# Patient Record
Sex: FEMALE | Race: WHITE | NOT HISPANIC OR LATINO | Employment: OTHER | URBAN - METROPOLITAN AREA
[De-identification: names, ages, dates, MRNs, and addresses within clinical notes are randomized per-mention and may not be internally consistent; named-entity substitution may affect disease eponyms.]

---

## 2017-03-02 ENCOUNTER — HOSPITAL ENCOUNTER (OUTPATIENT)
Dept: MAMMOGRAPHY | Facility: CLINIC | Age: 72
Discharge: HOME | End: 2017-03-02
Attending: EMERGENCY MEDICINE | Admitting: EMERGENCY MEDICINE
Payer: MEDICARE

## 2017-03-02 DIAGNOSIS — Z12.31 VISIT FOR SCREENING MAMMOGRAM: ICD-10-CM

## 2017-03-02 PROCEDURE — 77063 BREAST TOMOSYNTHESIS BI: CPT

## 2017-03-06 ENCOUNTER — TRANSFERRED RECORDS (OUTPATIENT)
Dept: HEALTH INFORMATION MANAGEMENT | Facility: CLINIC | Age: 72
End: 2017-03-06

## 2017-03-20 DIAGNOSIS — I10 ESSENTIAL HYPERTENSION WITH GOAL BLOOD PRESSURE LESS THAN 130/80: ICD-10-CM

## 2017-03-20 NOTE — TELEPHONE ENCOUNTER
lisinopril 20mg      Last Written Prescription Date: 05/12/16  Last Fill Quantity: 90, # refills: 2  Last Office Visit with G, P or [facility] prescribing provider: 08/05/16       Potassium   Date Value Ref Range Status   08/05/2016 4.3 3.4 - 5.3 mmol/L Final     Creatinine   Date Value Ref Range Status   08/05/2016 0.76 0.52 - 1.04 mg/dL Final     BP Readings from Last 3 Encounters:   08/05/16 112/74   07/27/16 135/81   05/12/16 120/80

## 2017-03-21 RX ORDER — LISINOPRIL 20 MG/1
20 TABLET ORAL DAILY
Qty: 90 TABLET | Refills: 1 | Status: SHIPPED | OUTPATIENT
Start: 2017-03-21 | End: 2017-09-22

## 2017-04-20 ENCOUNTER — HOSPITAL ENCOUNTER (OUTPATIENT)
Dept: CT IMAGING | Facility: CLINIC | Age: 72
Discharge: HOME | End: 2017-04-20
Attending: INTERNAL MEDICINE | Admitting: INTERNAL MEDICINE
Payer: MEDICARE

## 2017-04-20 DIAGNOSIS — R91.8 PULMONARY NODULES: ICD-10-CM

## 2017-04-20 LAB
CREAT BLD-MCNC: 0.8 MG/DL (ref 0.52–1.04)
GFR SERPL CREATININE-BSD FRML MDRD: 70 ML/MIN/1.7M2

## 2017-04-20 PROCEDURE — 71260 CT THORAX DX C+: CPT

## 2017-04-20 PROCEDURE — 82565 ASSAY OF CREATININE: CPT

## 2017-04-20 PROCEDURE — 25500064 ZZH RX 255 OP 636: Performed by: INTERNAL MEDICINE

## 2017-04-20 PROCEDURE — 25000125 ZZHC RX 250: Performed by: INTERNAL MEDICINE

## 2017-04-20 RX ORDER — IOPAMIDOL 755 MG/ML
75 INJECTION, SOLUTION INTRAVASCULAR ONCE
Status: COMPLETED | OUTPATIENT
Start: 2017-04-20 | End: 2017-04-20

## 2017-04-20 RX ADMIN — IOPAMIDOL 75 ML: 755 INJECTION, SOLUTION INTRAVENOUS at 10:43

## 2017-04-20 RX ADMIN — SODIUM CHLORIDE 70 ML: 9 INJECTION, SOLUTION INTRAVENOUS at 10:44

## 2017-04-21 NOTE — PROGRESS NOTES
[facility]  [address] [address]  Tel. [phone]  Fax [phone]      Dear Alondra,   Your recent CT scan lungs results were reviewed and are within acceptable limits. Please continue with treatment, and follow up plans as discussed and do not hesitate to contact me with any questions or concerns, or if your symptoms do not improve or resolve.  Stay healthy!    Regards,  Dr. Kramer  [facility]

## 2017-07-10 ENCOUNTER — OFFICE VISIT (OUTPATIENT)
Dept: INTERNAL MEDICINE | Facility: CLINIC | Age: 72
End: 2017-07-10
Payer: COMMERCIAL

## 2017-07-10 VITALS
HEIGHT: 64 IN | HEART RATE: 69 BPM | OXYGEN SATURATION: 95 % | BODY MASS INDEX: 28.03 KG/M2 | SYSTOLIC BLOOD PRESSURE: 110 MMHG | TEMPERATURE: 98.6 F | WEIGHT: 164.2 LBS | DIASTOLIC BLOOD PRESSURE: 84 MMHG

## 2017-07-10 DIAGNOSIS — E53.8 VITAMIN B12 DEFICIENCY WITHOUT ANEMIA: ICD-10-CM

## 2017-07-10 DIAGNOSIS — I10 ESSENTIAL HYPERTENSION: ICD-10-CM

## 2017-07-10 DIAGNOSIS — E53.8 VITAMIN B12 DEFICIENCY (NON ANEMIC): ICD-10-CM

## 2017-07-10 DIAGNOSIS — E78.5 HYPERLIPIDEMIA LDL GOAL <130: ICD-10-CM

## 2017-07-10 DIAGNOSIS — H26.9 CATARACT OF BOTH EYES, UNSPECIFIED CATARACT TYPE: ICD-10-CM

## 2017-07-10 DIAGNOSIS — Z01.818 PREOP GENERAL PHYSICAL EXAM: Primary | ICD-10-CM

## 2017-07-10 PROCEDURE — 80048 BASIC METABOLIC PNL TOTAL CA: CPT | Performed by: PHYSICIAN ASSISTANT

## 2017-07-10 PROCEDURE — 99214 OFFICE O/P EST MOD 30 MIN: CPT | Performed by: PHYSICIAN ASSISTANT

## 2017-07-10 PROCEDURE — 36415 COLL VENOUS BLD VENIPUNCTURE: CPT | Performed by: PHYSICIAN ASSISTANT

## 2017-07-10 NOTE — NURSING NOTE
"Chief Complaint   Patient presents with     Pre-Op Exam     catract surgery 07/17       Initial /84 (BP Location: Left arm, Patient Position: Chair, Cuff Size: Adult Regular)  Pulse 69  Temp 98.6  F (37  C) (Oral)  Ht 5' 4\" (1.626 m)  Wt 164 lb 3.2 oz (74.5 kg)  SpO2 95%  BMI 28.18 kg/m2 Estimated body mass index is 28.18 kg/(m^2) as calculated from the following:    Height as of this encounter: 5' 4\" (1.626 m).    Weight as of this encounter: 164 lb 3.2 oz (74.5 kg).  Medication Reconciliation: complete    "

## 2017-07-10 NOTE — MR AVS SNAPSHOT
After Visit Summary   7/10/2017    Alondra Morgan    MRN: [id number]           Patient Information     Date Of Birth          1945        Visit Information        Provider Department      7/10/2017 10:40 AM Gerda Camarillo PA-C [facility]        Today's Diagnoses     Preop general physical exam    -  1    Cataract of both eyes, unspecified cataract type        Essential hypertension        Vitamin B12 deficiency (non anemic)        Hyperlipidemia LDL goal <130        Vitamin B12 deficiency without anemia          Care Instructions      Before Your Surgery      Call your surgeon if there is any change in your health. This includes signs of a cold or flu (such as a sore throat, runny nose, cough, rash or fever).    Do not smoke, drink alcohol or take over the counter medicine (unless your surgeon or primary care doctor tells you to) for the 24 hours before and after surgery.    If you take prescribed drugs: Follow your doctor s orders about which medicines to take and which to stop until after surgery.    Eating and drinking prior to surgery: follow the instructions from your surgeon    Take a shower or bath the night before surgery. Use the soap your surgeon gave you to gently clean your skin. If you do not have soap from your surgeon, use your regular soap. Do not shave or scrub the surgery site.  Wear clean pajamas and have clean sheets on your bed.           Follow-ups after your visit        Who to contact     If you have questions or need follow up information about today's clinic visit or your schedule please contact [facility] directly at [phone].  Normal or non-critical lab and imaging results will be communicated to you by MyChart, letter or phone within 4 business days after the clinic has received the results. If you do not hear from us within 7 days, please contact the clinic through MyChart or phone. If you have a critical  "or abnormal lab result, we will notify you by phone as soon as possible.  Submit refill requests through [employer] or call your pharmacy and they will forward the refill request to us. Please allow 3 business days for your refill to be completed.          Additional Information About Your Visit        Medialetshart Information     [employer] gives you secure access to your electronic health record. If you see a primary care provider, you can also send messages to your care team and make appointments. If you have questions, please call your primary care clinic.  If you do not have a primary care provider, please call [phone] and they will assist you.        Care EveryWhere ID     This is your Care EveryWhere ID. This could be used by other organizations to access your [facility] medical records  [id number]        Your Vitals Were     Pulse Temperature Height Pulse Oximetry BMI (Body Mass Index)       69 98.6  F (37  C) (Oral) 5' 4\" (1.626 m) 95% 28.18 kg/m2        Blood Pressure from Last 3 Encounters:   07/10/17 110/84   08/05/16 112/74   07/27/16 135/81    Weight from Last 3 Encounters:   07/10/17 164 lb 3.2 oz (74.5 kg)   08/05/16 159 lb 9.6 oz (72.4 kg)   05/12/16 161 lb 3.2 oz (73.1 kg)              We Performed the Following     Basic metabolic panel        Primary Care Provider Office Phone # Fax #    Roxana Kramer [phone]       [facility] [address] [facility] [address]        Equal Access to Services     EMANUEL GARCÍA : Hadii bruna bernard hadasho Sobrandoali, waaxda luqadaha, qaybta kaalmada adeegyaadrian, andrew orellana. So [facility] [phone].    ATENCIÓN: Si habla español, tiene a dill disposición servicios gratuitos de asistencia lingüística. Llame al [phone].    We comply with applicable federal civil rights laws and [place] laws. We do not discriminate on the basis of race, color, national origin, age, disability sex, sexual orientation or gender " identity.            Thank you!     Thank you for choosing [facility]  for your care. Our goal is always to provide you with excellent care. Hearing back from our patients is one way we can continue to improve our services. Please take a few minutes to complete the written survey that you may receive in the mail after your visit with us. Thank you!             Your Updated Medication List - Protect others around you: Learn how to safely use, store and throw away your medicines at www.disposemymeds.org.          This list is accurate as of: 7/10/17  4:07 PM.  Always use your most recent med list.                   Brand Name Dispense Instructions for use Diagnosis    aspirin 81 MG tablet     100 tablet    Take 1 tablet (81 mg) by mouth daily INDICATION: FOR HEART AND CARDIOVASCULAR HEALTH    Hypertension       calcium-vitamin D 600-400 MG-UNIT per tablet    calcium 600 + D    100 tablet    Take 1 tablet by mouth 2 times daily FOR BONE HEALTH AND FOR VITAMIN D DEFICIENCY (LOW VITAMIN D)    Osteopenia       cholecalciferol 2000 UNITS tablet     100 tablet    Take 2 tablets by mouth daily INDICATION: TO TREAT VITAMIN D DEFICIENCY (LOW VITAMIN D)    Vitamin D deficiency       CLARITIN 10 MG tablet   Generic drug:  loratadine     3 MONTHS    ONE DAILY FOR ALLERGIES    Allergies       cyabnocobalamin 2500 MCG sublingual tablet    VITAMIN B-12    250 tablet    Take 2,500 mcg by mouth daily INDICATION: FOR VITAMIN B12 SUPPLEMENTATION - TO IMPROVE MEMORY, BALANCE, SLEEP AND MOOD, DIRECTIONS: PLEASE PLACE UNDER THE TONGUE TO DISSOLVE    Vitamin B12 deficiency (non anemic)       fish oil-omega-3 fatty acids 1000 MG capsule     100    THREE TIMES DAILY    Routine physical examination       fluticasone 50 MCG/ACT spray    FLONASE    16 g    Spray 2 sprays in nostril At Bedtime INDICATION: TO CONTROL NASAL ALLERGY SYMPTOMS, DO NOT BLOW NOSE FOR 30 MINUTES AFTER USING    Nasal congestion       lisinopril 20  MG tablet    PRINIVIL/ZESTRIL    90 tablet    Take 1 tablet (20 mg) by mouth daily INDICATION:TO LOWER BLOOD PRESSURE AND TO PRESERVE KIDNEY FUNCTION    Essential hypertension with goal blood pressure less than 130/80       methylcellulose (laxative) Powd     1418 g    Take 1 g by mouth 2 times daily (before meals) INDICATION: FIBER SUPPLEMENT/TO LOWER CHOLESTEROL    Hyperlipidemia LDL goal <130       MULTIVITAMIN PO      1 daily        Pantothenic Acid 250 MG Caps      Take 1 capsule by mouth daily        zoledronic Acid 5 MG/100ML Soln infusion    RECLAST    100 mL    Inject 100 mLs (5 mg) into the vein once for 1 dose TO TREAT OSTEOPENIA    Osteopenia

## 2017-07-10 NOTE — PROGRESS NOTES
[address] [address]  [phone]  Dept: [phone]    PRE-OP EVALUATION:  Today's date: 7/10/2017    Alondra Morgan (: 1945) presents for pre-operative evaluation assessment as requested by Dr. Gr.  She requires evaluation and anesthesia risk assessment prior to undergoing surgery/procedure for treatment of Cataract  .  Proposed procedure: bilateral cataract repair     Date of Surgery/ Procedure:  and 2017  Time of Surgery/ Procedure: [address] [place] [address]/Surgical Facility: [facility]   [phone]  Primary Physician: Roxana Kramer  Type of Anesthesia Anticipated: Local with MAC    Patient has a Health Care Directive or Living Will:  YES     1. NO - Do you have a history of heart attack, stroke, stent, bypass or surgery on an artery in the head, neck, heart or legs?  2. NO - Do you ever have any pain or discomfort in your chest?  3. NO - Do you have a history of  Heart Failure?  4. NO - Are you troubled by shortness of breath when: walking on the level, up a slight hill or at night?  5. NO - Do you currently have a cold, bronchitis or other respiratory infection?  6. NO - Do you have a cough, shortness of breath or wheezing?  7. NO - Do you sometimes get pains in the calves of your legs when you walk?  8. NO - Do you or anyone in your family have previous history of blood clots?  9. NO - Do you or does anyone in your family have a serious bleeding problem such as prolonged bleeding following surgeries or cuts?  10. NO - Have you ever had problems with anemia or been told to take iron pills?  11. NO - Have you had any abnormal blood loss such as black, tarry or bloody stools, or abnormal vaginal bleeding?  12. NO - Have you ever had a blood transfusion?  13. NO - Have you or any of your relatives ever had problems with anesthesia?  14. NO - Do you have sleep apnea, excessive snoring or daytime drowsiness?  15. NO - Do you have  any prosthetic heart valves?  16. NO - Do you have prosthetic joints?  17. NO - Is there any chance that you may be pregnant?      HPI:                                                      Brief HPI related to upcoming procedure: cataract      See problem list for active medical problems.  Problems all longstanding and stable, except as noted/documented.  See ROS for pertinent symptoms related to these conditions.                                                                                                  .  HYPERTENSION - Patient has longstanding history of mod-severe HTN , currently denies any symptoms referable to elevated blood pressure. Specifically denies chest pain, palpitations, dyspnea, orthopnea, PND or peripheral edema. Blood pressure readings have been in normal range. Current medication regimen is as listed below. Patient denies any side effects of medication.                                                                                                                                                                                          .  HYPERLIPIDEMIA - Patient has a long history of significant Hyperlipidemia requiring medication for treatment with recent good control. Patient reports no problems or side effects with the medication.                                                                                                                                                       .    MEDICAL HISTORY:                                                      Patient Active Problem List    Diagnosis Date Noted     Nasal congestion 05/12/2016     Priority: Medium     Vitamin B12 deficiency (non anemic) 05/12/2016     Priority: Medium     Nonspecific abnormal results of thyroid function study 05/12/2016     Priority: Medium     Postmenopausal status 12/04/2015     Priority: Medium     Essential hypertension 05/21/2015     Hypertension 07/30/2014     Alcohol consuption of more than two drinks per day  01/28/2013     Abnormal LFTs 01/28/2013     MODERATE ALCOHOL INTAKE       Tubular adenoma of colon 01/28/2013     Heme positive stool 01/28/2013     FH: colon cancer 01/28/2013     Hyperlipidemia LDL goal <130 01/28/2013     Vitamin B12 deficiency without anemia 10/03/2011     Diagnosis updated by automated process. Provider to review and confirm.       Family history of colon cancer 10/03/2011     Asymptomatic postmenopausal status 10/03/2011     Problem list name updated by automated process. Provider to review       FHx: osteoporosis 10/03/2011     Melanocytic nevus 10/03/2011     (Problem list name updated by automated process. Provider to review and confirm.)       Advanced directives, counseling/discussion 09/28/2011     Advance Directive Problem List Overview:   Name Relationship Phone    Primary Health Care Agent            Alternative Health Care Agent          Discussed advance care planning with patient; information given to patient to review. 9/28/2011          Vitamin D deficiency 07/06/2011     Osteopenia      Arthritis       Past Medical History:   Diagnosis Date     Arthritis      Atypical hyperplasia of breasts      Osteopenia      Polio      Polyp, colon     Adenomatous     Past Surgical History:   Procedure Laterality Date     C VAG HYST,RMV TUBE/OVARY       HC EXCISION BREAST LESION, OPEN >=1      ATYPICAL HYPERPLASIA X 2     TONSILLECTOMY       Current Outpatient Prescriptions   Medication Sig Dispense Refill     lisinopril (PRINIVIL/ZESTRIL) 20 MG tablet Take 1 tablet (20 mg) by mouth daily INDICATION:TO LOWER BLOOD PRESSURE AND TO PRESERVE KIDNEY FUNCTION 90 tablet 1     calcium-vitamin D (CALCIUM 600 + D) 600-400 MG-UNIT per tablet Take 1 tablet by mouth 2 times daily FOR BONE HEALTH AND FOR VITAMIN D DEFICIENCY (LOW VITAMIN D) 100 tablet PRN     cholecalciferol 2000 UNITS tablet Take 2 tablets by mouth daily INDICATION: TO TREAT VITAMIN D DEFICIENCY (LOW VITAMIN D) 100 tablet prn      Cyanocobalamin (VITAMIN  B-12) 2500 MCG tablet Take 2,500 mcg by mouth daily INDICATION: FOR VITAMIN B12 SUPPLEMENTATION - TO IMPROVE MEMORY, BALANCE, SLEEP AND MOOD, DIRECTIONS: PLEASE PLACE UNDER THE TONGUE TO DISSOLVE 250 tablet 3     methylcellulose, laxative, POWD Take 1 g by mouth 2 times daily (before meals) INDICATION: FIBER SUPPLEMENT/TO LOWER CHOLESTEROL 1418 g prn     Pantothenic Acid 250 MG CAPS Take 1 capsule by mouth daily       aspirin 81 MG tablet Take 1 tablet (81 mg) by mouth daily INDICATION: FOR HEART AND CARDIOVASCULAR HEALTH 100 tablet 3     CLARITIN 10 MG OR TABS ONE DAILY FOR ALLERGIES 3 MONTHS 1 YEAR     FISH OIL 1000 MG OR CAPS THREE TIMES DAILY 100 3     MULTIVITAMIN OR 1 daily       fluticasone (FLONASE) 50 MCG/ACT nasal spray Spray 2 sprays in nostril At Bedtime INDICATION: TO CONTROL NASAL ALLERGY SYMPTOMS, DO NOT BLOW NOSE FOR 30 MINUTES AFTER USING (Patient not taking: Reported on 7/10/2017) 16 g PRN     zoledronic Acid (RECLAST) 5 MG/100ML SOLN Inject 100 mLs (5 mg) into the vein once for 1 dose TO TREAT OSTEOPENIA 100 mL 0     OTC products: None, except as noted above and no recent use of OTC ASA, NSAIDS or Steroids    Allergies   Allergen Reactions     Nkda [No Known Drug Allergies]       Latex Allergy: NO    Social History   Substance Use Topics     Smoking status: Former Smoker     Packs/day: 1.00     Years: 40.00     Types: Cigarettes     Quit date: 10/7/1983     Smokeless tobacco: Never Used     Alcohol use Yes      Comment: 4 drinks per week     History   Drug Use No       REVIEW OF SYSTEMS:                                                    C: NEGATIVE for fever, chills, change in weight  INTEGUMENTARY/SKIN: NEGATIVE for worrisome rashes, moles or lesions  EYES: NEGATIVE for vision changes or irritation  E/M: NEGATIVE for ear, mouth and throat problems  R: NEGATIVE for significant cough or SOB  CV: NEGATIVE for chest pain, palpitations or peripheral edema  GI: NEGATIVE for  "nausea, abdominal pain, heartburn, or change in bowel habits  MUSCULOSKELETAL: NEGATIVE for significant arthralgias or myalgia  ENDOCRINE: NEGATIVE for temperature intolerance, skin/hair changes  HEME/ALLERGY/IMMUNE: NEGATIVE for bleeding problems  PSYCHIATRIC: NEGATIVE for changes in mood or affect  ROS otherwise negative    EXAM:                                                    /84 (BP Location: Left arm, Patient Position: Chair, Cuff Size: Adult Regular)  Pulse 69  Temp 98.6  F (37  C) (Oral)  Ht 5' 4\" (1.626 m)  Wt 164 lb 3.2 oz (74.5 kg)  SpO2 95%  BMI 28.18 kg/m2  GENERAL APPEARANCE: healthy, alert and no distress  EYES: Eyes grossly normal to inspection, PERRL and conjunctivae and sclerae normal  HENT: ear canals and TM's normal and nose and mouth without ulcers or lesions  RESP: lungs clear to auscultation - no rales, rhonchi or wheezes  CV: regular rate and rhythm, normal S1 S2, no S3 or S4 and no murmur, click or rub   ABDOMEN: soft, nontender, no HSM or masses and bowel sounds normal  MS: extremities normal- no gross deformities noted  SKIN: no suspicious lesions or rashes  NEURO: Normal strength and tone, sensory exam grossly normal, mentation intact and speech normal  PSYCH: mentation appears normal and affect normal/bright    DIAGNOSTICS:                                                      EKG: Not indicated due to non-vascular surgery and low risk of event (age <65 and without cardiac risk factors)  Labs Drawn and in Process:   Unresulted Labs Ordered in the Past 30 Days of this Admission     Date and Time Order Name Status Description    7/10/2017 1039 BASIC METABOLIC PANEL In process           Recent Labs   Lab Test  08/05/16   1515  07/27/16 2002 05/05/16   0845   04/13/14   1212   HGB   --   12.3  13.2   < >  12.7   PLT   --   299  259   < >  260   INR   --    --    --    --   0.95   NA  135  130*  133   < >  137   POTASSIUM  4.3  3.8  4.3   < >  4.8   CR  0.76  0.69  0.80   < >  " 0.73    < > = values in this interval not displayed.        IMPRESSION:                                                    Reason for surgery/procedure: cataract  Diagnosis/reason for consult: HTN, hyperlipidemia,     The proposed surgical procedure is considered LOW risk.    REVISED CARDIAC RISK INDEX  The patient has the following serious cardiovascular risks for perioperative complications such as (MI, PE, VFib and 3  AV Block):  No serious cardiac risks  INTERPRETATION: 0 risks: Class I (very low risk - 0.4% complication rate)    The patient has the following additional risks for perioperative complications:  No identified additional risks      ICD-10-CM    1. Preop general physical exam Z01.818 Basic metabolic panel   2. Cataract of both eyes, unspecified cataract type H26.9 Basic metabolic panel   3. Essential hypertension I10 Basic metabolic panel   4. Vitamin B12 deficiency (non anemic) E53.8    5. Hyperlipidemia LDL goal <130 E78.5    6. Vitamin B12 deficiency without anemia E53.8        RECOMMENDATIONS:                                                          --Patient is to take all scheduled medications on the day of surgery EXCEPT for modifications listed below.    Anticoagulant or Antiplatelet Medication Use  Bleeding risk is low for this procedure (e.g. dental, skin, cataract)        ACE Inhibitor or Angiotensin Receptor Blocker (ARB) Use  Ace inhibitor or Angiotensin Receptor Blocker (ARB) and will continue this medication     APPROVAL GIVEN to proceed with proposed procedure, without further diagnostic evaluation       Signed Electronically by: Gerda Camarillo PA-C    Copy of this evaluation report is provided to requesting physician.    [facility] Preop Guidelines

## 2017-07-11 LAB
ANION GAP SERPL CALCULATED.3IONS-SCNC: 12 MMOL/L (ref 3–14)
BUN SERPL-MCNC: 15 MG/DL (ref 7–30)
CALCIUM SERPL-MCNC: 9.9 MG/DL (ref 8.5–10.1)
CHLORIDE SERPL-SCNC: 100 MMOL/L (ref 94–109)
CO2 SERPL-SCNC: 25 MMOL/L (ref 20–32)
CREAT SERPL-MCNC: 0.7 MG/DL (ref 0.52–1.04)
GFR SERPL CREATININE-BSD FRML MDRD: 81 ML/MIN/1.7M2
GLUCOSE SERPL-MCNC: 77 MG/DL (ref 70–99)
POTASSIUM SERPL-SCNC: 4.6 MMOL/L (ref 3.4–5.3)
SODIUM SERPL-SCNC: 137 MMOL/L (ref 133–144)

## 2017-08-10 ENCOUNTER — TELEPHONE (OUTPATIENT)
Dept: INTERNAL MEDICINE | Facility: CLINIC | Age: 72
End: 2017-08-10

## 2017-08-10 DIAGNOSIS — I10 ESSENTIAL HYPERTENSION: ICD-10-CM

## 2017-08-10 DIAGNOSIS — R79.89 ABNORMAL LFTS: ICD-10-CM

## 2017-08-10 DIAGNOSIS — E55.9 VITAMIN D DEFICIENCY: Primary | ICD-10-CM

## 2017-08-10 DIAGNOSIS — E78.5 HYPERLIPIDEMIA LDL GOAL <130: ICD-10-CM

## 2017-08-10 NOTE — TELEPHONE ENCOUNTER
Reason for Call: Request for an order or referral    Order or referral being requested labs before October 24th    Date needed: as soon as possible    Has the patient been seen by the PCP for this problem? YES    Additional comments pt is having physical on October 24th needs labs before    Phone number Patient can be reached at  Home number on file [phone] (home)    Best Time anytime    Can we leave a detailed message on this number?  YES    Call taken on 8/10/2017 at 9:46 AM by Zaria Trinh

## 2017-09-22 DIAGNOSIS — I10 ESSENTIAL HYPERTENSION WITH GOAL BLOOD PRESSURE LESS THAN 130/80: ICD-10-CM

## 2017-09-25 RX ORDER — LISINOPRIL 20 MG/1
TABLET ORAL
Qty: 90 TABLET | Refills: 2 | Status: SHIPPED | OUTPATIENT
Start: 2017-09-25 | End: 2017-10-24

## 2017-09-25 NOTE — TELEPHONE ENCOUNTER
Lisinopril       Last Written Prescription Date: 3/21/17  Last Fill Quantity: 90, # refills: 1  Last Office Visit with [facility], [facility] or [facility] prescribing provider: 7/10/17  Next 5 appointments (look out 90 days)     Oct 24, 2017  2:30 PM CDT   PHYSICAL with Roxana Kramer MD   [facility]    [address] [place] [address] [phone]                   Potassium   Date Value Ref Range Status   07/10/2017 4.6 3.4 - 5.3 mmol/L Final     Creatinine   Date Value Ref Range Status   07/10/2017 0.70 0.52 - 1.04 mg/dL Final     BP Readings from Last 3 Encounters:   07/10/17 110/84   08/05/16 112/74   07/27/16 135/81

## 2017-10-16 ENCOUNTER — TRANSFERRED RECORDS (OUTPATIENT)
Dept: HEALTH INFORMATION MANAGEMENT | Facility: CLINIC | Age: 72
End: 2017-10-16

## 2017-10-24 ENCOUNTER — OFFICE VISIT (OUTPATIENT)
Dept: INTERNAL MEDICINE | Facility: CLINIC | Age: 72
End: 2017-10-24
Payer: COMMERCIAL

## 2017-10-24 VITALS
DIASTOLIC BLOOD PRESSURE: 70 MMHG | RESPIRATION RATE: 18 BRPM | HEART RATE: 87 BPM | SYSTOLIC BLOOD PRESSURE: 110 MMHG | OXYGEN SATURATION: 95 % | TEMPERATURE: 97.3 F | WEIGHT: 167 LBS | BODY MASS INDEX: 27.82 KG/M2 | HEIGHT: 65 IN

## 2017-10-24 DIAGNOSIS — E78.5 HYPERLIPIDEMIA LDL GOAL <130: ICD-10-CM

## 2017-10-24 DIAGNOSIS — E53.8 VITAMIN B12 DEFICIENCY (NON ANEMIC): ICD-10-CM

## 2017-10-24 DIAGNOSIS — Z85.828 HISTORY OF BASAL CELL CARCINOMA: ICD-10-CM

## 2017-10-24 DIAGNOSIS — M85.80 OSTEOPENIA, UNSPECIFIED LOCATION: ICD-10-CM

## 2017-10-24 DIAGNOSIS — E55.9 VITAMIN D DEFICIENCY: ICD-10-CM

## 2017-10-24 DIAGNOSIS — J30.2 SEASONAL ALLERGIC RHINITIS, UNSPECIFIED CHRONICITY, UNSPECIFIED TRIGGER: ICD-10-CM

## 2017-10-24 DIAGNOSIS — R09.81 NASAL CONGESTION: ICD-10-CM

## 2017-10-24 DIAGNOSIS — I10 ESSENTIAL HYPERTENSION WITH GOAL BLOOD PRESSURE LESS THAN 130/80: ICD-10-CM

## 2017-10-24 DIAGNOSIS — R94.6 NONSPECIFIC ABNORMAL RESULTS OF THYROID FUNCTION STUDY: Primary | ICD-10-CM

## 2017-10-24 DIAGNOSIS — Z78.0 ASYMPTOMATIC POSTMENOPAUSAL STATUS: ICD-10-CM

## 2017-10-24 DIAGNOSIS — L98.9 SKIN LESION: ICD-10-CM

## 2017-10-24 PROCEDURE — 99214 OFFICE O/P EST MOD 30 MIN: CPT | Performed by: INTERNAL MEDICINE

## 2017-10-24 RX ORDER — LISINOPRIL 20 MG/1
20 TABLET ORAL DAILY
Qty: 90 TABLET | Refills: 2 | Status: SHIPPED | OUTPATIENT
Start: 2017-10-24 | End: 2018-10-26

## 2017-10-24 RX ORDER — LORATADINE 10 MG/1
TABLET ORAL
Qty: 90 TABLET | Status: SHIPPED | OUTPATIENT
Start: 2017-10-24 | End: 2020-11-09

## 2017-10-24 RX ORDER — CYANOCOBALAMIN (VITAMIN B-12) 2500 MCG
2500 TABLET, SUBLINGUAL SUBLINGUAL DAILY
Qty: 250 TABLET | Refills: 3 | Status: SHIPPED | OUTPATIENT
Start: 2017-10-24 | End: 2018-10-26

## 2017-10-24 RX ORDER — FLUTICASONE PROPIONATE 50 MCG
2 SPRAY, SUSPENSION (ML) NASAL AT BEDTIME
Qty: 16 G | Status: SHIPPED | OUTPATIENT
Start: 2017-10-24 | End: 2017-11-17

## 2017-10-24 RX ORDER — ZOLEDRONIC ACID 5 MG/100ML
5 INJECTION, SOLUTION INTRAVENOUS ONCE
Qty: 100 ML | Refills: 0 | Status: CANCELLED | OUTPATIENT
Start: 2017-10-24 | End: 2017-10-24

## 2017-10-24 NOTE — PROGRESS NOTES
SUBJECTIVE:   Alondra Morgan is a 72 year old female who presents for Preventive Visit.    [facility] for HPI/ROS submitted by the patient on 10/24/2017   Annual Exam:  Getting at least 3 servings of Calcium per day:: Yes  Bi-annual eye exam:: Yes  Dental care twice a year:: Yes  Sleep apnea or symptoms of sleep apnea:: None  Frequency of exercise:: 2-3 days/week  Taking medications regularly:: Yes  Medication side effects:: None  Additional concerns today:: No  PHQ-2 Score: 0  Duration of exercise:: 30-45 minut    COGNITIVE SCREEN  1) Repeat 3 items (Banana, Sunrise, Chair)    2) Clock draw:   3) 3 item recall: Recalls 3 objects  Results: NORMAL clock, 1-2 items recalled: COGNITIVE IMPAIRMENT LESS LIKELY    Mini-CogTM Copyright ANDREW Jarvis. Licensed by the author for use in [facility]; reprinted with permission (anabela@[email or web address]). All rights reserved.            Hypertension Follow-up      Outpatient blood pressures are not being checked.    Low Salt Diet: no added salt      Alondra reports feeling really good. She reports feeling better since she started taking vitamin supplements and has noted significant improvement with regards to fatigue and muscle aches and pains.  Reviewed and updated as needed this visit by clinical staffTobacco  Allergies  Meds  Med Hx  Surg Hx  Fam Hx  Soc Hx        Reviewed and updated as needed this visit by Provider        Social History   Substance Use Topics     Smoking status: Former Smoker     Packs/day: 1.00     Years: 40.00     Types: Cigarettes     Quit date: 10/7/1983     Smokeless tobacco: Never Used     Alcohol use Yes      Comment: 4 drinks per week       The patient does not drink >3 drinks per day nor >7 drinks per week.    Today's PHQ-2 Score:   PHQ-2 ( 1999 [employer]) 10/24/2017 10/24/2017   Q1: Little interest or pleasure in doing things 0 0   Q2: Feeling down, depressed or hopeless 0 0   PHQ-2 Score 0 0   Q1: Little interest  "or pleasure in doing things Not at all -   Q2: Feeling down, depressed or hopeless Not at all -   PHQ-2 Score 0 -         Do you feel safe in your environment - Yes    Do you have a Health Care Directive?: Yes: Patient states has Advance Directive and will bring in a copy to clinic.    Current providers sharing in care for this patient include:   Patient Care Team:  Roxana Kramer MD as PCP - General      Hearing impairment: No    Ability to successfully perform activities of daily living: Yes, no assistance needed     Fall risk:  Fallen 2 or more times in the past year?: No  Any fall with injury in the past year?: No      Home safety:  none identified  click delete button to remove this line now    The following health maintenance items are reviewed in Epic and correct as of today:  Health Maintenance   Topic Date Due     DEXA Q2 YR  02/26/2017     CMP Q1 YR  05/05/2017     LIPID MONITORING Q1 YEAR  05/05/2017     FALL RISK ASSESSMENT  05/12/2017     TSH Q1 YEAR  08/05/2017     INFLUENZA VACCINE (SYSTEM ASSIGNED)  09/01/2017     CBC Q1 YR  10/24/2018     MAMMO SCREEN Q2 YR (SYSTEM ASSIGNED)  03/02/2019     ADVANCE DIRECTIVE PLANNING Q5 YRS  05/12/2021     COLON CANCER SCREEN (SYSTEM ASSIGNED)  10/24/2021     TETANUS IMMUNIZATION (SYSTEM ASSIGNED)  12/11/2022     PNEUMOCOCCAL  Completed     Labs reviewed in EPIC      ROS:  14 point ROS reviewed in detail and negative     OBJECTIVE:   /70  Pulse 87  Temp 97.3  F (36.3  C) (Oral)  Resp 18  Ht 5' 5\" (1.651 m)  Wt 167 lb (75.8 kg)  SpO2 95%  BMI 27.79 kg/m2 Estimated body mass index is 27.79 kg/(m^2) as calculated from the following:    Height as of this encounter: 5' 5\" (1.651 m).    Weight as of this encounter: 167 lb (75.8 kg).  EXAM:   GENERAL APPEARANCE: healthy, alert and no distress  EYES: Eyes grossly normal to inspection, PERRL and conjunctivae and sclerae normal  HENT: ear canals and TM's normal, nose and mouth without ulcers or lesions, " "oropharynx clear and oral mucous membranes moist  NECK: no adenopathy, no asymmetry, masses, or scars and thyroid normal to palpation  RESP: lungs clear to auscultation - no rales, rhonchi or wheezes  BREAST: normal without masses, tenderness or nipple discharge and no palpable axillary masses or adenopathy  CV: regular rate and rhythm, normal S1 S2, no S3 or S4, no murmur, click or rub, no peripheral edema and peripheral pulses strong  ABDOMEN: soft, nontender, no hepatosplenomegaly, no masses and bowel sounds normal  MS: no musculoskeletal defects are noted and gait is age appropriate without ataxia  SKIN: no suspicious lesions or rashes  NEURO: Normal strength and tone, sensory exam grossly normal, mentation intact and speech normal  PSYCH: mentation appears normal and affect normal/bright    ASSESSMENT / PLAN:       End of Life Planning:  Patient currently has an advanced directive: Yes.  Practitioner is supportive of decision.    COUNSELING:  Reviewed preventive health counseling, as reflected in patient instructions       Regular exercise       Healthy diet/nutrition       Immunizations    UTD              Estimated body mass index is 27.79 kg/(m^2) as calculated from the following:    Height as of this encounter: 5' 5\" (1.651 m).    Weight as of this encounter: 167 lb (75.8 kg).     reports that she quit smoking about 34 years ago. Her smoking use included Cigarettes. She has a 40.00 pack-year smoking history. She has never used smokeless tobacco.        Appropriate preventive services were discussed with this patient, including applicable screening as appropriate for cardiovascular disease, diabetes, osteopenia/osteoporosis, and glaucoma.  As appropriate for age/gender, discussed screening for colorectal cancer, prostate cancer, breast cancer, and cervical cancer. Checklist reviewing preventive services available has been given to the patient.    Reviewed patients plan of care and provided an AVS. The Basic " Care Plan (routine screening as documented in Health Maintenance) for Alondra meets the Care Plan requirement. This Care Plan has been established and reviewed with the Patient.    Counseling Resources:  ATP IV Guidelines  Pooled Cohorts Equation Calculator  Breast Cancer Risk Calculator  FRAX Risk Assessment  ICSI Preventive Guidelines  Dietary Guidelines for Americans, 2010  USDA's MyPlate  ASA Prophylaxis  Lung CA Screening    Roxana Kramer MD  [facility] for HPI/ROS submitted by the patient on 10/24/2017   Annual Exam:  Getting at least 3 servings of Calcium per day:: Yes  Bi-annual eye exam:: Yes  Dental care twice a year:: Yes  Sleep apnea or symptoms of sleep apnea:: None  Frequency of exercise:: 2-3 days/week  Taking medications regularly:: Yes  Medication side effects:: None  Additional concerns today:: No  PHQ-2 Score: 0  Duration of exercise:: 30-45 minutes

## 2017-10-24 NOTE — NURSING NOTE
"Chief Complaint   Patient presents with     Medicare Visit       Initial /70  Pulse 87  Temp 97.3  F (36.3  C) (Oral)  Resp 18  Ht 5' 4\" (1.626 m)  Wt 167 lb (75.8 kg)  SpO2 95%  BMI 28.67 kg/m2 Estimated body mass index is 28.67 kg/(m^2) as calculated from the following:    Height as of this encounter: 5' 4\" (1.626 m).    Weight as of this encounter: 167 lb (75.8 kg).  Blood pressure completed using cuff size: regular    "

## 2017-10-24 NOTE — MR AVS SNAPSHOT
After Visit Summary   10/24/2017    Alondra Morgan    MRN: [id number]           Patient Information     Date Of Birth          1945        Visit Information        Provider Department      10/24/2017 2:30 PM Roxana Kramer MD [facility]        Today's Diagnoses     Nonspecific abnormal results of thyroid function study    -  1    Essential hypertension with goal blood pressure less than 130/80        Osteopenia, unspecified location        Vitamin D deficiency        Nasal congestion        Vitamin B12 deficiency (non anemic)        Hyperlipidemia LDL goal <130        Seasonal allergic rhinitis, unspecified chronicity, unspecified trigger        Asymptomatic postmenopausal status        Skin lesion        History of basal cell carcinoma          Care Instructions    ** FOLLOW UP PLAN**:    PLEASE SCHEDULE FASTING LABS WITH OFFICE VISIT 1 MONTH FROM TODAY TO ESTABLISH CARE AND TO REVIEW TEST RESULTS      BE SURE TO SCHEDULE YOUR LAB DRAW APPOINTMENT FOR AT LEAST 5 DAYS BEFORE YOUR NEXT VISIT      YOU HAVE BEEN REFERRED FOR BONE DENSITY, YEARLY SKIN CHECK   PLEASE  MAKE SURE TO SCHEDULE BONE DENSITY APPOINTMENT(S) AT THE   OR BY CALLING THE NUMBER BELOW AND  YEARLY SKIN CHECK APPOINTMENT(S) BY TELEPHONE      YOU MAY CONTACT THE CLINIC IF ANY QUESTIONS OR CONCERNS -[phone] OR VIA [employer]           Preventive Health Recommendations    Female Ages 65 +    Yearly exam:     See your health care provider every year in order to  o Review health changes.   o Discuss preventive care.    o Review your medicines if your doctor has prescribed any.      You no longer need a yearly Pap test unless you've had an abnormal Pap test in the past 10 years. If you have vaginal symptoms, such as bleeding or discharge, be sure to talk with your provider about a Pap test.      Every 1 to 2 years, have a mammogram.  If you are over 69, talk with your health care provider about  whether or not you want to continue having screening mammograms.      Every 10 years, have a colonoscopy. Or, have a yearly FIT test (stool test). These exams will check for colon cancer.       Have a cholesterol test every 5 years, or more often if your doctor advises it.       Have a diabetes test (fasting glucose) every three years. If you are at risk for diabetes, you should have this test more often.       At age 65, have a bone density scan (DEXA) to check for osteoporosis (brittle bone disease).    Shots:    Get a flu shot each year.    Get a tetanus shot every 10 years.    Talk to your doctor about your pneumonia vaccines. There are now two you should receive - Pneumovax (PPSV 23) and Prevnar (PCV 13).    Talk to your doctor about the shingles vaccine.    Talk to your doctor about the hepatitis B vaccine.    Nutrition:     Eat at least 5 servings of fruits and vegetables each day.      Eat whole-grain bread, whole-wheat pasta and brown rice instead of white grains and rice.      Talk to your provider about Calcium and Vitamin D.     Lifestyle    Exercise at least 150 minutes a week (30 minutes a day, 5 days a week). This will help you control your weight and prevent disease.      Limit alcohol to one drink per day.      No smoking.       Wear sunscreen to prevent skin cancer.       See your dentist twice a year for an exam and cleaning.      See your eye doctor every 1 to 2 years to screen for conditions such as glaucoma, macular degeneration and cataracts.          Follow-ups after your visit        Additional Services     DERMATOLOGY REFERRAL       Your provider has referred you to: [facility] [phone]   [email or web address]    Please be aware that coverage of these services is subject to the terms and limitations of your health insurance plan.  Call member services at your health plan with any benefit or coverage questions.       Please bring the following with you to your appointment:    (1) Any X-Rays, CTs or MRIs which have been performed.  Contact the facility where they were done to arrange for  prior to your scheduled appointment.  Any new CT, MRI or other procedures ordered by your specialist must be performed at a [place] facility or coordinated by your clinic's referral office.  (2) List of current medications  (3) This referral request   (4) Any documents/labs given to you for this referral                  Future tests that were ordered for you today     Open Future Orders        Priority Expected Expires Ordered    DX Hip/Pelvis/Spine Routine 10/24/2017 10/23/2018 10/24/2017    T4 free Routine 10/24/2017 4/23/2018 10/24/2017    TSH Routine 10/24/2017 4/23/2018 10/24/2017            Who to contact     If you have questions or need follow up information about today's clinic visit or your schedule please contact [facility] directly at [phone].  Normal or non-critical lab and imaging results will be communicated to you by [employer], letter or phone within 4 business days after the clinic has received the results. If you do not hear from us within 7 days, please contact the clinic through [email or web address] or phone. If you have a critical or abnormal lab result, we will notify you by phone as soon as possible.  Submit refill requests through [email or web address] or call your pharmacy and they will forward the refill request to us. Please allow 3 business days for your refill to be completed.          Additional Information About Your Visit        [email or web address] Information     [email or web address] gives you secure access to your electronic health record. If you see a primary care provider, you can also send messages to your care team and make appointments. If you have questions, please call your primary care clinic.  If you do not have a primary care provider, please call [phone] and they will assist you.        Care EveryWhere ID   "   This is your Care EveryWhere ID. This could be used by other organizations to access your [place] medical records  [id number]        Your Vitals Were     Pulse Temperature Respirations Height Pulse Oximetry BMI (Body Mass Index)    87 97.3  F (36.3  C) (Oral) 18 5' 5\" (1.651 m) 95% 27.79 kg/m2       Blood Pressure from Last 3 Encounters:   10/24/17 110/70   07/10/17 110/84   08/05/16 112/74    Weight from Last 3 Encounters:   10/24/17 167 lb (75.8 kg)   07/10/17 164 lb 3.2 oz (74.5 kg)   08/05/16 159 lb 9.6 oz (72.4 kg)              We Performed the Following     DERMATOLOGY REFERRAL          Today's Medication Changes          These changes are accurate as of: 10/24/17  4:03 PM.  If you have any questions, ask your nurse or doctor.               These medicines have changed or have updated prescriptions.        Dose/Directions    lisinopril 20 MG tablet   Commonly known as:  PRINIVIL/ZESTRIL   This may have changed:  See the new instructions.   Used for:  Essential hypertension with goal blood pressure less than 130/80   Changed by:  Roxana Kramer MD        Dose:  20 mg   Take 1 tablet (20 mg) by mouth daily   Quantity:  90 tablet   Refills:  2       loratadine 10 MG tablet   Commonly known as:  CLARITIN   This may have changed:  See the new instructions.   Used for:  Seasonal allergic rhinitis, unspecified chronicity, unspecified trigger   Changed by:  Roxana Kramer MD        ONE DAILY FOR ALLERGIES   Quantity:  90 tablet   Refills:  prn            Where to get your medicines      These medications were sent to [id number] - [facility] [address]  [id number] [place] [address]     Phone:  [phone]     calcium-vitamin D 600-400 MG-UNIT per tablet    cholecalciferol 2000 UNITS tablet    fluticasone 50 MCG/ACT spray    lisinopril 20 MG tablet    loratadine 10 MG tablet    methylcellulose (laxative) Powd         Some of these will need a paper " prescription and others can be bought over the counter.  Ask your nurse if you have questions.     Bring a paper prescription for each of these medications     cyabnocobalamin 2500 MCG sublingual tablet                Primary Care Provider Office Phone # Fax #    Roxana Kramer [phone]       [address] [facility] [address]        Equal Access to Services     [facility] HospitalJUNE : Hadii aad ku hadasho Soomaali, waaxda luqadaha, qaybta kaalmada adeegyada, waxay idiin hayaan adeeg khronaldsh laTienaan ah. So [facility] [phone].    ATENCIÓN: Si habla español, tiene a dill disposición servicios gratuitos de asistencia lingüística. Llame al [phone].    We comply with applicable federal civil rights laws and [place] laws. We do not discriminate on the basis of race, color, national origin, age, disability, sex, sexual orientation, or gender identity.            Thank you!     Thank you for choosing [facility]  for your care. Our goal is always to provide you with excellent care. Hearing back from our patients is one way we can continue to improve our services. Please take a few minutes to complete the written survey that you may receive in the mail after your visit with us. Thank you!             Your Updated Medication List - Protect others around you: Learn how to safely use, store and throw away your medicines at www.disposemymeds.org.          This list is accurate as of: 10/24/17  4:03 PM.  Always use your most recent med list.                   Brand Name Dispense Instructions for use Diagnosis    aspirin 81 MG tablet     100 tablet    Take 1 tablet (81 mg) by mouth daily INDICATION: FOR HEART AND CARDIOVASCULAR HEALTH    Hypertension       calcium-vitamin D 600-400 MG-UNIT per tablet    calcium 600 + D    100 tablet    Take 1 tablet by mouth 2 times daily FOR BONE HEALTH AND FOR VITAMIN D DEFICIENCY (LOW VITAMIN D)    Osteopenia, unspecified location       cholecalciferol  2000 UNITS tablet     100 tablet    Take 2 tablets by mouth daily INDICATION: TO TREAT VITAMIN D DEFICIENCY (LOW VITAMIN D)    Vitamin D deficiency       cyabnocobalamin 2500 MCG sublingual tablet    VITAMIN B-12    250 tablet    Take 2,500 mcg by mouth daily INDICATION: FOR VITAMIN B12 SUPPLEMENTATION - TO IMPROVE MEMORY, BALANCE, SLEEP AND MOOD, DIRECTIONS: PLEASE PLACE UNDER THE TONGUE TO DISSOLVE    Vitamin B12 deficiency (non anemic)       fish oil-omega-3 fatty acids 1000 MG capsule     100    THREE TIMES DAILY    Routine physical examination       fluticasone 50 MCG/ACT spray    FLONASE    16 g    Spray 2 sprays in nostril At Bedtime INDICATION: TO CONTROL NASAL ALLERGY SYMPTOMS, DO NOT BLOW NOSE FOR 30 MINUTES AFTER USING    Nasal congestion       lisinopril 20 MG tablet    PRINIVIL/ZESTRIL    90 tablet    Take 1 tablet (20 mg) by mouth daily    Essential hypertension with goal blood pressure less than 130/80       loratadine 10 MG tablet    CLARITIN    90 tablet    ONE DAILY FOR ALLERGIES    Seasonal allergic rhinitis, unspecified chronicity, unspecified trigger       methylcellulose (laxative) Powd     1418 g    Take 1 g by mouth 2 times daily (before meals) INDICATION: FIBER SUPPLEMENT/TO LOWER CHOLESTEROL    Hyperlipidemia LDL goal <130       MULTIVITAMIN PO      1 daily        Pantothenic Acid 250 MG Caps      Take 1 capsule by mouth daily        zoledronic Acid 5 MG/100ML Soln infusion    RECLAST    100 mL    Inject 100 mLs (5 mg) into the vein once for 1 dose TO TREAT OSTEOPENIA    Osteopenia

## 2017-10-24 NOTE — PATIENT INSTRUCTIONS
** FOLLOW UP PLAN**:    PLEASE SCHEDULE FASTING LABS WITH OFFICE VISIT 1 MONTH FROM TODAY TO ESTABLISH CARE AND TO REVIEW TEST RESULTS      BE SURE TO SCHEDULE YOUR LAB DRAW APPOINTMENT FOR AT LEAST 5 DAYS BEFORE YOUR NEXT VISIT      YOU HAVE BEEN REFERRED FOR BONE DENSITY, YEARLY SKIN CHECK   PLEASE  MAKE SURE TO SCHEDULE BONE DENSITY APPOINTMENT(S) AT THE   OR BY CALLING THE NUMBER BELOW AND  YEARLY SKIN CHECK APPOINTMENT(S) BY TELEPHONE      YOU MAY CONTACT THE CLINIC IF ANY QUESTIONS OR CONCERNS -[phone] OR VIA [employer]           Preventive Health Recommendations    Female Ages 65 +    Yearly exam:     See your health care provider every year in order to  o Review health changes.   o Discuss preventive care.    o Review your medicines if your doctor has prescribed any.      You no longer need a yearly Pap test unless you've had an abnormal Pap test in the past 10 years. If you have vaginal symptoms, such as bleeding or discharge, be sure to talk with your provider about a Pap test.      Every 1 to 2 years, have a mammogram.  If you are over 69, talk with your health care provider about whether or not you want to continue having screening mammograms.      Every 10 years, have a colonoscopy. Or, have a yearly FIT test (stool test). These exams will check for colon cancer.       Have a cholesterol test every 5 years, or more often if your doctor advises it.       Have a diabetes test (fasting glucose) every three years. If you are at risk for diabetes, you should have this test more often.       At age 65, have a bone density scan (DEXA) to check for osteoporosis (brittle bone disease).    Shots:    Get a flu shot each year.    Get a tetanus shot every 10 years.    Talk to your doctor about your pneumonia vaccines. There are now two you should receive - Pneumovax (PPSV 23) and Prevnar (PCV 13).    Talk to your doctor about the shingles vaccine.    Talk to your doctor about the hepatitis B  vaccine.    Nutrition:     Eat at least 5 servings of fruits and vegetables each day.      Eat whole-grain bread, whole-wheat pasta and brown rice instead of white grains and rice.      Talk to your provider about Calcium and Vitamin D.     Lifestyle    Exercise at least 150 minutes a week (30 minutes a day, 5 days a week). This will help you control your weight and prevent disease.      Limit alcohol to one drink per day.      No smoking.       Wear sunscreen to prevent skin cancer.       See your dentist twice a year for an exam and cleaning.      See your eye doctor every 1 to 2 years to screen for conditions such as glaucoma, macular degeneration and cataracts.

## 2017-10-26 ENCOUNTER — RADIANT APPOINTMENT (OUTPATIENT)
Dept: BONE DENSITY | Facility: CLINIC | Age: 72
End: 2017-10-26
Attending: INTERNAL MEDICINE
Payer: COMMERCIAL

## 2017-10-26 DIAGNOSIS — E78.5 HYPERLIPIDEMIA LDL GOAL <130: ICD-10-CM

## 2017-10-26 DIAGNOSIS — R79.89 ABNORMAL LFTS: ICD-10-CM

## 2017-10-26 DIAGNOSIS — R94.6 NONSPECIFIC ABNORMAL RESULTS OF THYROID FUNCTION STUDY: ICD-10-CM

## 2017-10-26 DIAGNOSIS — M85.80 OSTEOPENIA, UNSPECIFIED LOCATION: ICD-10-CM

## 2017-10-26 DIAGNOSIS — I10 ESSENTIAL HYPERTENSION: ICD-10-CM

## 2017-10-26 DIAGNOSIS — E55.9 VITAMIN D DEFICIENCY: ICD-10-CM

## 2017-10-26 DIAGNOSIS — Z78.0 ASYMPTOMATIC POSTMENOPAUSAL STATUS: ICD-10-CM

## 2017-10-26 LAB
ALBUMIN SERPL-MCNC: 3.5 G/DL (ref 3.4–5)
ALP SERPL-CCNC: 95 U/L (ref 40–150)
ALT SERPL W P-5'-P-CCNC: 26 U/L (ref 0–50)
ANION GAP SERPL CALCULATED.3IONS-SCNC: 7 MMOL/L (ref 3–14)
AST SERPL W P-5'-P-CCNC: 16 U/L (ref 0–45)
BILIRUB SERPL-MCNC: 0.3 MG/DL (ref 0.2–1.3)
BUN SERPL-MCNC: 14 MG/DL (ref 7–30)
CALCIUM SERPL-MCNC: 8.9 MG/DL (ref 8.5–10.1)
CHLORIDE SERPL-SCNC: 104 MMOL/L (ref 94–109)
CHOLEST SERPL-MCNC: 206 MG/DL
CO2 SERPL-SCNC: 27 MMOL/L (ref 20–32)
CREAT SERPL-MCNC: 0.7 MG/DL (ref 0.52–1.04)
DEPRECATED CALCIDIOL+CALCIFEROL SERPL-MC: 75 UG/L (ref 20–75)
GFR SERPL CREATININE-BSD FRML MDRD: 82 ML/MIN/1.7M2
GLUCOSE SERPL-MCNC: 90 MG/DL (ref 70–99)
HDLC SERPL-MCNC: 78 MG/DL
LDLC SERPL CALC-MCNC: 111 MG/DL
NONHDLC SERPL-MCNC: 128 MG/DL
POTASSIUM SERPL-SCNC: 4.3 MMOL/L (ref 3.4–5.3)
PROT SERPL-MCNC: 7.1 G/DL (ref 6.8–8.8)
SODIUM SERPL-SCNC: 138 MMOL/L (ref 133–144)
T4 FREE SERPL-MCNC: 0.86 NG/DL (ref 0.76–1.46)
TRIGL SERPL-MCNC: 84 MG/DL
TSH SERPL DL<=0.005 MIU/L-ACNC: 1.88 MU/L (ref 0.4–4)

## 2017-10-26 PROCEDURE — 77085 DXA BONE DENSITY AXL VRT FX: CPT | Performed by: INTERNAL MEDICINE

## 2017-10-26 PROCEDURE — 84439 ASSAY OF FREE THYROXINE: CPT | Performed by: INTERNAL MEDICINE

## 2017-10-26 PROCEDURE — 36415 COLL VENOUS BLD VENIPUNCTURE: CPT | Performed by: INTERNAL MEDICINE

## 2017-10-26 PROCEDURE — 84443 ASSAY THYROID STIM HORMONE: CPT | Performed by: INTERNAL MEDICINE

## 2017-10-26 PROCEDURE — 80053 COMPREHEN METABOLIC PANEL: CPT | Performed by: INTERNAL MEDICINE

## 2017-10-26 PROCEDURE — 82306 VITAMIN D 25 HYDROXY: CPT | Performed by: INTERNAL MEDICINE

## 2017-10-26 PROCEDURE — 80061 LIPID PANEL: CPT | Performed by: INTERNAL MEDICINE

## 2017-11-17 ENCOUNTER — OFFICE VISIT (OUTPATIENT)
Dept: FAMILY MEDICINE | Facility: CLINIC | Age: 72
End: 2017-11-17
Payer: COMMERCIAL

## 2017-11-17 DIAGNOSIS — L81.4 SOLAR LENTIGO: ICD-10-CM

## 2017-11-17 DIAGNOSIS — L82.1 SEBORRHEIC KERATOSES: ICD-10-CM

## 2017-11-17 DIAGNOSIS — I78.1 TELANGIECTASIA OF FACE: ICD-10-CM

## 2017-11-17 DIAGNOSIS — D22.9 MULTIPLE BENIGN MELANOCYTIC NEVI: Primary | ICD-10-CM

## 2017-11-17 DIAGNOSIS — Z80.8 FAMILY HISTORY OF NONMELANOMA SKIN CANCER: ICD-10-CM

## 2017-11-17 DIAGNOSIS — Z85.828 HISTORY OF BASAL CELL CARCINOMA: ICD-10-CM

## 2017-11-17 DIAGNOSIS — D18.01 CAPILLARY HEMANGIOMA OF SKIN: ICD-10-CM

## 2017-11-17 PROCEDURE — 99213 OFFICE O/P EST LOW 20 MIN: CPT | Performed by: FAMILY MEDICINE

## 2017-11-17 NOTE — LETTER
11/17/2017         RE: Alondra Morgan  [address] [address]        Dear Colleague,    Thank you for referring your patient, Alondra Morgan, to the [facility] - PRIMARY CARE SKIN. Please see a copy of my visit note below.    [facility] - PRIMARY CARE SKIN    CC : skin cancer screening (full-body)  SUBJECTIVE:                                                    Alondra Morgan is a 72 year old female who presents to clinic today for a full-body skin exam because of her history of skin cancer. No bothersome lesions noticed by the patient or other skin concerns.    She requests treatment of bumps on the face.    Personal history of skin cancer : YES - basal cell carcinoma on right shoulder and right ala groove (s/p Mohs with ?dermatology specialists).  Family history of skin cancer : YES - non-melanoma skin cancer in mother, sister, brother.    Sun Exposure History  Previous history of significant sun exposure: Travels to [facility] every January.  Blistering sunburns : YES - 5 times  Tanning beds : NO.  Sunscreen Use : YES, SPF : 30.  UV-protective clothing use : NO  Wide-brimmed hats : YES  UV-protective sunglasses : YES  Avoids mid-day sun : YES - sits in shade when in [place], [place] locations    Occupation : retired school counselor (indoor).    Refer to electronic medical record (EMR) for past medical history and medications.    INTEGUMENTARY/SKIN: NEGATIVE for worrisome rashes, moles or lesions  ROS : 14 point review of systems was negative except the symptoms listed above in the HPI.    This document serves as a record of the services and decisions personally performed and made by Drea Mcadams MD. It was created on her behalf by Blaise Tapia, a trained medical scribe.  The creation of this document is based on the scribe's personal observations and the provider's statements to the medical scribe.  Blaise Tapia, November 17, 2017 8:08 AM      OBJECTIVE:                  "                                   GENERAL: healthy, alert and no distress  SKIN: [place] Skin Type - I.  This patient was examined from the top of the head to the bottom of the feet  including scalp, face, neck, back, chest, breasts, buttocks, both arms, both legs, both hands, both feet, all 10 fingers and all 10 toes. The dermatoscope was used to help evaluate pigmented lesions.  Skin Pertinent Findings:  Face : Multiple telangiectasias on both cheeks. Slightly raised, red lesion(s) consistent with capillary hemangioma on right upper cheek.    Legs : Scattered infrequent brown macules most consistent with benign nevi (melanocytic nevi). brown, macule(s) most consistent with benign solar lentigo.    Back : Scattered \"stuck on\" appearing papules, raised, brown, coarse-textured, round lesion(s) most consistent with seborrheic keratoses.    Right  shoulder : well-healed scar.    Diagnostic Test Results:  none           ASSESSMENT:                                                      Encounter Diagnoses   Name Primary?     Family history of nonmelanoma skin cancer      History of basal cell carcinoma      Multiple benign melanocytic nevi Yes     Seborrheic keratoses      Capillary hemangioma of skin      Solar lentigo      Telangiectasia of face          PLAN:                                                    Patient Instructions   FUTURE APPOINTMENTS    Follow up in 1 year(s) for a full-body skin cancer screening.    Consider following with Dr. Chelo Luna for laser treatment of lesions on the face.    Please have your previous dermatology records forwarded to the clinic.    SUN PROTECTION INSTRUCTIONS  Sun damage can lead to skin cancer and premature aging of the skin.      The best way to protect from sun damage to your skin is to avoid the sun during peak hours (10 am - 2 pm) even on overcast days.      Use UPF sun-protective clothing, which while more expensive initially provides longer lasting coverage " "without having to worry about remembering to re-apply.  1. Wear a wide-brimmed hat and sunglasses.   2. Wear sun-protective clothing.  [employer] and other companies make sun protective clothing that are stylish, comfortable and cool. [employer] and other companies make UV arm sleeves suitable for golfing, gardening and other activities.      Sunscreen instructions:  1. Use sunscreens with Zinc Oxide, Titanium Dioxide or Avobenzone to protect from UVA rays.  2. Use SPF 30-50+ to protect from UVB rays.  3. Re-apply every 2 hours even if water resistant.  4. Apply on your face every day even when cloudy and even in the winter. UVA \"aging rays\" penetrate window glass and are just as strong in the winter as in the summer.    Product Recommendations:    Good examples include: Blue Lizard, EltaMD, Solbar    Good daily moisturizers with SPF: Vanicream, CeraVe.    For sensitive skin, consider : SkinMedica Essential Defense Mineral Shield Broad Spectrum SPF 35      Never use tanning beds. Tanning beds are associated with much higher risks of skin cancer.    All tanning damages the skin. Aim for ivory skin year round and you will have less trouble with your skin in years to come. There is no merit in getting \"a base tan\" before a warm weather vacation, as any tanning indicates your body's response to sun damage.    Stop smoking. Smokers have higher rates of skin cancer and also have premature skin wrinkling.    FYI  You should use about 3 tablespoons of sunscreen to protect your whole body. Thus a typical eight ounce bottle of sunscreen should last 4 applications. Remember, that the SPF rating is compromised if you don t apply enough. Most people only apply 1/2 - 1/3 of the amount they need. Also don t forget areas such as your ears, feet, upper back and harder to reach places. Keep in mind that these amounts should be increased for larger body sizes.    Sunscreens with titanium dioxide and/or zinc oxide in the " active ingredients are physical blockers as opposed to chemical blockers. Chemical-free sunscreens should not irritate the skin.    Spray-on sunscreens may be used for touch-up application only, not as a base layer. Also, use with caution around small children due to inhalation risk.    Avoid retinyl palmitate products.    Avoid combination products that include both sunscreen and insect repellant, as sunscreen should be applied every 2 hours, but insect repellant should not be applied as frequently.    SPF means sun protection factor, which is just the degree to which the sunscreen can protect against UVB rays. There is no rating system for UVA rays. SPF is calculated as the time skin will burn when sunscreen is applied vs. skin without sunscreen.    Water resistant sunscreens should be re-applied every 1-2 hours.    For more information:  http://www.skincancer.org/prevention/sun-protection/sunscreen/sunscreens-safe-and-effective    SKIN CANCER SELF-EXAM INSTRUCTIONS  Check every month in the mirror or with a household member. Be aware of any changes, especially bleeding or tenderness. Also, make sure to check your nails for color changes after removal of nail polish.    For melanoma, check for:  A - Asymmetry. One half unlike the other half.  B - Border. Irregular, scalloped, ragged, notched, blurred or poorly defined borders.  C - Color. Color variations from one area to another, with shades of tan, brown and/or black present. Sometimes white, red or blue.  D - Diameter. Greater than 6 mm (about the size of a pencil eraser). Any new growth of a mole should be concerning and be evaluated.  E - Evolving. A mole or skin lesion that looks different from the rest or is changing in size, shape or color.    For basal cell carcinoma and squamous cell carcinoma, check for:    Sores, shiny bumps, nodules, scaly lesions, or wart-like growths that are itchy, tender, crusting, scabbing, eroding, oozing or bleeding.    Open  sores/wounds or reddish/irritated areas that do not heal within 2-3 weeks.    Scar-like areas that are white, yellow or waxy in color.    The patient was counseled about sunscreens and sun avoidance. The patient was counseled to check the skin regularly and report any lesion that is new, changing, itching, scabbing, bleeding or otherwise bothersome. The patient was discharged ambulatory and in stable condition.      PROCEDURES:                                                    None.    TT : 25 minutes.  CT : 15 minutes.      The information in this document, created by the medical scribe for me, accurately reflects the services I personally performed and the decisions made by me. I have reviewed and approved this document for accuracy prior to leaving the patient care area.  Drea Mcadams MD November 17, 2017 8:08 AM  [facility] - PRIMARY CARE SKIN    Again, thank you for allowing me to participate in the care of your patient.        Sincerely,        Susy Mcadams MD

## 2017-11-17 NOTE — MR AVS SNAPSHOT
"              After Visit Summary   11/17/2017    Alondra Morgan    MRN: [id number]           Patient Information     Date Of Birth          1945        Visit Information        Provider Department      11/17/2017 8:20 AM Susy Mcadams MD [facility] - Primary Care Skin        Today's Diagnoses     Multiple benign melanocytic nevi    -  1    Family history of nonmelanoma skin cancer        History of basal cell carcinoma        Seborrheic keratoses        Capillary hemangioma of skin        Solar lentigo        Telangiectasia of face          Care Instructions    FUTURE APPOINTMENTS    Follow up in 1 year(s) for a full-body skin cancer screening.    Consider following with Dr. Chelo Luna for laser treatment of lesions on the face.    Please have your previous dermatology records forwarded to the clinic.    SUN PROTECTION INSTRUCTIONS  Sun damage can lead to skin cancer and premature aging of the skin.      The best way to protect from sun damage to your skin is to avoid the sun during peak hours (10 am - 2 pm) even on overcast days.      Use UPF sun-protective clothing, which while more expensive initially provides longer lasting coverage without having to worry about remembering to re-apply.  1. Wear a wide-brimmed hat and sunglasses.   2. Wear sun-protective clothing.  [employer] and other [employer] make sun protective clothing that are stylish, comfortable and cool. [employer] and other [employer] make UV arm sleeves suitable for golfing, gardening and other activities.      Sunscreen instructions:  1. Use sunscreens with Zinc Oxide, Titanium Dioxide or Avobenzone to protect from UVA rays.  2. Use SPF 30-50+ to protect from UVB rays.  3. Re-apply every 2 hours even if water resistant.  4. Apply on your face every day even when cloudy and even in the winter. UVA \"aging rays\" penetrate window glass and are just as strong in the winter as in the summer.    Product " "Recommendations:    Good examples include: Blue Lizard, EltaMD, Solbar    Good daily moisturizers with SPF: Vanicream, CeraVe.    For sensitive skin, consider : SkinMedica Essential Defense Mineral Shield Broad Spectrum SPF 35      Never use tanning beds. Tanning beds are associated with much higher risks of skin cancer.    All tanning damages the skin. Aim for ivory skin year round and you will have less trouble with your skin in years to come. There is no merit in getting \"a base tan\" before a warm weather vacation, as any tanning indicates your body's response to sun damage.    Stop smoking. Smokers have higher rates of skin cancer and also have premature skin wrinkling.    FYI  You should use about 3 tablespoons of sunscreen to protect your whole body. Thus a typical eight ounce bottle of sunscreen should last 4 applications. Remember, that the SPF rating is compromised if you don t apply enough. Most people only apply 1/2 - 1/3 of the amount they need. Also don t forget areas such as your ears, feet, upper back and harder to reach places. Keep in mind that these amounts should be increased for larger body sizes.    Sunscreens with titanium dioxide and/or zinc oxide in the active ingredients are physical blockers as opposed to chemical blockers. Chemical-free sunscreens should not irritate the skin.    Spray-on sunscreens may be used for touch-up application only, not as a base layer. Also, use with caution around small children due to inhalation risk.    Avoid retinyl palmitate products.    Avoid combination products that include both sunscreen and insect repellant, as sunscreen should be applied every 2 hours, but insect repellant should not be applied as frequently.    SPF means sun protection factor, which is just the degree to which the sunscreen can protect against UVB rays. There is no rating system for UVA rays. SPF is calculated as the time skin will burn when sunscreen is applied vs. skin without " sunscreen.    Water resistant sunscreens should be re-applied every 1-2 hours.    For more information:  http://www.skincancer.org/prevention/sun-protection/sunscreen/sunscreens-safe-and-effective    SKIN CANCER SELF-EXAM INSTRUCTIONS  Check every month in the mirror or with a household member. Be aware of any changes, especially bleeding or tenderness. Also, make sure to check your nails for color changes after removal of nail polish.    For melanoma, check for:  A - Asymmetry. One half unlike the other half.  B - Border. Irregular, scalloped, ragged, notched, blurred or poorly defined borders.  C - Color. Color variations from one area to another, with shades of tan, brown and/or black present. Sometimes white, red or blue.  D - Diameter. Greater than 6 mm (about the size of a pencil eraser). Any new growth of a mole should be concerning and be evaluated.  E - Evolving. A mole or skin lesion that looks different from the rest or is changing in size, shape or color.    For basal cell carcinoma and squamous cell carcinoma, check for:    Sores, shiny bumps, nodules, scaly lesions, or wart-like growths that are itchy, tender, crusting, scabbing, eroding, oozing or bleeding.    Open sores/wounds or reddish/irritated areas that do not heal within 2-3 weeks.    Scar-like areas that are white, yellow or waxy in color.          Follow-ups after your visit        Your next 10 appointments already scheduled     Nov 21, 2017 10:00 AM CST   [facility]    [address] [address] [phone]           Bring a current list of meds and any records pertaining to this visit. For Physicals, please bring immunization records and any forms needing to be filled out. Please arrive 10 minutes early to complete paperwork.              Who to contact     If you have questions or need follow up information  about today's clinic visit or your schedule please contact [facility] - PRIMARY CARE SKIN directly at [phone].  Normal or non-critical lab and imaging results will be communicated to you by MyChart, letter or phone within 4 business days after the clinic has received the results. If you do not hear from us within 7 days, please contact the clinic through LikeWherehart or phone. If you have a critical or abnormal lab result, we will notify you by phone as soon as possible.  Submit refill requests through ReferBright or call your pharmacy and they will forward the refill request to us. Please allow 3 business days for your refill to be completed.          Additional Information About Your Visit        LikeWhereharWorkfolio Information     ReferBright gives you secure access to your electronic health record. If you see a primary care provider, you can also send messages to your care team and make appointments. If you have questions, please call your primary care clinic.  If you do not have a primary care provider, please call [phone] and they will assist you.        Care EveryWhere ID     This is your Care EveryWhere ID. This could be used by other organizations to access your [facility] medical records  [id number]         Blood Pressure from Last 3 Encounters:   10/24/17 110/70   07/10/17 110/84   08/05/16 112/74    Weight from Last 3 Encounters:   10/24/17 167 lb (75.8 kg)   07/10/17 164 lb 3.2 oz (74.5 kg)   08/05/16 159 lb 9.6 oz (72.4 kg)              Today, you had the following     No orders found for display       Primary Care Provider Office Phone # Fax #    Ivan Mcpherson [phone]       [address] [place] [id number]        Equal Access to Services     [facility] Center[facility]E : Hadii bruna barr Soharsha, waaxda luqadaha, qaybta kaalmada rosemary, andrew orellana. So [facility] [phone].    ATENCIÓN: Si habla español, tiene a dill disposición servicios gratuitos de  jericho lingüísticjacobMarcus Sylvester al [phone].    We comply with applicable federal civil rights laws and [place] laws. We do not discriminate on the basis of race, color, national origin, age, disability, sex, sexual orientation, or gender identity.            Thank you!     Thank you for choosing [facility] - PRIMARY CARE [facility]  for your care. Our goal is always to provide you with excellent care. Hearing back from our patients is one way we can continue to improve our services. Please take a few minutes to complete the written survey that you may receive in the mail after your visit with us. Thank you!             Your Updated Medication List - Protect others around you: Learn how to safely use, store and throw away your medicines at www.disposemymeds.org.          This list is accurate as of: 11/17/17  8:25 AM.  Always use your most recent med list.                   Brand Name Dispense Instructions for use Diagnosis    aspirin 81 MG tablet     100 tablet    Take 1 tablet (81 mg) by mouth daily INDICATION: FOR HEART AND CARDIOVASCULAR HEALTH    Hypertension       calcium-vitamin D 600-400 MG-UNIT per tablet    calcium 600 + D    100 tablet    Take 1 tablet by mouth 2 times daily FOR BONE HEALTH AND FOR VITAMIN D DEFICIENCY (LOW VITAMIN D)    Osteopenia, unspecified location       cholecalciferol 2000 UNITS tablet     100 tablet    Take 2 tablets by mouth daily INDICATION: TO TREAT VITAMIN D DEFICIENCY (LOW VITAMIN D)    Vitamin D deficiency       cyabnocobalamin 2500 MCG sublingual tablet    VITAMIN B-12    250 tablet    Take 2,500 mcg by mouth daily INDICATION: FOR VITAMIN B12 SUPPLEMENTATION - TO IMPROVE MEMORY, BALANCE, SLEEP AND MOOD, DIRECTIONS: PLEASE PLACE UNDER THE TONGUE TO DISSOLVE    Vitamin B12 deficiency (non anemic)       fish oil-omega-3 fatty acids 1000 MG capsule     100    THREE TIMES DAILY    Routine physical examination       lisinopril 20 MG tablet    PRINIVIL/ZESTRIL    90 tablet     Take 1 tablet (20 mg) by mouth daily    Essential hypertension with goal blood pressure less than 130/80       loratadine 10 MG tablet    CLARITIN    90 tablet    ONE DAILY FOR ALLERGIES    Seasonal allergic rhinitis, unspecified chronicity, unspecified trigger       methylcellulose (laxative) Powd     1418 g    Take 1 g by mouth 2 times daily (before meals) INDICATION: FIBER SUPPLEMENT/TO LOWER CHOLESTEROL    Hyperlipidemia LDL goal <130       MULTIVITAMIN PO      1 daily        Pantothenic Acid 250 MG Caps      Take 1 capsule by mouth daily        zoledronic Acid 5 MG/100ML Soln infusion    RECLAST    100 mL    Inject 100 mLs (5 mg) into the vein once for 1 dose TO TREAT OSTEOPENIA    Osteopenia

## 2017-11-17 NOTE — PROGRESS NOTES
[facility] - PRIMARY CARE SKIN    CC : skin cancer screening (full-body)  SUBJECTIVE:                                                    Alondra Morgan is a 72 year old female who presents to clinic today for a full-body skin exam because of her history of skin cancer. No bothersome lesions noticed by the patient or other skin concerns.    She requests treatment of bumps on the face.    Personal history of skin cancer : YES - basal cell carcinoma on right shoulder and right ala groove (s/p Mohs with ?dermatology specialists).  Family history of skin cancer : YES - non-melanoma skin cancer in mother, sister, brother.    Sun Exposure History  Previous history of significant sun exposure: Travels to [facility] every January.  Blistering sunburns : YES - 5 times  Tanning beds : NO.  Sunscreen Use : YES, SPF : 30.  UV-protective clothing use : NO  Wide-brimmed hats : YES  UV-protective sunglasses : YES  Avoids mid-day sun : YES - sits in shade when in [place] locations    Occupation : retired school counselor (indoor).    Refer to electronic medical record (EMR) for past medical history and medications.    INTEGUMENTARY/SKIN: NEGATIVE for worrisome rashes, moles or lesions  ROS : 14 point review of systems was negative except the symptoms listed above in the HPI.    This document serves as a record of the services and decisions personally performed and made by Drea Mcadams MD. It was created on her behalf by Blaise Tapia, a trained medical scribe.  The creation of this document is based on the scribe's personal observations and the provider's statements to the medical scribe.  Blaise Tapia, November 17, 2017 8:08 AM      OBJECTIVE:                                                    GENERAL: healthy, alert and no distress  SKIN: [place] Skin Type - I.  This patient was examined from the top of the head to the bottom of the feet  including scalp, face, neck, back, chest, breasts, buttocks, both arms,  "both legs, both hands, both feet, all 10 fingers and all 10 toes. The dermatoscope was used to help evaluate pigmented lesions.  Skin Pertinent Findings:  Face : Multiple telangiectasias on both cheeks. Slightly raised, red lesion(s) consistent with capillary hemangioma on right upper cheek.    Legs : Scattered infrequent brown macules most consistent with benign nevi (melanocytic nevi). brown, macule(s) most consistent with benign solar lentigo.    Back : Scattered \"stuck on\" appearing papules, raised, brown, coarse-textured, round lesion(s) most consistent with seborrheic keratoses.    Right  shoulder : well-healed scar.    Diagnostic Test Results:  none           ASSESSMENT:                                                      Encounter Diagnoses   Name Primary?     Family history of nonmelanoma skin cancer      History of basal cell carcinoma      Multiple benign melanocytic nevi Yes     Seborrheic keratoses      Capillary hemangioma of skin      Solar lentigo      Telangiectasia of face          PLAN:                                                    Patient Instructions   FUTURE APPOINTMENTS    Follow up in 1 year(s) for a full-body skin cancer screening.    Consider following with Dr. Chelo Luna for laser treatment of lesions on the face.    Please have your previous dermatology records forwarded to the clinic.    SUN PROTECTION INSTRUCTIONS  Sun damage can lead to skin cancer and premature aging of the skin.      The best way to protect from sun damage to your skin is to avoid the sun during peak hours (10 am - 2 pm) even on overcast days.      Use UPF sun-protective clothing, which while more expensive initially provides longer lasting coverage without having to worry about remembering to re-apply.  1. Wear a wide-brimmed hat and sunglasses.   2. Wear sun-protective clothing.  [employer] and other companies make sun protective clothing that are stylish, comfortable and cool. [employer] and " "other companies make UV arm sleeves suitable for golfing, gardening and other activities.      Sunscreen instructions:  1. Use sunscreens with Zinc Oxide, Titanium Dioxide or Avobenzone to protect from UVA rays.  2. Use SPF 30-50+ to protect from UVB rays.  3. Re-apply every 2 hours even if water resistant.  4. Apply on your face every day even when cloudy and even in the winter. UVA \"aging rays\" penetrate window glass and are just as strong in the winter as in the summer.    Product Recommendations:    Good examples include: Blue Lizard, EltaMD, Solbar    Good daily moisturizers with SPF: Vanicream, CeraVe.    For sensitive skin, consider : SkinMedica Essential Defense Mineral Shield Broad Spectrum SPF 35      Never use tanning beds. Tanning beds are associated with much higher risks of skin cancer.    All tanning damages the skin. Aim for ivory skin year round and you will have less trouble with your skin in years to come. There is no merit in getting \"a base tan\" before a warm weather vacation, as any tanning indicates your body's response to sun damage.    Stop smoking. Smokers have higher rates of skin cancer and also have premature skin wrinkling.    FYI  You should use about 3 tablespoons of sunscreen to protect your whole body. Thus a typical eight ounce bottle of sunscreen should last 4 applications. Remember, that the SPF rating is compromised if you don t apply enough. Most people only apply 1/2 - 1/3 of the amount they need. Also don t forget areas such as your ears, feet, upper back and harder to reach places. Keep in mind that these amounts should be increased for larger body sizes.    Sunscreens with titanium dioxide and/or zinc oxide in the active ingredients are physical blockers as opposed to chemical blockers. Chemical-free sunscreens should not irritate the skin.    Spray-on sunscreens may be used for touch-up application only, not as a base layer. Also, use with caution around small children due " to inhalation risk.    Avoid retinyl palmitate products.    Avoid combination products that include both sunscreen and insect repellant, as sunscreen should be applied every 2 hours, but insect repellant should not be applied as frequently.    SPF means sun protection factor, which is just the degree to which the sunscreen can protect against UVB rays. There is no rating system for UVA rays. SPF is calculated as the time skin will burn when sunscreen is applied vs. skin without sunscreen.    Water resistant sunscreens should be re-applied every 1-2 hours.    For more information:  http://www.skincancer.org/prevention/sun-protection/sunscreen/sunscreens-safe-and-effective    SKIN CANCER SELF-EXAM INSTRUCTIONS  Check every month in the mirror or with a household member. Be aware of any changes, especially bleeding or tenderness. Also, make sure to check your nails for color changes after removal of nail polish.    For melanoma, check for:  A - Asymmetry. One half unlike the other half.  B - Border. Irregular, scalloped, ragged, notched, blurred or poorly defined borders.  C - Color. Color variations from one area to another, with shades of tan, brown and/or black present. Sometimes white, red or blue.  D - Diameter. Greater than 6 mm (about the size of a pencil eraser). Any new growth of a mole should be concerning and be evaluated.  E - Evolving. A mole or skin lesion that looks different from the rest or is changing in size, shape or color.    For basal cell carcinoma and squamous cell carcinoma, check for:    Sores, shiny bumps, nodules, scaly lesions, or wart-like growths that are itchy, tender, crusting, scabbing, eroding, oozing or bleeding.    Open sores/wounds or reddish/irritated areas that do not heal within 2-3 weeks.    Scar-like areas that are white, yellow or waxy in color.    The patient was counseled about sunscreens and sun avoidance. The patient was counseled to check the skin regularly and report  any lesion that is new, changing, itching, scabbing, bleeding or otherwise bothersome. The patient was discharged ambulatory and in stable condition.      PROCEDURES:                                                    None.    TT : 25 minutes.  CT : 15 minutes.      The information in this document, created by the medical scribe for me, accurately reflects the services I personally performed and the decisions made by me. I have reviewed and approved this document for accuracy prior to leaving the patient care area.  Drea Mcadams MD November 17, 2017 8:08 AM  [facility] - PRIMARY CARE SKIN

## 2017-11-17 NOTE — PATIENT INSTRUCTIONS
"FUTURE APPOINTMENTS    Follow up in 1 year(s) for a full-body skin cancer screening.    Consider following with Dr. Chelo Luna for laser treatment of lesions on the face.    Please have your previous dermatology records forwarded to the clinic.    SUN PROTECTION INSTRUCTIONS  Sun damage can lead to skin cancer and premature aging of the skin.      The best way to protect from sun damage to your skin is to avoid the sun during peak hours (10 am - 2 pm) even on overcast days.      Use UPF sun-protective clothing, which while more expensive initially provides longer lasting coverage without having to worry about remembering to re-apply.  1. Wear a wide-brimmed hat and sunglasses.   2. Wear sun-protective clothing.  [employer] and other [employer] make sun protective clothing that are stylish, comfortable and cool. [employer] and other [employer] make UV arm sleeves suitable for golfing, gardening and other activities.      Sunscreen instructions:  1. Use sunscreens with Zinc Oxide, Titanium Dioxide or Avobenzone to protect from UVA rays.  2. Use SPF 30-50+ to protect from UVB rays.  3. Re-apply every 2 hours even if water resistant.  4. Apply on your face every day even when cloudy and even in the winter. UVA \"aging rays\" penetrate window glass and are just as strong in the winter as in the summer.    Product Recommendations:    Good examples include: Blue Tyrell, EltaMD, Solbar    Good daily moisturizers with SPF: Vanicream, CeraVe.    For sensitive skin, consider : SkinMedica Essential Defense Mineral Shield Broad Spectrum SPF 35      Never use tanning beds. Tanning beds are associated with much higher risks of skin cancer.    All tanning damages the skin. Aim for ivory skin year round and you will have less trouble with your skin in years to come. There is no merit in getting \"a base tan\" before a warm weather vacation, as any tanning indicates your body's response to sun damage.    Stop smoking. Smokers " have higher rates of skin cancer and also have premature skin wrinkling.    FYI  You should use about 3 tablespoons of sunscreen to protect your whole body. Thus a typical eight ounce bottle of sunscreen should last 4 applications. Remember, that the SPF rating is compromised if you don t apply enough. Most people only apply 1/2 - 1/3 of the amount they need. Also don t forget areas such as your ears, feet, upper back and harder to reach places. Keep in mind that these amounts should be increased for larger body sizes.    Sunscreens with titanium dioxide and/or zinc oxide in the active ingredients are physical blockers as opposed to chemical blockers. Chemical-free sunscreens should not irritate the skin.    Spray-on sunscreens may be used for touch-up application only, not as a base layer. Also, use with caution around small children due to inhalation risk.    Avoid retinyl palmitate products.    Avoid combination products that include both sunscreen and insect repellant, as sunscreen should be applied every 2 hours, but insect repellant should not be applied as frequently.    SPF means sun protection factor, which is just the degree to which the sunscreen can protect against UVB rays. There is no rating system for UVA rays. SPF is calculated as the time skin will burn when sunscreen is applied vs. skin without sunscreen.    Water resistant sunscreens should be re-applied every 1-2 hours.    For more information:  http://www.skincancer.org/prevention/sun-protection/sunscreen/sunscreens-safe-and-effective    SKIN CANCER SELF-EXAM INSTRUCTIONS  Check every month in the mirror or with a household member. Be aware of any changes, especially bleeding or tenderness. Also, make sure to check your nails for color changes after removal of nail polish.    For melanoma, check for:  A - Asymmetry. One half unlike the other half.  B - Border. Irregular, scalloped, ragged, notched, blurred or poorly defined borders.  C - Color.  Color variations from one area to another, with shades of tan, brown and/or black present. Sometimes white, red or blue.  D - Diameter. Greater than 6 mm (about the size of a pencil eraser). Any new growth of a mole should be concerning and be evaluated.  E - Evolving. A mole or skin lesion that looks different from the rest or is changing in size, shape or color.    For basal cell carcinoma and squamous cell carcinoma, check for:    Sores, shiny bumps, nodules, scaly lesions, or wart-like growths that are itchy, tender, crusting, scabbing, eroding, oozing or bleeding.    Open sores/wounds or reddish/irritated areas that do not heal within 2-3 weeks.    Scar-like areas that are white, yellow or waxy in color.

## 2017-11-20 ENCOUNTER — TRANSFERRED RECORDS (OUTPATIENT)
Dept: FAMILY MEDICINE | Facility: CLINIC | Age: 72
End: 2017-11-20

## 2017-11-20 DIAGNOSIS — Z85.828 HISTORY OF BASAL CELL CARCINOMA: Primary | ICD-10-CM

## 2017-11-20 NOTE — PROGRESS NOTES
TRANSFERRED RECORDS                                                    Records from : [facility].    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

## 2017-11-21 ENCOUNTER — OFFICE VISIT (OUTPATIENT)
Dept: INTERNAL MEDICINE | Facility: CLINIC | Age: 72
End: 2017-11-21
Payer: COMMERCIAL

## 2017-11-21 VITALS
DIASTOLIC BLOOD PRESSURE: 82 MMHG | BODY MASS INDEX: 27.96 KG/M2 | TEMPERATURE: 97.6 F | WEIGHT: 168 LBS | OXYGEN SATURATION: 98 % | HEART RATE: 78 BPM | SYSTOLIC BLOOD PRESSURE: 132 MMHG

## 2017-11-21 DIAGNOSIS — Z12.11 SCREENING FOR COLON CANCER: ICD-10-CM

## 2017-11-21 DIAGNOSIS — M85.80 OSTEOPENIA, UNSPECIFIED LOCATION: ICD-10-CM

## 2017-11-21 DIAGNOSIS — I10 ESSENTIAL HYPERTENSION: Primary | ICD-10-CM

## 2017-11-21 PROCEDURE — 99214 OFFICE O/P EST MOD 30 MIN: CPT | Performed by: INTERNAL MEDICINE

## 2017-11-21 NOTE — NURSING NOTE
"Chief Complaint   Patient presents with     Hypertension     review recent labs     Establish Care       Initial /78  Pulse 78  Temp 97.6  F (36.4  C) (Oral)  Wt 168 lb (76.2 kg)  SpO2 98%  BMI 27.96 kg/m2 Estimated body mass index is 27.96 kg/(m^2) as calculated from the following:    Height as of 10/24/17: 5' 5\" (1.651 m).    Weight as of this encounter: 168 lb (76.2 kg).  Medication Reconciliation: complete   Kira Cardoso CMA      "

## 2017-11-21 NOTE — MR AVS SNAPSHOT
"              After Visit Summary   11/21/2017    Alondra Morgan    MRN: [id number]           Patient Information     Date Of Birth          1945        Visit Information        Provider Department      11/21/2017 10:00 AM Ivan Mcpherson MD [facility]        Today's Diagnoses     Essential hypertension    -  1    Osteopenia, unspecified location          Care Instructions    *  Continue all medications at the same doses.  Contact your usual pharmacy if you need refills.     *  Reclast infusion at least one more time.  I will set this up at the outpatient infusion center.     *  Return to see me in approximately Spet/October 2018, sooner if needed.  Please get nonfasting labs done in the [facility] lab 1-2 days before this appointment.  Call [phone] to schedule both appointments.               5 GOALS TO PREVENT VASCULAR DISEASE:     1.  Aggressive blood pressure control, under 130/80 ideally.  Using medications if needed.    Your blood pressure is under good control    BP Readings from Last 4 Encounters:   11/21/17 132/82   10/24/17 110/70   07/10/17 110/84   08/05/16 112/74       2.  Aggressive LDL cholesterol (\"bad cholesterol\") lowering as indicated.    Your goal is an LDL under 130 for sure, preferably under 100.  (If you have diabetes or previous vascular disease, the the LDL goals would be under 100 for sure, preferably under 70.)    New guidelines identify four high-risk groups who could benefit from statins:   *people with pre-existing heart disease, such as those who have had a heart attack;   *people ages 40 to 75 who have diabetes of any type  *patients ages 40 to 75 with at least a 7.5% risk of developing cardiovascular disease over the next decade, according to a formula described in the guidelines  *patients with the sort of super-high cholesterol that sometimes runs in families, as evidenced by an LDL of 190 milligrams per deciliter or higher    Your " "cholesterol levels are well controlled.    Recent Labs   Lab Test  10/26/17   0853  05/05/16   0845  02/24/15   0838  01/09/14   0854   CHOL  206*  190  190  224*   HDL  78  76  78  73   LDL  111*  98  97  136*   TRIG  84  80  74  78   CHOLHDLRATIO   --    --   2.4  3.1       3.  Aggressive diabetic prevention, screening and/or management.      You do not have diabetes as of the most recent blood tests.     4.  No smoking    5.  Consider taking low dose aspirin (81 mg) tablet once per day over the age of 50, every day unless there is a specific reason that you cannot take aspirin (such as side effect, allergy, or you are on another \"blood thinner\").        --Based on your current cardiac risk factors, you should take Aspirin 81 mg once per day if you are over 50 years of age.             Preventive Health Recommendations  Female Ages 50 - 64    Yearly exam: See your health care provider every year in order to  o Review health changes.   o Discuss preventive care.    o Review your medicines if your doctor has prescribed any.      Get a Pap test every three years (unless you have an abnormal result and your provider advises testing more often).    If you get Pap tests with HPV test, you only need to test every 5 years, unless you have an abnormal result.     You do not need a Pap test if your uterus was removed (hysterectomy) and you have not had cancer.    You should be tested each year for STDs (sexually transmitted diseases) if you're at risk.     Have a mammogram every 1 to 2 years.    Have a colonoscopy at age 50, or have a yearly FIT test (stool test). These exams screen for colon cancer.      Have a cholesterol test every 5 years, or more often if advised.    Have a diabetes test (fasting glucose) every three years. If you are at risk for diabetes, you should have this test more often.     If you are at risk for osteoporosis (brittle bone disease), think about having a bone density scan (DEXA).    Shots: Get a " "flu shot each year. Get a tetanus shot every 10 years.    Nutrition:     Eat at least 5 servings of fruits and vegetables each day.    Eat whole-grain bread, whole-wheat pasta and brown rice instead of white grains and rice.    Talk to your provider about Calcium and Vitamin D.        --Good Grains:  Oats, brown rice, Quinoa (these do not raise the blood sugar as much)     --Bad grains:  Anything made from wheat or white rice     (because these raise the blood sugars significantly, and the possible gluten issue from wheat for some people).      --Proteins:  Aim for \"lean proteins\" including chicken, fish, seafood, pork, turkey, and eggs (in moderation); Eat red meat only occasionally        Jassi Schuler:                  Lifestyle    Exercise at least 150 minutes a week (30 minutes a day, 5 days a week). This will help you control your weight and prevent disease.    Limit alcohol to one drink per day.    No smoking.     Wear sunscreen to prevent skin cancer.     See your dentist every six months for an exam and cleaning.    See your eye doctor every 1 to 2 years.              Follow-ups after your visit        Future tests that were ordered for you today     Open Future Orders        Priority Expected Expires Ordered    CBC with platelets differential ASAP 9/1/2018 11/21/2018 11/21/2017    Basic metabolic panel Routine 9/1/2018 11/21/2018 11/21/2017            Who to contact     If you have questions or need follow up information about today's clinic visit or your schedule please contact [facility] directly at [phone].  Normal or non-critical lab and imaging results will be communicated to you by MyChart, letter or phone within 4 business days after the clinic has received the results. If you do not hear from us within 7 days, please contact the clinic through MyChart or phone. If you have a critical or abnormal lab result, we will notify you by phone as soon as possible.  Submit " refill requests through [employer] or call your pharmacy and they will forward the refill request to us. Please allow 3 business days for your refill to be completed.          Additional Information About Your Visit        [employer] Information     [employer] gives you secure access to your electronic health record. If you see a primary care provider, you can also send messages to your care team and make appointments. If you have questions, please call your primary care clinic.  If you do not have a primary care provider, please call [phone] and they will assist you.        Care EveryWhere ID     This is your Care EveryWhere ID. This could be used by other organizations to access your [place] medical records  [id number]        Your Vitals Were     Pulse Temperature Pulse Oximetry BMI (Body Mass Index)          78 97.6  F (36.4  C) (Oral) 98% 27.96 kg/m2         Blood Pressure from Last 3 Encounters:   11/21/17 132/82   10/24/17 110/70   07/10/17 110/84    Weight from Last 3 Encounters:   11/21/17 168 lb (76.2 kg)   10/24/17 167 lb (75.8 kg)   07/10/17 164 lb 3.2 oz (74.5 kg)               Primary Care Provider Office Phone # Fax #    Ivan Mcpherson [phone]       [address] [place] [address]        Equal Access to Services     EMANUEL GARCÍA AH: Hadii aad ku hadasho Soomaali, waaxda luqadaha, qaybta kaalmada adeegyada, waxay gonzalez haymaryan orellana. So [facility] [phone].    ATENCIÓN: Si habla español, tiene a dill disposición servicios gratuitos de asistencia lingüística. Llame al [phone].    We comply with applicable federal civil rights laws and [place] laws. We do not discriminate on the basis of race, color, national origin, age, disability, sex, sexual orientation, or gender identity.            Thank you!     Thank you for choosing [facility]  for your care. Our goal is always to provide you with excellent care. Hearing back from our  patients is one way we can continue to improve our services. Please take a few minutes to complete the written survey that you may receive in the mail after your visit with us. Thank you!             Your Updated Medication List - Protect others around you: Learn how to safely use, store and throw away your medicines at www.disposemymeds.org.          This list is accurate as of: 11/21/17 10:52 AM.  Always use your most recent med list.                   Brand Name Dispense Instructions for use Diagnosis    aspirin 81 MG tablet     100 tablet    Take 1 tablet (81 mg) by mouth daily INDICATION: FOR HEART AND CARDIOVASCULAR HEALTH    Hypertension       calcium-vitamin D 600-400 MG-UNIT per tablet    calcium 600 + D    100 tablet    Take 1 tablet by mouth 2 times daily FOR BONE HEALTH AND FOR VITAMIN D DEFICIENCY (LOW VITAMIN D)    Osteopenia, unspecified location       cholecalciferol 2000 UNITS tablet     100 tablet    Take 2 tablets by mouth daily INDICATION: TO TREAT VITAMIN D DEFICIENCY (LOW VITAMIN D)    Vitamin D deficiency       cyabnocobalamin 2500 MCG sublingual tablet    VITAMIN B-12    250 tablet    Take 2,500 mcg by mouth daily INDICATION: FOR VITAMIN B12 SUPPLEMENTATION - TO IMPROVE MEMORY, BALANCE, SLEEP AND MOOD, DIRECTIONS: PLEASE PLACE UNDER THE TONGUE TO DISSOLVE    Vitamin B12 deficiency (non anemic)       fish oil-omega-3 fatty acids 1000 MG capsule     100    THREE TIMES DAILY    Routine physical examination       lisinopril 20 MG tablet    PRINIVIL/ZESTRIL    90 tablet    Take 1 tablet (20 mg) by mouth daily    Essential hypertension with goal blood pressure less than 130/80       loratadine 10 MG tablet    CLARITIN    90 tablet    ONE DAILY FOR ALLERGIES    Seasonal allergic rhinitis, unspecified chronicity, unspecified trigger       methylcellulose (laxative) Powd     1418 g    Take 1 g by mouth 2 times daily (before meals) INDICATION: FIBER SUPPLEMENT/TO LOWER CHOLESTEROL    Hyperlipidemia LDL  goal <130       MULTIVITAMIN PO      1 daily        Pantothenic Acid 250 MG Caps      Take 1 capsule by mouth daily        zoledronic Acid 5 MG/100ML Soln infusion    RECLAST    100 mL    Inject 100 mLs (5 mg) into the vein once for 1 dose TO TREAT OSTEOPENIA    Osteopenia

## 2017-11-21 NOTE — PATIENT INSTRUCTIONS
"*  Continue all medications at the same doses.  Contact your usual pharmacy if you need refills.     *  Reclast infusion at least one more time.  I will set this up at the outpatient infusion center.     *  Return to see me in approximately Spet/October 2018, sooner if needed.  [facility] 1-2 days before this appointment.  Call [phone] to schedule both appointments.               5 GOALS TO PREVENT VASCULAR DISEASE:     1.  Aggressive blood pressure control, under 130/80 ideally.  Using medications if needed.    Your blood pressure is under good control    BP Readings from Last 4 Encounters:   11/21/17 132/82   10/24/17 110/70   07/10/17 110/84   08/05/16 112/74       2.  Aggressive LDL cholesterol (\"bad cholesterol\") lowering as indicated.    Your goal is an LDL under 130 for sure, preferably under 100.  (If you have diabetes or previous vascular disease, the the LDL goals would be under 100 for sure, preferably under 70.)    New guidelines identify four high-risk groups who could benefit from statins:   *people with pre-existing heart disease, such as those who have had a heart attack;   *people ages 40 to 75 who have diabetes of any type  *patients ages 40 to 75 with at least a 7.5% risk of developing cardiovascular disease over the next decade, according to a formula described in the guidelines  *patients with the sort of super-high cholesterol that sometimes runs in families, as evidenced by an LDL of 190 milligrams per deciliter or higher    Your cholesterol levels are well controlled.    Recent Labs   Lab Test  10/26/17   0853  05/05/16   0845  02/24/15   0838  01/09/14   0854   CHOL  206*  190  190  224*   HDL  78  76  78  73   LDL  111*  98  97  136*   TRIG  84  80  74  78   CHOLHDLRATIO   --    --   2.4  3.1       3.  Aggressive diabetic prevention, screening and/or management.      You do not have diabetes as of the most recent blood tests.     4.  No " "smoking    5.  Consider taking low dose aspirin (81 mg) tablet once per day over the age of 50, every day unless there is a specific reason that you cannot take aspirin (such as side effect, allergy, or you are on another \"blood thinner\").        --Based on your current cardiac risk factors, you should take Aspirin 81 mg once per day if you are over 50 years of age.             Preventive Health Recommendations  Female Ages 50 - 64    Yearly exam: See your health care provider every year in order to  o Review health changes.   o Discuss preventive care.    o Review your medicines if your doctor has prescribed any.      Get a Pap test every three years (unless you have an abnormal result and your provider advises testing more often).    If you get Pap tests with HPV test, you only need to test every 5 years, unless you have an abnormal result.     You do not need a Pap test if your uterus was removed (hysterectomy) and you have not had cancer.    You should be tested each year for STDs (sexually transmitted diseases) if you're at risk.     Have a mammogram every 1 to 2 years.    Have a colonoscopy at age 50, or have a yearly FIT test (stool test). These exams screen for colon cancer.      Have a cholesterol test every 5 years, or more often if advised.    Have a diabetes test (fasting glucose) every three years. If you are at risk for diabetes, you should have this test more often.     If you are at risk for osteoporosis (brittle bone disease), think about having a bone density scan (DEXA).    Shots: Get a flu shot each year. Get a tetanus shot every 10 years.    Nutrition:     Eat at least 5 servings of fruits and vegetables each day.    Eat whole-grain bread, whole-wheat pasta and brown rice instead of white grains and rice.    Talk to your provider about Calcium and Vitamin D.        --Good Grains:  Oats, brown rice, Quinoa (these do not raise the blood sugar as much)     --Bad grains:  Anything made from wheat or " "white rice     (because these raise the blood sugars significantly, and the possible gluten issue from wheat for some people).      --Proteins:  Aim for \"lean proteins\" including chicken, fish, seafood, pork, turkey, and eggs (in moderation); Eat red meat only occasionally        Jassi Schuler:                  Lifestyle    Exercise at least 150 minutes a week (30 minutes a day, 5 days a week). This will help you control your weight and prevent disease.    Limit alcohol to one drink per day.    No smoking.     Wear sunscreen to prevent skin cancer.     See your dentist every six months for an exam and cleaning.    See your eye doctor every 1 to 2 years.      "

## 2017-11-21 NOTE — PROGRESS NOTES
SUBJECTIVE:   Alondra Morgan is a 72 year old female who presents to clinic today for the following health issues:    Review recent labs - review bone density TBD if she is to continue on Reclast    New Patient/Establish Care    Hypertension Follow-up      Outpatient blood pressures are not being checked.    Low Salt Diet: no added salt      Amount of exercise or physical activity: 2-3 days/week for an average of 45-60 minutes    Problems taking medications regularly: No    Medication side effects: none    Diet: low salt    ITCHING - Concerned about possible shingles on lower back, R side. Mild itching in area, unable to see area, but no pain X 1 week    Problem list and histories reviewed & adjusted, as indicated.  Additional history: as documented        Reviewed and updated as needed this visit by clinical staffTobacco  Allergies       Reviewed and updated as needed this visit by Provider           Past Medical History:  ---------------------------  Past Medical History:   Diagnosis Date     Arthritis      Atypical hyperplasia of breasts      Basal cell carcinoma of ala nasi      Basal cell carcinoma of shoulder      History of poliomyelitis 1947    acute polio infection age 2, resolved without any sequelae     Osteopenia      Polyp, colon     Adenomatous       Past Surgical History:  ---------------------------  Past Surgical History:   Procedure Laterality Date     C VAG HYST,RMV TUBE/OVARY       EYE SURGERY  07/2017    cataract bilat eyes     HC EXCISION BREAST LESION, OPEN >=1      ATYPICAL HYPERPLASIA X 2     HYSTERECTOMY, PAP NO LONGER INDICATED  1994     TONSILLECTOMY         Current Medications:  ---------------------------  Current Outpatient Prescriptions   Medication Sig Dispense Refill     lisinopril (PRINIVIL/ZESTRIL) 20 MG tablet Take 1 tablet (20 mg) by mouth daily 90 tablet 2     calcium-vitamin D (CALCIUM 600 + D) 600-400 MG-UNIT per tablet Take 1 tablet by mouth 2 times daily FOR BONE  HEALTH AND FOR VITAMIN D DEFICIENCY (LOW VITAMIN D) 100 tablet PRN     cholecalciferol 2000 UNITS tablet Take 2 tablets by mouth daily INDICATION: TO TREAT VITAMIN D DEFICIENCY (LOW VITAMIN D) 100 tablet prn     cyabnocobalamin (VITAMIN B-12) 2500 MCG sublingual tablet Take 2,500 mcg by mouth daily INDICATION: FOR VITAMIN B12 SUPPLEMENTATION - TO IMPROVE MEMORY, BALANCE, SLEEP AND MOOD, DIRECTIONS: PLEASE PLACE UNDER THE TONGUE TO DISSOLVE 250 tablet 3     loratadine (CLARITIN) 10 MG tablet ONE DAILY FOR ALLERGIES 90 tablet prn     methylcellulose, laxative, POWD Take 1 g by mouth 2 times daily (before meals) INDICATION: FIBER SUPPLEMENT/TO LOWER CHOLESTEROL 1418 g prn     Pantothenic Acid 250 MG CAPS Take 1 capsule by mouth daily       aspirin 81 MG tablet Take 1 tablet (81 mg) by mouth daily INDICATION: FOR HEART AND CARDIOVASCULAR HEALTH 100 tablet 3     FISH OIL 1000 MG OR CAPS THREE TIMES DAILY 100 3     MULTIVITAMIN OR 1 daily       zoledronic Acid (RECLAST) 5 MG/100ML SOLN Inject 100 mLs (5 mg) into the vein once for 1 dose TO TREAT OSTEOPENIA 100 mL 0       Allergies:  -------------  Allergies   Allergen Reactions     Nkda [No Known Drug Allergies]        Social History:  -------------------  Social History     Social History     Marital status:      Spouse name: N/A     Number of children: N/A     Years of education: N/A     Occupational History     Not on file.     Social History Main Topics     Smoking status: Former Smoker     Packs/day: 1.00     Years: 40.00     Types: Cigarettes     Quit date: 10/7/1983     Smokeless tobacco: Never Used     Alcohol use Yes      Comment: 4 drinks per week     Drug use: No     Sexual activity: Not Currently     Partners: Male     Other Topics Concern      Service No     Blood Transfusions No     Caffeine Concern No     Occupational Exposure No     Hobby Hazards No     Sleep Concern Yes     Stress Concern No     Weight Concern Yes     Special Diet No      Back Care No     Exercise Yes     3 X WEEK     Seat Belt Yes     Self-Exams No     ENCOURAGED     Social History Narrative       Family Medical History:  ------------------------------  Family History   Problem Relation Age of Onset     CANCER Mother      lung     OSTEOPOROSIS Mother      Eye Disorder Mother      glaucoma     Arthritis Mother      osteo     HEART DISEASE Mother      CHF     Hypertension Mother      CEREBROVASCULAR DISEASE Father      TIAs     OSTEOPOROSIS Father      Hypertension Father      CANCER Father      BONE     CEREBROVASCULAR DISEASE Brother      Cancer - colorectal Brother      CANCER Brother      PROSTATE     Arthritis Sister      PARTIAL KNEE REPLACEMENT IN 2009     Breast Cancer Maternal Grandmother      CEREBROVASCULAR DISEASE Maternal Grandfather      Breast Cancer Maternal Aunt      Breast Cancer Maternal Aunt      Breast Cancer Other      2 YOUNGER MATERNAL COUSINS     Asthma Daughter          ROS:  REVIEW OF SYSTEMS:    RESP: negative for cough, dyspnea, wheezing, hemoptysis  CV: negative for chest pain, palpitations, PND, GAMEZ, orthopnea; reports no changes in their ability to perform physical activity (from cardiovascular standpoint)  GI: negative for dysphagia, N/V, pain, melena, diarrhea and constipation  NEURO: negative for numbness/tingling, paralysis, incoordination, or focal weakness     OBJECTIVE:                                                    /82  Pulse 78  Temp 97.6  F (36.4  C) (Oral)  Wt 168 lb (76.2 kg)  SpO2 98%  BMI 27.96 kg/m2     GENERAL alert and no distress  EYES:  Normal sclera,conjunctiva, EOMI  HENT: oral and posterior pharynx without lesions or erythema, facies symmetric  NECK: Neck supple. No LAD, without thyroidmegaly or JVD., Carotids without bruits.  RESP: Clear to ausculation bilaterally without wheezes or crackles. Normal BS in all fields.  CV: RRR normal S1S2 without murmurs, rubs or gallops. PMI normal  LYMPH: no cervical lymph  adenopathy appreciated  MS: extremities- no gross deformities of the visible extremities noted, no edema  PSYCH: Alert and oriented times 3; speech- coherent  SKIN:  No obvious significant skin lesions on visible portions of face          ASSESSMENT/PLAN:                                                      (I10) Essential hypertension  (primary encounter diagnosis)  Comment: This condition is currently controlled on the current medical regimen.  Continue current therapy.   Discussed hypertension in detail including JNC VIII guidelines for blood pressure goals.  Discussed indication for treatment and treatment options.  Discussed the importance for aggressive management of HTN to prevent vascular complications later.  Recommended lower fat, lower carbohydrate, and lower sodium (<2000 mg)diet.  Discussed required intervals for follow up on HTN, lab studies, and the need to aggresive management of other cardiac disease risk factors.  Recommened pt. follow their blood pressures outside the clinic to ensure that BPs are remaining within guidelines, and to contact me if the readings are not within guidelines so we can adjust treatment as needed.   Plan: CBC with platelets differential, Basic         metabolic panel            (M85.80) Osteopenia, unspecified location  Comment: should have at least one more infusion of Reclast.   Plan:     (Z12.11) Screening for colon cancer  Comment:   Plan: GASTROENTEROLOGY ADULT REF PROCEDURE ONLY            *  Continue all medications at the same doses.  Contact your usual pharmacy if you need refills.     *  Reclast infusion at least one more time.  I will set this up at the outpatient infusion center.     *  Return to see me in approximately Spet/October 2018, sooner if needed.  Please get nonfasting labs done in the [facility] lab 1-2 days before this appointment.  Call [phone] to schedule both appointments.           See Patient Instructions    CHRISTIANO HOPKINS M.D.,  MD  [facility]

## 2017-12-02 RX ORDER — ZOLEDRONIC ACID 5 MG/100ML
5 INJECTION, SOLUTION INTRAVENOUS ONCE
Status: CANCELLED
Start: 2017-12-04 | End: 2017-12-04

## 2017-12-08 ENCOUNTER — MYC MEDICAL ADVICE (OUTPATIENT)
Dept: INTERNAL MEDICINE | Facility: CLINIC | Age: 72
End: 2017-12-08

## 2017-12-08 NOTE — TELEPHONE ENCOUNTER
Lety from [facility] oncology/infusion center calling.  Verified they have infusion order.  Lety stated she will call the patient.

## 2017-12-08 NOTE — TELEPHONE ENCOUNTER
Order and referral placed by PCP on 12/2/17.  Left message for infusion clinic at [facility] to call back to verify they received order.

## 2017-12-19 ENCOUNTER — HOSPITAL ENCOUNTER (OUTPATIENT)
Facility: CLINIC | Age: 72
Setting detail: SPECIMEN
Discharge: HOME | End: 2017-12-19
Attending: INTERNAL MEDICINE | Admitting: INTERNAL MEDICINE
Payer: MEDICARE

## 2017-12-19 ENCOUNTER — INFUSION THERAPY VISIT (OUTPATIENT)
Dept: INFUSION THERAPY | Facility: CLINIC | Age: 72
End: 2017-12-19
Attending: INTERNAL MEDICINE
Payer: MEDICARE

## 2017-12-19 VITALS — TEMPERATURE: 97.4 F | SYSTOLIC BLOOD PRESSURE: 134 MMHG | DIASTOLIC BLOOD PRESSURE: 77 MMHG | HEART RATE: 76 BPM

## 2017-12-19 DIAGNOSIS — M85.80 OSTEOPENIA, UNSPECIFIED LOCATION: ICD-10-CM

## 2017-12-19 DIAGNOSIS — Z78.0 ASYMPTOMATIC POSTMENOPAUSAL STATUS: ICD-10-CM

## 2017-12-19 DIAGNOSIS — Z78.0 POSTMENOPAUSAL STATUS: Primary | ICD-10-CM

## 2017-12-19 LAB
CALCIUM SERPL-MCNC: 9.1 MG/DL (ref 8.5–10.1)
CREAT SERPL-MCNC: 0.88 MG/DL (ref 0.52–1.04)
GFR SERPL CREATININE-BSD FRML MDRD: 63 ML/MIN/1.7M2

## 2017-12-19 PROCEDURE — 96365 THER/PROPH/DIAG IV INF INIT: CPT

## 2017-12-19 PROCEDURE — 82565 ASSAY OF CREATININE: CPT | Performed by: INTERNAL MEDICINE

## 2017-12-19 PROCEDURE — 25000128 H RX IP 250 OP 636: Performed by: INTERNAL MEDICINE

## 2017-12-19 PROCEDURE — 82310 ASSAY OF CALCIUM: CPT | Performed by: INTERNAL MEDICINE

## 2017-12-19 RX ORDER — ZOLEDRONIC ACID 5 MG/100ML
5 INJECTION, SOLUTION INTRAVENOUS ONCE
Status: CANCELLED
Start: 2017-12-19 | End: 2017-12-19

## 2017-12-19 RX ORDER — ZOLEDRONIC ACID 5 MG/100ML
5 INJECTION, SOLUTION INTRAVENOUS ONCE
Status: COMPLETED | OUTPATIENT
Start: 2017-12-19 | End: 2017-12-19

## 2017-12-19 RX ADMIN — ZOLEDRONIC ACID 5 MG: 5 INJECTION, SOLUTION INTRAVENOUS at 16:12

## 2017-12-19 ASSESSMENT — PAIN SCALES - GENERAL: PAINLEVEL: NO PAIN (0)

## 2017-12-19 NOTE — MR AVS SNAPSHOT
After Visit Summary   12/19/2017    Alondra Morgan    MRN: [id number]           Patient Information     Date Of Birth          1945        Visit Information        Provider Department      12/19/2017 3:00 PM  INFUSION CHAIR [facility] [place] and [facility]        Today's Diagnoses     Postmenopausal status    -  1    Asymptomatic postmenopausal status        Osteopenia, unspecified location           Follow-ups after your visit        Who to contact     If you have questions or need follow up information about today's clinic visit or your schedule please contact [facility] directly at [phone].  Normal or non-critical lab and imaging results will be communicated to you by [employer], letter or phone within 4 business days after the clinic has received the results. If you do not hear from us within 7 days, please contact the clinic through [employer] or phone. If you have a critical or abnormal lab result, we will notify you by phone as soon as possible.  Submit refill requests through [employer] or call your pharmacy and they will forward the refill request to us. Please allow 3 business days for your refill to be completed.          Additional Information About Your Visit        MyChart Information     [employer] gives you secure access to your electronic health record. If you see a primary care provider, you can also send messages to your care team and make appointments. If you have questions, please call your primary care clinic.  If you do not have a primary care provider, please call [phone] and they will assist you.        Care EveryWhere ID     This is your Care EveryWhere ID. This could be used by other organizations to access your [place] medical records  [id number]        Your Vitals Were     Pulse Temperature                76 97.4  F (36.3  C) (Oral)           Blood Pressure from Last 3 Encounters:   12/19/17 134/77   11/21/17 132/82    10/24/17 110/70    Weight from Last 3 Encounters:   11/21/17 76.2 kg (168 lb)   10/24/17 75.8 kg (167 lb)   07/10/17 74.5 kg (164 lb 3.2 oz)              We Performed the Following     Calcium     Creatinine        Primary Care Provider Office Phone # Fax #    Ivan Mcpherson [phone]       [address] [facility] [address]        Equal Access to Services     EMANUEL GARCÍA : Hadii aad ku hadasho Soomaali, waaxda luqadaha, qaybta kaalmada adeegyada, waxay idiin hayaan adeeg eddiearaalba la'maryan . So [facility] [phone].    ATENCIÓN: Si habla español, tiene a dill disposición servicios gratuitos de asistencia lingüística. [facility] [phone].    We comply with applicable federal civil rights laws and [place] laws. We do not discriminate on the basis of race, color, national origin, age, disability, sex, sexual orientation, or gender identity.            Thank you!     Thank you for choosing [facility]  for your care. Our goal is always to provide you with excellent care. Hearing back from our patients is one way we can continue to improve our services. Please take a few minutes to complete the written survey that you may receive in the mail after your visit with us. Thank you!             Your Updated Medication List - Protect others around you: Learn how to safely use, store and throw away your medicines at www.disposemymeds.org.          This list is accurate as of: 12/19/17  4:37 PM.  Always use your most recent med list.                   Brand Name Dispense Instructions for use Diagnosis    aspirin 81 MG tablet     100 tablet    Take 1 tablet (81 mg) by mouth daily INDICATION: FOR HEART AND CARDIOVASCULAR HEALTH    Hypertension       calcium-vitamin D 600-400 MG-UNIT per tablet    calcium 600 + D    100 tablet    Take 1 tablet by mouth 2 times daily FOR BONE HEALTH AND FOR VITAMIN D DEFICIENCY (LOW VITAMIN D)    Osteopenia, unspecified location        cholecalciferol 2000 UNITS tablet     100 tablet    Take 2 tablets by mouth daily INDICATION: TO TREAT VITAMIN D DEFICIENCY (LOW VITAMIN D)    Vitamin D deficiency       cyabnocobalamin 2500 MCG sublingual tablet    VITAMIN B-12    250 tablet    Take 2,500 mcg by mouth daily INDICATION: FOR VITAMIN B12 SUPPLEMENTATION - TO IMPROVE MEMORY, BALANCE, SLEEP AND MOOD, DIRECTIONS: PLEASE PLACE UNDER THE TONGUE TO DISSOLVE    Vitamin B12 deficiency (non anemic)       fish oil-omega-3 fatty acids 1000 MG capsule     100    THREE TIMES DAILY    Routine physical examination       lisinopril 20 MG tablet    PRINIVIL/ZESTRIL    90 tablet    Take 1 tablet (20 mg) by mouth daily    Essential hypertension with goal blood pressure less than 130/80       loratadine 10 MG tablet    CLARITIN    90 tablet    ONE DAILY FOR ALLERGIES    Seasonal allergic rhinitis, unspecified chronicity, unspecified trigger       methylcellulose (laxative) Powd     1418 g    Take 1 g by mouth 2 times daily (before meals) INDICATION: FIBER SUPPLEMENT/TO LOWER CHOLESTEROL    Hyperlipidemia LDL goal <130       MULTIVITAMIN PO      1 daily        Pantothenic Acid 250 MG Caps      Take 1 capsule by mouth daily        zoledronic Acid 5 MG/100ML Soln infusion    RECLAST    100 mL    Inject 100 mLs (5 mg) into the vein once for 1 dose TO TREAT OSTEOPENIA    Osteopenia

## 2017-12-19 NOTE — PROGRESS NOTES
Infusion Nursing Note:  Alondra Morgan presents today for labs and reclast.    Patient seen by provider today: No   present during visit today: Not Applicable.    Note: N/A.    Intravenous Access:  Labs drawn without difficulty.  Peripheral IV placed.    Treatment Conditions:  Lab Results   Component Value Date     10/26/2017                   Lab Results   Component Value Date    POTASSIUM 4.3 10/26/2017           Lab Results   Component Value Date    MAG 2.1 08/05/2016            Lab Results   Component Value Date    CR 0.88 12/19/2017                   Lab Results   Component Value Date    YANICK 9.1 12/19/2017                Lab Results   Component Value Date    BILITOTAL 0.3 10/26/2017           Lab Results   Component Value Date    ALBUMIN 3.5 10/26/2017                    Lab Results   Component Value Date    ALT 26 10/26/2017           Lab Results   Component Value Date    AST 16 10/26/2017     Results reviewed, labs MET treatment parameters, ok to proceed with treatment.        Post Infusion Assessment:  Patient tolerated infusion without incident.  Site patent and intact, free from redness, edema or discomfort.  No evidence of extravasations.  Access discontinued per protocol.    Discharge Plan:   AVS to patient via MYCHART.  Patient will return as scheduled for next appointment.   Patient discharged in stable condition accompanied by: self.  Departure Mode: Ambulatory.    Colin Damon RN

## 2018-04-13 ENCOUNTER — HOSPITAL ENCOUNTER (OUTPATIENT)
Dept: MAMMOGRAPHY | Facility: CLINIC | Age: 73
Discharge: HOME | End: 2018-04-13
Attending: INTERNAL MEDICINE | Admitting: INTERNAL MEDICINE
Payer: MEDICARE

## 2018-04-13 DIAGNOSIS — Z12.31 VISIT FOR SCREENING MAMMOGRAM: ICD-10-CM

## 2018-04-13 PROCEDURE — 77067 SCR MAMMO BI INCL CAD: CPT

## 2018-04-18 ENCOUNTER — TRANSFERRED RECORDS (OUTPATIENT)
Dept: HEALTH INFORMATION MANAGEMENT | Facility: CLINIC | Age: 73
End: 2018-04-18

## 2018-06-11 ENCOUNTER — OFFICE VISIT (OUTPATIENT)
Dept: URGENT CARE | Facility: URGENT CARE | Age: 73
End: 2018-06-11
Payer: COMMERCIAL

## 2018-06-11 VITALS
WEIGHT: 161 LBS | BODY MASS INDEX: 26.79 KG/M2 | DIASTOLIC BLOOD PRESSURE: 70 MMHG | RESPIRATION RATE: 20 BRPM | HEART RATE: 81 BPM | TEMPERATURE: 97.9 F | OXYGEN SATURATION: 100 % | SYSTOLIC BLOOD PRESSURE: 120 MMHG

## 2018-06-11 DIAGNOSIS — H10.31 ACUTE BACTERIAL CONJUNCTIVITIS OF RIGHT EYE: ICD-10-CM

## 2018-06-11 DIAGNOSIS — J06.9 UPPER RESPIRATORY TRACT INFECTION, UNSPECIFIED TYPE: Primary | ICD-10-CM

## 2018-06-11 PROCEDURE — 99213 OFFICE O/P EST LOW 20 MIN: CPT | Performed by: PHYSICIAN ASSISTANT

## 2018-06-11 RX ORDER — OFLOXACIN 3 MG/ML
SOLUTION/ DROPS OPHTHALMIC
Qty: 1 BOTTLE | Refills: 0 | Status: SHIPPED | OUTPATIENT
Start: 2018-06-11 | End: 2018-10-26

## 2018-06-11 NOTE — MR AVS SNAPSHOT
After Visit Summary   6/11/2018    Alondra Morgan    MRN: [id number]           Patient Information     Date Of Birth          1945        Visit Information        Provider Department      6/11/2018 12:05 PM Mychal Ferrara PA-C [facility]        Today's Diagnoses     Upper respiratory tract infection, unspecified type    -  1    Acute bacterial conjunctivitis of right eye           Follow-ups after your visit        Who to contact     If you have questions or need follow up information about today's clinic visit or your schedule please contact [facility] directly at [phone].  Normal or non-critical lab and imaging results will be communicated to you by i[employer]hart, letter or phone within 4 business days after the clinic has received the results. If you do not hear from us within 7 days, please contact the clinic through [employer] or phone. If you have a critical or abnormal lab result, we will notify you by phone as soon as possible.  Submit refill requests through [employer] or call your pharmacy and they will forward the refill request to us. Please allow 3 business days for your refill to be completed.          Additional Information About Your Visit        MyChart Information     [employer] gives you secure access to your electronic health record. If you see a primary care provider, you can also send messages to your care team and make appointments. If you have questions, please call your primary care clinic.  If you do not have a primary care provider, please call [phone] and they will assist you.        Care EveryWhere ID     This is your Care EveryWhere ID. This could be used by other organizations to access your [place] medical records  [id number]        Your Vitals Were     Pulse Temperature Respirations Pulse Oximetry BMI (Body Mass Index)       81 97.9  F (36.6  C) 20 100% 26.79 kg/m2        Blood Pressure from Last 3  Encounters:   06/11/18 120/70   12/19/17 134/77   11/21/17 132/82    Weight from Last 3 Encounters:   06/11/18 161 lb (73 kg)   11/21/17 168 lb (76.2 kg)   10/24/17 167 lb (75.8 kg)              Today, you had the following     No orders found for display         Today's Medication Changes          These changes are accurate as of 6/11/18  1:19 PM.  If you have any questions, ask your nurse or doctor.               Start taking these medicines.        Dose/Directions    ofloxacin 0.3 % ophthalmic solution   Commonly known as:  OCUFLOX   Used for:  Acute bacterial conjunctivitis of right eye   Started by:  Mychal Ferrara PA-C        Instill 1-2 drops in the affected eye(s) every 2 hours while awake for 2 days then 1-2 drops every 4 hours while awake for the next 5 days.   Quantity:  1 Bottle   Refills:  0            Where to get your medicines      These medications were sent to [id number] [place], [place] [id number] [facility] [address]     Phone:  [phone]     ofloxacin 0.3 % ophthalmic solution                Primary Care Provider Office Phone # Fax #    Ivan Mcpherson [phone]       [address] [address]        Equal Access to Services     EMANUEL GARCÍA AH: Hadparvez mcfarland ku hadasho Soomaali, waaxda luqadaha, qaybta kaalmada adeegyada, andrew roth haymaryan rudolph . So [facility] [phone].    ATENCIÓN: Si habla español, tiene a dill disposición servicios gratuitos de asistencia lingüística. Llame al [phone].    We comply with applicable federal civil rights laws and [place] laws. We do not discriminate on the basis of race, color, national origin, age, disability, sex, sexual orientation, or gender identity.            Thank you!     Thank you for choosing [facility]  for your care. Our goal is always to provide you with excellent care. Hearing back from our patients is one way we  can continue to improve our services. Please take a few minutes to complete the written survey that you may receive in the mail after your visit with us. Thank you!             Your Updated Medication List - Protect others around you: Learn how to safely use, store and throw away your medicines at www.disposemymeds.org.          This list is accurate as of 6/11/18  1:19 PM.  Always use your most recent med list.                   Brand Name Dispense Instructions for use Diagnosis    aspirin 81 MG tablet     100 tablet    Take 1 tablet (81 mg) by mouth daily INDICATION: FOR HEART AND CARDIOVASCULAR HEALTH    Hypertension       calcium-vitamin D 600-400 MG-UNIT per tablet    calcium 600 + D    100 tablet    Take 1 tablet by mouth 2 times daily FOR BONE HEALTH AND FOR VITAMIN D DEFICIENCY (LOW VITAMIN D)    Osteopenia, unspecified location       cholecalciferol 2000 units tablet     100 tablet    Take 2 tablets by mouth daily INDICATION: TO TREAT VITAMIN D DEFICIENCY (LOW VITAMIN D)    Vitamin D deficiency       cyanocobalamin 2500 MCG sublingual tablet    VITAMIN B-12    250 tablet    Take 2,500 mcg by mouth daily INDICATION: FOR VITAMIN B12 SUPPLEMENTATION - TO IMPROVE MEMORY, BALANCE, SLEEP AND MOOD, DIRECTIONS: PLEASE PLACE UNDER THE TONGUE TO DISSOLVE    Vitamin B12 deficiency (non anemic)       fish oil-omega-3 fatty acids 1000 MG capsule     100    THREE TIMES DAILY    Routine physical examination       lisinopril 20 MG tablet    PRINIVIL/ZESTRIL    90 tablet    Take 1 tablet (20 mg) by mouth daily    Essential hypertension with goal blood pressure less than 130/80       loratadine 10 MG tablet    CLARITIN    90 tablet    ONE DAILY FOR ALLERGIES    Seasonal allergic rhinitis, unspecified chronicity, unspecified trigger       MULTIVITAMIN PO      1 daily        ofloxacin 0.3 % ophthalmic solution    OCUFLOX    1 Bottle    Instill 1-2 drops in the affected eye(s) every 2 hours while awake for 2 days then 1-2 drops  every 4 hours while awake for the next 5 days.    Acute bacterial conjunctivitis of right eye       Pantothenic Acid 250 MG Caps      Take 1 capsule by mouth daily        zoledronic Acid 5 MG/100ML Soln infusion    RECLAST    100 mL    Inject 100 mLs (5 mg) into the vein once for 1 dose TO TREAT OSTEOPENIA    Osteopenia

## 2018-06-11 NOTE — PROGRESS NOTES
SUBJECTIVE:   Alondra Morgan is a 72 year old female presenting with a chief complaint of nasal congestion and red eye, mattering.  Onset of symptoms was over 1 week ago .  Course of illness is ongoing.    Severity mild  Current and Associated symptoms: runny nose  Treatment measures tried include warm moist compresses.  Predisposing factors include recent illness.    Past Medical History:   Diagnosis Date     Arthritis      Atypical hyperplasia of breasts      Basal cell carcinoma of ala nasi      Basal cell carcinoma of shoulder      History of poliomyelitis 1947    acute polio infection age 2, resolved without any sequelae     Osteopenia      Polyp, colon     Adenomatous        Allergies   Allergen Reactions     Nkda [No Known Drug Allergies]      Pollen Extract          Social History   Substance Use Topics     Smoking status: Former Smoker     Packs/day: 1.00     Years: 40.00     Types: Cigarettes     Quit date: 10/7/1983     Smokeless tobacco: Never Used     Alcohol use Yes      Comment: 4 drinks per week       ROS:  CONSTITUTIONAL:NEGATIVE for fever, chills, change in weight  INTEGUMENTARY/SKIN: NEGATIVE for worrisome rashes, moles or lesions  EYES: POSITIVE for right eye redness, mattering  ENT/MOUTH: Positive for runny nose  RESP:NEGATIVE for significant cough or SOB  GI: NEGATIVE for nausea, abdominal pain, heartburn, or change in bowel habits  MUSCULOSKELETAL: NEGATIVE for significant arthralgias or myalgia  NEURO: NEGATIVE for weakness, dizziness or paresthesias    OBJECTIVE  :/70  Pulse 81  Temp 97.9  F (36.6  C)  Resp 20  Wt 161 lb (73 kg)  SpO2 100%  BMI 26.79 kg/m2  GENERAL APPEARANCE: healthy, alert and no distress  EYES: conjunctiva/corneas- conjunctival injection OD and yellow colored discharge present right  HENT: ear canals and TM's normal.  Nose and mouth without ulcers, erythema or lesions  NECK: supple, nontender, no lymphadenopathy  RESP: lungs clear to auscultation - no rales,  rhonchi or wheezes  CV: regular rates and rhythm, normal S1 S2, no murmur noted  NEURO: Normal strength and tone, sensory exam grossly normal,  normal speech and mentation  SKIN: no suspicious lesions or rashes    ASSESSMENT/PLAN:    ICD-10-CM    1. Upper respiratory tract infection, unspecified type J06.9    2. Acute bacterial conjunctivitis of right eye H10.31 ofloxacin (OCUFLOX) 0.3 % ophthalmic solution     Orders Placed This Encounter     ofloxacin (OCUFLOX) 0.3 % ophthalmic solution       Wash hands  Follow up as needed  See orders in Epic

## 2018-06-14 ENCOUNTER — TRANSFERRED RECORDS (OUTPATIENT)
Dept: HEALTH INFORMATION MANAGEMENT | Facility: CLINIC | Age: 73
End: 2018-06-14

## 2018-10-10 ENCOUNTER — DOCUMENTATION ONLY (OUTPATIENT)
Dept: LAB | Facility: CLINIC | Age: 73
End: 2018-10-10

## 2018-10-10 DIAGNOSIS — Z13.29 SCREENING FOR HYPOTHYROIDISM: Primary | ICD-10-CM

## 2018-10-10 DIAGNOSIS — E78.5 HYPERLIPIDEMIA LDL GOAL <130: ICD-10-CM

## 2018-10-10 DIAGNOSIS — E53.8 VITAMIN B12 DEFICIENCY (NON ANEMIC): ICD-10-CM

## 2018-10-10 DIAGNOSIS — E55.9 VITAMIN D DEFICIENCY: ICD-10-CM

## 2018-10-10 DIAGNOSIS — M85.80 OSTEOPENIA, UNSPECIFIED LOCATION: ICD-10-CM

## 2018-10-10 DIAGNOSIS — I10 ESSENTIAL HYPERTENSION WITH GOAL BLOOD PRESSURE LESS THAN 130/80: ICD-10-CM

## 2018-10-10 DIAGNOSIS — R79.89 ABNORMAL LFTS: ICD-10-CM

## 2018-10-23 DIAGNOSIS — I10 ESSENTIAL HYPERTENSION: ICD-10-CM

## 2018-10-23 DIAGNOSIS — I10 ESSENTIAL HYPERTENSION WITH GOAL BLOOD PRESSURE LESS THAN 130/80: ICD-10-CM

## 2018-10-23 DIAGNOSIS — M85.80 OSTEOPENIA, UNSPECIFIED LOCATION: ICD-10-CM

## 2018-10-23 DIAGNOSIS — E78.5 HYPERLIPIDEMIA LDL GOAL <130: ICD-10-CM

## 2018-10-23 DIAGNOSIS — R79.89 ABNORMAL LFTS: ICD-10-CM

## 2018-10-23 DIAGNOSIS — E55.9 VITAMIN D DEFICIENCY: ICD-10-CM

## 2018-10-23 DIAGNOSIS — Z13.29 SCREENING FOR HYPOTHYROIDISM: ICD-10-CM

## 2018-10-23 LAB
ALBUMIN SERPL-MCNC: 3.5 G/DL (ref 3.4–5)
ALP SERPL-CCNC: 92 U/L (ref 40–150)
ALT SERPL W P-5'-P-CCNC: 21 U/L (ref 0–50)
ANION GAP SERPL CALCULATED.3IONS-SCNC: 5 MMOL/L (ref 3–14)
AST SERPL W P-5'-P-CCNC: 18 U/L (ref 0–45)
BASOPHILS # BLD AUTO: 0 10E9/L (ref 0–0.2)
BASOPHILS NFR BLD AUTO: 0.7 %
BILIRUB SERPL-MCNC: 0.4 MG/DL (ref 0.2–1.3)
BUN SERPL-MCNC: 17 MG/DL (ref 7–30)
CALCIUM SERPL-MCNC: 9.2 MG/DL (ref 8.5–10.1)
CHLORIDE SERPL-SCNC: 98 MMOL/L (ref 94–109)
CHOLEST SERPL-MCNC: 199 MG/DL
CO2 SERPL-SCNC: 28 MMOL/L (ref 20–32)
CREAT SERPL-MCNC: 0.71 MG/DL (ref 0.52–1.04)
DEPRECATED CALCIDIOL+CALCIFEROL SERPL-MC: 63 UG/L (ref 20–75)
DIFFERENTIAL METHOD BLD: NORMAL
EOSINOPHIL # BLD AUTO: 0.2 10E9/L (ref 0–0.7)
EOSINOPHIL NFR BLD AUTO: 4.1 %
ERYTHROCYTE [DISTWIDTH] IN BLOOD BY AUTOMATED COUNT: 13.7 % (ref 10–15)
GFR SERPL CREATININE-BSD FRML MDRD: 80 ML/MIN/1.7M2
GLUCOSE SERPL-MCNC: 90 MG/DL (ref 70–99)
HCT VFR BLD AUTO: 37.8 % (ref 35–47)
HDLC SERPL-MCNC: 71 MG/DL
HGB BLD-MCNC: 12.3 G/DL (ref 11.7–15.7)
LDLC SERPL CALC-MCNC: 112 MG/DL
LYMPHOCYTES # BLD AUTO: 1.5 10E9/L (ref 0.8–5.3)
LYMPHOCYTES NFR BLD AUTO: 33.2 %
MCH RBC QN AUTO: 29.6 PG (ref 26.5–33)
MCHC RBC AUTO-ENTMCNC: 32.5 G/DL (ref 31.5–36.5)
MCV RBC AUTO: 91 FL (ref 78–100)
MONOCYTES # BLD AUTO: 0.5 10E9/L (ref 0–1.3)
MONOCYTES NFR BLD AUTO: 12.4 %
NEUTROPHILS # BLD AUTO: 2.2 10E9/L (ref 1.6–8.3)
NEUTROPHILS NFR BLD AUTO: 49.6 %
NONHDLC SERPL-MCNC: 128 MG/DL
PLATELET # BLD AUTO: 298 10E9/L (ref 150–450)
POTASSIUM SERPL-SCNC: 4.4 MMOL/L (ref 3.4–5.3)
PROT SERPL-MCNC: 7.4 G/DL (ref 6.8–8.8)
RBC # BLD AUTO: 4.16 10E12/L (ref 3.8–5.2)
SODIUM SERPL-SCNC: 131 MMOL/L (ref 133–144)
TRIGL SERPL-MCNC: 81 MG/DL
TSH SERPL DL<=0.005 MIU/L-ACNC: 1.57 MU/L (ref 0.4–4)
WBC # BLD AUTO: 4.4 10E9/L (ref 4–11)

## 2018-10-23 PROCEDURE — 80053 COMPREHEN METABOLIC PANEL: CPT | Performed by: INTERNAL MEDICINE

## 2018-10-23 PROCEDURE — 36415 COLL VENOUS BLD VENIPUNCTURE: CPT | Performed by: INTERNAL MEDICINE

## 2018-10-23 PROCEDURE — 84443 ASSAY THYROID STIM HORMONE: CPT | Performed by: INTERNAL MEDICINE

## 2018-10-23 PROCEDURE — 80061 LIPID PANEL: CPT | Performed by: INTERNAL MEDICINE

## 2018-10-23 PROCEDURE — 82306 VITAMIN D 25 HYDROXY: CPT | Performed by: INTERNAL MEDICINE

## 2018-10-23 PROCEDURE — 85025 COMPLETE CBC W/AUTO DIFF WBC: CPT | Performed by: INTERNAL MEDICINE

## 2018-10-24 ASSESSMENT — ACTIVITIES OF DAILY LIVING (ADL)
CURRENT_FUNCTION: NO ASSISTANCE NEEDED
I_NEED_ASSISTANCE_FOR_THE_FOLLOWING_DAILY_ACTIVITIES:: NO ASSISTANCE IS NEEDED

## 2018-10-26 ENCOUNTER — OFFICE VISIT (OUTPATIENT)
Dept: INTERNAL MEDICINE | Facility: CLINIC | Age: 73
End: 2018-10-26
Payer: COMMERCIAL

## 2018-10-26 VITALS
OXYGEN SATURATION: 98 % | RESPIRATION RATE: 10 BRPM | HEIGHT: 65 IN | BODY MASS INDEX: 27.16 KG/M2 | DIASTOLIC BLOOD PRESSURE: 80 MMHG | WEIGHT: 163 LBS | TEMPERATURE: 98.4 F | SYSTOLIC BLOOD PRESSURE: 124 MMHG | HEART RATE: 81 BPM

## 2018-10-26 DIAGNOSIS — M85.80 OSTEOPENIA, UNSPECIFIED LOCATION: ICD-10-CM

## 2018-10-26 DIAGNOSIS — I10 ESSENTIAL HYPERTENSION WITH GOAL BLOOD PRESSURE LESS THAN 130/80: ICD-10-CM

## 2018-10-26 DIAGNOSIS — D12.6 TUBULAR ADENOMA OF COLON: ICD-10-CM

## 2018-10-26 DIAGNOSIS — Z00.00 MEDICARE ANNUAL WELLNESS VISIT, SUBSEQUENT: Primary | ICD-10-CM

## 2018-10-26 PROCEDURE — 99397 PER PM REEVAL EST PAT 65+ YR: CPT | Performed by: INTERNAL MEDICINE

## 2018-10-26 RX ORDER — LISINOPRIL 20 MG/1
20 TABLET ORAL DAILY
Qty: 90 TABLET | Refills: 3 | Status: SHIPPED | OUTPATIENT
Start: 2018-10-26 | End: 2019-11-04

## 2018-10-26 ASSESSMENT — ACTIVITIES OF DAILY LIVING (ADL): CURRENT_FUNCTION: NO ASSISTANCE NEEDED

## 2018-10-26 NOTE — PROGRESS NOTES
SUBJECTIVE:   Alondra Morgan is a 73 year old female who presents for Preventive Visit.      Are you in the first 12 months of your Medicare coverage?  No    Physical   Annual:     Getting at least 3 servings of Calcium per day:  Yes    Bi-annual eye exam:  Yes    Dental care twice a year:  Yes    Sleep apnea or symptoms of sleep apnea:  None    Diet:  Regular (no restrictions)    Frequency of exercise:  2-3 days/week    Duration of exercise:  30-45 minutes    Taking medications regularly:  Yes    Medication side effects:  Not applicable    Additional concerns today:  YES    Ability to successfully perform activities of daily living: no assistance needed    Home Safety:  No safety concerns identified    Hearing Impairment: no hearing concerns    1.    Blood presure remains well controlled at home  Readings outside clinic are within normal limits.  Reviewed last 6 BP readings in chart:  BP Readings from Last 6 Encounters:   10/26/18 124/80   06/11/18 120/70   12/19/17 134/77   11/21/17 132/82   10/24/17 110/70   07/10/17 110/84     He has not experienced any significant side effects from medicaiotns for hypertension.    NO active cardiac complaints or symptoms with exercise.     2.  History of osteopenia.  Did not tolerate alendronate/Fosamax when tried years ago.  She does use intermittent doses of Reclast.  Reviewed last bone density with her.  She is taking calcium and vitamin D.  Vitamin D levels are within normal limits.    Fall risk:       COGNITIVE SCREEN  1) Repeat 3 items (Leader, Season, Table)    2) Clock draw: NORMAL  3) 3 item recall: Recalls 3 objects  Results: 3 items recalled: COGNITIVE IMPAIRMENT LESS LIKELY    Mini-CogTM Copyright ANDREW Jarvis. Licensed by the author for use in [facility]; reprinted with permission ([email or web address]@.[email or web address]). All rights reserved.        Reviewed and updated as needed this visit by clinical staff  Tobacco  Allergies  Meds         Reviewed and updated as needed this  visit by Provider        Social History   Substance Use Topics     Smoking status: Former Smoker     Packs/day: 1.00     Years: 40.00     Types: Cigarettes     Quit date: 10/7/1983     Smokeless tobacco: Never Used     Alcohol use Yes      Comment: 4 drinks per week       Alcohol Use 10/24/2018   If you drink alcohol do you typically have greater than 3 drinks per day OR greater than 7 drinks per week? No   No flowsheet data found.            Today's PHQ-2 Score:   PHQ-2 ( 1999 Pf[employer]) 10/24/2018   Q1: Little interest or pleasure in doing things 0   Q2: Feeling down, depressed or hopeless 0   PHQ-2 Score 0   Q1: Little interest or pleasure in doing things Not at all   Q2: Feeling down, depressed or hopeless Not at all   PHQ-2 Score 0       Do you feel safe in your environment - Yes    Do you have a Health Care Directive?: Yes: Advance Directive has been received and scanned.    Current providers sharing in care for this patient include:   Patient Care Team:  Ivan Mcpherson MD as PCP - General (Internal Medicine)    The following health maintenance items are reviewed in Epic and correct as of today:  Health Maintenance   Topic Date Due     INFLUENZA VACCINE (1) 09/01/2018     FALL RISK ASSESSMENT  10/24/2018     TSH Q1 YEAR  10/23/2019     CMP Q1 YR  10/23/2019     LIPID MONITORING Q1 YEAR  10/23/2019     CBC Q1 YR  10/23/2019     DEXA Q2 YR  10/26/2019     PHQ-2 Q1 YR  10/26/2019     ADVANCE DIRECTIVE PLANNING Q5 YRS  05/12/2021     TETANUS IMMUNIZATION (SYSTEM ASSIGNED)  12/11/2022     COLON CANCER SCREEN (SYSTEM ASSIGNED)  06/14/2028     PNEUMOCOCCAL  Completed       Past Medical History:  ---------------------------  Past Medical History:   Diagnosis Date     Arthritis      Atypical hyperplasia of breasts      Basal cell carcinoma of ala nasi      Basal cell carcinoma of shoulder      History of poliomyelitis 1947    acute polio infection age 2, resolved without any sequelae     Osteopenia      Polyp,  colon     Adenomatous       Past Surgical History:  ---------------------------  Past Surgical History:   Procedure Laterality Date     C VAG HYST,RMV TUBE/OVARY       EYE SURGERY  07/2017    cataract bilat eyes     HC EXCISION BREAST LESION, OPEN >=1      ATYPICAL HYPERPLASIA X 2     HYSTERECTOMY, PAP NO LONGER INDICATED  1994     TONSILLECTOMY         Current Medications:  ---------------------------  Current Outpatient Prescriptions   Medication Sig Dispense Refill     aspirin 81 MG tablet Take 1 tablet (81 mg) by mouth daily INDICATION: FOR HEART AND CARDIOVASCULAR HEALTH 100 tablet 3     calcium-vitamin D (CALCIUM 600 + D) 600-400 MG-UNIT per tablet Take 1 tablet by mouth 2 times daily FOR BONE HEALTH AND FOR VITAMIN D DEFICIENCY (LOW VITAMIN D) 100 tablet PRN     cholecalciferol 2000 UNITS tablet Take 2 tablets by mouth daily INDICATION: TO TREAT VITAMIN D DEFICIENCY (LOW VITAMIN D) 100 tablet prn     FISH OIL 1000 MG OR CAPS THREE TIMES DAILY 100 3     lisinopril (PRINIVIL/ZESTRIL) 20 MG tablet Take 1 tablet (20 mg) by mouth daily 90 tablet 3     loratadine (CLARITIN) 10 MG tablet ONE DAILY FOR ALLERGIES 90 tablet prn     MULTIVITAMIN OR 1 daily       Pantothenic Acid 250 MG CAPS Take 1 capsule by mouth daily       zoledronic Acid (RECLAST) 5 MG/100ML SOLN Inject 100 mLs (5 mg) into the vein once for 1 dose TO TREAT OSTEOPENIA 100 mL 0     [DISCONTINUED] lisinopril (PRINIVIL/ZESTRIL) 20 MG tablet Take 1 tablet (20 mg) by mouth daily 90 tablet 2       Allergies:  -------------  Allergies   Allergen Reactions     Alendronic Acid GI Disturbance     Severe GERD     Pollen Extract        Social History:  -------------------  Social History     Social History     Marital status:      Spouse name: N/A     Number of children: N/A     Years of education: N/A     Occupational History     Not on file.     Social History Main Topics     Smoking status: Former Smoker     Packs/day: 1.00     Years: 40.00     Types:  "Cigarettes     Quit date: 10/7/1983     Smokeless tobacco: Never Used     Alcohol use Yes      Comment: 4 drinks per week     Drug use: No     Sexual activity: Not Currently     Partners: Male     Other Topics Concern      Service No     Blood Transfusions No     Caffeine Concern No     Occupational Exposure No     Hobby Hazards No     Sleep Concern Yes     Stress Concern No     Weight Concern Yes     Special Diet No     Back Care No     Exercise Yes     3 X WEEK     Seat Belt Yes     Self-Exams No     ENCOURAGED     Social History Narrative       Family Medical History:  ------------------------------  Family History   Problem Relation Age of Onset     Cancer Mother      lung     Osteoporosis Mother      Eye Disorder Mother      glaucoma     Arthritis Mother      osteo     HEART DISEASE Mother      CHF     Hypertension Mother      Cerebrovascular Disease Father      TIAs     Osteoporosis Father      Hypertension Father      Cancer Father      BONE     No Known Problems Brother      Cerebrovascular Disease Brother      Cancer - colorectal Brother      Cancer Brother      PROSTATE     Arthritis Sister      PARTIAL KNEE REPLACEMENT IN 2009     Osteoporosis Sister      Breast Cancer Maternal Grandmother      Cerebrovascular Disease Maternal Grandfather      Breast Cancer Maternal Aunt      Breast Cancer Maternal Aunt      Breast Cancer Other      2 YOUNGER MATERNAL COUSINS     Asthma Daughter             Review of Systems  Constitutional, HEENT, cardiovascular, pulmonary, gi and gu systems are negative, except as otherwise noted.    OBJECTIVE:   /80  Pulse 81  Temp 98.4  F (36.9  C) (Oral)  Resp 10  Ht 5' 5\" (1.651 m)  Wt 163 lb (73.9 kg)  SpO2 98%  BMI 27.12 kg/m2 Estimated body mass index is 27.12 kg/(m^2) as calculated from the following:    Height as of this encounter: 5' 5\" (1.651 m).    Weight as of this encounter: 163 lb (73.9 kg).  Physical Exam  GENERAL alert and no distress  EYES:  Normal " sclera,conjunctiva, EOMI  HENT: oral and posterior pharynx without lesions or erythema, facies symmetric  NECK: Neck supple. No LAD, without thyroidmegaly or JVD., Carotids without bruits.  RESP: Clear to ausculation bilaterally without wheezes or crackles. Normal BS in all fields.  CV: RRR normal S1S2 without murmurs, rubs or gallops. PMI normal  LYMPH: no cervical lymph adenopathy appreciated  MS: extremities- no gross deformities of the visible extremities noted, no edema  PSYCH: Alert and oriented times 3; speech- coherent  SKIN:  No obvious significant skin lesions on visible portions of face         ASSESSMENT / PLAN:     (Z00.00) Medicare annual wellness visit, subsequent  (primary encounter diagnosis)  Comment: Discussed cardiac disease risk factors and cardiac disease risk factor modification, including diabetes screening, blood pressure screening (and management if indicated), and cholesterol screening.   Reviewed immunzation guidelines, including pneumococcal vaccines, annual influenza, and shingles vaccines.   Discussed routine cancer screenings, including skin cancer, colon cancer screening for everyone until age 80, prostate cancer screening in men until age 75, mammogram and PAP/pelvic for women until age 75.   Recommended regular dentist visits to care for remaining teeth.   Recommended regular screening for vision and glaucoma.   Recommended safe driving and accident avoidance.   Plan:     (I10) Essential hypertension with goal blood pressure less than 130/80  Comment: This condition is currently controlled on the current medical regimen.  Continue current therapy.  Discussed current hypertension treatment guidelines, including indications for treatment and treatment options.  Discussed the importance for aggressive management of HTN to prevent vascular complications later.  Recommended lower fat, lower carbohydrate, and lower sodium (<2000 mg)diet.  Discussed required intervals for follow up on  "HTN, lab studies.  Recommened pt. follow their blood pressures outside the clinic to ensure that BPs are remaining within guidelines, and to contact me if the readings are not within guidelines on a regular basis so we can adjust treatment as needed.   Plan: lisinopril (PRINIVIL/ZESTRIL) 20 MG tablet            (M85.80) Osteopenia, unspecified location  Comment: Recommend conservative treatments for osteopenia/osteoporosis including regular exercise and stretching, calcium and Vit D replacement (aiming for at least 3001-0080 mg Ca, 400 IU Vit D each day), and not smoking.  Reviewed medical treatments for osteopenia/osteoporosis including bisphosphanates (Fosamax/Actonel), SERMs (Evista), and HRT.  Discussed the risks and benefits of each and discussed the WHI study results and controversies over HRT use.  Check DEXA not more often than every 2-3 years, depending on insurance coverage.   Plan:     (D12.6) Tubular adenoma of colon  Comment: repeat colonoscopy every 5 years.   Plan:        End of Life Planning:  Patient currently has an advanced directive: Yes.  Practitioner is supportive of decision.    COUNSELING:  Reviewed preventive health counseling, as reflected in patient instructions       Regular exercise       Healthy diet/nutrition       Vision screening       Hearing screening       Dental care       Aspirin Prophylaxsis       Colon cancer screening       Hepatitis C screening    BP Readings from Last 1 Encounters:   10/26/18 124/80     Estimated body mass index is 27.12 kg/(m^2) as calculated from the following:    Height as of this encounter: 5' 5\" (1.651 m).    Weight as of this encounter: 163 lb (73.9 kg).           reports that she quit smoking about 35 years ago. Her smoking use included Cigarettes. She has a 40.00 pack-year smoking history. She has never used smokeless tobacco.      Appropriate preventive services were discussed with this patient, including applicable screening as appropriate for " cardiovascular disease, diabetes, osteopenia/osteoporosis, and glaucoma.  As appropriate for age/gender, discussed screening for colorectal cancer, prostate cancer, breast cancer, and cervical cancer. Checklist reviewing preventive services available has been given to the patient.    Reviewed patients plan of care and provided an AVS. The Basic Care Plan (routine screening as documented in Health Maintenance) for Alondra meets the Care Plan requirement. This Care Plan has been established and reviewed with the Patient.    Counseling Resources:  ATP IV Guidelines  Pooled Cohorts Equation Calculator  Breast Cancer Risk Calculator  FRAX Risk Assessment  ICSI Preventive Guidelines  Dietary Guidelines for Americans, 2010  USDA's MyPlate  ASA Prophylaxis  Lung CA Screening    Ivan Mcpherson MD  [facility]  Answers for HPI/ROS submitted by the patient on 10/24/2018   PHQ-2 Score: 0

## 2018-10-26 NOTE — PATIENT INSTRUCTIONS
"  *  Continue all medications at the same doses.  Contact your usual pharmacy if you need refills.     SHINGLES VACCINE:     I would recommend that you consider getting a \"shingles vaccine\".  The shingles vaccine does not stop you from getting shingles, but it decreases the intensity of the event, the duration of the event, and decreases the painful nerve condition that results     There are two options available:     --Shingrix (available starting early 2018), 2 shots, 2-6 months apart  **recommended**   OR   --Zostavax, one shot    --Based on the available data, the Shingrix vaccine has superior benefit and should be considered even if you have had the Zostavax vaccine before.      --Contact your insurance provider and ask them if either shingles vaccine is covered and is so, how much it will cost you.  Usually this will be cheaper to get in a pharmacy given by the pharmacist.    --Regardless of the coverage, I would recommend that you consider the vaccine since shingles is very painful, just ask anyone who has ever had it.               5 GOALS TO PREVENT VASCULAR DISEASE:     1.  Aggressive blood pressure control, under 130/80 ideally.  Using medications if needed.    Your blood pressure is under good control    BP Readings from Last 4 Encounters:   10/26/18 124/80   06/11/18 120/70   12/19/17 134/77   11/21/17 132/82       2.  Aggressive LDL cholesterol (\"bad cholesterol\") lowering as indicated.    Your goal is an LDL under 130 for sure, preferably under 100.  (If you have diabetes or previous vascular disease, the the LDL goals would be under 100 for sure, preferably under 70.)    New guidelines identify four high-risk groups who could benefit from statins:   *people with pre-existing heart disease, such as those who have had a heart attack;   *people ages 40 to 75 who have diabetes of any type  *patients ages 40 to 75 with at least a 7.5% risk of developing cardiovascular disease over the next decade, " "according to a formula described in the guidelines  *patients with the sort of super-high cholesterol that sometimes runs in families, as evidenced by an LDL of 190 milligrams per deciliter or higher    Your cholesterol levels are well controlled.    Recent Labs   Lab Test  10/23/18   1009  10/26/17   0853   02/24/15   0838  01/09/14   0854   CHOL  199  206*   < >  190  224*   HDL  71  78   < >  78  73   LDL  112*  111*   < >  97  136*   TRIG  81  84   < >  74  78   CHOLHDLRATIO   --    --    --   2.4  3.1    < > = values in this interval not displayed.       3.  Aggressive diabetic prevention, screening and/or management.      You do not have diabetes as of the most recent blood tests.     4.  No smoking    5.  Consider taking low dose aspirin (81 mg) tablet once per day over the age of 50, every day unless there is a specific reason that you cannot take aspirin (such as side effect, allergy, or you are on another \"blood thinner\").        --Based on your current cardiac risk factors, you should take Aspirin 81 mg once per day if you are over 50 years of age.           Preventive Health Recommendations    Female Ages 65 +    Yearly exam:     See your health care provider every year in order to  o Review health changes.   o Discuss preventive care.    o Review your medicines if your doctor has prescribed any.      You no longer need a yearly Pap test unless you've had an abnormal Pap test in the past 10 years. If you have vaginal symptoms, such as bleeding or discharge, be sure to talk with your provider about a Pap test.      Every 1 to 2 years, have a mammogram.  If you are over 69, talk with your health care provider about whether or not you want to continue having screening mammograms.      Every 10 years, have a colonoscopy. Or, have a yearly FIT test (stool test). These exams will check for colon cancer.       Have a cholesterol test every 5 years, or more often if your doctor advises it.       Have a diabetes " "test (fasting glucose) every three years. If you are at risk for diabetes, you should have this test more often.       At age 65, have a bone density scan (DEXA) to check for osteoporosis (brittle bone disease).    Shots:    Get a flu shot each year.    Get a tetanus shot every 10 years.    Talk to your doctor about your pneumonia vaccines. There are now two you should receive - Pneumovax (PPSV 23) and Prevnar (PCV 13).    Talk to your pharmacist about the shingles vaccine.    Talk to your doctor about the hepatitis B vaccine.    Nutrition:     Eat at least 5 servings of fruits and vegetables each day.      Eat whole-grain bread, whole-wheat pasta and brown rice instead of white grains and rice.      Get adequate Calcium and Vitamin D.        --Good Grains:  Oats, brown rice, Quinoa (these do not raise the blood sugar as much)     --Bad grains:  Anything made from wheat or white rice     (because these raise the blood sugars significantly, and the possible gluten issue from wheat for some people).      --Proteins:  Aim for \"lean proteins\" including chicken, fish, seafood, pork, turkey, and eggs (in moderation); Eat red meat only occasionally      Lifestyle    Exercise at least 150 minutes a week (30 minutes a day, 5 days a week). This will help you control your weight and prevent disease.      Limit alcohol to one drink per day.      No smoking.       Wear sunscreen to prevent skin cancer.       See your dentist twice a year for an exam and cleaning.      See your eye doctor every 1 to 2 years to screen for conditions such as glaucoma, macular degeneration and cataracts.  "

## 2018-10-26 NOTE — MR AVS SNAPSHOT
"              After Visit Summary   10/26/2018    Alondra Morgan    MRN: [id number]           Patient Information     Date Of Birth          1945        Visit Information        Provider Department      10/26/2018 10:00 AM Ivan Mcpherson MD [facility]        Today's Diagnoses     Medicare annual wellness visit, subsequent    -  1    Essential hypertension with goal blood pressure less than 130/80        Osteopenia, unspecified location        Tubular adenoma of colon          Care Instructions      *  Continue all medications at the same doses.  Contact your usual pharmacy if you need refills.     SHINGLES VACCINE:     I would recommend that you consider getting a \"shingles vaccine\".  The shingles vaccine does not stop you from getting shingles, but it decreases the intensity of the event, the duration of the event, and decreases the painful nerve condition that results     There are two options available:     --Shingrix (available starting early 2018), 2 shots, 2-6 months apart  **recommended**   OR   --Zostavax, one shot    --Based on the available data, the Shingrix vaccine has superior benefit and should be considered even if you have had the Zostavax vaccine before.      --Contact your insurance provider and ask them if either shingles vaccine is covered and is so, how much it will cost you.  Usually this will be cheaper to get in a pharmacy given by the pharmacist.    --Regardless of the coverage, I would recommend that you consider the vaccine since shingles is very painful, just ask anyone who has ever had it.               5 GOALS TO PREVENT VASCULAR DISEASE:     1.  Aggressive blood pressure control, under 130/80 ideally.  Using medications if needed.    Your blood pressure is under good control    BP Readings from Last 4 Encounters:   10/26/18 124/80   06/11/18 120/70   12/19/17 134/77   11/21/17 132/82       2.  Aggressive LDL cholesterol (\"bad cholesterol\") " "lowering as indicated.    Your goal is an LDL under 130 for sure, preferably under 100.  (If you have diabetes or previous vascular disease, the the LDL goals would be under 100 for sure, preferably under 70.)    New guidelines identify four high-risk groups who could benefit from statins:   *people with pre-existing heart disease, such as those who have had a heart attack;   *people ages 40 to 75 who have diabetes of any type  *patients ages 40 to 75 with at least a 7.5% risk of developing cardiovascular disease over the next decade, according to a formula described in the guidelines  *patients with the sort of super-high cholesterol that sometimes runs in families, as evidenced by an LDL of 190 milligrams per deciliter or higher    Your cholesterol levels are well controlled.    Recent Labs   Lab Test  10/23/18   1009  10/26/17   0853   02/24/15   0838  01/09/14   0854   CHOL  199  206*   < >  190  224*   HDL  71  78   < >  78  73   LDL  112*  111*   < >  97  136*   TRIG  81  84   < >  74  78   CHOLHDLRATIO   --    --    --   2.4  3.1    < > = values in this interval not displayed.       3.  Aggressive diabetic prevention, screening and/or management.      You do not have diabetes as of the most recent blood tests.     4.  No smoking    5.  Consider taking low dose aspirin (81 mg) tablet once per day over the age of 50, every day unless there is a specific reason that you cannot take aspirin (such as side effect, allergy, or you are on another \"blood thinner\").        --Based on your current cardiac risk factors, you should take Aspirin 81 mg once per day if you are over 50 years of age.           Preventive Health Recommendations    Female Ages 65 +    Yearly exam:     See your health care provider every year in order to  o Review health changes.   o Discuss preventive care.    o Review your medicines if your doctor has prescribed any.      You no longer need a yearly Pap test unless you've had an abnormal Pap " "test in the past 10 years. If you have vaginal symptoms, such as bleeding or discharge, be sure to talk with your provider about a Pap test.      Every 1 to 2 years, have a mammogram.  If you are over 69, talk with your health care provider about whether or not you want to continue having screening mammograms.      Every 10 years, have a colonoscopy. Or, have a yearly FIT test (stool test). These exams will check for colon cancer.       Have a cholesterol test every 5 years, or more often if your doctor advises it.       Have a diabetes test (fasting glucose) every three years. If you are at risk for diabetes, you should have this test more often.       At age 65, have a bone density scan (DEXA) to check for osteoporosis (brittle bone disease).    Shots:    Get a flu shot each year.    Get a tetanus shot every 10 years.    Talk to your doctor about your pneumonia vaccines. There are now two you should receive - Pneumovax (PPSV 23) and Prevnar (PCV 13).    Talk to your pharmacist about the shingles vaccine.    Talk to your doctor about the hepatitis B vaccine.    Nutrition:     Eat at least 5 servings of fruits and vegetables each day.      Eat whole-grain bread, whole-wheat pasta and brown rice instead of white grains and rice.      Get adequate Calcium and Vitamin D.        --Good Grains:  Oats, brown rice, Quinoa (these do not raise the blood sugar as much)     --Bad grains:  Anything made from wheat or white rice     (because these raise the blood sugars significantly, and the possible gluten issue from wheat for some people).      --Proteins:  Aim for \"lean proteins\" including chicken, fish, seafood, pork, turkey, and eggs (in moderation); Eat red meat only occasionally      Lifestyle    Exercise at least 150 minutes a week (30 minutes a day, 5 days a week). This will help you control your weight and prevent disease.      Limit alcohol to one drink per day.      No smoking.       Wear sunscreen to prevent skin " cancer.       See your dentist twice a year for an exam and cleaning.      See your eye doctor every 1 to 2 years to screen for conditions such as glaucoma, macular degeneration and cataracts.          Follow-ups after your visit        Follow-up notes from your care team     Return in about 1 year (around 10/26/2019) for Blood pressure, Medicare Annual Wellness Exam.      Your next 10 appointments already scheduled     Nov 19, 2018 11:00 AM CST   Return Visit with Susy Mcadams MD   [facility] [place] [id number] [phone]              Who to contact     If you have questions or need follow up information about today's clinic visit or your schedule please contact [facility] directly at [phone].  Normal or non-critical lab and imaging results will be communicated to you by MyChart, letter or phone within 4 business days after the clinic has received the results. If you do not hear from us within 7 days, please contact the clinic through MyChart or phone. If you have a critical or abnormal lab result, we will notify you by phone as soon as possible.  Submit refill requests through [employer] or call your pharmacy and they will forward the refill request to us. Please allow 3 business days for your refill to be completed.          Additional Information About Your Visit        MyChart Information     [employer] gives you secure access to your electronic health record. If you see a primary care provider, you can also send messages to your care team and make appointments. If you have questions, please call your primary care clinic.  If you do not have a primary care provider, please call [phone] and they will assist you.        Care EveryWhere ID     This is your Care EveryWhere ID. This could be used by other organizations to access your [facility] medical records  [id number]        Your  "Vitals Were     Pulse Temperature Respirations Height Pulse Oximetry BMI (Body Mass Index)    81 98.4  F (36.9  C) (Oral) 10 5' 5\" (1.651 m) 98% 27.12 kg/m2       Blood Pressure from Last 3 Encounters:   10/26/18 124/80   06/11/18 120/70   12/19/17 134/77    Weight from Last 3 Encounters:   10/26/18 163 lb (73.9 kg)   06/11/18 161 lb (73 kg)   11/21/17 168 lb (76.2 kg)              Today, you had the following     No orders found for display         Where to get your medicines      These medications were sent to [id number] - [facility] [address] [facility]  [address] [facility] [address]     Phone:  [phone]     lisinopril 20 MG tablet          Primary Care Provider Office Phone # Fax #    Ivan Mcpherson [phone]       [address] [address]        Equal Access to Services     EL GARCÍA : Hadii bruna ku hadasho Soomaali, waaxda luqadaha, qaybta kaalmada adeegyada, andrew roth haymaryan rudolph . So [facility] [phone].    ATENCIÓN: Si habla español, tiene a dill disposición servicios gratuitos de asistencia lingüística. Llame al [phone].    We comply with applicable federal civil rights laws and [place] laws. We do not discriminate on the basis of race, color, national origin, age, disability, sex, sexual orientation, or gender identity.            Thank you!     Thank you for choosing [facility]  for your care. Our goal is always to provide you with excellent care. Hearing back from our patients is one way we can continue to improve our services. Please take a few minutes to complete the written survey that you may receive in the mail after your visit with us. Thank you!             Your Updated Medication List - Protect others around you: Learn how to safely use, store and throw away your medicines at www.disposemymeds.org.          This list is accurate as of 10/26/18 10:50 AM.  Always use your " most recent med list.                   Brand Name Dispense Instructions for use Diagnosis    aspirin 81 MG tablet     100 tablet    Take 1 tablet (81 mg) by mouth daily INDICATION: FOR HEART AND CARDIOVASCULAR HEALTH    Hypertension       calcium carbonate 600 mg-vitamin D 400 units 600-400 MG-UNIT per tablet    calcium 600 + D    100 tablet    Take 1 tablet by mouth 2 times daily FOR BONE HEALTH AND FOR VITAMIN D DEFICIENCY (LOW VITAMIN D)    Osteopenia, unspecified location       cholecalciferol 2000 units tablet     100 tablet    Take 2 tablets by mouth daily INDICATION: TO TREAT VITAMIN D DEFICIENCY (LOW VITAMIN D)    Vitamin D deficiency       fish oil-omega-3 fatty acids 1000 MG capsule     100    THREE TIMES DAILY    Routine physical examination       lisinopril 20 MG tablet    PRINIVIL/ZESTRIL    90 tablet    Take 1 tablet (20 mg) by mouth daily    Essential hypertension with goal blood pressure less than 130/80       loratadine 10 MG tablet    CLARITIN    90 tablet    ONE DAILY FOR ALLERGIES    Seasonal allergic rhinitis, unspecified chronicity, unspecified trigger       MULTIVITAMIN PO      1 daily        Pantothenic Acid 250 MG Caps      Take 1 capsule by mouth daily        zoledronic Acid 5 MG/100ML Soln infusion    RECLAST    100 mL    Inject 100 mLs (5 mg) into the vein once for 1 dose TO TREAT OSTEOPENIA    Osteopenia

## 2018-11-19 ENCOUNTER — OFFICE VISIT (OUTPATIENT)
Dept: FAMILY MEDICINE | Facility: CLINIC | Age: 73
End: 2018-11-19
Payer: COMMERCIAL

## 2018-11-19 VITALS
HEIGHT: 65 IN | BODY MASS INDEX: 27.12 KG/M2 | DIASTOLIC BLOOD PRESSURE: 83 MMHG | SYSTOLIC BLOOD PRESSURE: 129 MMHG | HEART RATE: 76 BPM | OXYGEN SATURATION: 100 %

## 2018-11-19 DIAGNOSIS — D18.01 CAPILLARY HEMANGIOMA OF SKIN: ICD-10-CM

## 2018-11-19 DIAGNOSIS — Z85.828 HISTORY OF BASAL CELL CARCINOMA: Primary | ICD-10-CM

## 2018-11-19 DIAGNOSIS — L82.1 SEBORRHEIC KERATOSES: ICD-10-CM

## 2018-11-19 DIAGNOSIS — L81.4 SOLAR LENTIGO: ICD-10-CM

## 2018-11-19 DIAGNOSIS — D22.9 MULTIPLE BENIGN MELANOCYTIC NEVI: ICD-10-CM

## 2018-11-19 PROCEDURE — 99213 OFFICE O/P EST LOW 20 MIN: CPT | Performed by: FAMILY MEDICINE

## 2018-11-19 NOTE — MR AVS SNAPSHOT
"              After Visit Summary   11/19/2018    Alondra Morgan    MRN: [id number]           Patient Information     Date Of Birth          1945        Visit Information        Provider Department      11/19/2018 11:00 AM Susy Mcadams MD [facility]        Today's Diagnoses     History of basal cell carcinoma    -  1    Capillary hemangioma of skin        Multiple benign melanocytic nevi        Solar lentigo        Seborrheic keratoses          Care Instructions    FUTURE APPOINTMENTS  Follow up in 1 year(s) for a full-body skin cancer screening.    SUN PROTECTION INSTRUCTIONS  Sun damage can lead to skin cancer and premature aging of the skin.      The best way to protect from sun damage to your skin is to avoid the sun during peak hours (10 am - 2 pm) even on overcast days.      Use UPF sun-protective clothing, which while more expensive initially provides longer lasting coverage without having to worry about remembering to re-apply.  1. Wear a wide-brimmed hat and sunglasses.   2. Wear sun-protective clothing.  [employer] and other [employer] make sun protective clothing that are stylish, comfortable and cool. [employer] and other [employer] make UV arm sleeves suitable for golfing, gardening and other activities.      Sunscreen instructions:  1. Use sunscreens with Zinc Oxide, Titanium Dioxide or Avobenzone to protect from UVA rays.  2. Use SPF 30-50+ to protect from UVB rays.  3. Re-apply every 2 hours even if water resistant.  4. Apply on your face every day even when cloudy and even in the winter. UVA \"aging rays\" penetrate window glass and are just as strong in the winter as in the summer.    Product Recommendations:    Consider use of sunscreen sticks with Zinc Oxide and Titanium Dioxide active ingredients such as Neutrogena Pure&Free Baby Sunscreen Stick.    Good examples include: Blue Lizard, EltaMD, Solbar    Good daily moisturizers with SPF: Vanicream, " "CeraVe.    For sensitive skin, consider : SkinMedica Essential Defense Mineral Shield Broad Spectrum SPF 35    Men: consider use of Neutrogena Triple Protect Facial Lotion      Never use tanning beds. Tanning beds are associated with much higher risks of skin cancer.    All tanning damages the skin. Aim for ivory skin year round and you will have less trouble with your skin in years to come. There is no merit in getting \"a base tan\" before a warm weather vacation, as any tanning indicates your body's response to sun damage.    Stop smoking. Smokers have higher rates of skin cancer and also have premature skin wrinkling.    FYI  You should use about 3 tablespoons of sunscreen to protect your whole body. Thus a typical eight ounce bottle of sunscreen should last 4 applications. Remember, that the SPF rating is compromised if you don t apply enough. Most people only apply 1/2 - 1/3 of the amount they need. Also don t forget areas such as your ears, feet, upper back and harder to reach places. Keep in mind that these amounts should be increased for larger body sizes.    Sunscreens with titanium dioxide and/or zinc oxide in the active ingredients are physical blockers as opposed to chemical blockers. Chemical-free sunscreens should not irritate the skin.    Spray-on sunscreens may be used for touch-up application only, not as a base layer. Also, use with caution around small children due to inhalation risk.    Avoid retinyl palmitate products.    Avoid combination products that include both sunscreen and insect repellant, as sunscreen should be applied every 2 hours, but insect repellant should not be applied as frequently.    SPF means sun protection factor, which is just the degree to which the sunscreen can protect against UVB rays. There is no rating system for UVA rays. SPF is calculated as the time skin will burn when sunscreen is applied vs. skin without sunscreen.    Water resistant sunscreens should be " re-applied every 1-2 hours.    For more information:  http://www.skincancer.org/prevention/sun-protection/sunscreen/sunscreens-safe-and-effective    SKIN CANCER SELF-EXAM INSTRUCTIONS  Check every month in the mirror or with a household member. Be aware of any changes, especially bleeding or tenderness. Also, make sure to check your nails for color changes after removal of nail polish.    For melanoma, check for:  A - Asymmetry. One half unlike the other half.  B - Border. Irregular, scalloped, ragged, notched, blurred or poorly defined borders.  C - Color. Color variations from one area to another, with shades of tan, brown and/or black present. Sometimes white, red or blue.  D - Diameter. Greater than 6 mm (about the size of a pencil eraser). Any new growth of a mole should be concerning and be evaluated.  E - Evolving. A mole or skin lesion that looks different from the rest or is changing in size, shape or color.    For basal cell carcinoma and squamous cell carcinoma, check for:    Sores, shiny bumps, nodules, scaly lesions, or wart-like growths that are itchy, tender, crusting, scabbing, eroding, oozing or bleeding.    Open sores/wounds or reddish/irritated areas that do not heal within 2-3 weeks.    Scar-like areas that are white, yellow or waxy in color.          Follow-ups after your visit        Your next 10 appointments already scheduled     Nov 19, 2018 11:00 AM CST   Return Visit with Susy Mcadams MD   [facility] ([address] [address]   [phone]              Who to contact     If you have questions or need follow up information about today's clinic visit or your schedule please contact [facility] directly at [phone].  Normal or non-critical lab and imaging results will be communicated to you by MyChart, letter or phone within 4 business days after the clinic has received the  "results. If you do not hear from us within 7 days, please contact the clinic through [employer] or phone. If you have a critical or abnormal lab result, we will notify you by phone as soon as possible.  Submit refill requests through [employer] or call your pharmacy and they will forward the refill request to us. Please allow 3 business days for your refill to be completed.          Additional Information About Your Visit        Vir2usharAdmazely Information     [employer] gives you secure access to your electronic health record. If you see a primary care provider, you can also send messages to your care team and make appointments. If you have questions, please call your primary care clinic.  If you do not have a primary care provider, please call [phone] and they will assist you.        Care EveryWhere ID     This is your Care EveryWhere ID. This could be used by other organizations to access your [facility] medical records  [id number]        Your Vitals Were     Pulse Height Pulse Oximetry Breastfeeding? BMI (Body Mass Index)       76 5' 5\" (1.651 m) 100% No 27.12 kg/m2        Blood Pressure from Last 3 Encounters:   11/19/18 129/83   10/26/18 124/80   06/11/18 120/70    Weight from Last 3 Encounters:   10/26/18 163 lb (73.9 kg)   06/11/18 161 lb (73 kg)   11/21/17 168 lb (76.2 kg)              Today, you had the following     No orders found for display       Primary Care Provider Office Phone # Fax #    Ivan Mcpherson [phone]       [address] [facility] [address]        Equal Access to Services     EMANUEL GARCÍA : Hadii aad ku hadasho Soomaali, waaxda luqadaha, qaybta kaalmada adeegyada, andrew orellana. So [facility] [phone].    ATENCIÓN: Si habla español, tiene a dill disposición servicios gratuitos de asistencia lingüística. Llame al [phone].    We comply with applicable federal civil rights laws and [place] laws. We do not discriminate on the basis of race, " color, national origin, age, disability, sex, sexual orientation, or gender identity.            Thank you!     Thank you for choosing [facility]  for your care. Our goal is always to provide you with excellent care. Hearing back from our patients is one way we can continue to improve our services. Please take a few minutes to complete the written survey that you may receive in the mail after your visit with us. Thank you!             Your Updated Medication List - Protect others around you: Learn how to safely use, store and throw away your medicines at www.disposemymeds.org.          This list is accurate as of 11/19/18 10:58 AM.  Always use your most recent med list.                   Brand Name Dispense Instructions for use Diagnosis    aspirin 81 MG tablet     100 tablet    Take 1 tablet (81 mg) by mouth daily INDICATION: FOR HEART AND CARDIOVASCULAR HEALTH    Hypertension       calcium carbonate 600 mg-vitamin D 400 units 600-400 MG-UNIT per tablet    calcium 600 + D    100 tablet    Take 1 tablet by mouth 2 times daily FOR BONE HEALTH AND FOR VITAMIN D DEFICIENCY (LOW VITAMIN D)    Osteopenia, unspecified location       cholecalciferol 2000 units tablet     100 tablet    Take 2 tablets by mouth daily INDICATION: TO TREAT VITAMIN D DEFICIENCY (LOW VITAMIN D)    Vitamin D deficiency       fish oil-omega-3 fatty acids 1000 MG capsule     100    THREE TIMES DAILY    Routine physical examination       lisinopril 20 MG tablet    PRINIVIL/ZESTRIL    90 tablet    Take 1 tablet (20 mg) by mouth daily    Essential hypertension with goal blood pressure less than 130/80       loratadine 10 MG tablet    CLARITIN    90 tablet    ONE DAILY FOR ALLERGIES    Seasonal allergic rhinitis, unspecified chronicity, unspecified trigger       MULTIVITAMIN PO      1 daily        Pantothenic Acid 250 MG Caps      Take 1 capsule by mouth daily        zoledronic Acid 5 MG/100ML Soln infusion    RECLAST    100 mL     Inject 100 mLs (5 mg) into the vein once for 1 dose TO TREAT OSTEOPENIA    Osteopenia

## 2018-11-19 NOTE — PATIENT INSTRUCTIONS
"FUTURE APPOINTMENTS  Follow up in 1 year(s) for a full-body skin cancer screening.    SUN PROTECTION INSTRUCTIONS  Sun damage can lead to skin cancer and premature aging of the skin.      The best way to protect from sun damage to your skin is to avoid the sun during peak hours (10 am - 2 pm) even on overcast days.      Use UPF sun-protective clothing, which while more expensive initially provides longer lasting coverage without having to worry about remembering to re-apply.  1. Wear a wide-brimmed hat and sunglasses.   2. Wear sun-protective clothing.  [employer] and other [employer] make sun protective clothing that are stylish, comfortable and cool. [employer] and other [employer] make UV arm sleeves suitable for golfing, gardening and other activities.      Sunscreen instructions:  1. Use sunscreens with Zinc Oxide, Titanium Dioxide or Avobenzone to protect from UVA rays.  2. Use SPF 30-50+ to protect from UVB rays.  3. Re-apply every 2 hours even if water resistant.  4. Apply on your face every day even when cloudy and even in the winter. UVA \"aging rays\" penetrate window glass and are just as strong in the winter as in the summer.    Product Recommendations:    Consider use of sunscreen sticks with Zinc Oxide and Titanium Dioxide active ingredients such as Neutrogena Pure&Free Baby Sunscreen Stick.    Good examples include: Blue Lizard, EltaMD, Solbar    Good daily moisturizers with SPF: Vanicream, CeraVe.    For sensitive skin, consider : SkinMedica Essential Defense Mineral Shield Broad Spectrum SPF 35    Men: consider use of Neutrogena Triple Protect Facial Lotion      Never use tanning beds. Tanning beds are associated with much higher risks of skin cancer.    All tanning damages the skin. Aim for ivory skin year round and you will have less trouble with your skin in years to come. There is no merit in getting \"a base tan\" before a warm weather vacation, as any tanning indicates your body's " response to sun damage.    Stop smoking. Smokers have higher rates of skin cancer and also have premature skin wrinkling.    FYI  You should use about 3 tablespoons of sunscreen to protect your whole body. Thus a typical eight ounce bottle of sunscreen should last 4 applications. Remember, that the SPF rating is compromised if you don t apply enough. Most people only apply 1/2 - 1/3 of the amount they need. Also don t forget areas such as your ears, feet, upper back and harder to reach places. Keep in mind that these amounts should be increased for larger body sizes.    Sunscreens with titanium dioxide and/or zinc oxide in the active ingredients are physical blockers as opposed to chemical blockers. Chemical-free sunscreens should not irritate the skin.    Spray-on sunscreens may be used for touch-up application only, not as a base layer. Also, use with caution around small children due to inhalation risk.    Avoid retinyl palmitate products.    Avoid combination products that include both sunscreen and insect repellant, as sunscreen should be applied every 2 hours, but insect repellant should not be applied as frequently.    SPF means sun protection factor, which is just the degree to which the sunscreen can protect against UVB rays. There is no rating system for UVA rays. SPF is calculated as the time skin will burn when sunscreen is applied vs. skin without sunscreen.    Water resistant sunscreens should be re-applied every 1-2 hours.    For more information:  http://www.skincancer.org/prevention/sun-protection/sunscreen/sunscreens-safe-and-effective    SKIN CANCER SELF-EXAM INSTRUCTIONS  Check every month in the mirror or with a household member. Be aware of any changes, especially bleeding or tenderness. Also, make sure to check your nails for color changes after removal of nail polish.    For melanoma, check for:  A - Asymmetry. One half unlike the other half.  B - Border. Irregular, scalloped, ragged,  notched, blurred or poorly defined borders.  C - Color. Color variations from one area to another, with shades of tan, brown and/or black present. Sometimes white, red or blue.  D - Diameter. Greater than 6 mm (about the size of a pencil eraser). Any new growth of a mole should be concerning and be evaluated.  E - Evolving. A mole or skin lesion that looks different from the rest or is changing in size, shape or color.    For basal cell carcinoma and squamous cell carcinoma, check for:    Sores, shiny bumps, nodules, scaly lesions, or wart-like growths that are itchy, tender, crusting, scabbing, eroding, oozing or bleeding.    Open sores/wounds or reddish/irritated areas that do not heal within 2-3 weeks.    Scar-like areas that are white, yellow or waxy in color.

## 2018-11-19 NOTE — LETTER
11/19/2018         RE: Alondra Morgan  [address] [facility] [address] [place] [id number]        Dear Colleague,    Thank you for referring your patient, Alondra Morgan, to the [facility]. Please see a copy of my visit note below.    [facility] - PRIMARY CARE SKIN    CC : skin cancer screening (full-body)  SUBJECTIVE:                                                    Alondra Morgan is a 73 year old female who presents to clinic today for a full-body skin exam because of her history of skin cancer. No bothersome lesions noticed by the patient or other skin concerns.    Personal history of skin cancer : YES - basal cell carcinoma on right shoulder and right nasolabial fold (s/p MMS).  Family history of skin cancer : YES - non-melanoma skin cancer in mother, sister, brother.    Personal Medical History  Eczema Psoriasis Autoimmune   NO NO NO     Family Medical History  Eczema Psoriasis Autoimmune   NO YES - in father and a brother NO     Sun Exposure History  Previous history of significant sun exposure: Travels to [facility] every January.  Blistering sunburns : YES - 5 times  Tanning beds : NO.  Sunscreen Use : YES, SPF : 30.  UV-protective clothing use : NO  Wide-brimmed hats : YES  UV-protective sunglasses : YES  Avoids mid-day sun : YES - sits in shade when in [place], [place] locations    Occupation : retired school counselor (indoor).    Refer to electronic medical record (EMR) for past medical history and medications.    INTEGUMENTARY/SKIN: NEGATIVE for worrisome rashes, moles or lesions   ROS : 14 point review of systems was negative except the symptoms listed above in the HPI.    This document serves as a record of the services and decisions personally performed and made by Drea Mcadams MD. It was created on her behalf by Blaise Tapia, a trained medical scribe.  The creation of this document is based on the scribe's personal observations and the provider's statements to the  "medical scribe.  Blaise Tapia, November 19, 2018 10:48 AM      OBJECTIVE:                                                    GENERAL: healthy, alert and no distress  SKIN: Starks Skin Type - I.  This patient was examined from the top of the head to the bottom of the feet  including scalp, face, neck, back, chest, breasts, buttocks, both arms, both legs, both hands, both feet, all 10 fingers and all 10 toes. The dermatoscope was used to help evaluate pigmented lesions.  Skin Pertinent Findings:  Face : Multiple telangiectasias on both cheeks.     Arms : scattered slightly raised, red lesion(s) consistent with capillary hemangioma. brown, macule(s) most consistent with benign solar lentigo.    Legs : Brown, macule(s) most consistent with benign solar lentigo.    Back : brown, macule(s) most consistent with benign solar lentigo. Scattered \"stuck on\" appearing papules, raised, brown, coarse-textured, round lesion(s) most consistent with seborrheic keratoses.    Right shoulder : well-healed scar.          ASSESSMENT:                                                      Encounter Diagnoses   Name Primary?     History of basal cell carcinoma Yes     Capillary hemangioma of skin      Multiple benign melanocytic nevi      Solar lentigo      Seborrheic keratoses          PLAN:                                                    Patient Instructions   FUTURE APPOINTMENTS  Follow up in 1 year(s) for a full-body skin cancer screening.    SUN PROTECTION INSTRUCTIONS  Sun damage can lead to skin cancer and premature aging of the skin.      The best way to protect from sun damage to your skin is to avoid the sun during peak hours (10 am - 2 pm) even on overcast days.      Use UPF sun-protective clothing, which while more expensive initially provides longer lasting coverage without having to worry about remembering to re-apply.  1. Wear a wide-brimmed hat and sunglasses.   2. Wear sun-protective clothing.  [employer] and " "other companies make sun protective clothing that are stylish, comfortable and cool. [employer] and other companies make UV arm sleeves suitable for golfing, gardening and other activities.      Sunscreen instructions:  1. Use sunscreens with Zinc Oxide, Titanium Dioxide or Avobenzone to protect from UVA rays.  2. Use SPF 30-50+ to protect from UVB rays.  3. Re-apply every 2 hours even if water resistant.  4. Apply on your face every day even when cloudy and even in the winter. UVA \"aging rays\" penetrate window glass and are just as strong in the winter as in the summer.    Product Recommendations:    Consider use of sunscreen sticks with Zinc Oxide and Titanium Dioxide active ingredients such as Neutrogena Pure&Free Baby Sunscreen Stick.    Good examples include: Blue Lizard, EltaMD, Solbar    Good daily moisturizers with SPF: Vanicream, CeraVe.    For sensitive skin, consider : SkinMedica Essential Defense Mineral Shield Broad Spectrum SPF 35    Men: consider use of Neutrogena Triple Protect Facial Lotion      Never use tanning beds. Tanning beds are associated with much higher risks of skin cancer.    All tanning damages the skin. Aim for ivory skin year round and you will have less trouble with your skin in years to come. There is no merit in getting \"a base tan\" before a warm weather vacation, as any tanning indicates your body's response to sun damage.    Stop smoking. Smokers have higher rates of skin cancer and also have premature skin wrinkling.    FYI  You should use about 3 tablespoons of sunscreen to protect your whole body. Thus a typical eight ounce bottle of sunscreen should last 4 applications. Remember, that the SPF rating is compromised if you don t apply enough. Most people only apply 1/2 - 1/3 of the amount they need. Also don t forget areas such as your ears, feet, upper back and harder to reach places. Keep in mind that these amounts should be increased for larger body sizes.    Sunscreens " with titanium dioxide and/or zinc oxide in the active ingredients are physical blockers as opposed to chemical blockers. Chemical-free sunscreens should not irritate the skin.    Spray-on sunscreens may be used for touch-up application only, not as a base layer. Also, use with caution around small children due to inhalation risk.    Avoid retinyl palmitate products.    Avoid combination products that include both sunscreen and insect repellant, as sunscreen should be applied every 2 hours, but insect repellant should not be applied as frequently.    SPF means sun protection factor, which is just the degree to which the sunscreen can protect against UVB rays. There is no rating system for UVA rays. SPF is calculated as the time skin will burn when sunscreen is applied vs. skin without sunscreen.    Water resistant sunscreens should be re-applied every 1-2 hours.    For more information:  http://www.skincancer.org/prevention/sun-protection/sunscreen/sunscreens-safe-and-effective    SKIN CANCER SELF-EXAM INSTRUCTIONS  Check every month in the mirror or with a household member. Be aware of any changes, especially bleeding or tenderness. Also, make sure to check your nails for color changes after removal of nail polish.    For melanoma, check for:  A - Asymmetry. One half unlike the other half.  B - Border. Irregular, scalloped, ragged, notched, blurred or poorly defined borders.  C - Color. Color variations from one area to another, with shades of tan, brown and/or black present. Sometimes white, red or blue.  D - Diameter. Greater than 6 mm (about the size of a pencil eraser). Any new growth of a mole should be concerning and be evaluated.  E - Evolving. A mole or skin lesion that looks different from the rest or is changing in size, shape or color.    For basal cell carcinoma and squamous cell carcinoma, check for:    Sores, shiny bumps, nodules, scaly lesions, or wart-like growths that are itchy, tender, crusting,  scabbing, eroding, oozing or bleeding.    Open sores/wounds or reddish/irritated areas that do not heal within 2-3 weeks.    Scar-like areas that are white, yellow or waxy in color.    The patient was counseled about sunscreens and sun avoidance. The patient was counseled to check the skin regularly and report any lesion that is new, changing, itching, scabbing, bleeding or otherwise bothersome. The patient was discharged ambulatory and in stable condition.      PROCEDURES:                                                    None.    TT : 25 minutes.  CT : 15 minutes.      The information in this document, created by the medical scribe for me, accurately reflects the services I personally performed and the decisions made by me. I have reviewed and approved this document for accuracy prior to leaving the patient care area.  November 19, 2018 10:48 AM  Susy Mcadams MD  [facility] - PRIMARY CARE SKIN    Again, thank you for allowing me to participate in the care of your patient.        Sincerely,        Susy Mcadams MD

## 2018-11-19 NOTE — PROGRESS NOTES
[facility] - PRIMARY CARE SKIN    CC : skin cancer screening (full-body)  SUBJECTIVE:                                                    Alondra Morgan is a 73 year old female who presents to clinic today for a full-body skin exam because of her history of skin cancer. No bothersome lesions noticed by the patient or other skin concerns.    Personal history of skin cancer : YES - basal cell carcinoma on right shoulder and right nasolabial fold (s/p MMS).  Family history of skin cancer : YES - non-melanoma skin cancer in mother, sister, brother.    Personal Medical History  Eczema Psoriasis Autoimmune   NO NO NO     Family Medical History  Eczema Psoriasis Autoimmune   NO YES - in father and a brother NO     Sun Exposure History  Previous history of significant sun exposure: Travels to [facility] every January.  Blistering sunburns : YES - 5 times  Tanning beds : NO.  Sunscreen Use : YES, SPF : 30.  UV-protective clothing use : NO  Wide-brimmed hats : YES  UV-protective sunglasses : YES  Avoids mid-day sun : YES - sits in shade when in [place], [place] locations    Occupation : retired school counselor (indoor).    Refer to electronic medical record (EMR) for past medical history and medications.    INTEGUMENTARY/SKIN: NEGATIVE for worrisome rashes, moles or lesions   ROS : 14 point review of systems was negative except the symptoms listed above in the HPI.    This document serves as a record of the services and decisions personally performed and made by Drea Mcadams MD. It was created on her behalf by Blaise Tapia, a trained medical scribe.  The creation of this document is based on the scribe's personal observations and the provider's statements to the medical scribe.  Blaise Tapia, November 19, 2018 10:48 AM      OBJECTIVE:                                                    GENERAL: healthy, alert and no distress  SKIN: Starks Skin Type - I.  This patient was examined from the top of the head to the  "bottom of the feet  including scalp, face, neck, back, chest, breasts, buttocks, both arms, both legs, both hands, both feet, all 10 fingers and all 10 toes. The dermatoscope was used to help evaluate pigmented lesions.  Skin Pertinent Findings:  Face : Multiple telangiectasias on both cheeks.     Arms : scattered slightly raised, red lesion(s) consistent with capillary hemangioma. brown, macule(s) most consistent with benign solar lentigo.    Legs : Brown, macule(s) most consistent with benign solar lentigo.    Back : brown, macule(s) most consistent with benign solar lentigo. Scattered \"stuck on\" appearing papules, raised, brown, coarse-textured, round lesion(s) most consistent with seborrheic keratoses.    Right shoulder : well-healed scar.          ASSESSMENT:                                                      Encounter Diagnoses   Name Primary?     History of basal cell carcinoma Yes     Capillary hemangioma of skin      Multiple benign melanocytic nevi      Solar lentigo      Seborrheic keratoses          PLAN:                                                    Patient Instructions   FUTURE APPOINTMENTS  Follow up in 1 year(s) for a full-body skin cancer screening.    SUN PROTECTION INSTRUCTIONS  Sun damage can lead to skin cancer and premature aging of the skin.      The best way to protect from sun damage to your skin is to avoid the sun during peak hours (10 am - 2 pm) even on overcast days.      Use UPF sun-protective clothing, which while more expensive initially provides longer lasting coverage without having to worry about remembering to re-apply.  1. Wear a wide-brimmed hat and sunglasses.   2. Wear sun-protective clothing.  [employer] and other [employer] make sun protective clothing that are stylish, comfortable and cool. [employer] and other [employer] make UV arm sleeves suitable for golfing, gardening and other activities.      Sunscreen instructions:  1. Use sunscreens with Zinc " "Oxide, Titanium Dioxide or Avobenzone to protect from UVA rays.  2. Use SPF 30-50+ to protect from UVB rays.  3. Re-apply every 2 hours even if water resistant.  4. Apply on your face every day even when cloudy and even in the winter. UVA \"aging rays\" penetrate window glass and are just as strong in the winter as in the summer.    Product Recommendations:    Consider use of sunscreen sticks with Zinc Oxide and Titanium Dioxide active ingredients such as Neutrogena Pure&Free Baby Sunscreen Stick.    Good examples include: Blue Lizard, EltaMD, Solbar    Good daily moisturizers with SPF: Vanicream, CeraVe.    For sensitive skin, consider : SkinMedica Essential Defense Mineral Shield Broad Spectrum SPF 35    Men: consider use of Neutrogena Triple Protect Facial Lotion      Never use tanning beds. Tanning beds are associated with much higher risks of skin cancer.    All tanning damages the skin. Aim for ivory skin year round and you will have less trouble with your skin in years to come. There is no merit in getting \"a base tan\" before a warm weather vacation, as any tanning indicates your body's response to sun damage.    Stop smoking. Smokers have higher rates of skin cancer and also have premature skin wrinkling.    FYI  You should use about 3 tablespoons of sunscreen to protect your whole body. Thus a typical eight ounce bottle of sunscreen should last 4 applications. Remember, that the SPF rating is compromised if you don t apply enough. Most people only apply 1/2 - 1/3 of the amount they need. Also don t forget areas such as your ears, feet, upper back and harder to reach places. Keep in mind that these amounts should be increased for larger body sizes.    Sunscreens with titanium dioxide and/or zinc oxide in the active ingredients are physical blockers as opposed to chemical blockers. Chemical-free sunscreens should not irritate the skin.    Spray-on sunscreens may be used for touch-up application only, not as a " base layer. Also, use with caution around small children due to inhalation risk.    Avoid retinyl palmitate products.    Avoid combination products that include both sunscreen and insect repellant, as sunscreen should be applied every 2 hours, but insect repellant should not be applied as frequently.    SPF means sun protection factor, which is just the degree to which the sunscreen can protect against UVB rays. There is no rating system for UVA rays. SPF is calculated as the time skin will burn when sunscreen is applied vs. skin without sunscreen.    Water resistant sunscreens should be re-applied every 1-2 hours.    For more information:  http://www.skincancer.org/prevention/sun-protection/sunscreen/sunscreens-safe-and-effective    SKIN CANCER SELF-EXAM INSTRUCTIONS  Check every month in the mirror or with a household member. Be aware of any changes, especially bleeding or tenderness. Also, make sure to check your nails for color changes after removal of nail polish.    For melanoma, check for:  A - Asymmetry. One half unlike the other half.  B - Border. Irregular, scalloped, ragged, notched, blurred or poorly defined borders.  C - Color. Color variations from one area to another, with shades of tan, brown and/or black present. Sometimes white, red or blue.  D - Diameter. Greater than 6 mm (about the size of a pencil eraser). Any new growth of a mole should be concerning and be evaluated.  E - Evolving. A mole or skin lesion that looks different from the rest or is changing in size, shape or color.    For basal cell carcinoma and squamous cell carcinoma, check for:    Sores, shiny bumps, nodules, scaly lesions, or wart-like growths that are itchy, tender, crusting, scabbing, eroding, oozing or bleeding.    Open sores/wounds or reddish/irritated areas that do not heal within 2-3 weeks.    Scar-like areas that are white, yellow or waxy in color.    The patient was counseled about sunscreens and sun avoidance. The  patient was counseled to check the skin regularly and report any lesion that is new, changing, itching, scabbing, bleeding or otherwise bothersome. The patient was discharged ambulatory and in stable condition.      PROCEDURES:                                                    None.    TT : 25 minutes.  CT : 15 minutes.      The information in this document, created by the medical scribe for me, accurately reflects the services I personally performed and the decisions made by me. I have reviewed and approved this document for accuracy prior to leaving the patient care area.  November 19, 2018 10:48 AM  Susy Mcadams MD  [facility] - PRIMARY CARE SKIN

## 2018-11-20 ENCOUNTER — OFFICE VISIT (OUTPATIENT)
Dept: INTERNAL MEDICINE | Facility: CLINIC | Age: 73
End: 2018-11-20
Payer: COMMERCIAL

## 2018-11-20 ENCOUNTER — TELEPHONE (OUTPATIENT)
Dept: INTERNAL MEDICINE | Facility: CLINIC | Age: 73
End: 2018-11-20

## 2018-11-20 VITALS
OXYGEN SATURATION: 97 % | BODY MASS INDEX: 26.96 KG/M2 | TEMPERATURE: 97.3 F | RESPIRATION RATE: 14 BRPM | DIASTOLIC BLOOD PRESSURE: 88 MMHG | SYSTOLIC BLOOD PRESSURE: 122 MMHG | HEART RATE: 73 BPM | WEIGHT: 162 LBS

## 2018-11-20 DIAGNOSIS — M62.838 NECK MUSCLE SPASM: ICD-10-CM

## 2018-11-20 DIAGNOSIS — M62.830 BACK MUSCLE SPASM: Primary | ICD-10-CM

## 2018-11-20 DIAGNOSIS — R19.5 LOOSE STOOLS: ICD-10-CM

## 2018-11-20 DIAGNOSIS — R11.0 NAUSEA: ICD-10-CM

## 2018-11-20 PROCEDURE — 99214 OFFICE O/P EST MOD 30 MIN: CPT | Performed by: PHYSICIAN ASSISTANT

## 2018-11-20 NOTE — PROGRESS NOTES
SUBJECTIVE:   Alondra Morgan is a 73 year old female who presents to clinic today for the following health issues:      Joint Pain    Onset: 4-5 days worsened last night    Description:   Location: Left side of chest and left arm and shoulder  Character: Sharp    Intensity: moderate    Progression of Symptoms: worse, intermittent    Accompanying Signs & Symptoms:  Other symptoms: tingling in left fingers, diarrhea, low appetite    History:   Previous similar pain: no       Precipitating factors:   Trauma or overuse: YES- swims- may have overstretched with swimming the day before this happened.    Alleviating factors:  Improved by: nothing    Therapies Tried and outcome: None    Has not noted any rash     -------------------------------------    Problem list and histories reviewed & adjusted, as indicated.  Additional history: as documented    Labs reviewed in EPIC    Reviewed and updated as needed this visit by clinical staff       Reviewed and updated as needed this visit by Provider  Allergies  Meds         ROS:  Constitutional, HEENT, cardiovascular, pulmonary, gi and gu systems are negative, except as otherwise noted.    OBJECTIVE:     /88 (BP Location: Left arm, Patient Position: Chair, Cuff Size: Adult Regular)  Pulse 73  Temp 97.3  F (36.3  C) (Oral)  Resp 14  Wt 162 lb (73.5 kg)  SpO2 97%  BMI 26.96 kg/m2  Body mass index is 26.96 kg/(m^2).  GENERAL: healthy, alert and no distress  NECK: no adenopathy, no asymmetry, masses, or scars and thyroid normal to palpation  RESP: lungs clear to auscultation - no rales, rhonchi or wheezes  CV: regular rates and rhythm and normal S1 S2, no S3 or S4  MS: tenderness parascapular muscle noted.   Some tightness trapezius and left neck muscles   SKIN: no suspicious lesions or rashes    Diagnostic Test Results:  none     ASSESSMENT/PLAN:             1. Back muscle spasm      2. Neck muscle spasm      3. Nausea      4. Loose stools      Reviewed symptoms,  seems Musculoskeletal in nature.  REviewed stretches to do, heat tylenol/ibuprofen.  Monitor  If rash be checked again - reviewed s/sx of shingles.     Otherwise mild nause and loose stools to monitor - possible mild viral process.           25 minute spent with patient > 50% of time on counseling and plan of care      Gerda Camarillo PA-C  [facility]

## 2018-11-20 NOTE — TELEPHONE ENCOUNTER
Alondra Morgan is a 73 year old female who calls with left side rib pain, upper left side back pain.    NURSING ASSESSMENT:  Description:  Pt states that she has a pain on her left side, rib cage area/upper back that she notices in certain positions, can be sharp at times, comes and goes   Onset/duration:  yesterday  Precip. factors:  none  Associated symptoms:  Does c/o upset stomach, some diarrhea.  Denies any chest pain, no chest pressures, states has some SOB but this is ongoing for her.  Denies any jaw pain, no n/v   Improves/worsens symptoms:  none  Pain scale (0-10)   5/10  LMP/preg/breast feeding:  NA  Last exam/Treatment:  10/26/18  Allergies:   Allergies   Allergen Reactions     Alendronic Acid GI Disturbance     Severe GERD     Pollen Extract        MEDICATIONS:   Taking medication(s) as prescribed? Yes  Taking over the counter medication(s?) No  Any medication side effects? Not Applicable    Any barriers to taking medication(s) as prescribed?  No  Medication(s) improving/managing symptoms?  N/A  Medication reconciliation completed: Yes      NURSING PLAN: Nursing advice to patient pt is asking to be seen today. Appointment scheduled     RECOMMENDED DISPOSITION:  See in 24 hours - appointment with Gerda Camarillo today   Will comply with recommendation: Yes  If further questions/concerns or if symptoms do not improve, worsen or new symptoms develop, call your PCP or [facility] as soon as possible.      Guideline used:  Telephone Triage Protocols for Nurses, Fifth Edition, Kristen Sparks RN

## 2018-11-20 NOTE — MR AVS SNAPSHOT
After Visit Summary   11/20/2018    Alondra Morgan    MRN: [id number]           Patient Information     Date Of Birth          1945        Visit Information        Provider Department      11/20/2018 3:00 PM Gerda Camarillo PA-C [facility]        Today's Diagnoses     Back muscle spasm    -  1    Neck muscle spasm        Nausea        Loose stools           Follow-ups after your visit        Follow-up notes from your care team     Return in about 2 weeks (around 12/4/2018), or if symptoms worsen or fail to improve.      Who to contact     If you have questions or need follow up information about today's clinic visit or your schedule please contact [facility] directly at [phone].  Normal or non-critical lab and imaging results will be communicated to you by [employer], letter or phone within 4 business days after the clinic has received the results. If you do not hear from us within 7 days, please contact the clinic through Viameric[employer] or phone. If you have a critical or abnormal lab result, we will notify you by phone as soon as possible.  Submit refill requests through [employer] or call your pharmacy and they will forward the refill request to us. Please allow 3 business days for your refill to be completed.          Additional Information About Your Visit        MyChart Information     [employer] gives you secure access to your electronic health record. If you see a primary care provider, you can also send messages to your care team and make appointments. If you have questions, please call your primary care clinic.  If you do not have a primary care provider, please call [phone] and they will assist you.        Care EveryWhere ID     This is your Care EveryWhere ID. This could be used by other organizations to access your [facility] medical records  [id number]        Your Vitals Were     Pulse Temperature Respirations Pulse Oximetry BMI  (Body Mass Index)       73 97.3  F (36.3  C) (Oral) 14 97% 26.96 kg/m2        Blood Pressure from Last 3 Encounters:   11/20/18 122/88   11/19/18 129/83   10/26/18 124/80    Weight from Last 3 Encounters:   11/20/18 162 lb (73.5 kg)   10/26/18 163 lb (73.9 kg)   06/11/18 161 lb (73 kg)              Today, you had the following     No orders found for display       Primary Care Provider Office Phone # Fax #    Ivan Mcpherson [phone]       [address] [facility] [address]        Equal Access to Services     EMANUEL GARCÍA : Hadii bruna barr Soharsha, waaxda marcella, qaybta kaalmada adeserenada, andrew orellana. So [facility] [phone].    ATENCIÓN: Si habla español, tiene a dill disposición servicios gratuitos de asistencia lingüística. Llame al [phone].    We comply with applicable federal civil rights laws and [place] laws. We do not discriminate on the basis of race, color, national origin, age, disability, sex, sexual orientation, or gender identity.            Thank you!     Thank you for choosing [facility]  for your care. Our goal is always to provide you with excellent care. Hearing back from our patients is one way we can continue to improve our services. Please take a few minutes to complete the written survey that you may receive in the mail after your visit with us. Thank you!             Your Updated Medication List - Protect others around you: Learn how to safely use, store and throw away your medicines at www.disposemymeds.org.          This list is accurate as of 11/20/18  3:24 PM.  Always use your most recent med list.                   Brand Name Dispense Instructions for use Diagnosis    aspirin 81 MG tablet     100 tablet    Take 1 tablet (81 mg) by mouth daily INDICATION: FOR HEART AND CARDIOVASCULAR HEALTH    Hypertension       calcium carbonate 600 mg-vitamin D 400 units 600-400 MG-UNIT per tablet    calcium  600 + D    100 tablet    Take 1 tablet by mouth 2 times daily FOR BONE HEALTH AND FOR VITAMIN D DEFICIENCY (LOW VITAMIN D)    Osteopenia, unspecified location       cholecalciferol 2000 units tablet     100 tablet    Take 2 tablets by mouth daily INDICATION: TO TREAT VITAMIN D DEFICIENCY (LOW VITAMIN D)    Vitamin D deficiency       fish oil-omega-3 fatty acids 1000 MG capsule     100    THREE TIMES DAILY    Routine physical examination       lisinopril 20 MG tablet    PRINIVIL/ZESTRIL    90 tablet    Take 1 tablet (20 mg) by mouth daily    Essential hypertension with goal blood pressure less than 130/80       loratadine 10 MG tablet    CLARITIN    90 tablet    ONE DAILY FOR ALLERGIES    Seasonal allergic rhinitis, unspecified chronicity, unspecified trigger       MULTIVITAMIN PO      1 daily        Pantothenic Acid 250 MG Caps      Take 1 capsule by mouth daily        zoledronic Acid 5 MG/100ML Soln infusion    RECLAST    100 mL    Inject 100 mLs (5 mg) into the vein once for 1 dose TO TREAT OSTEOPENIA    Osteopenia

## 2019-02-07 ENCOUNTER — OFFICE VISIT (OUTPATIENT)
Dept: INTERNAL MEDICINE | Facility: CLINIC | Age: 74
End: 2019-02-07
Payer: COMMERCIAL

## 2019-02-07 VITALS
WEIGHT: 166.5 LBS | RESPIRATION RATE: 16 BRPM | BODY MASS INDEX: 27.71 KG/M2 | HEART RATE: 81 BPM | TEMPERATURE: 98.6 F | DIASTOLIC BLOOD PRESSURE: 68 MMHG | SYSTOLIC BLOOD PRESSURE: 120 MMHG | OXYGEN SATURATION: 97 %

## 2019-02-07 DIAGNOSIS — M25.511 ACUTE PAIN OF RIGHT SHOULDER: Primary | ICD-10-CM

## 2019-02-07 PROCEDURE — 99213 OFFICE O/P EST LOW 20 MIN: CPT | Performed by: PHYSICIAN ASSISTANT

## 2019-02-07 NOTE — PROGRESS NOTES
SUBJECTIVE:   Alondra Morgan is a 73 year old female who presents to clinic today for the following health issues:    Musculoskeletal problem/pain    Duration: 1 week    Description  Location: Right shoulder  Decreased range of motion and hard to push things down   Denies numbness or tingling  Noted weakness     Intensity:  mild    Accompanying signs and symptoms: none    History  Previous similar problem: no   Previous evaluation:  none    Precipitating or alleviating factors:  Trauma or overuse: YES- Pulled something when she caught herself about to fall down the stairs.   Aggravating factors include: none    Therapies tried and outcome: heat and ice      Problem list and histories reviewed & adjusted, as indicated.  Additional history: as documented    Reviewed and updated as needed this visit by clinical staff  Tobacco  Allergies  Meds  Problems       Reviewed and updated as needed this visit by Provider  Meds  Problems         OBJECTIVE:     /68   Pulse 81   Temp 98.6  F (37  C) (Oral)   Resp 16   Wt 75.5 kg (166 lb 8 oz)   SpO2 97%   BMI 27.71 kg/m    Body mass index is 27.71 kg/m .  GENERAL: healthy, alert and no distress  SHOULDER Exam-Right   Inspection: no swelling, no bruising, no discoloration, no obvious deformity, no asymmetry, no glenohumeral joint anterior bulge, no distal clavicle elevation, no muscle atrophy, no scapular winging   Tenderness of: SC joint- no , clavicle(prox-mid)- no , clavicle-(mid-distal)- no , AC joint- YES, acromion- no , anterior capsule- YES, prox bicep tendon- YES, greater tuberosity- no , prox humerus- no , supraspinatous- no , infraspinatous- no , superior trapezious- no , rhomboids- no    Range of Motion: Active- forward flexion- 90 degrees, abduction- 90 degrees, external rotation- normal, internal rotation- normal   Strength: forward flexion- 5/5, 4+/5, abduction- 5/5, 4+/5, internal rotation- 5/5 and external rotation- 5/5   Special tests: Neers-  POSITIVE and Munroe(supraspinatous)- POSITIVE       ASSESSMENT/PLAN:       1. Acute pain of right shoulder  - suspect rotator cuff injury, plan for trial of PT, if no improvement over the next 2-4 weeks, plan for MRI   - [facility], AND CHIROPRACTIC REFERRAL; Future  - PHYSICAL THERAPY REFERRAL; Future    Nicol Herman PA-C  [facility]

## 2019-02-11 ENCOUNTER — THERAPY VISIT (OUTPATIENT)
Dept: PHYSICAL THERAPY | Facility: CLINIC | Age: 74
End: 2019-02-11
Attending: PHYSICIAN ASSISTANT
Payer: COMMERCIAL

## 2019-02-11 DIAGNOSIS — M25.511 ACUTE PAIN OF RIGHT SHOULDER: ICD-10-CM

## 2019-02-11 PROCEDURE — 97110 THERAPEUTIC EXERCISES: CPT | Mod: GP | Performed by: PHYSICAL THERAPIST

## 2019-02-11 PROCEDURE — 97161 PT EVAL LOW COMPLEX 20 MIN: CPT | Mod: GP | Performed by: PHYSICAL THERAPIST

## 2019-02-11 NOTE — PROGRESS NOTES
[facility] Initial Evaluation  Subjective:  The history is provided by the patient.   Alondra Morgan is a 73 year old female with a right shoulder condition.  Condition occurred with:  A fall.  Condition occurred: other (on vacation).  This is a new condition  DOI February 1st  Order: 2/11/2019  .    Patient reports pain:  Anterior and posterior.  Radiates to:  Upper arm and shoulder.  Pain is described as aching and shooting and is constant and reported as 4/10.  Associated symptoms:  Loss of motion/stiffness and loss of strength (clicking ). Pain is worse in the P.M..  Symptoms are exacerbated by carrying, lying on extremity, lifting, using arm overhead, using arm at shoulder level and using arm behind back and relieved by heat.  Since onset symptoms are gradually improving.  Special testing: On hold       General health as reported by patient is excellent.  Pertinent medical history includes:  High blood pressure (osteopenia).  Medical allergies: no.  Surgical history: Broken arm, hysterectomy, atypical hyperplasia.  Current medications:  High blood pressure medication (bone density).  Current occupation is Retired  .    Employment tasks: regular household.    Barriers include:  None as reported by the patient.    Red flags:  None as reported by the patient.                        Objective:  Standing Alignment:    Cervical/Thoracic:  Forward head  Shoulder/UE:  Rounded shoulders                                  Cervical/Thoracic Evaluation  Arom wnl cervical: Patient denies any numbness, tingling, radicular sympmtoms or neck pain.                                   Shoulder Evaluation:  ROM:  AROM:    Flexion:  Left:  Wnl    Right:  Mod loss, pain  Extension: Left: wnlRight: min loss, pain  Abduction:  Left: wnl   Right:  Mod loss, pain    Internal Rotation:  Left:  T5    Right:  T8  External Rotation:  Left:  75 degrees    Right:  65-70 degrees, pain                PROM:    Flexion:  Left:   Wnl    Right: min loss due to pain      Abduction:  Left:  Wnl    Right:  Slight loss due to pain      External Rotation:  Left:  85 degrees    Right:  80 degrees, slight pain                Pain: Patient notes pain at end range with active flexion, abduction, ER and slight pain with IR and extension.  Endfeel: tighter end feel with overpressure of abduction and flexion possibly due to pain inhibition  Strength:    Flexion: Left:5/5   Pain:    Right: 4+/5      Pain:  +    Abduction:  Left: 5/5  Pain:    Right: 4+/5      Pain:++  Adduction:  Left: 5/5    Pain:    Right: 5/5      Pain:-/+  Internal Rotation:  Left:5/5     Pain:    Right: 5/5     Pain:  External Rotation:   Left:5/5     Pain:   Right:5/5      Pain:+      Horizontal Adduction:  Right:/5     Pain:-/+      Stability Testing:      Left shoulder stability negative testing:  Load and shift anterior and Load and shift posterior    Right shoulder stability negative testing:  Load and shift anterior and Load and shift posterior  Special Tests:  Special tests assessed shoulder: positive empty can, O'Briens (pain with both directions).  Pain with transition from concentric to eccentric shoulder abduction.    Left shoulder negative for the following special tests:  Impingement and Acromioclavicular    Right shoulder negative for the following special tests:Impingement and Acrimioclavicular  Palpation:      Right shoulder tenderness present at: Supraspinatus and Upper Trap                                     General     ROS    Assessment/Plan:    Patient is a 73 year old female with right side shoulder complaints.    Patient has the following significant findings with corresponding treatment plan.                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    Comorbidity factors that impact the plan of care are:      High blood pressure, Osteoarthritis and osteopenia.     Medications impacting care: Bone density and High blood pressure.  2) Examination of Body Systems comprised of:   Body structures and functions that impact the plan of care:      Shoulder.   Activity limitations that impact the plan of care are:      Dressing, Lifting and reaching.  3) Clinical presentation characteristics are:   Stable/Uncomplicated.  4) Decision-Making    Low complexity using standardized patient assessment instrument and/or measureable assessment of functional outcome.  Cumulative Therapy Evaluation is: Low complexity.    Previous and current functional limitations:  (See Goal Flow Sheet for this information)    Short term and Long term goals: (See Goal Flow Sheet for this information)     Communication ability:  Patient appears to be able to clearly communicate and understand verbal and written communication and follow directions correctly.  Treatment Explanation - The following has been discussed with the patient:   RX ordered/plan of care  Anticipated outcomes  Possible risks and side effects  This patient would benefit from PT intervention to resume normal activities.   Rehab potential is good.    Frequency:  1 X week, once daily  Duration:  for 4 weeks  Discharge Plan:  Achieve all LTG.  Independent in home treatment program.  Reach maximal therapeutic benefit.    Please refer to the daily flowsheet for treatment today, total treatment time and time spent performing 1:1 timed codes.

## 2019-02-18 ENCOUNTER — THERAPY VISIT (OUTPATIENT)
Dept: PHYSICAL THERAPY | Facility: CLINIC | Age: 74
End: 2019-02-18
Payer: COMMERCIAL

## 2019-02-18 DIAGNOSIS — M25.511 ACUTE PAIN OF RIGHT SHOULDER: ICD-10-CM

## 2019-02-18 PROCEDURE — 97110 THERAPEUTIC EXERCISES: CPT | Mod: GP | Performed by: PHYSICAL THERAPIST

## 2019-02-18 PROCEDURE — 97112 NEUROMUSCULAR REEDUCATION: CPT | Mod: GP | Performed by: PHYSICAL THERAPIST

## 2019-02-18 PROCEDURE — 97140 MANUAL THERAPY 1/> REGIONS: CPT | Mod: GP | Performed by: PHYSICAL THERAPIST

## 2019-02-19 NOTE — PROGRESS NOTES
Subjective:  HPI                    Objective:  System    Physical Exam    General     ROS    Assessment/Plan:    SUBJECTIVE  Subjective: Patient returns to therapy after a week on her own and feels that she is improving and able to do more activities with less pain. Reaching out and grabbing a gallon of milk is still bothersome.  Icing after HEP has helped reduced her symptoms.    Current Pain level: 3/10   Changes in function:  Yes (See Goal flowsheet attached for changes in current functional level)     Adverse reaction to treatment or activity:  None    OBJECTIVE  Changes in objective findings:  Yes, see objective  Objective: Flexion: 125 degrees, pain level 1/10, abduction 133 degrees, 2/10     ASSESSMENT  Alondra continues to require intervention to meet STG and LTG's: PT  Patient is progressing as expected.  Response to therapy has shown an improvement in  pain level and function  Progress made towards STG/LTG?  Yes (See Goal flowsheet attached for updates on achievement of STG and LTG)    PLAN  Current treatment program is being advanced to more complex exercises.  Continue current treatment plan until patient demonstrates readiness to progress to higher level exercises.    PTA/ATC plan:  N/A    Please refer to the daily flowsheet for treatment today, total treatment time and time spent performing 1:1 timed codes.

## 2019-02-25 ENCOUNTER — THERAPY VISIT (OUTPATIENT)
Dept: PHYSICAL THERAPY | Facility: CLINIC | Age: 74
End: 2019-02-25
Payer: COMMERCIAL

## 2019-02-25 DIAGNOSIS — M25.511 ACUTE PAIN OF RIGHT SHOULDER: ICD-10-CM

## 2019-02-25 PROCEDURE — 97112 NEUROMUSCULAR REEDUCATION: CPT | Mod: GP | Performed by: PHYSICAL THERAPIST

## 2019-02-25 PROCEDURE — 97110 THERAPEUTIC EXERCISES: CPT | Mod: GP | Performed by: PHYSICAL THERAPIST

## 2019-02-26 NOTE — PROGRESS NOTES
Subjective:  HPI                    Objective:  System    Physical Exam    General     ROS    Assessment/Plan:    SUBJECTIVE  Subjective: Patient returns to therapy after a week on her own and reports that in the middle of the week felt increase of symptoms so she backed off the intensity and has returned to baseline. Patient has been performing HEP consistently and has no further questions or concerns at this time.     Current Pain level: 2/10   Changes in function:  Yes (See Goal flowsheet attached for changes in current functional level)     Adverse reaction to treatment or activity:  None    OBJECTIVE  Changes in objective findings:  Yes, see objective measures.  Objective: Flexion 135 degrees, min pain level. Abduction 140 degrees, min pain level. Pain with resisted abduction, ER and IR. Pain with overpressure flexion and abduction.      ASSESSMENT  Alondra continues to require intervention to meet STG and LTG's: PT  Patient is progressing as expected.  Response to therapy has shown an improvement in  pain level, ROM  and function  Progress made towards STG/LTG?  Yes (See Goal flowsheet attached for updates on achievement of STG and LTG)    PLAN  Continue current treatment plan until patient demonstrates readiness to progress to higher level exercises.    PTA/ATC plan:  N/A    Please refer to the daily flowsheet for treatment today, total treatment time and time spent performing 1:1 timed codes.

## 2019-03-07 ENCOUNTER — THERAPY VISIT (OUTPATIENT)
Dept: PHYSICAL THERAPY | Facility: CLINIC | Age: 74
End: 2019-03-07
Payer: COMMERCIAL

## 2019-03-07 DIAGNOSIS — M25.511 ACUTE PAIN OF RIGHT SHOULDER: ICD-10-CM

## 2019-03-07 PROCEDURE — 97110 THERAPEUTIC EXERCISES: CPT | Mod: GP | Performed by: PHYSICAL THERAPIST

## 2019-03-07 PROCEDURE — 97140 MANUAL THERAPY 1/> REGIONS: CPT | Mod: GP | Performed by: PHYSICAL THERAPIST

## 2019-03-07 PROCEDURE — 97112 NEUROMUSCULAR REEDUCATION: CPT | Mod: GP | Performed by: PHYSICAL THERAPIST

## 2019-03-08 NOTE — PROGRESS NOTES
Subjective:  HPI                    Objective:  System    Physical Exam    General     ROS    Assessment/Plan:    PROGRESS  REPORT    Progress reporting period is from 2/11/2019 to 3/7/2019.       SUBJECTIVE  Subjective: Patient returns to therapy after a week on her own and reports that she continues to improve. She feels less pain with her ADL's throughout her day but reaching up high and or out infront of her causes her the most pain. Patient was educated on HEP progressions and will return to therapy in 1-2 weeks.      Current Pain level: 2/10.     Initial Pain level: 4/10.   Changes in function:  Yes (See Goal flowsheet attached for changes in current functional level)  Adverse reaction to treatment or activity: None    OBJECTIVE  Changes noted in objective findings:  Yes, see objective measures.   Objective: AROM: flexion 130 degrees, 3/10 pain level, abduction 140 degrees, 2/10 pain level. T7 to T 10 difference in IR, 3+/10. Patient demonstrates decreased motor control of RT scapular stabilizers during upward-downward rotation and scapular retraction.      ASSESSMENT/PLAN  Updated problem list and treatment plan: Diagnosis 1:  Right shoulder pain    Pain -  hot/cold therapy, manual therapy and home program  Decreased ROM/flexibility - manual therapy, therapeutic exercise and home program  Decreased strength - therapeutic exercise, therapeutic activities and home program  Impaired muscle performance - neuro re-education and home program  Decreased function - therapeutic activities and home program  STG/LTGs have been met or progress has been made towards goals:  Yes (See Goal flow sheet completed today.)  Assessment of Progress: The patient's condition is improving.  The patient's condition has potential to improve.  Patient is progressing towards her goals  Self Management Plans:  Patient has been instructed in a home treatment program.  I have re-evaluated this patient and find that the nature, scope,  duration and intensity of the therapy is appropriate for the medical condition of the patient.  Alondra continues to require the following intervention to meet STG and LTG's:  PT    Recommendations:  This patient would benefit from continued therapy.     Frequency:  1 X week, once daily  Duration:  for 4 weeks        Please refer to the daily flowsheet for treatment today, total treatment time and time spent performing 1:1 timed codes.

## 2019-03-14 ENCOUNTER — THERAPY VISIT (OUTPATIENT)
Dept: PHYSICAL THERAPY | Facility: CLINIC | Age: 74
End: 2019-03-14
Payer: COMMERCIAL

## 2019-03-14 DIAGNOSIS — M25.511 ACUTE PAIN OF RIGHT SHOULDER: ICD-10-CM

## 2019-03-14 PROCEDURE — 97140 MANUAL THERAPY 1/> REGIONS: CPT | Mod: GP | Performed by: PHYSICAL THERAPIST

## 2019-03-14 PROCEDURE — 97112 NEUROMUSCULAR REEDUCATION: CPT | Mod: GP | Performed by: PHYSICAL THERAPIST

## 2019-03-14 PROCEDURE — 97110 THERAPEUTIC EXERCISES: CPT | Mod: GP | Performed by: PHYSICAL THERAPIST

## 2019-03-14 NOTE — PROGRESS NOTES
"Subjective:  HPI                    Objective:  System    Physical Exam    General     ROS    Assessment/Plan:    SUBJECTIVE  Subjective: Patient returns to therapy after a week on her own and reports that she is a \"smidgen\" better. Patient noticed less pain with shoulder movements, felt some increase of symptoms when driving (moving steering well).  Patient wanted to review HEP and understand purpose.    Current Pain level: 1/10   Changes in function:  Yes (See Goal flowsheet attached for changes in current functional level)     Adverse reaction to treatment or activity:  None    OBJECTIVE  Changes in objective findings:  Yes, see objective  Objective: AROM flexion: 125-135 2/10 pain level, 90 0/10. Abduction 140 degrees, 2/10 pain level. PAtient noted reduction of symptoms with shoulder flexion following thoracic extension reps.      ASSESSMENT  Alondra continues to require intervention to meet STG and LTG's: PT  Patient is progressing as expected.  Response to therapy has shown an improvement in  pain level and ROM   Progress made towards STG/LTG?  Yes (See Goal flowsheet attached for updates on achievement of STG and LTG)    PLAN  Continue current treatment plan until patient demonstrates readiness to progress to higher level exercises.    NEXT: PNF, scapular stablization via wall, progresss HEP    Please refer to the daily flowsheet for treatment today, total treatment time and time spent performing 1:1 timed codes.        "

## 2019-03-25 ENCOUNTER — THERAPY VISIT (OUTPATIENT)
Dept: PHYSICAL THERAPY | Facility: CLINIC | Age: 74
End: 2019-03-25
Payer: COMMERCIAL

## 2019-03-25 DIAGNOSIS — M25.511 ACUTE PAIN OF RIGHT SHOULDER: ICD-10-CM

## 2019-03-25 PROCEDURE — 97140 MANUAL THERAPY 1/> REGIONS: CPT | Mod: GP | Performed by: PHYSICAL THERAPIST

## 2019-03-25 PROCEDURE — 97110 THERAPEUTIC EXERCISES: CPT | Mod: GP | Performed by: PHYSICAL THERAPIST

## 2019-03-25 PROCEDURE — 97112 NEUROMUSCULAR REEDUCATION: CPT | Mod: GP | Performed by: PHYSICAL THERAPIST

## 2019-03-26 NOTE — PROGRESS NOTES
Subjective:  HPI                    Objective:  System    Physical Exam    General     ROS    Assessment/Plan:    SUBJECTIVE   Subjective: Patient returns to therapy after a week and a half on her own and reports that she feels that it is healing slowly but frustrated that it is not fully recovered. Patient feels she is experiencing less pain, less frequently with her everyday activities. Driving long distances still bothers her shoulder.   Current Pain level: 2/10   Changes in function:  Yes (See Goal flowsheet attached for changes in current functional level)     Adverse reaction to treatment or activity:  None    OBJECTIVE  Changes in objective findings:  Yes, see objective  Objective: IR LT T7 to RT T12, Flexion 140, Abduction 160 1/10 pain level.     ASSESSMENT  Alondra continues to require intervention to meet STG and LTG's: PT  Patient's symptoms are resolving.  Patient is progressing as expected.  Response to therapy has shown an improvement in  function  Progress made towards STG/LTG?  Yes (See Goal flowsheet attached for updates on achievement of STG and LTG)    PLAN  Continue current treatment plan until patient demonstrates readiness to progress to higher level exercises.  NEXT: Progress HEP  PTA/ATC plan:  N/A    Please refer to the daily flowsheet for treatment today, total treatment time and time spent performing 1:1 timed codes.

## 2019-04-08 ENCOUNTER — THERAPY VISIT (OUTPATIENT)
Dept: PHYSICAL THERAPY | Facility: CLINIC | Age: 74
End: 2019-04-08
Payer: COMMERCIAL

## 2019-04-08 DIAGNOSIS — M25.511 ACUTE PAIN OF RIGHT SHOULDER: ICD-10-CM

## 2019-04-08 PROCEDURE — 97110 THERAPEUTIC EXERCISES: CPT | Mod: GP | Performed by: PHYSICAL THERAPIST

## 2019-04-08 PROCEDURE — 97140 MANUAL THERAPY 1/> REGIONS: CPT | Mod: GP | Performed by: PHYSICAL THERAPIST

## 2019-04-16 ENCOUNTER — HOSPITAL ENCOUNTER (OUTPATIENT)
Dept: MAMMOGRAPHY | Facility: CLINIC | Age: 74
Discharge: HOME | End: 2019-04-16
Attending: INTERNAL MEDICINE | Admitting: INTERNAL MEDICINE
Payer: COMMERCIAL

## 2019-04-16 DIAGNOSIS — Z12.31 VISIT FOR SCREENING MAMMOGRAM: ICD-10-CM

## 2019-04-16 PROCEDURE — 77063 BREAST TOMOSYNTHESIS BI: CPT

## 2019-04-17 ENCOUNTER — TRANSFERRED RECORDS (OUTPATIENT)
Dept: HEALTH INFORMATION MANAGEMENT | Facility: CLINIC | Age: 74
End: 2019-04-17

## 2019-04-29 ENCOUNTER — THERAPY VISIT (OUTPATIENT)
Dept: PHYSICAL THERAPY | Facility: CLINIC | Age: 74
End: 2019-04-29
Payer: COMMERCIAL

## 2019-04-29 DIAGNOSIS — M25.511 ACUTE PAIN OF RIGHT SHOULDER: ICD-10-CM

## 2019-04-29 PROCEDURE — 97112 NEUROMUSCULAR REEDUCATION: CPT | Mod: GP | Performed by: PHYSICAL THERAPIST

## 2019-04-29 PROCEDURE — 97110 THERAPEUTIC EXERCISES: CPT | Mod: GP | Performed by: PHYSICAL THERAPIST

## 2019-04-29 NOTE — LETTER
[facility] PHYSICAL THERAPY  [address]  [place] [address]  [phone]    May 2, 2019    Re: Alondra Morgan   :   1945  MRN:  [id number]   REFERRING PHYSICIAN:   Nicol Herman    [facility] PHYSICAL [facility]    Date of Initial Evaluation:  2019  Visits:  Rxs Used: 4 (6t)  Reason for Referral:  Acute pain of right shoulder    PROGRESS  REPORT  Progress reporting period is from 19 to 19.       SUBJECTIVE  Subjective: Patient returns to therapy after 3 weeks on her own and reports that she continues to make small improvements every week but still has some pain in her shoulder throughout her day with reaching and ADL's. Patient has been consistent with her HEP and doesn not have any additional questions or concerns at this time. Therapist will complete a progress report and request 4 additional physical therapy sessions to assist the patient in meeting her goals.     Current Pain level: 1/10.   Worst: 3/10   Initial Pain level: 4/10.   Changes in function:  Yes (See Goal flowsheet attached for changes in current functional level)  Adverse reaction to treatment or activity: None    OBJECTIVE  Changes noted in objective findings:  Yes.  Objective: Shoulder ROM: RT IR T10, improved to T7 after posterior capsule stretching. Flexion and abduction slight loss at end range compared to contralateral side, stiffer endfeel with 1.5/10 pain level. Resisted abduction, adduction and ER of involved UE 1/10 pain level. Patient demonstrates RT scapular winging of inferior border in static and dynamic shoulder positions.      ASSESSMENT/PLAN  Updated problem list and treatment plan: Diagnosis 1:  Right shoulder pain    Pain -  hot/cold therapy and manual therapy  Decreased ROM/flexibility - manual therapy, therapeutic exercise and home program  Decreased strength - therapeutic exercise, therapeutic activities and home  program  Impaired muscle performance - neuro re-education and home program  Decreased function - therapeutic activities and home program  STG/LTGs have been met or progress has been made towards goals:  Yes (See Goal flow sheet completed today.)  Assessment of Progress: The patient's condition is improving.  The patient's condition has potential to improve.  Self Management Plans:  Patient has been instructed in a home treatment program.  I have re-evaluated this patient and find that the nature, scope, duration and intensity of the therapy is appropriate for the medical condition of the patient.  Alondra continues to require the following intervention to meet STG and LTG's:  PT    Recommendations:  This patient would benefit from continued therapy.     Frequency:  1 X week, once daily  Duration:  for 4 weeks    Thank you for your referral.    INQUIRIES  Therapist: Fam Bella DPT [facility]   [facility]  [address] [place], [place] [id number] [address]  Phone: [phone]  Fax: [phone]

## 2019-04-30 NOTE — PROGRESS NOTES
Subjective:  HPI                    Objective:  System    Physical Exam    General     ROS    Assessment/Plan:    PROGRESS  REPORT    Progress reporting period is from 2/11/19 to 4/29/19.       SUBJECTIVE  Subjective: Patient returns to therapy after 3 weeks on her own and reports that she continues to make small improvements every week but still has some pain in her shoulder throughout her day with reaching and ADL's. Patient has been consistent with her HEP and doesn not have any additional questions or concerns at this time. Therapist will complete a progress report and request 4 additional physical therapy sessions to assist the patient in meeting her goals.     Current Pain level: 1/10.   Worst: 3/10   Initial Pain level: 4/10.   Changes in function:  Yes (See Goal flowsheet attached for changes in current functional level)  Adverse reaction to treatment or activity: None    OBJECTIVE  Changes noted in objective findings:  Yes.  Objective: Shoulder ROM: RT IR T10, improved to T7 after posterior capsule stretching. Flexion and abduction slight loss at end range compared to contralateral side, stiffer endfeel with 1.5/10 pain level. Resisted abduction, adduction and ER of involved UE 1/10 pain level. Patient demonstrates RT scapular winging of inferior border in static and dynamic shoulder positions.      ASSESSMENT/PLAN  Updated problem list and treatment plan: Diagnosis 1:  Right shoulder pain    Pain -  hot/cold therapy and manual therapy  Decreased ROM/flexibility - manual therapy, therapeutic exercise and home program  Decreased strength - therapeutic exercise, therapeutic activities and home program  Impaired muscle performance - neuro re-education and home program  Decreased function - therapeutic activities and home program  STG/LTGs have been met or progress has been made towards goals:  Yes (See Goal flow sheet completed today.)  Assessment of Progress: The patient's condition is improving.  The  patient's condition has potential to improve.  Self Management Plans:  Patient has been instructed in a home treatment program.  I have re-evaluated this patient and find that the nature, scope, duration and intensity of the therapy is appropriate for the medical condition of the patient.  Alondra continues to require the following intervention to meet STG and LTG's:  PT    Recommendations:  This patient would benefit from continued therapy.     Frequency:  1 X week, once daily  Duration:  for 4 weeks        Please refer to the daily flowsheet for treatment today, total treatment time and time spent performing 1:1 timed codes.

## 2019-05-15 ENCOUNTER — OFFICE VISIT (OUTPATIENT)
Dept: INTERNAL MEDICINE | Facility: CLINIC | Age: 74
End: 2019-05-15
Payer: COMMERCIAL

## 2019-05-15 ENCOUNTER — ANCILLARY PROCEDURE (OUTPATIENT)
Dept: GENERAL RADIOLOGY | Facility: CLINIC | Age: 74
End: 2019-05-15
Attending: INTERNAL MEDICINE
Payer: COMMERCIAL

## 2019-05-15 VITALS
HEART RATE: 74 BPM | DIASTOLIC BLOOD PRESSURE: 86 MMHG | WEIGHT: 170 LBS | TEMPERATURE: 98.4 F | BODY MASS INDEX: 28.29 KG/M2 | SYSTOLIC BLOOD PRESSURE: 116 MMHG | OXYGEN SATURATION: 100 %

## 2019-05-15 DIAGNOSIS — M25.511 ACUTE PAIN OF RIGHT SHOULDER: Primary | ICD-10-CM

## 2019-05-15 DIAGNOSIS — M25.511 ACUTE PAIN OF RIGHT SHOULDER: ICD-10-CM

## 2019-05-15 PROCEDURE — 73030 X-RAY EXAM OF SHOULDER: CPT | Mod: RT

## 2019-05-15 PROCEDURE — 99213 OFFICE O/P EST LOW 20 MIN: CPT | Performed by: INTERNAL MEDICINE

## 2019-05-15 NOTE — PROGRESS NOTES
SUBJECTIVE:   Alondra Morgan is a 73 year old female who presents to clinic today for the following   health issues:      Musculoskeletal problem/pain      Duration: yesterday Re injured     Description  Location: Right shoulder     Intensity:  moderate    Accompanying signs and symptoms: Cant move shoulder well     History  Previous similar problem: YES  Previous evaluation:  none    Precipitating or alleviating factors:  Trauma or overuse: YES-   Aggravating factors include: overuse    Therapies tried and outcome: nothing          Additional history: as documented    Reviewed  and updated as needed this visit by clinical staff  Allergies  Meds         Reviewed and updated as needed this visit by Provider               Past Medical History:  ---------------------------  Past Medical History:   Diagnosis Date     Arthritis      Atypical hyperplasia of breasts      Basal cell carcinoma of ala nasi      Basal cell carcinoma of shoulder      History of poliomyelitis 1947    acute polio infection age 2, resolved without any sequelae     Osteopenia      Polyp, colon     Adenomatous       Past Surgical History:  ---------------------------  Past Surgical History:   Procedure Laterality Date     C VAG HYST,RMV TUBE/OVARY       EYE SURGERY  07/2017    cataract bilat eyes     HC EXCISION BREAST LESION, OPEN >=1      ATYPICAL HYPERPLASIA X 2     HYSTERECTOMY, PAP NO LONGER INDICATED  1994     TONSILLECTOMY         Current Medications:  ---------------------------  Current Outpatient Medications   Medication Sig Dispense Refill     aspirin 81 MG tablet Take 1 tablet (81 mg) by mouth daily INDICATION: FOR HEART AND CARDIOVASCULAR HEALTH 100 tablet 3     calcium-vitamin D (CALCIUM 600 + D) 600-400 MG-UNIT per tablet Take 1 tablet by mouth 2 times daily FOR BONE HEALTH AND FOR VITAMIN D DEFICIENCY (LOW VITAMIN D) 100 tablet PRN     cholecalciferol 2000 UNITS tablet Take 2 tablets by mouth daily INDICATION: TO TREAT VITAMIN D  DEFICIENCY (LOW VITAMIN D) 100 tablet prn     FISH OIL 1000 MG OR CAPS THREE TIMES DAILY 100 3     lisinopril (PRINIVIL/ZESTRIL) 20 MG tablet Take 1 tablet (20 mg) by mouth daily 90 tablet 3     loratadine (CLARITIN) 10 MG tablet ONE DAILY FOR ALLERGIES 90 tablet prn     MULTIVITAMIN OR 1 daily       Pantothenic Acid 250 MG CAPS Take 1 capsule by mouth daily       zoledronic Acid (RECLAST) 5 MG/100ML SOLN Inject 100 mLs (5 mg) into the vein once for 1 dose TO TREAT OSTEOPENIA 100 mL 0       Allergies:  -------------  Allergies   Allergen Reactions     Alendronic Acid GI Disturbance     Severe GERD     Pollen Extract        Social History:  -------------------  Social History     Socioeconomic History     Marital status:      Spouse name: Not on file     Number of children: Not on file     Years of education: Not on file     Highest education level: Not on file   Occupational History     Not on file   Social Needs     Financial resource strain: Not on file     Food insecurity:     Worry: Not on file     Inability: Not on file     Transportation needs:     Medical: Not on file     Non-medical: Not on file   Tobacco Use     Smoking status: Former Smoker     Packs/day: 1.00     Years: 40.00     Pack years: 40.00     Types: Cigarettes     Last attempt to quit: 10/7/1983     Years since quittin.6     Smokeless tobacco: Never Used   Substance and Sexual Activity     Alcohol use: Yes     Comment: 4 drinks per week     Drug use: No     Sexual activity: Not Currently     Partners: Male   Lifestyle     Physical activity:     Days per week: Not on file     Minutes per session: Not on file     Stress: Not on file   Relationships     Social connections:     Talks on phone: Not on file     Gets together: Not on file     Attends Buddhist service: Not on file     Active member of club or organization: Not on file     Attends meetings of clubs or organizations: Not on file     Relationship status: Not on file      Intimate partner violence:     Fear of current or ex partner: Not on file     Emotionally abused: Not on file     Physically abused: Not on file     Forced sexual activity: Not on file   Other Topics Concern      Service No     Blood Transfusions No     Caffeine Concern No     Occupational Exposure No     Hobby Hazards No     Sleep Concern Yes     Stress Concern No     Weight Concern Yes     Special Diet No     Back Care No     Exercise Yes     Comment: 3 X WEEK     Bike Helmet Not Asked     Seat Belt Yes     Self-Exams No     Comment: ENCOURAGED     Parent/sibling w/ CABG, MI or angioplasty before 65F 55M? Not Asked   Social History Narrative     Not on file       Family Medical History:  ------------------------------  Family History   Problem Relation Age of Onset     Cancer Mother         lung     Osteoporosis Mother      Eye Disorder Mother         glaucoma     Arthritis Mother         osteo     Heart Disease Mother         CHF     Hypertension Mother      Cerebrovascular Disease Father         TIAs     Osteoporosis Father      Hypertension Father      Cancer Father         BONE     No Known Problems Brother      Cerebrovascular Disease Brother      Cancer - colorectal Brother      Cancer Brother         PROSTATE     Arthritis Sister         PARTIAL KNEE REPLACEMENT IN 2009     Osteoporosis Sister      Breast Cancer Maternal Grandmother      Cerebrovascular Disease Maternal Grandfather      Breast Cancer Maternal Aunt      Breast Cancer Maternal Aunt      Breast Cancer Other         2 YOUNGER MATERNAL COUSINS     Asthma Daughter          ROS:  REVIEW OF SYSTEMS:    RESP: negative for cough, dyspnea, wheezing, hemoptysis  CV: negative for chest pain, palpitations, PND, GAMEZ, orthopnea; reports no significant changes in their ability to perform physical activity (from cardiovascular standpoint)  GI: negative for dysphagia, N/V, pain, melena, diarrhea and constipation  NEURO: negative for new  numbness/tingling, paralysis, incoordination, or focal weakness     OBJECTIVE:                                                    /86   Pulse 74   Temp 98.4  F (36.9  C) (Temporal)   Wt 77.1 kg (170 lb)   SpO2 100%   BMI 28.29 kg/m       GENERAL alert and no distress  EYES:  Normal sclera,conjunctiva, EOMI  HENT: oral and posterior pharynx without lesions or erythema, facies symmetric  NECK: Neck supple. No LAD, without thyroidmegaly.  RESP: Clear to ausculation bilaterally without wheezes or crackles. Normal BS in all fields.  CV: RRR normal S1S2 without murmurs, rubs or gallops.  LYMPH: no cervical lymph adenopathy appreciated  MS: extremities- no gross deformities of the visible extremities noted,   EXT:  no lower extremity edema  PSYCH: Alert and oriented times 3; speech- coherent  SKIN:  No obvious significant skin lesions on visible portions of face   SHOULDER;  Moves arms above head, more slowly on right  Rotator cuff muscles intact with testing.    Passive ROM shows no pain, pain with active movement of the rotator cuff muscles.        ASSESSMENT/PLAN:                                                      (M25.511) Acute pain of right shoulder  (primary encounter diagnosis)  Comment:   *  Possible Rotator cuff tendonitis, possible rotator cuff partial tear.       *  Orthopedic surgery referral to evaluate the affected shoulder and to consider possible steroid shot into the affected shoulder    *  I would recommend basic shoulder rotator cuff exercises to help return shoulder function better.     *  Look up rotator cuff rehabilitation exercises on [employer].  Many of them use resistance bands (large rubber bands)     --http://orthoinfo.org/PDFs/Rehab_Shoulder_5.pdf    *  Referral to physical therapy ([facility]) may be helpful for more hands on therapy and instructions on rehabilitation exercises for the shoulder.     *  Take over the counter Motrin/Ibuprofen/Advil or Aleve to help  relieve pain and inflammation (Unless you have side effects from Motrin like medications or have been told not to take aspirin or Motrin kind of medication).  follow the directions on the bottle.  Be sure to take with a small meal to prevent stomach upset.      --Motrin 600 mg ( 3 x 200 mg tablets) three times per day every day for 5-7 days, then 2-3 timers per day as needed (take with food to avoid stomach side effects)     OR     --Aleve 2 tablets twice per day for 5-7 days every day, then twice per day as needed     *  IF YOU ARE NOT BETTER FAIRLY SHORTLY AFTER STARTING PHYSICAL THERAPY, THEN YOU NEED TO SEE ORTHOPEDIC CLINIC.      --[facility] [place] [phone]   [email or web address]        Plan: XR Shoulder Right 2 Views, [facility] PT, HAND, AND         CHIROPRACTIC REFERRAL, ORTHOPEDICS ADULT         REFERRAL              See Patient Instructions    CHRISTIANO HOPKINS M.D., MD  [facility]    (Chart documentation may have been completed, in part, with [employer] voice-recognition software. Even though reviewed, some grammatical, spelling, and word errors may remain.)

## 2019-05-15 NOTE — PATIENT INSTRUCTIONS
*  Possible Rotator cuff tendonitis, possible rotator cuff partial tear.       *  Orthopedic surgery referral to evaluate the affected shoulder and to consider possible steroid shot into the affected shoulder    *  I would recommend basic shoulder rotator cuff exercises to help return shoulder function better.     *  Look up rotator cuff rehabilitation exercises on [employer].  Many of them use resistance bands (large rubber bands)     --http://orthoinfo.org/PDFs/Rehab_Shoulder_5.pdf    *  Referral to physical therapy ([facility]) may be helpful for more hands on therapy and instructions on rehabilitation exercises for the shoulder.     *  Take over the counter Motrin/Ibuprofen/Advil or Aleve to help relieve pain and inflammation (Unless you have side effects from Motrin like medications or have been told not to take aspirin or Motrin kind of medication).  follow the directions on the bottle.  Be sure to take with a small meal to prevent stomach upset.      --Motrin 600 mg ( 3 x 200 mg tablets) three times per day every day for 5-7 days, then 2-3 timers per day as needed (take with food to avoid stomach side effects)     OR     --Aleve 2 tablets twice per day for 5-7 days every day, then twice per day as needed     *  IF YOU ARE NOT BETTER FAIRLY SHORTLY AFTER STARTING PHYSICAL THERAPY, THEN YOU NEED TO SEE ORTHOPEDIC CLINIC.      --[facility] [place] [phone]   [email or web address]

## 2019-05-20 ENCOUNTER — THERAPY VISIT (OUTPATIENT)
Dept: PHYSICAL THERAPY | Facility: CLINIC | Age: 74
End: 2019-05-20
Payer: COMMERCIAL

## 2019-05-20 DIAGNOSIS — M25.511 ACUTE PAIN OF RIGHT SHOULDER: ICD-10-CM

## 2019-05-20 PROCEDURE — 97110 THERAPEUTIC EXERCISES: CPT | Mod: GP | Performed by: PHYSICAL THERAPIST

## 2019-05-20 PROCEDURE — 97112 NEUROMUSCULAR REEDUCATION: CPT | Mod: GP | Performed by: PHYSICAL THERAPIST

## 2019-06-03 ENCOUNTER — THERAPY VISIT (OUTPATIENT)
Dept: PHYSICAL THERAPY | Facility: CLINIC | Age: 74
End: 2019-06-03
Payer: COMMERCIAL

## 2019-06-03 DIAGNOSIS — M25.511 ACUTE PAIN OF RIGHT SHOULDER: ICD-10-CM

## 2019-06-03 PROCEDURE — 97110 THERAPEUTIC EXERCISES: CPT | Mod: GP | Performed by: PHYSICAL THERAPIST

## 2019-06-03 PROCEDURE — 97112 NEUROMUSCULAR REEDUCATION: CPT | Mod: GP | Performed by: PHYSICAL THERAPIST

## 2019-06-17 ENCOUNTER — THERAPY VISIT (OUTPATIENT)
Dept: PHYSICAL THERAPY | Facility: CLINIC | Age: 74
End: 2019-06-17
Payer: COMMERCIAL

## 2019-06-17 DIAGNOSIS — M25.511 SHOULDER PAIN, RIGHT: ICD-10-CM

## 2019-06-17 PROCEDURE — 97140 MANUAL THERAPY 1/> REGIONS: CPT | Mod: GP | Performed by: PHYSICAL THERAPIST

## 2019-06-17 PROCEDURE — 97110 THERAPEUTIC EXERCISES: CPT | Mod: GP | Performed by: PHYSICAL THERAPIST

## 2019-07-01 ENCOUNTER — THERAPY VISIT (OUTPATIENT)
Dept: PHYSICAL THERAPY | Facility: CLINIC | Age: 74
End: 2019-07-01
Payer: COMMERCIAL

## 2019-07-01 DIAGNOSIS — M25.511 SHOULDER PAIN, RIGHT: ICD-10-CM

## 2019-07-01 PROCEDURE — 97140 MANUAL THERAPY 1/> REGIONS: CPT | Mod: GP | Performed by: PHYSICAL THERAPIST

## 2019-07-01 PROCEDURE — 97112 NEUROMUSCULAR REEDUCATION: CPT | Mod: GP | Performed by: PHYSICAL THERAPIST

## 2019-07-03 NOTE — PROGRESS NOTES
Subjective:  HPI                    Objective:  System    Physical Exam    General     ROS    Assessment/Plan:    PROGRESS  REPORT    Progress reporting period is from 4/29/2019 to 7/1/2019.       SUBJECTIVE  Subjective: Pt returns to therapy after two weeks and reports that her shoulder is feeling a lot better and she has less pain throughout her daily activities.  Her pain feels centered in the shoulder and feels less flexible in the morning.  Biggest limitation is stiffness in shoulder     Current Pain level: 2/10.     Initial Pain level: 4/10.   Changes in function:  Yes (See Goal flowsheet attached for changes in current functional level)  Adverse reaction to treatment or activity: None    OBJECTIVE  Changes noted in objective findings:  Yes.  Objective: Shoulder flexion: 125 2/10 pain level, ER 85 degrees, 1/10 pain level. Ext+IR min loss 1/10 pain level.  MMT: abduction 4/5 due to 3/10 pain level. adduction, IR 5/5. ER 4+/5 with 1/10 pain level.      ASSESSMENT/PLAN  Updated problem list and treatment plan: Diagnosis 1:  Right Shoulder Pain -  hot/cold therapy, manual therapy and home program  Decreased ROM/flexibility - manual therapy, therapeutic exercise and home program  Decreased strength - therapeutic exercise, therapeutic activities and home program  Impaired muscle performance - neuro re-education and home program  Decreased function - therapeutic activities and home program  STG/LTGs have been met or progress has been made towards goals:  Yes (See Goal flow sheet completed today.)  Assessment of Progress: The patient's condition is improving.  Self Management Plans:  Patient has been instructed in a home treatment program.  I have re-evaluated this patient and find that the nature, scope, duration and intensity of the therapy is appropriate for the medical condition of the patient.  Alondra continues to require the following intervention to meet STG and LTG's:  PT    Recommendations:  This patient  would benefit from continued therapy.     Frequency:  1 X week, once daily  Duration:  for 4 weeks        Please refer to the daily flowsheet for treatment today, total treatment time and time spent performing 1:1 timed codes.

## 2019-07-22 ENCOUNTER — THERAPY VISIT (OUTPATIENT)
Dept: PHYSICAL THERAPY | Facility: CLINIC | Age: 74
End: 2019-07-22
Payer: COMMERCIAL

## 2019-07-22 DIAGNOSIS — M25.511 SHOULDER PAIN, RIGHT: ICD-10-CM

## 2019-07-22 PROCEDURE — 97112 NEUROMUSCULAR REEDUCATION: CPT | Mod: GP | Performed by: PHYSICAL THERAPIST

## 2019-07-22 PROCEDURE — 97110 THERAPEUTIC EXERCISES: CPT | Mod: GP | Performed by: PHYSICAL THERAPIST

## 2019-07-25 PROBLEM — M25.511 SHOULDER PAIN, RIGHT: Status: RESOLVED | Noted: 2019-02-11 | Resolved: 2019-07-25

## 2019-07-26 NOTE — PROGRESS NOTES
Subjective:  HPI                    Objective:  System    Physical Exam    General     ROS    Assessment/Plan:    DISCHARGE REPORT    Progress reporting period is from 4/29/2019 to 7/22/2019.       SUBJECTIVE  Subjective: Pt notes she is doing amazingly better. She feels that she has more strength, using her core better and being smarter about using her shoulder wisely. Patient can perform her ADL's relatively pain free. Patient has met her goals and would like to discharge from therapy at this time to work on her HEP on her own.     Current Pain level: 1/10.    Initial Pain level: 4/10.   Changes in function:  Yes (See Goal flowsheet attached for changes in current functional level)  Adverse reaction to treatment or activity: None    OBJECTIVE  Changes noted in objective findings:  Yes  Objective: MMT: abduction, adduction 5/5 and pain free. ER 5-/5 no pain AROM: Flexion 130 degrees, 0/10 pain level, slight discomfort with OP(Neer). PROM ER>85 degrees, pain free. Ext+IR, pain free min loss which improved with repeated extension and posterior capsule stretch.      ASSESSMENT/PLAN  RT Shoulder Pain  STG/LTGs have been met or progress has been made towards goals:  Yes (See Goal flow sheet completed today.)  Assessment of Progress: The patient has met all of their long term goals.  Self Management Plans:  Patient is independent in a home treatment program.  I have re-evaluated this patient and find that the nature, scope, duration and intensity of the therapy is appropriate for the medical condition of the patient.  Alondra continues to require the following intervention to meet STG and LTG's:  PT intervention is no longer required to meet STG/LTG.    Recommendations:  This patient is ready to be discharged from therapy and continue their home treatment program.    Please refer to the daily flowsheet for treatment today, total treatment time and time spent performing 1:1 timed codes.

## 2019-10-03 ENCOUNTER — HEALTH MAINTENANCE LETTER (OUTPATIENT)
Age: 74
End: 2019-10-03

## 2019-10-15 ENCOUNTER — DOCUMENTATION ONLY (OUTPATIENT)
Dept: INTERNAL MEDICINE | Facility: CLINIC | Age: 74
End: 2019-10-15

## 2019-10-15 DIAGNOSIS — I10 ESSENTIAL HYPERTENSION WITH GOAL BLOOD PRESSURE LESS THAN 130/80: Primary | ICD-10-CM

## 2019-10-15 DIAGNOSIS — R79.89 ABNORMAL LFTS: ICD-10-CM

## 2019-10-15 DIAGNOSIS — M85.80 OSTEOPENIA: ICD-10-CM

## 2019-10-15 DIAGNOSIS — Z13.29 SCREENING FOR THYROID DISORDER: ICD-10-CM

## 2019-10-15 NOTE — PROGRESS NOTES
Future orders placed.      She does not need cholesterol checked, only needs cholesterol every 3-5 years due normal lipid panels over past several years without any lipid lowering meds.

## 2019-10-28 DIAGNOSIS — Z13.29 SCREENING FOR THYROID DISORDER: ICD-10-CM

## 2019-10-28 DIAGNOSIS — I10 ESSENTIAL HYPERTENSION WITH GOAL BLOOD PRESSURE LESS THAN 130/80: ICD-10-CM

## 2019-10-28 DIAGNOSIS — R79.89 ABNORMAL LFTS: ICD-10-CM

## 2019-10-28 LAB
ALT SERPL W P-5'-P-CCNC: 23 U/L (ref 0–50)
ANION GAP SERPL CALCULATED.3IONS-SCNC: 4 MMOL/L (ref 3–14)
AST SERPL W P-5'-P-CCNC: 19 U/L (ref 0–45)
BUN SERPL-MCNC: 16 MG/DL (ref 7–30)
CALCIUM SERPL-MCNC: 8.8 MG/DL (ref 8.5–10.1)
CHLORIDE SERPL-SCNC: 100 MMOL/L (ref 94–109)
CO2 SERPL-SCNC: 29 MMOL/L (ref 20–32)
CREAT SERPL-MCNC: 0.72 MG/DL (ref 0.52–1.04)
ERYTHROCYTE [DISTWIDTH] IN BLOOD BY AUTOMATED COUNT: 14.8 % (ref 10–15)
GFR SERPL CREATININE-BSD FRML MDRD: 81 ML/MIN/{1.73_M2}
GLUCOSE SERPL-MCNC: 91 MG/DL (ref 70–99)
HCT VFR BLD AUTO: 37.9 % (ref 35–47)
HGB BLD-MCNC: 12.3 G/DL (ref 11.7–15.7)
MCH RBC QN AUTO: 29.6 PG (ref 26.5–33)
MCHC RBC AUTO-ENTMCNC: 32.5 G/DL (ref 31.5–36.5)
MCV RBC AUTO: 91 FL (ref 78–100)
PLATELET # BLD AUTO: 268 10E9/L (ref 150–450)
POTASSIUM SERPL-SCNC: 4.2 MMOL/L (ref 3.4–5.3)
RBC # BLD AUTO: 4.16 10E12/L (ref 3.8–5.2)
SODIUM SERPL-SCNC: 133 MMOL/L (ref 133–144)
TSH SERPL DL<=0.005 MIU/L-ACNC: 1.56 MU/L (ref 0.4–4)
WBC # BLD AUTO: 4.9 10E9/L (ref 4–11)

## 2019-10-28 PROCEDURE — 85027 COMPLETE CBC AUTOMATED: CPT | Performed by: INTERNAL MEDICINE

## 2019-10-28 PROCEDURE — 84443 ASSAY THYROID STIM HORMONE: CPT | Performed by: INTERNAL MEDICINE

## 2019-10-28 PROCEDURE — 36415 COLL VENOUS BLD VENIPUNCTURE: CPT | Performed by: INTERNAL MEDICINE

## 2019-10-28 PROCEDURE — 80048 BASIC METABOLIC PNL TOTAL CA: CPT | Performed by: INTERNAL MEDICINE

## 2019-10-28 PROCEDURE — 84460 ALANINE AMINO (ALT) (SGPT): CPT | Performed by: INTERNAL MEDICINE

## 2019-10-28 PROCEDURE — 84450 TRANSFERASE (AST) (SGOT): CPT | Performed by: INTERNAL MEDICINE

## 2019-10-29 NOTE — PROGRESS NOTES
[facility] - PRIMARY CARE SKIN    CC : skin cancer screening (full-body)  SUBJECTIVE:                                                    Alondra Morgan is a 74 year old female who presents to clinic today for a full-body skin exam because of her history of skin cancer.     Issue one : top of ears, patch on the right side of the face that is scaly, no bleeding.    Personal history of skin cancer : YES - basal cell carcinoma on right shoulder and right nasolabial fold (s/p MMS).  Family history of skin cancer : YES - non-melanoma skin cancer in mother, sister, brother.    Personal Medical History  Eczema Psoriasis Autoimmune   NO NO NO     Family Medical History  Eczema Psoriasis Autoimmune   NO YES - in father and a brother NO     Sun Exposure History  Previous history of significant sun exposure: Travels to [facility] every January.  Blistering sunburns : YES - 5 times  Tanning beds : NO.  Sunscreen Use : YES, SPF : 30.  UV-protective clothing use : NO  Wide-brimmed hats : YES  UV-protective sunglasses : YES  Avoids mid-day sun : YES - sits in shade when in [place], [place] locations    Occupation : retired school counselor (indoor).    Refer to electronic medical record (EMR) for past medical history and medications.    INTEGUMENTARY/SKIN: NEGATIVE for worrisome rashes, moles or lesions   ROS : 14 point review of systems was negative except the symptoms listed above in the HPI.        OBJECTIVE:                                                    GENERAL: healthy, alert and no distress  SKIN: Starks Skin Type - I.  This patient was examined from the top of the head to the bottom of the feet  including scalp, face, neck, back, chest, breasts, buttocks, both arms, both legs, both hands, both feet, all 10 fingers and all 10 toes. The dermatoscope was used to help evaluate pigmented lesions.  Skin Pertinent Findings:                      Left ear, right ear right lateral face - mm in size, erythematous,  scaly, non-indurated lesion(s) most consistent with actinic keratoses.  Name: Liquid Nitrogen Cry-Ac Cryotherapy.  Indication: Pre-malignant lesion.  Location(s): as above  - x.  Completed by: Drea Mcadams MD.  Note : Discussed natural history of lesion and treatment options. Prior to treatment, we discussed inflammation, tenderness post-procedure, the healing process, and the risks of pain, infection, scarring, blistering, and hypo-/hyperpigmentation after healing. Explained that these lesions may grow back and may need additional treatment or re-treatment. The patient expressed a desire to proceed with cryotherapy.    The lesion(s) was treated with liquid nitrogen Cry-Ac, five second freeze repeated twice with a pause to allow for the area to thaw.    Patient tolerated the procedure well and left in good condition. If this lesion should persist or recur, then it needs to be re-evaluated.  Total number of lesions treated: 3     Trunk, arms , legs ,:            Brown, macule(s) most consistent with benign solar lentigo            Raised, coarse textured, stuck appearing lesion consistent with seborrheic keratosis .            Slightly raised, red lesion(s) consistent with capillary hemangioma            Brown macules of various sizes and shapes most consistent with (benign) melanocytic nevi  Face : telangiectasias              ASSESSMENT:                                                      Encounter Diagnoses   Name Primary?     Actinic keratosis Yes     Seborrheic keratosis      Capillary hemangioma      Solar lentiginosis      Telangiectasia          PLAN:                                                    Patient Instructions   Skin exam in one year    ACTINIC KERATOSES POST-TREATMENT CARE INSTRUCTIONS  Actinic keratoses are benign, scaly or gritty lesions that appear in sun-exposed areas and may progress to skin cancers. For this reason, it is important to treat them before they become cancerous. Liquid  nitrogen is the most commonly used and most effective treatment for actinic keratoses; it is mildly uncomfortable when applied to the skin, but the discomfort rapidly subsides.    Post-Treatment:  You may experience burning and/or stinging immediately following the procedure. The discomfort from the procedure may persist over the next 12-24 hours. The area treated will look pinker and slightly swollen before the healing process begins. You may also notice redness, swelling, tenderness, weeping and crusts or scabs. Healing time is approximately 10-14 days.    Blister - You may or may notice blistering from the freezing. If you develop an uncomfortable blister from today's treatment, you may gently puncture this with a needle that has been cleaned with alcohol. However, do not remove the protective skin layer of the blister.    Scab - After a few days, you may notice scaliness or scab formation. Do not pick at the scabs because this may cause slower healing and a permanent scar.    The skin may appear temporarily darker at the treatment site, but this usually fades over a period of months, provided that the area is protected from the sun.    Care of the areas treated:    Wash the area with a mild cleanser.    Gently pat dry.    Do not rub.     Keep protected from the sun during the healing process and for a full year following treatment as the skin continues to remodel during this time.    Apply Vaseline or Aquaphor ointment sparingly to the site for the first 7 days after treatment.    Do not use Neosporin, as many people eventually develop a medication allergy, that can easily be confused with an infection, to Neomycin.    Return if:  There should not be any residual scaling. If there is any concern that the lesion has persisted after 4-6 weeks, make an appointment for a re-check. Healing time does vary depending on your individual healing process and the area of the body treated. Most patients will be healed in one  month.    Signs of Infection:  Thankfully this is rare. However if you notice persistent colored drainage, increasing pain, fever or other signs of infection, please call us at: [phone]      The patient was counseled about sunscreens and sun avoidance. The patient was counseled to check the skin regularly and report any lesion that is new, changing, itching, scabbing, bleeding or otherwise bothersome. The patient was discharged ambulatory and in stable condition.      PROCEDURES:                                                    None.    TT : 25 minutes.  CT : 15 minutes.

## 2019-10-30 ENCOUNTER — OFFICE VISIT (OUTPATIENT)
Dept: FAMILY MEDICINE | Facility: CLINIC | Age: 74
End: 2019-10-30
Payer: COMMERCIAL

## 2019-10-30 VITALS — SYSTOLIC BLOOD PRESSURE: 118 MMHG | DIASTOLIC BLOOD PRESSURE: 72 MMHG

## 2019-10-30 DIAGNOSIS — I78.1 CAPILLARY HEMANGIOMA: ICD-10-CM

## 2019-10-30 DIAGNOSIS — L82.1 SEBORRHEIC KERATOSIS: ICD-10-CM

## 2019-10-30 DIAGNOSIS — L81.4 SOLAR LENTIGINOSIS: ICD-10-CM

## 2019-10-30 DIAGNOSIS — I78.1 TELANGIECTASIA: ICD-10-CM

## 2019-10-30 DIAGNOSIS — L57.0 ACTINIC KERATOSIS: Primary | ICD-10-CM

## 2019-10-30 PROCEDURE — 17000 DESTRUCT PREMALG LESION: CPT | Performed by: FAMILY MEDICINE

## 2019-10-30 PROCEDURE — 99213 OFFICE O/P EST LOW 20 MIN: CPT | Mod: 25 | Performed by: FAMILY MEDICINE

## 2019-10-30 PROCEDURE — 17003 DESTRUCT PREMALG LES 2-14: CPT | Performed by: FAMILY MEDICINE

## 2019-10-30 NOTE — LETTER
10/30/2019         RE: Alondra Morgan  [address] [facility] [address] [place] [id number]        Dear Colleague,    Thank you for [place] your [place], [place] Morgan[place] [facility] [place] see a copy of my visit note below.    [facility] - PRIMARY CARE SKIN    CC : skin cancer screening (full-body)  SUBJECTIVE:                                                    Alondra Morgan is a 74 year old female who presents to clinic today for a full-body skin exam because of her history of skin cancer.     Issue one : top of ears, patch on the right side of the face that is scaly, no bleeding.    Personal history of skin cancer : YES - basal cell carcinoma on right shoulder and right nasolabial fold (s/p MMS).  Family history of skin cancer : YES - non-melanoma skin cancer in mother, sister, brother.    Personal Medical History  Eczema Psoriasis Autoimmune   NO NO NO     Family Medical History  Eczema Psoriasis Autoimmune   NO YES - in father and a brother NO     Sun Exposure History  Previous history of significant sun exposure: Travels to [facility] every January.  Blistering sunburns : YES - 5 times  Tanning beds : NO.  Sunscreen Use : YES, SPF : 30.  UV-protective clothing use : NO  Wide-brimmed hats : YES  UV-protective sunglasses : YES  Avoids mid-day sun : YES - sits in shade when in [place], [place] locations    Occupation : retired school counselor (indoor).    Refer to electronic medical record (EMR) for past medical history and medications.    INTEGUMENTARY/SKIN: NEGATIVE for worrisome rashes, moles or lesions   ROS : 14 point review of systems was negative except the symptoms listed above in the HPI.        OBJECTIVE:                                                    GENERAL: healthy, alert and no distress  SKIN: Starks Skin Type - I.  This patient was examined from the top of the head to the bottom of the feet  including scalp, face, neck, back, chest, breasts,  buttocks, both arms, both legs, both hands, both feet, all 10 fingers and all 10 toes. The dermatoscope was used to help evaluate pigmented lesions.  Skin Pertinent Findings:                      Left ear, right ear right lateral face - mm in size, erythematous, scaly, non-indurated lesion(s) most consistent with actinic keratoses.  Name: Liquid Nitrogen Cry-Ac Cryotherapy.  Indication: Pre-malignant lesion.  Location(s): as above  - x.  Completed by: Drea Mcadams MD.  Note : Discussed natural history of lesion and treatment options. Prior to treatment, we discussed inflammation, tenderness post-procedure, the healing process, and the risks of pain, infection, scarring, blistering, and hypo-/hyperpigmentation after healing. Explained that these lesions may grow back and may need additional treatment or re-treatment. The patient expressed a desire to proceed with cryotherapy.    The lesion(s) was treated with liquid nitrogen Cry-Ac, five second freeze repeated twice with a pause to allow for the area to thaw.    Patient tolerated the procedure well and left in good condition. If this lesion should persist or recur, then it needs to be re-evaluated.  Total number of lesions treated: 3     Trunk, arms , legs ,:            Brown, macule(s) most consistent with benign solar lentigo            Raised, coarse textured, stuck appearing lesion consistent with seborrheic keratosis .            Slightly raised, red lesion(s) consistent with capillary hemangioma            Brown macules of various sizes and shapes most consistent with (benign) melanocytic nevi  Face : telangiectasias              ASSESSMENT:                                                      Encounter Diagnoses   Name Primary?     Actinic keratosis Yes     Seborrheic keratosis      Capillary hemangioma      Solar lentiginosis      Telangiectasia          PLAN:                                                    Patient Instructions   Skin exam in one  year    ACTINIC KERATOSES POST-TREATMENT CARE INSTRUCTIONS  Actinic keratoses are benign, scaly or gritty lesions that appear in sun-exposed areas and may progress to skin cancers. For this reason, it is important to treat them before they become cancerous. Liquid nitrogen is the most commonly used and most effective treatment for actinic keratoses; it is mildly uncomfortable when applied to the skin, but the discomfort rapidly subsides.    Post-Treatment:  You may experience burning and/or stinging immediately following the procedure. The discomfort from the procedure may persist over the next 12-24 hours. The area treated will look pinker and slightly swollen before the healing process begins. You may also notice redness, swelling, tenderness, weeping and crusts or scabs. Healing time is approximately 10-14 days.    Blister - You may or may notice blistering from the freezing. If you develop an uncomfortable blister from today's treatment, you may gently puncture this with a needle that has been cleaned with alcohol. However, do not remove the protective skin layer of the blister.    Scab - After a few days, you may notice scaliness or scab formation. Do not pick at the scabs because this may cause slower healing and a permanent scar.    The skin may appear temporarily darker at the treatment site, but this usually fades over a period of months, provided that the area is protected from the sun.    Care of the areas treated:    Wash the area with a mild cleanser.    Gently pat dry.    Do not rub.     Keep protected from the sun during the healing process and for a full year following treatment as the skin continues to remodel during this time.    Apply Vaseline or Aquaphor ointment sparingly to the site for the first 7 days after treatment.    Do not use Neosporin, as many people eventually develop a medication allergy, that can easily be confused with an infection, to Neomycin.    Return if:  There should not be  any residual scaling. If there is any concern that the lesion has persisted after 4-6 weeks, make an appointment for a re-check. Healing time does vary depending on your individual healing process and the area of the body treated. Most patients will be healed in one month.    Signs of Infection:  Thankfully this is rare. However if you notice persistent colored drainage, increasing pain, fever or other signs of infection, please call us at: [phone]      The patient was counseled about sunscreens and sun avoidance. The patient was counseled to check the skin regularly and report any lesion that is new, changing, itching, scabbing, bleeding or otherwise bothersome. The patient was discharged ambulatory and in stable condition.      PROCEDURES:                                                    None.    TT : 25 minutes.  CT : 15 minutes.      Again, thank you for allowing me to participate in the care of your patient.        Sincerely,        Susy Mcadams MD

## 2019-10-30 NOTE — PATIENT INSTRUCTIONS
Skin exam in one year    ACTINIC KERATOSES POST-TREATMENT CARE INSTRUCTIONS  Actinic keratoses are benign, scaly or gritty lesions that appear in sun-exposed areas and may progress to skin cancers. For this reason, it is important to treat them before they become cancerous. Liquid nitrogen is the most commonly used and most effective treatment for actinic keratoses; it is mildly uncomfortable when applied to the skin, but the discomfort rapidly subsides.    Post-Treatment:  You may experience burning and/or stinging immediately following the procedure. The discomfort from the procedure may persist over the next 12-24 hours. The area treated will look pinker and slightly swollen before the healing process begins. You may also notice redness, swelling, tenderness, weeping and crusts or scabs. Healing time is approximately 10-14 days.    Blister - You may or may notice blistering from the freezing. If you develop an uncomfortable blister from today's treatment, you may gently puncture this with a needle that has been cleaned with alcohol. However, do not remove the protective skin layer of the blister.    Scab - After a few days, you may notice scaliness or scab formation. Do not pick at the scabs because this may cause slower healing and a permanent scar.    The skin may appear temporarily darker at the treatment site, but this usually fades over a period of months, provided that the area is protected from the sun.    Care of the areas treated:    Wash the area with a mild cleanser.    Gently pat dry.    Do not rub.     Keep protected from the sun during the healing process and for a full year following treatment as the skin continues to remodel during this time.    Apply Vaseline or Aquaphor ointment sparingly to the site for the first 7 days after treatment.    Do not use Neosporin, as many people eventually develop a medication allergy, that can easily be confused with an infection, to Neomycin.    Return  if:  There should not be any residual scaling. If there is any concern that the lesion has persisted after 4-6 weeks, make an appointment for a re-check. Healing time does vary depending on your individual healing process and the area of the body treated. Most patients will be healed in one month.    Signs of Infection:  Thankfully this is rare. However if you notice persistent colored drainage, increasing pain, fever or other signs of infection, please call us at: [phone]

## 2019-10-31 ENCOUNTER — HEALTH MAINTENANCE LETTER (OUTPATIENT)
Age: 74
End: 2019-10-31

## 2019-11-01 ASSESSMENT — ACTIVITIES OF DAILY LIVING (ADL): CURRENT_FUNCTION: NO ASSISTANCE NEEDED

## 2019-11-04 ENCOUNTER — OFFICE VISIT (OUTPATIENT)
Dept: INTERNAL MEDICINE | Facility: CLINIC | Age: 74
End: 2019-11-04
Payer: COMMERCIAL

## 2019-11-04 VITALS
SYSTOLIC BLOOD PRESSURE: 116 MMHG | BODY MASS INDEX: 28.63 KG/M2 | TEMPERATURE: 98 F | OXYGEN SATURATION: 99 % | DIASTOLIC BLOOD PRESSURE: 78 MMHG | WEIGHT: 167.7 LBS | HEIGHT: 64 IN | HEART RATE: 85 BPM

## 2019-11-04 DIAGNOSIS — M85.80 OSTEOPENIA, UNSPECIFIED LOCATION: ICD-10-CM

## 2019-11-04 DIAGNOSIS — Z23 NEED FOR PNEUMOCOCCAL VACCINE: ICD-10-CM

## 2019-11-04 DIAGNOSIS — Z78.0 ASYMPTOMATIC MENOPAUSAL STATE: ICD-10-CM

## 2019-11-04 DIAGNOSIS — I10 ESSENTIAL HYPERTENSION WITH GOAL BLOOD PRESSURE LESS THAN 130/80: ICD-10-CM

## 2019-11-04 DIAGNOSIS — Z00.00 MEDICARE ANNUAL WELLNESS VISIT, SUBSEQUENT: Primary | ICD-10-CM

## 2019-11-04 PROCEDURE — 99397 PER PM REEVAL EST PAT 65+ YR: CPT | Mod: 25 | Performed by: INTERNAL MEDICINE

## 2019-11-04 PROCEDURE — G0009 ADMIN PNEUMOCOCCAL VACCINE: HCPCS | Performed by: INTERNAL MEDICINE

## 2019-11-04 PROCEDURE — 90732 PPSV23 VACC 2 YRS+ SUBQ/IM: CPT | Performed by: INTERNAL MEDICINE

## 2019-11-04 RX ORDER — LISINOPRIL 20 MG/1
20 TABLET ORAL DAILY
Qty: 90 TABLET | Refills: 3 | Status: SHIPPED | OUTPATIENT
Start: 2019-11-04 | End: 2020-11-09

## 2019-11-04 ASSESSMENT — ACTIVITIES OF DAILY LIVING (ADL): CURRENT_FUNCTION: NO ASSISTANCE NEEDED

## 2019-11-04 ASSESSMENT — MIFFLIN-ST. JEOR: SCORE: 1239.65

## 2019-11-04 NOTE — PATIENT INSTRUCTIONS
"*   Continue all medications at the same doses.  Contact your usual pharmacy if you need refills.     *  Repeat bone demnsity in 2020    * second pneumonia vaccine given today    *  Return to see me in 1 year, sooner if needed.  Use [employer] or Call [phone] to schedule the appointment with me.       5 GOALS TO PREVENT VASCULAR DISEASE:     1.  Aggressive blood pressure control, under 130/80 ideally.  Using medications if needed.    Your blood pressure is under good control    BP Readings from Last 4 Encounters:   11/04/19 116/78   10/30/19 118/72   05/15/19 116/86   02/07/19 120/68       2.  Aggressive LDL cholesterol (\"bad cholesterol\") lowering as indicated.    Your goal is an LDL under 130 for sure, preferably under 100.  (If you have diabetes or previous vascular disease, the the LDL goals would be under 100 for sure, preferably under 70.)    New guidelines identify four high-risk groups who could benefit from statins:   *people with pre-existing heart disease, such as those who have had a heart attack;   *people ages 40 to 75 who have diabetes of any type  *patients ages 40 to 75 with at least a 7.5% risk of developing cardiovascular disease over the next decade, according to a formula described in the guidelines  *patients with the sort of super-high cholesterol that sometimes runs in families, as evidenced by an LDL of 190 milligrams per deciliter or higher    Your cholesterol levels are well controlled.    Recent Labs   Lab Test 10/23/18  1009 10/26/17  0853  02/24/15  0838 01/09/14  0854   CHOL 199 206*   < > 190 224*   HDL 71 78   < > 78 73   * 111*   < > 97 136*   TRIG 81 84   < > 74 78   CHOLHDLRATIO  --   --   --  2.4 3.1    < > = values in this interval not displayed.       3.  Aggressive diabetic prevention, screening and/or management.      You do not have diabetes as of the most recent blood tests.     4.  No smoking    5.  Consider Daily aspirin: Have a discussion about the relative " "benefits and risks of taking daily low dose aspirin (81 mg) tablet once per day over the age of 50, unless there is a specific reason that you cannot take aspirin (such as side effect, allergy, or you are on another \"blood thinner\").        --Based on your relative lack of current cardiac risk factors, you do not  necessarily need to take daily aspirin.     Preventive Health Recommendations  Female Ages 75+    Yearly exam: See your health care provider every year in order to  o Review health changes.   o Discuss preventive care.    o Review your medicines if your doctor has prescribed any.      Get a Pap test every three years (unless you have an abnormal result and your provider advises testing more often).    No more PAP smear needed.      You do not need a Pap test if your uterus was removed (hysterectomy) and you have not had cancer.    You should be tested each year for STDs (sexually transmitted diseases) if you're at risk.     Routine screening mammogram no longer indicated.    No routine screening colonoscopy indiacted     Have a cholesterol test every 5 years, or more often if advised.    Have a diabetes test (fasting glucose) every three years. If you are at risk for diabetes, you should have this test more often.     If you are at risk for osteoporosis (brittle bone disease), think about having a bone density scan (DEXA).    Shots:   *  Get a flu shot each year.   *  Get a tetanus shot every 10 years.    *  Pneumonia vaccine up to date.      Nutrition:     Eat at least 5 servings of fruits and vegetables each day.    Eat whole-grain bread, whole-wheat pasta and brown rice instead of white grains and rice.    Talk to your provider about Calcium and Vitamin D.    --Calcium: aim for 1200 mg per day (any brand is fine)   --Vitamin D3 at least 1000 units per day (any brand is fine)       --Good Grains:  Oats, brown rice, Quinoa (these do not raise the blood sugar as much)     --Bad grains:  Anything made from " "wheat or white rice     (because these raise the blood sugars significantly, and the possible gluten issue from wheat for some people).      --Proteins:  Aim for \"lean proteins\" including chicken, fish, seafood, pork, turkey, and eggs (in moderation); Eat red meat only occasionally        Lifestyle    Exercise at least 150 minutes a week (30 minutes a day, 5 days a week). This will help you control your weight and prevent disease.    Limit alcohol to one drink per day.    No smoking.     Wear sunscreen to prevent skin cancer.     See your dentist every six months for an exam and cleaning.    See your eye doctor every 1 to 2 years.          "

## 2019-11-04 NOTE — NURSING NOTE
"Chief Complaint   Patient presents with     Medicare Visit     /78   Pulse 85   Temp 98  F (36.7  C) (Temporal)   Ht 1.632 m (5' 4.25\")   Wt 76.1 kg (167 lb 11.2 oz)   SpO2 99%   BMI 28.56 kg/m   Estimated body mass index is 28.56 kg/m  as calculated from the following:    Height as of this encounter: 1.632 m (5' 4.25\").    Weight as of this encounter: 76.1 kg (167 lb 11.2 oz).  Medication Reconciliation: complete      Health Maintenance that is potentially due pending provider review:  NONE    n/a    JANINE Live  "

## 2019-11-04 NOTE — PROGRESS NOTES
"SUBJECTIVE:   Alondra Morgan is a 74 year old female who presents for Preventive Visit.    Are you in the first 12 months of your Medicare coverage?  No    Healthy Habits:     In general, how would you rate your overall health?  Excellent    Frequency of exercise:  2-3 days/week    Duration of exercise:  30-45 minutes    Do you usually eat at least 4 servings of fruit and vegetables a day, include whole grains    & fiber and avoid regularly eating high fat or \"junk\" foods?  Yes    Taking medications regularly:  Yes    Medication side effects:  None    Ability to successfully perform activities of daily living:  No assistance needed    Home Safety:  No safety concerns identified    Hearing Impairment:  No hearing concerns    In the past 6 months, have you been bothered by leaking of urine?  No    In general, how would you rate your overall mental or emotional health?  Excellent      PHQ-2 Total Score: 0    Additional concerns today:  No    Do you feel safe in your environment? Yes    Have you ever done Advance Care Planning? (For example, a Health Directive, POLST, or a discussion with a medical provider about your wishes): Yes, advance care planning is on file.      Fall risk  Fallen 2 or more times in the past year?: No  Any fall with injury in the past year?: No    Cognitive Screening   1) Repeat 3 items (Leader, Season, Table)    2) Clock draw: NORMAL  3) 3 item recall: Recalls 3 objects  Results: 3 items recalled: COGNITIVE IMPAIRMENT LESS LIKELY    Mini-CogTM Copyright ANDREW Jarvis. Licensed by the author for use in [facility]; reprinted with permission ([email or web address]). All rights reserved.      Do you have sleep apnea, excessive snoring or daytime drowsiness?: no    Reviewed and updated as needed this visit by clinical staff  Tobacco  Allergies  Meds  [facility]         Reviewed and updated as needed this visit by Provider  [facility]        Social History     Tobacco Use     Smoking status: Former " Smoker     Packs/day: 1.00     Years: 40.00     Pack years: 40.00     Types: Cigarettes     Last attempt to quit: 10/7/1983     Years since quittin.1     Smokeless tobacco: Never Used   Substance Use Topics     Alcohol use: Yes     Comment: 4 drinks per week         No flowsheet data found.      1.    Blood presure remains well controlled at home  Readings outside clinic are within normal limits.  Reviewed last 6 BP readings in chart:  BP Readings from Last 6 Encounters:   19 116/78   10/30/19 118/72   05/15/19 116/86   19 120/68   18 122/88   18 129/83     He has not experienced any significant side effects from medicaiotns for hypertension.    NO active cardiac complaints or symptoms with exercise.     2.  History of osteopenia.  Reviewed most recent DEXA scans.  Take calcium, taking vitamin D.  Had been taking Reclast 4 years ago.        Current providers sharing in care for this patient include:   Patient Care Team:  Ivan Mcpherson MD as PCP - General (Internal Medicine)  Ivan Mcpherson MD as Assigned PCP    The following health maintenance items are reviewed in Epic and correct as of today:  Health Maintenance   Topic Date Due     CMP  10/23/2019     LIPID  10/23/2019     DEXA  10/26/2019     MEDICARE ANNUAL WELLNESS VISIT  10/26/2019     FALL RISK ASSESSMENT  2020     TSH W/FREE T4 REFLEX  10/28/2020     CBC  10/28/2020     ADVANCE CARE PLANNING  2021     DTAP/TDAP/TD IMMUNIZATION (3 - Td) 2022     COLONOSCOPY  2028     PHQ-2  Completed     INFLUENZA VACCINE  Completed     PNEUMOCOCCAL IMMUNIZATION 65+ LOW/MEDIUM RISK  Completed     ZOSTER IMMUNIZATION  Completed     IPV IMMUNIZATION  Aged Out     MENINGITIS IMMUNIZATION  Aged Out       Past Medical History:  ---------------------------  Past Medical History:   Diagnosis Date     Arthritis      Atypical hyperplasia of breasts      Basal cell carcinoma of ala nasi      Basal cell carcinoma  of shoulder      History of poliomyelitis 1947    acute polio infection age 2, resolved without any sequelae     Osteopenia      Polyp, colon     Adenomatous       Past Surgical History:  ---------------------------  Past Surgical History:   Procedure Laterality Date     C VAG HYST,RMV TUBE/OVARY       EYE SURGERY  07/2017    cataract bilat eyes     HC EXCISION BREAST LESION, OPEN >=1      ATYPICAL HYPERPLASIA X 2     HYSTERECTOMY, PAP NO LONGER INDICATED  1994     TONSILLECTOMY         Current Medications:  ---------------------------  Current Outpatient Medications   Medication Sig Dispense Refill     aspirin 81 MG tablet Take 1 tablet (81 mg) by mouth daily INDICATION: FOR HEART AND CARDIOVASCULAR HEALTH 100 tablet 3     calcium-vitamin D (CALCIUM 600 + D) 600-400 MG-UNIT per tablet Take 1 tablet by mouth 2 times daily FOR BONE HEALTH AND FOR VITAMIN D DEFICIENCY (LOW VITAMIN D) 100 tablet PRN     cholecalciferol 2000 UNITS tablet Take 2 tablets by mouth daily INDICATION: TO TREAT VITAMIN D DEFICIENCY (LOW VITAMIN D) 100 tablet prn     FISH OIL 1000 MG OR CAPS THREE TIMES DAILY 100 3     lisinopril (PRINIVIL/ZESTRIL) 20 MG tablet Take 1 tablet (20 mg) by mouth daily 90 tablet 3     loratadine (CLARITIN) 10 MG tablet ONE DAILY FOR ALLERGIES 90 tablet prn     MULTIVITAMIN OR 1 daily       Pantothenic Acid 250 MG CAPS Take 1 capsule by mouth daily       zoledronic Acid (RECLAST) 5 MG/100ML SOLN Inject 100 mLs (5 mg) into the vein once for 1 dose TO TREAT OSTEOPENIA 100 mL 0       Allergies:  -------------  Allergies   Allergen Reactions     Alendronic Acid GI Disturbance     Severe GERD     Pollen Extract        Social History:  -------------------  Social History     Socioeconomic History     Marital status:      Spouse name: Not on file     Number of children: Not on file     Years of education: Not on file     Highest education level: Not on file   Occupational History     Not on file   Social Needs      Financial resource strain: Not on file     Food insecurity:     Worry: Not on file     Inability: Not on file     Transportation needs:     Medical: Not on file     Non-medical: Not on file   Tobacco Use     Smoking status: Former Smoker     Packs/day: 1.00     Years: 40.00     Pack years: 40.00     Types: Cigarettes     Last attempt to quit: 10/7/1983     Years since quittin.1     Smokeless tobacco: Never Used   Substance and Sexual Activity     Alcohol use: Yes     Comment: 4 drinks per week     Drug use: No     Sexual activity: Not Currently     Partners: Male   Lifestyle     Physical activity:     Days per week: Not on file     Minutes per session: Not on file     Stress: Not on file   Relationships     Social connections:     Talks on phone: Not on file     Gets together: Not on file     Attends [employer] service: Not on file     Active member of club or organization: Not on file     Attends meetings of clubs or organizations: Not on file     Relationship status: Not on file     Intimate partner violence:     Fear of current or ex partner: Not on file     Emotionally abused: Not on file     Physically abused: Not on file     Forced sexual activity: Not on file   Other Topics Concern      Service No     Blood Transfusions No     Caffeine Concern No     Occupational Exposure No     Hobby Hazards No     Sleep Concern Yes     Stress Concern No     Weight Concern Yes     Special Diet No     Back Care No     Exercise Yes     Comment: 3 X WEEK     Bike Helmet Not Asked     Seat Belt Yes     Self-Exams No     Comment: ENCOURAGED     Parent/sibling w/ CABG, MI or angioplasty before 65F 55M? Not Asked   Social History Narrative     Not on file       Family Medical History:  ------------------------------  Family History   Problem Relation Age of Onset     Cancer Mother         lung     Osteoporosis Mother      Eye Disorder Mother         glaucoma     Arthritis Mother         osteo     Heart Disease Mother   "       CHF     Hypertension Mother      Cerebrovascular Disease Father         TIAs     Osteoporosis Father      Hypertension Father      Cancer Father         BONE     No Known Problems Brother      Cerebrovascular Disease Brother      Colon Cancer Brother      Cancer Brother         PROSTATE     Arthritis Sister         PARTIAL KNEE REPLACEMENT IN 2009     Osteoporosis Sister      Breast Cancer Maternal Grandmother      Cerebrovascular Disease Maternal Grandfather      Breast Cancer Maternal Aunt      Breast Cancer Maternal Aunt      Breast Cancer Other         2 YOUNGER MATERNAL COUSINS     Asthma Daughter         Review of Systems  Constitutional, HEENT, cardiovascular, pulmonary, gi and gu systems are negative, except as otherwise noted.    OBJECTIVE:   /78   Pulse 85   Temp 98  F (36.7  C) (Temporal)   Ht 1.632 m (5' 4.25\")   Wt 76.1 kg (167 lb 11.2 oz)   SpO2 99%   BMI 28.56 kg/m   Estimated body mass index is 28.56 kg/m  as calculated from the following:    Height as of this encounter: 1.632 m (5' 4.25\").    Weight as of this encounter: 76.1 kg (167 lb 11.2 oz).  Physical Exam    GENERAL alert and no distress  EYES:  Normal sclera,conjunctiva, EOMI  HENT: oral and posterior pharynx without lesions or erythema, facies symmetric  NECK: Neck supple. No LAD, without thyroidmegaly.  RESP: Clear to ausculation bilaterally without wheezes or crackles. Normal BS in all fields.  CV: RRR normal S1S2 without murmurs, rubs or gallops.  LYMPH: no cervical lymph adenopathy appreciated  MS: extremities- no gross deformities of the visible extremities noted,   EXT:  no lower extremity edema  PSYCH: Alert and oriented times 3; speech- coherent  SKIN:  No obvious significant skin lesions on visible portions of face         ASSESSMENT / PLAN:     (Z00.00) Medicare annual wellness visit, subsequent  (primary encounter diagnosis)  Comment: Discussed cardiac disease risk factors and cardiac disease risk factor " modification, including diabetes screening, blood pressure screening (and management if indicated), and cholesterol screening.   Reviewed immunzation guidelines, including pneumococcal vaccines, annual influenza, and shingles vaccines.   Discussed routine cancer screenings, including skin cancer, colon cancer screening for everyone until age 80, prostate cancer screening in men until age 75, mammogram and PAP/pelvic for women until age 75.   Recommended regular dentist visits to care for remaining teeth.   Recommended regular screening for vision and glaucoma.   Recommended safe driving and accident avoidance.   Plan:     (I10) Essential hypertension with goal blood pressure less than 130/80  Comment: This condition is currently controlled on the current medical regimen.  Continue current therapy.  Discussed current hypertension treatment guidelines, including indications for treatment and treatment options.  Discussed the importance for aggressive management of HTN to prevent vascular complications later.  Recommended lower fat, lower carbohydrate, and lower sodium (<2000 mg)diet.  Discussed required intervals for follow up on HTN, lab studies.  Recommened pt. follow their blood pressures outside the clinic to ensure that BPs are remaining within guidelines, and to contact me if the readings are not within guidelines on a regular basis so we can adjust treatment as needed.   Plan: lisinopril (PRINIVIL/ZESTRIL) 20 MG tablet            (Z23) Need for pneumococcal vaccine  Comment: This condition is currently controlled on the current medical regimen.  Continue current therapy.   Plan: Pneumococcal vaccine 23 valent PPSV23          (Pneumovax) [07764], ADMIN MEDICARE:         Pneumococcal Vaccine ()            (M85.80) Osteopenia, unspecified location  Comment: Reviewed DEXA scan results showing osteopenia.  There is no need for a specific medication for osteopenia/osteoporosis, but if it decreases further, on  "emay be indiacted.  * continue Calcium (at least 1200 mg per day) and Vitamin D (at least 2000 units per day) supplements either separately or together in the form of Caltrate (or similar product).   * regular exercise to keep muscles strong and weight stable  * no smoking  * avoid falls  *Repeat DEXA (bone density test) in 1-2 years.    Plan: DX Hip/Pelvis/Spine            (Z78.0) Asymptomatic menopausal state   Comment:   Plan: DX Hip/Pelvis/Spine               COUNSELING:  Reviewed preventive health counseling, as reflected in patient instructions       Regular exercise       Healthy diet/nutrition       Vision screening       Hearing screening       Dental care       Bladder control       Osteoporosis Prevention/Bone Health    Estimated body mass index is 28.56 kg/m  as calculated from the following:    Height as of this encounter: 1.632 m (5' 4.25\").    Weight as of this encounter: 76.1 kg (167 lb 11.2 oz).         reports that she quit smoking about 36 years ago. Her smoking use included cigarettes. She has a 40.00 pack-year smoking history. She has never used smokeless tobacco.      Appropriate preventive services were discussed with this patient, including applicable screening as appropriate for cardiovascular disease, diabetes, osteopenia/osteoporosis, and glaucoma.  As appropriate for age/gender, discussed screening for colorectal cancer, prostate cancer, breast cancer, and cervical cancer. Checklist reviewing preventive services available has been given to the patient.    Reviewed patients plan of care and provided an AVS. The  armida Cantu meets the Care Plan requirement. This Care Plan has been established and reviewed with the .    Counseling Resources:  ATP IV Guidelines  Pooled Cohorts Equation Calculator  Breast Cancer Risk Calculator  FRAX Risk Assessment  ICSI Preventive Guidelines  Dietary Guidelines for Americans, 2010  USDA's MyPlate  ASA Prophylaxis  Lung CA Screening    Ivan Mcpherson, " MD  [facility]    Identified Health Risks:

## 2020-06-17 ENCOUNTER — HOSPITAL ENCOUNTER (OUTPATIENT)
Dept: MAMMOGRAPHY | Facility: CLINIC | Age: 75
Discharge: HOME | End: 2020-06-17
Attending: INTERNAL MEDICINE | Admitting: INTERNAL MEDICINE
Payer: COMMERCIAL

## 2020-06-17 DIAGNOSIS — Z12.31 VISIT FOR SCREENING MAMMOGRAM: ICD-10-CM

## 2020-06-17 PROCEDURE — 77063 BREAST TOMOSYNTHESIS BI: CPT

## 2020-06-23 ENCOUNTER — TRANSFERRED RECORDS (OUTPATIENT)
Dept: HEALTH INFORMATION MANAGEMENT | Facility: CLINIC | Age: 75
End: 2020-06-23

## 2020-09-08 ENCOUNTER — ANCILLARY PROCEDURE (OUTPATIENT)
Dept: BONE DENSITY | Facility: CLINIC | Age: 75
End: 2020-09-08
Attending: INTERNAL MEDICINE
Payer: COMMERCIAL

## 2020-09-08 DIAGNOSIS — M85.80 OSTEOPENIA, UNSPECIFIED LOCATION: ICD-10-CM

## 2020-09-08 DIAGNOSIS — Z78.0 ASYMPTOMATIC MENOPAUSAL STATE: ICD-10-CM

## 2020-09-08 PROCEDURE — 77085 DXA BONE DENSITY AXL VRT FX: CPT

## 2020-09-29 ENCOUNTER — ALLIED HEALTH/NURSE VISIT (OUTPATIENT)
Dept: NURSING | Facility: CLINIC | Age: 75
End: 2020-09-29
Payer: COMMERCIAL

## 2020-09-29 DIAGNOSIS — Z23 NEED FOR PROPHYLACTIC VACCINATION AND INOCULATION AGAINST INFLUENZA: Primary | ICD-10-CM

## 2020-09-29 PROCEDURE — 90662 IIV NO PRSV INCREASED AG IM: CPT

## 2020-09-29 PROCEDURE — G0008 ADMIN INFLUENZA VIRUS VAC: HCPCS

## 2020-09-29 NOTE — PROGRESS NOTES
Patient presents to influenza program requesting influenza vaccination.  Standing orders implemented.    Vaccination given by An Lopez, [facility]

## 2020-10-01 ENCOUNTER — TELEPHONE (OUTPATIENT)
Dept: INTERNAL MEDICINE | Facility: CLINIC | Age: 75
End: 2020-10-01

## 2020-10-01 DIAGNOSIS — I10 ESSENTIAL HYPERTENSION WITH GOAL BLOOD PRESSURE LESS THAN 130/80: ICD-10-CM

## 2020-10-01 NOTE — TELEPHONE ENCOUNTER
Reason for Call Request for an order or referral    Order or referral being requested  Lab orders     Date needed  Soon patient has appt on 11/9/2020    Has the patient been seen by the PCP for this problem? YES    Additional comments   Patient needs lab orders so she can have a lab appointment prior to her wellness exam on 11/9/2020 with dr patel    Phone number Patient can be reached at  Home number on file [phone] (home)    Best Time  anytime    Can we leave a detailed message on this number?  YES    Call taken on 10/1/2020 at 10:51 AM by Zaria Trinh

## 2020-10-01 NOTE — TELEPHONE ENCOUNTER
Future orders placed.       We can also get these done on the same day that she is here if she would prefer to minimize trips to the clinic, it is her choice.     We will discuss these results in further detail in their upcoming appointment.     Close encounter when done.

## 2020-11-06 DIAGNOSIS — I10 ESSENTIAL HYPERTENSION WITH GOAL BLOOD PRESSURE LESS THAN 130/80: ICD-10-CM

## 2020-11-06 LAB
ANION GAP SERPL CALCULATED.3IONS-SCNC: 5 MMOL/L (ref 3–14)
BUN SERPL-MCNC: 16 MG/DL (ref 7–30)
CALCIUM SERPL-MCNC: 9.6 MG/DL (ref 8.5–10.1)
CHLORIDE SERPL-SCNC: 97 MMOL/L (ref 94–109)
CO2 SERPL-SCNC: 28 MMOL/L (ref 20–32)
CREAT SERPL-MCNC: 0.73 MG/DL (ref 0.52–1.04)
ERYTHROCYTE [DISTWIDTH] IN BLOOD BY AUTOMATED COUNT: 14.1 % (ref 10–15)
GFR SERPL CREATININE-BSD FRML MDRD: 79 ML/MIN/{1.73_M2}
GLUCOSE SERPL-MCNC: 95 MG/DL (ref 70–99)
HCT VFR BLD AUTO: 38 % (ref 35–47)
HGB BLD-MCNC: 12.5 G/DL (ref 11.7–15.7)
MCH RBC QN AUTO: 30.2 PG (ref 26.5–33)
MCHC RBC AUTO-ENTMCNC: 32.9 G/DL (ref 31.5–36.5)
MCV RBC AUTO: 92 FL (ref 78–100)
PLATELET # BLD AUTO: 278 10E9/L (ref 150–450)
POTASSIUM SERPL-SCNC: 4.4 MMOL/L (ref 3.4–5.3)
RBC # BLD AUTO: 4.14 10E12/L (ref 3.8–5.2)
SODIUM SERPL-SCNC: 130 MMOL/L (ref 133–144)
WBC # BLD AUTO: 5.5 10E9/L (ref 4–11)

## 2020-11-06 PROCEDURE — 36415 COLL VENOUS BLD VENIPUNCTURE: CPT | Performed by: INTERNAL MEDICINE

## 2020-11-06 PROCEDURE — 80048 BASIC METABOLIC PNL TOTAL CA: CPT | Performed by: INTERNAL MEDICINE

## 2020-11-06 PROCEDURE — 85027 COMPLETE CBC AUTOMATED: CPT | Performed by: INTERNAL MEDICINE

## 2020-11-09 ENCOUNTER — OFFICE VISIT (OUTPATIENT)
Dept: INTERNAL MEDICINE | Facility: CLINIC | Age: 75
End: 2020-11-09
Payer: COMMERCIAL

## 2020-11-09 VITALS
DIASTOLIC BLOOD PRESSURE: 72 MMHG | WEIGHT: 166.4 LBS | HEART RATE: 88 BPM | TEMPERATURE: 97.5 F | SYSTOLIC BLOOD PRESSURE: 108 MMHG | BODY MASS INDEX: 28.41 KG/M2 | HEIGHT: 64 IN | OXYGEN SATURATION: 99 %

## 2020-11-09 DIAGNOSIS — E55.9 VITAMIN D DEFICIENCY: ICD-10-CM

## 2020-11-09 DIAGNOSIS — Z00.00 ENCOUNTER FOR MEDICARE ANNUAL WELLNESS EXAM: Primary | ICD-10-CM

## 2020-11-09 DIAGNOSIS — I10 ESSENTIAL HYPERTENSION WITH GOAL BLOOD PRESSURE LESS THAN 130/80: ICD-10-CM

## 2020-11-09 DIAGNOSIS — M85.80 OSTEOPENIA, UNSPECIFIED LOCATION: ICD-10-CM

## 2020-11-09 DIAGNOSIS — N60.92 ATYPICAL DUCTAL HYPERPLASIA OF LEFT BREAST: ICD-10-CM

## 2020-11-09 DIAGNOSIS — N64.4 BREAST PAIN, LEFT: ICD-10-CM

## 2020-11-09 PROCEDURE — 99397 PER PM REEVAL EST PAT 65+ YR: CPT | Performed by: INTERNAL MEDICINE

## 2020-11-09 PROCEDURE — 99213 OFFICE O/P EST LOW 20 MIN: CPT | Mod: 25 | Performed by: INTERNAL MEDICINE

## 2020-11-09 RX ORDER — LORATADINE 10 MG/1
TABLET ORAL
COMMUNITY
Start: 2020-11-09

## 2020-11-09 RX ORDER — LISINOPRIL 20 MG/1
20 TABLET ORAL DAILY
Qty: 90 TABLET | Refills: 3 | Status: SHIPPED | OUTPATIENT
Start: 2020-11-09 | End: 2021-11-01

## 2020-11-09 ASSESSMENT — ACTIVITIES OF DAILY LIVING (ADL): CURRENT_FUNCTION: NO ASSISTANCE NEEDED

## 2020-11-09 ASSESSMENT — MIFFLIN-ST. JEOR: SCORE: 1224.79

## 2020-11-09 NOTE — PATIENT INSTRUCTIONS
"*  Continue all medications at the same doses.  Contact your usual pharmacy if you need refills.     *  The left breast pain is almost certainly from your more regular physcial exercise.   I felt no abnormalities on the exam today.     *  Monitor your symptoms, let us know if any worsens.     *  If your symptoms are bad enough, try over the counter Anti-inflammatory medicine.  Take one of the following:     --Motrin/Advil/Ibuprofen 400-600 mg,  2-3 times per day as needed    DEXA (bone density):  Your bone density results show that you have osteopenia (thinning of the bones beyond the normals range but not quite to the osteoporosis range.  At this point you do not need a specific medication for this, but if it decreases further, you may need a medication for this.    I would also recomend the following:  * continue Calcium (at least 1200 mg per day) and Vitamin D (at least 2000 units per day) supplements either separately or together in the form of Caltrate (or similar product).   * regular exercise to keep muscles strong and weight stable  * no smoking  * avoid falls  *Repeat DEXA (bone density test) in 3 years.       Return to see me in 1 year, sooner if needed.  Use [employer] or Call [phone] to schedule the appointment with me.       5 GOALS TO PREVENT VASCULAR DISEASE:     1.  Aggressive blood pressure control, under 130/80 ideally.  Using medications if needed.    Your blood pressure is under good control    BP Readings from Last 4 Encounters:   11/09/20 108/72   11/04/19 116/78   10/30/19 118/72   05/15/19 116/86       2.  Aggressive LDL cholesterol (\"bad cholesterol\") lowering as indicated.    Your goal is an LDL under 130 for sure, preferably under 100.  (If you have diabetes or previous vascular disease, the the LDL goals would be under 100 for sure, preferably under 70.)    New guidelines identify four high-risk groups who could benefit from statins:   *people with pre-existing heart disease, such as " those who have had a heart attack;   *people ages 40 to 75 who have diabetes of any type  *patients ages 40 to 75 with at least a 7.5% risk of developing cardiovascular disease over the next decade, according to a formula described in the guidelines  *patients with the sort of super-high cholesterol that sometimes runs in families, as evidenced by an LDL of 190 milligrams per deciliter or higher    Your cholesterol levels are well controlled.    Recent Labs   Lab Test 10/23/18  1009 10/26/17  0853 02/24/15  0838 02/24/15  0838 01/09/14  0854   CHOL 199 206*   < > 190 224*   HDL 71 78   < > 78 73   * 111*   < > 97 136*   TRIG 81 84   < > 74 78   CHOLHDLRATIO  --   --   --  2.4 3.1    < > = values in this interval not displayed.       3.  Aggressive diabetic prevention, screening and/or management.      You do not have diabetes as of the most recent blood tests.     4.  No smoking    5.  Consider daily preventative aspirin over age 50 if you have enough cardiac risk factors to place you at higher risk for the presence of vascular disease.    If you have any reason not to take aspirin such easy bruising or bleeding, stomach problems, other anticoagulant medications, or any other side effects, then you should not take Aspirin.      --Based on your relative lack of current cardiac risk factor profile, you do NOT need to take daily preventative aspirin.         Preventive Health Recommendations  Female Ages 75+    Yearly exam: See your health care provider every year in order to  o Review health changes.   o Discuss preventive care.    o Review your medicines if your doctor has prescribed any.      Get a Pap test every three years (unless you have an abnormal result and your provider advises testing more often).    No more PAP smear needed.      You do not need a Pap test if your uterus was removed (hysterectomy) and you have not had cancer.    You should be tested each year for STDs (sexually transmitted diseases)  "if you're at risk.     Routine screening mammogram no longer indicated.    No routine screening colonoscopy indiacted     Have a cholesterol test every 5 years, or more often if advised.    Have a diabetes test (fasting glucose) every three years. If you are at risk for diabetes, you should have this test more often.     If you are at risk for osteoporosis (brittle bone disease), think about having a bone density scan (DEXA).    Shots:   *  Get a flu shot each year.   *  Get a tetanus shot every 10 years.    *  Pneumonia vaccine up to date.      Nutrition:     Eat at least 5 servings of fruits and vegetables each day.    Eat whole-grain bread, whole-wheat pasta and brown rice instead of white grains and rice.    Talk to your provider about Calcium and Vitamin D.    --Calcium: aim for 1200 mg per day (any brand is fine)   --Vitamin D3 at least 1000 units per day (any brand is fine)       --Good Grains:  Oats, brown rice, Quinoa (these do not raise the blood sugar as much)     --Bad grains:  Anything made from wheat or white rice     (because these raise the blood sugars significantly, and the possible gluten issue from wheat for some people).      --Proteins:  Aim for \"lean proteins\" including chicken, fish, seafood, pork, turkey, and eggs (in moderation); Eat red meat only occasionally        Lifestyle    Exercise at least 150 minutes a week (30 minutes a day, 5 days a week). This will help you control your weight and prevent disease.    Limit alcohol to one drink per day.    No smoking.     Wear sunscreen to prevent skin cancer.     See your dentist every six months for an exam and cleaning.    See your eye doctor every 1 to 2 years.          Patient Education   Personalized Prevention Plan  You are due for the preventive services outlined below.  Your care team is available to assist you in scheduling these services.  If you have already completed any of these items, please share that information with your care " team to update in your medical record.  Health Maintenance Due   Topic Date Due     Hepatitis C Screening  09/06/1963     Comprehensive Metabolic Panel  10/23/2019     Cholesterol Lab  10/23/2019     FALL RISK ASSESSMENT  11/04/2020     Thyroid Function Lab  10/28/2020

## 2020-11-09 NOTE — PROGRESS NOTES
"SUBJECTIVE:   Alondra Morgan is a 75 year old female who presents for Preventive Visit.      Patient has been advised of split billing requirements and indicates understanding: Yes   Are you in the first 12 months of your Medicare coverage?  No    Healthy Habits:     In general, how would you rate your overall health?  Good    Frequency of exercise:  4-5 days/week    Duration of exercise:  30-45 minutes    Do you usually eat at least 4 servings of fruit and vegetables a day, include whole grains    & fiber and avoid regularly eating high fat or \"junk\" foods?  Yes    Taking medications regularly:  Yes    Medication side effects:  None    Ability to successfully perform activities of daily living:  No assistance needed    Home Safety:  No safety concerns identified    Hearing Impairment:  No hearing concerns    In the past 6 months, have you been bothered by leaking of urine?  No    In general, how would you rate your overall mental or emotional health?  Good      PHQ-2 Total Score: 0    Additional concerns today:  Yes    Do you feel safe in your environment? Yes    Have you ever done Advance Care Planning? (For example, a Health Directive, POLST, or a discussion with a medical provider or your loved ones about your wishes): Yes, advance care planning is on file.      Fall risk  Fallen 2 or more times in the past year?: No  Any fall with injury in the past year?: No    Cognitive Screening   1) Repeat 3 items (Leader, Season, Table)    2) Clock draw: NORMAL  3) 3 item recall: Recalls 3 objects  Results: 3 items recalled: COGNITIVE IMPAIRMENT LESS LIKELY    Mini-CogTM Copyright ANDREW Jarvis. Licensed by the author for use in [facility]; reprinted with permission ([email or web address]). All rights reserved.      Do you have sleep apnea, excessive snoring or daytime drowsiness?: no    Reviewed and updated as needed this visit by clinical staff  Tobacco  Allergies  Meds              Reviewed and updated as needed this " visit by Provider                Social History     Tobacco Use     Smoking status: Former Smoker     Packs/day: 1.00     Years: 40.00     Pack years: 40.00     Types: Cigarettes     Quit date: 10/7/1983     Years since quittin.1     Smokeless tobacco: Never Used   Substance Use Topics     Alcohol use: Yes     Comment: 4 drinks per week         Alcohol Use 2020   Prescreen: >3 drinks/day or >7 drinks/week? No   Prescreen: >3 drinks/day or >7 drinks/week? -   No flowsheet data found.        1./  History of Atypical ductal hyperplasia of left breast.   Has had recent pain in left brast in the central part of hte breast.   No mases felt, just regular ongoing discomfort over the past month or two.   Non injury to that area.   She has been workign out more often with chets and upper boidy work.     up to date with mammography    2.  Osteopenia, reviewed DEXA scan,.     3.    Hypertension:  History of hypertension, on medication.  No reported side effects from medications.    Reviewed last 6 BP readings in chart:  BP Readings from Last 6 Encounters:   20 108/72   19 116/78   10/30/19 118/72   05/15/19 116/86   19 120/68   18 122/88     No active cardiac complaints or symptoms with exercise.       Current providers sharing in care for this patient include:   Patient Care Team:  Ivan Mcpherson MD as PCP - General (Internal Medicine)  Ivan Mcpherson MD as Assigned PCP    The following health maintenance items are reviewed in Epic and correct as of today:  Health Maintenance   Topic Date Due     HEPATITIS C SCREENING  1963     CMP  10/23/2019     LIPID  10/23/2019     FALL RISK ASSESSMENT  2020     TSH W/FREE T4 REFLEX  10/28/2020     CBC  2021     MEDICARE ANNUAL WELLNESS VISIT  2021     DEXA  2022     DTAP/TDAP/TD IMMUNIZATION (3 - Td) 2022     ADVANCE CARE PLANNING  2025     PHQ-2  Completed     INFLUENZA VACCINE  Completed      "Pneumococcal Vaccine: 65+ Years  Completed     ZOSTER IMMUNIZATION  Completed     Pneumococcal Vaccine: Pediatrics (0 to 5 Years) and At-Risk Patients (6 to 64 Years)  Aged Out     IPV IMMUNIZATION  Aged Out     MENINGITIS IMMUNIZATION  Aged Out     HEPATITIS B IMMUNIZATION  Aged Out       **I reviewed the information recorded in the patient's EPIC chart (including but not limited to medical history, surgical history, family history, problem list, medication list, and allergy list) and updated the information as indicated based on the patients reported information.         Review of Systems  Constitutional, HEENT, cardiovascular, pulmonary, gi and gu systems are negative, except as otherwise noted.    OBJECTIVE:   /72   Pulse 88   Temp 97.5  F (36.4  C) (Temporal)   Ht 1.626 m (5' 4\")   Wt 75.5 kg (166 lb 6.4 oz)   SpO2 99%   BMI 28.56 kg/m   Estimated body mass index is 28.56 kg/m  as calculated from the following:    Height as of this encounter: 1.626 m (5' 4\").    Weight as of this encounter: 75.5 kg (166 lb 6.4 oz).  Physical Exam  GENERAL alert and no distress  EYES:  Normal sclera,conjunctiva, EOMI  HENT: oral and posterior pharynx without lesions or erythema, facies symmetric  NECK: Neck supple. No LAD, without thyroidmegaly.  RESP: Clear to ausculation bilaterally without wheezes or crackles. Normal BS in all fields.  CV: RRR normal S1S2 without murmurs, rubs or gallops.  LYMPH: no cervical lymph adenopathy appreciated  MS: extremities- no gross deformities of the visible extremities noted,   EXT:  no lower extremity edema  PSYCH: Alert and oriented times 3; speech- coherent  SKIN:  No obvious significant skin lesions on visible portions of face     BREAST:  Normal right breast, nontedner, no nodules, no masses.   Left breast nontedner, no nodules, no masses.   Her discomfort is exactly reproduced with palaption of the left pactorla muscles.   No axillary masses.     Diagnostic Test " Results:  Labs reviewed in Epic    ASSESSMENT / PLAN:     (Z00.00) Encounter for Medicare annual wellness exam  (primary encounter diagnosis)  Comment: Discussed cardiac disease risk factors and cardiac disease risk factor modification, including diabetes screening, blood pressure screening (and management if indicated), and cholesterol screening.   Reviewed immunzation guidelines, including pneumococcal vaccines, annual influenza, and shingles vaccines.   Discussed routine cancer screenings, including skin cancer, colon cancer screening for everyone until age 80, prostate cancer screening in men until age 75, mammogram and PAP/pelvic for women until age 75.   Recommended regular dentist visits to care for remaining teeth.   Recommended regular screening for vision and glaucoma.   Recommended safe driving and accident avoidance.   Plan:     (I10) Essential hypertension with goal blood pressure less than 130/80  Comment: This condition is currently controlled on the current medical regimen.  Continue current therapy.   Plan: lisinopril (ZESTRIL) 20 MG tablet            (M85.80) Osteopenia, unspecified location  Comment: Reviewed DEXA scan results showing osteopenia.  There is no need for a specific medication for osteopenia/osteoporosis, but if it decreases further, on emay be indiacted.  * continue Calcium (at least 1200 mg per day) and Vitamin D (at least 2000 units per day) supplements either separately or together in the form of Caltrate (or similar product).   * regular exercise to keep muscles strong and weight stable  * no smoking  * avoid falls  *Repeat DEXA (bone density test) in 3-5 years.   Plan: calcium carbonate 600 mg-vitamin D 400 units         (CALCIUM 600 + D) 600-400 MG-UNIT per tablet            (E55.9) Vitamin D deficiency  Comment:   Plan: cholecalciferol 50 MCG (2000 UT) tablet            (N64.4) Breast pain, left  Comment:   *  The left breast pain is almost certainly from your more regular  "physcial exercise.   I felt no abnormalities on the exam today.     *  Monitor your symptoms, let us know if any worsens.     *  If your symptoms are bad enough, try over the counter Anti-inflammatory medicine.  Take one of the following:     --Motrin/Advil/Ibuprofen 400-600 mg,  2-3 times per day as needed  Plan:     (N60.92) Atypical ductal hyperplasia of left breast  Comment:   Plan:        Patient has been advised of split billing requirements and indicates understanding: Yes  COUNSELING:  Reviewed preventive health counseling, as reflected in patient instructions       Regular exercise       Healthy diet/nutrition       Vision screening       Hearing screening       Dental care       Bladder control       Fall risk prevention       Colon cancer screening    Estimated body mass index is 28.56 kg/m  as calculated from the following:    Height as of this encounter: 1.626 m (5' 4\").    Weight as of this encounter: 75.5 kg (166 lb 6.4 oz).        She reports that she quit smoking about 37 years ago. Her smoking use included cigarettes. She has a 40.00 pack-year smoking history. She has never used smokeless tobacco.      Appropriate preventive services were discussed with this patient, including applicable screening as appropriate for cardiovascular disease, diabetes, osteopenia/osteoporosis, and glaucoma.  As appropriate for age/gender, discussed screening for colorectal cancer, prostate cancer, breast cancer, and cervical cancer. Checklist reviewing preventive services available has been given to the patient.    Reviewed patients plan of care and provided an AVS. The  armida Cantu meets the Care Plan requirement. This Care Plan has been established and reviewed with the .    Counseling Resources:  ATP IV Guidelines  Pooled Cohorts Equation Calculator  Breast Cancer Risk Calculator  Breast Cancer: Medication to Reduce Risk  FRAX Risk Assessment  ICSI Preventive Guidelines  Dietary Guidelines for Americans, " 2010  USDA's MyPlate  ASA Prophylaxis  Lung CA Screening    Ivan Mcpherson MD  [facility]    Identified Health Risks:

## 2020-11-17 ENCOUNTER — OFFICE VISIT (OUTPATIENT)
Dept: FAMILY MEDICINE | Facility: CLINIC | Age: 75
End: 2020-11-17
Payer: COMMERCIAL

## 2020-11-17 VITALS — SYSTOLIC BLOOD PRESSURE: 118 MMHG | DIASTOLIC BLOOD PRESSURE: 62 MMHG

## 2020-11-17 DIAGNOSIS — I78.1 TELANGIECTASIAS: ICD-10-CM

## 2020-11-17 DIAGNOSIS — L82.1 SEBORRHEIC KERATOSIS: ICD-10-CM

## 2020-11-17 DIAGNOSIS — D48.5 NEOPLASM OF UNCERTAIN BEHAVIOR OF SKIN: Primary | ICD-10-CM

## 2020-11-17 DIAGNOSIS — L81.4 SOLAR LENTIGINOSIS: ICD-10-CM

## 2020-11-17 DIAGNOSIS — D22.5 MELANOCYTIC NEVI OF TRUNK: ICD-10-CM

## 2020-11-17 PROCEDURE — 88305 TISSUE EXAM BY PATHOLOGIST: CPT | Performed by: DERMATOLOGY

## 2020-11-17 PROCEDURE — 11306 SHAVE SKIN LESION 0.6-1.0 CM: CPT | Performed by: FAMILY MEDICINE

## 2020-11-17 PROCEDURE — 99213 OFFICE O/P EST LOW 20 MIN: CPT | Mod: 25 | Performed by: FAMILY MEDICINE

## 2020-11-17 NOTE — PROGRESS NOTES
[facility] - PRIMARY CARE SKIN    CC : skin cancer screening (full-body)  SUBJECTIVE:                                                    Alondra Morgan is a 75 year old female who presents to clinic today for a full-body skin exam because of her history of skin cancer.     Issue one :  Has a scaly patch on the right side of the face, previously cryotherapy for actinic keratosis one year ago. No tenderness or bleeding    Personal history of skin cancer : YES - basal cell carcinoma on right shoulder and right nasolabial fold (s/p MMS).  Family history of skin cancer : YES - non-melanoma skin cancer in mother, sister, brother.    Personal Medical History  Eczema Psoriasis Autoimmune   NO NO NO     Family Medical History  Eczema Psoriasis Autoimmune   NO YES - in father and a brother NO     Sun Exposure History  Previous history of significant sun exposure: Travels to [facility] every January.  Blistering sunburns : YES - 5 times  Tanning beds : NO.  Sunscreen Use : YES, SPF : 30.  UV-protective clothing use : NO  Wide-brimmed hats : YES  UV-protective sunglasses : YES  Avoids mid-day sun : YES - sits in shade when in [place], [place] locations    Occupation : retired school counselor (indoor).    Refer to electronic medical record (EMR) for past medical history and medications.    INTEGUMENTARY/SKIN: NEGATIVE for worrisome rashes, moles or lesions   ROS : 14 point review of systems was negative except the symptoms listed above in the HPI.        OBJECTIVE:                                                    GENERAL: healthy, alert and no distress  SKIN: [place] Skin Type - I.  This patient was examined from the top of the head to the bottom of the feet  including scalp, face, neck, back, chest, breasts, buttocks, both arms, both legs, both hands, both feet, all 10 fingers and all 10 toes. The dermatoscope was used to help evaluate pigmented lesions.  Skin Pertinent Findings:                     Right  lateral face- 2 cm X 1 cm scaly, erythematous non indurated lesion ? Actinic keratosis ? Squamous cell carcinoma ? Basal cell carcinoma ? Eczema ? Other           Face : telangiectasias         Trunk, arms, legs,           Brown, macule(s) most consistent with benign solar lentigo          Raised, coarse textured, stuck appearing lesion consistent with seborrheic keratosis .          Slightly raised, red lesion(s) consistent with capillary hemangioma          Brown macules of various sizes and shapes most consistent with (benign) melanocytic nevi                   ASSESSMENT:                                                      Encounter Diagnoses   Name Primary?     Neoplasm of uncertain behavior of skin Yes     Seborrheic keratosis      Solar lentiginosis      Melanocytic nevi of trunk      Telangiectasias          PLAN:                                                    Patient Instructions   FUTURE APPOINTMENTS  Follow up per pathology report. You will be notified, generally via letter or MyChart, in approximately 10 days. If there is anything we need to discuss or further treatment needed, I will call you to discuss it.      WOUND CARE INSTRUCTIONS  1. Wash hands before every dressing change.  2. Change the dressing after 24 hours and once daily, or earlier if it becomes saturated.  3. Wash the wound area with a mild soap, then rinse.  4. Gently pat dry with a sterile gauze or Q-tip.  5. Using a Q-tip, apply Vaseline or Aquaphor only over entire wound. DO NOT use Neosporin - as many people react to neomycin.  6. Finally, cover with a bandage or sterile non-stick gauze with micropore paper tape.  7. Repeat once daily until wound has healed.      Soap, water and shampoo will not hurt this area.    Do not go swimming or take baths, but showering is encouraged.    Limit use of the area where the procedure was done for a few days to allow for optimal healing.    Signs of Infection:  Infection can occur in any area  "where skin has been disrupted. If you notice persistent redness, swelling, colored drainage, increasing pain, fever or other signs of infection, please call us at: [phone] and ask to have me or my colleague paged. We will call you back to discuss.    If you experience bleeding:  Wash hands and hold firm pressure on the area for 10 minutes without checking to see if the bleeding has stopped. \"Checking\" pulls off the protective wound clot and restarts the bleeding all over again. Re-apply pressure for 10 minutes if necessary to stop bleeding.  Use additional sterile gauze and tape to maintain pressure once bleeding has stopped.  If bleeding continues, then call back to clinic at [phone].    PATIENT INFORMATION : WOUNDS  During the healing process you will notice a number of changes.     All wounds normally drain.  The larger the wound the more drainage there will be.  After 7-10 days, you will notice the wound beginning to shrink and new skin will begin to grow.  The wound is healed when you can see that skin has formed over the entire area.  A healed wound has a healthy, shiny look to the surface and is red to dark pink in color to normalize.  Wounds may take approximately 4-6 weeks to heal.  Larger wounds may take 6-8 weeks. After the wound is healed you may discontinue dressing changes.    All wounds develop a small halo of redness surrounding the wound which means that healing is occurring. Severe itching with extensive redness usually indicates sensitivity to the ointment or bandage tape used to dress the wound.  You should call our office if severe itching with extensive redness develops.    Swelling  and/or discoloration around your surgical site is common, particularly when performed around the eye.  You may experience a sensation of tightness as your wound heals. This is normal and will gradually subside.  Your healed wound may be sensitive to temperature changes. This sensitivity improves with " time, but if you re having a lot of discomfort, try to avoid temperature extremes.  Patients frequently experience itching after their wound appears to have healed because of the continue healing under the skin.  Plain Vaseline will help relieve the itching.        The patient was counseled about sunscreens and sun avoidance. The patient was counseled to check the skin regularly and report any lesion that is new, changing, itching, scabbing, bleeding or otherwise bothersome. The patient was discharged ambulatory and in stable condition.      PROCEDURES:                                                    None.    TT : 25 minutes.  CT : 15 minutes.

## 2020-11-17 NOTE — PROCEDURES
Name : Shave Excision  Indication : Excision of tissue for pathology evaluation.  Location(s) :                Right lateral face- 2 cm X 1 cm scaly, erythematous non indurated lesion ? Actinic keratosis ? Squamous cell carcinoma ? Basal cell carcinoma ? Eczema ? other  Completed by : Drea Mcadams MD  Photo Taken : yes.  Anesthesia : Patient was anesthetized by infiltrating the area surrounding the lesion with 1% lidocaine.   epinephrine [id number] : Yes.  Note : Discussed the risk of pain, infection, scarring, hypo- or hyperpigmentation and recurrence or need for re-treatment. The benefits of treatment and alternative treatments were also discussed.    During this procedure, the universal protocol was utilized. The patient's identity was confirmed by no less than two patient identifiers, correct procedure was verified, correct site was verified and marked as applicable and a final pause was completed.    Sterile technique was used throughout the procedure. The skin was cleaned and prepped with surgical cleanser. Once adequate anesthesia was obtained, the lesion was removed with a deep scallop shave procedure. The specimen was sent to pathology.    Direct pressure and aluminum chloride and monopolar cautery was applied for hemostasis. No bleeding was present upon the completion of the procedure. The wound was coated with antibacterial ointment. A dry sterile dressing was applied. Patient tolerated the procedure well and left in satisfactory condition.    Primary provider and referring provider will be informed regarding the tissue report when it returns.

## 2020-11-17 NOTE — LETTER
11/17/2020         RE: Alondra Morgan  [address] [facility] [address] [place] [id number]        Dear Colleague,    Thank you for referring your patient, Alondra Morgan, to the [facility]. Please see a copy of my visit note below.    [facility] - PRIMARY CARE SKIN    CC : skin cancer screening (full-body)  SUBJECTIVE:                                                    Alondra Morgan is a 75 year old female who presents to clinic today for a full-body skin exam because of her history of skin cancer.     Issue one :  Has a scaly patch on the right side of the face, previously cryotherapy for actinic keratosis one year ago. No tenderness or bleeding    Personal history of skin cancer : YES - basal cell carcinoma on right shoulder and right nasolabial fold (s/p MMS).  Family history of skin cancer : YES - non-melanoma skin cancer in mother, sister, brother.    Personal Medical History  Eczema Psoriasis Autoimmune   NO NO NO     Family Medical History  Eczema Psoriasis Autoimmune   NO YES - in father and a brother NO     Sun Exposure History  Previous history of significant sun exposure: Travels to [facility] every January.  Blistering sunburns : YES - 5 times  Tanning beds : NO.  Sunscreen Use : YES, SPF : 30.  UV-protective clothing use : NO  Wide-brimmed hats : YES  UV-protective sunglasses : YES  Avoids mid-day sun : YES - sits in shade when in [place], [place] locations    Occupation : retired school counselor (indoor).    Refer to electronic medical record (EMR) for past medical history and medications.    INTEGUMENTARY/SKIN: NEGATIVE for worrisome rashes, moles or lesions   ROS : 14 point review of systems was negative except the symptoms listed above in the HPI.        OBJECTIVE:                                                    GENERAL: healthy, alert and no distress  SKIN: Starks Skin Type - I.  This patient was examined from the top of the head to the bottom of the  feet  including scalp, face, neck, back, chest, breasts, buttocks, both arms, both legs, both hands, both feet, all 10 fingers and all 10 toes. The dermatoscope was used to help evaluate pigmented lesions.  Skin Pertinent Findings:                     Right lateral face- 2 cm X 1 cm scaly, erythematous non indurated lesion ? Actinic keratosis ? Squamous cell carcinoma ? Basal cell carcinoma ? Eczema ? Other           Face : telangiectasias         Trunk, arms, legs,           Brown, macule(s) most consistent with benign solar lentigo          Raised, coarse textured, stuck appearing lesion consistent with seborrheic keratosis .          Slightly raised, red lesion(s) consistent with capillary hemangioma          Brown macules of various sizes and shapes most consistent with (benign) melanocytic nevi                   ASSESSMENT:                                                      Encounter Diagnoses   Name Primary?     Neoplasm of uncertain behavior of skin Yes     Seborrheic keratosis      Solar lentiginosis      Melanocytic nevi of trunk      Telangiectasias          PLAN:                                                    Patient Instructions   FUTURE APPOINTMENTS  Follow up per pathology report. You will be notified, generally via letter or MyChart, in approximately 10 days. If there is anything we need to discuss or further treatment needed, I will call you to discuss it.      WOUND CARE INSTRUCTIONS  1. Wash hands before every dressing change.  2. Change the dressing after 24 hours and once daily, or earlier if it becomes saturated.  3. Wash the wound area with a mild soap, then rinse.  4. Gently pat dry with a sterile gauze or Q-tip.  5. Using a Q-tip, apply Vaseline or Aquaphor only over entire wound. DO NOT use Neosporin - as many people react to neomycin.  6. Finally, cover with a bandage or sterile non-stick gauze with micropore paper tape.  7. Repeat once daily until wound has healed.      Soap, water  "and shampoo will not hurt this area.    Do not go swimming or take baths, but showering is encouraged.    Limit use of the area where the procedure was done for a few days to allow for optimal healing.    Signs of Infection:  Infection can occur in any area where skin has been disrupted. If you notice persistent redness, swelling, colored drainage, increasing pain, fever or other signs of infection, please call us at: [phone] and ask to have me or my colleague paged. We will call you back to discuss.    If you experience bleeding:  Wash hands and hold firm pressure on the area for 10 minutes without checking to see if the bleeding has stopped. \"Checking\" pulls off the protective wound clot and restarts the bleeding all over again. Re-apply pressure for 10 minutes if necessary to stop bleeding.  Use additional sterile gauze and tape to maintain pressure once bleeding has stopped.  If bleeding continues, then call back to clinic at [phone].    PATIENT INFORMATION : WOUNDS  During the healing process you will notice a number of changes.     All wounds normally drain.  The larger the wound the more drainage there will be.  After 7-10 days, you will notice the wound beginning to shrink and new skin will begin to grow.  The wound is healed when you can see that skin has formed over the entire area.  A healed wound has a healthy, shiny look to the surface and is red to dark pink in color to normalize.  Wounds may take approximately 4-6 weeks to heal.  Larger wounds may take 6-8 weeks. After the wound is healed you may discontinue dressing changes.    All wounds develop a small halo of redness surrounding the wound which means that healing is occurring. Severe itching with extensive redness usually indicates sensitivity to the ointment or bandage tape used to dress the wound.  You should call our office if severe itching with extensive redness develops.    Swelling  and/or discoloration around your surgical " site is common, particularly when performed around the eye.  You may experience a sensation of tightness as your wound heals. This is normal and will gradually subside.  Your healed wound may be sensitive to temperature changes. This sensitivity improves with time, but if you re having a lot of discomfort, try to avoid temperature extremes.  Patients frequently experience itching after their wound appears to have healed because of the continue healing under the skin.  Plain Vaseline will help relieve the itching.        The patient was counseled about sunscreens and sun avoidance. The patient was counseled to check the skin regularly and report any lesion that is new, changing, itching, scabbing, bleeding or otherwise bothersome. The patient was discharged ambulatory and in stable condition.      PROCEDURES:                                                    None.    TT : 25 minutes.  CT : 15 minutes.      Again, thank you for allowing me to participate in the care of your patient.        Sincerely,        Susy Mcadams MD

## 2020-11-17 NOTE — PATIENT INSTRUCTIONS
"FUTURE APPOINTMENTS  Follow up per pathology report. You will be notified, generally via letter or MyChart, in approximately 10 days. If there is anything we need to discuss or further treatment needed, I will call you to discuss it.      WOUND CARE INSTRUCTIONS  1. Wash hands before every dressing change.  2. Change the dressing after 24 hours and once daily, or earlier if it becomes saturated.  3. Wash the wound area with a mild soap, then rinse.  4. Gently pat dry with a sterile gauze or Q-tip.  5. Using a Q-tip, apply Vaseline or Aquaphor only over entire wound. DO NOT use Neosporin - as many people react to neomycin.  6. Finally, cover with a bandage or sterile non-stick gauze with micropore paper tape.  7. Repeat once daily until wound has healed.      Soap, water and shampoo will not hurt this area.    Do not go swimming or take baths, but showering is encouraged.    Limit use of the area where the procedure was done for a few days to allow for optimal healing.    Signs of Infection:  Infection can occur in any area where skin has been disrupted. If you notice persistent redness, swelling, colored drainage, increasing pain, fever or other signs of infection, please call us at: [phone] and ask to have me or my colleague paged. We will call you back to discuss.    If you experience bleeding:  Wash hands and hold firm pressure on the area for 10 minutes without checking to see if the bleeding has stopped. \"Checking\" pulls off the protective wound clot and restarts the bleeding all over again. Re-apply pressure for 10 minutes if necessary to stop bleeding.  Use additional sterile gauze and tape to maintain pressure once bleeding has stopped.  If bleeding continues, then call back to clinic at [phone].    PATIENT INFORMATION : WOUNDS  During the healing process you will notice a number of changes.     All wounds normally drain.  The larger the wound the more drainage there will be.  After 7-10 days, " you will notice the wound beginning to shrink and new skin will begin to grow.  The wound is healed when you can see that skin has formed over the entire area.  A healed wound has a healthy, shiny look to the surface and is red to dark pink in color to normalize.  Wounds may take approximately 4-6 weeks to heal.  Larger wounds may take 6-8 weeks. After the wound is healed you may discontinue dressing changes.    All wounds develop a small halo of redness surrounding the wound which means that healing is occurring. Severe itching with extensive redness usually indicates sensitivity to the ointment or bandage tape used to dress the wound.  You should call our office if severe itching with extensive redness develops.    Swelling  and/or discoloration around your surgical site is common, particularly when performed around the eye.  You may experience a sensation of tightness as your wound heals. This is normal and will gradually subside.  Your healed wound may be sensitive to temperature changes. This sensitivity improves with time, but if you re having a lot of discomfort, try to avoid temperature extremes.  Patients frequently experience itching after their wound appears to have healed because of the continue healing under the skin.  Plain Vaseline will help relieve the itching.    Skin exam in one year

## 2020-11-21 LAB — COPATH REPORT: NORMAL

## 2020-11-23 ENCOUNTER — TELEPHONE (OUTPATIENT)
Dept: FAMILY MEDICINE | Facility: CLINIC | Age: 75
End: 2020-11-23

## 2020-11-23 NOTE — TELEPHONE ENCOUNTER
patient notified of test results and mohs procedure explained- appointment scheduled- letter mailed.    Adina PRAJAPATI RN BSN  [facility]  [phone]  [facility]   [phone]

## 2020-11-23 NOTE — TELEPHONE ENCOUNTER
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      Referral for Mohs with Brando Tobias.    Thank you,   Susy Mcadams M.D.

## 2020-11-23 NOTE — LETTER
[facility]  [address]  [address]  [phone]        11/23/2020       Alondra Morgan  [address] [facility] [address]  [facility] [address]      Dear Alondra:    You are scheduled for Mohs Surgery on: Thursday, January 7, 2021 at 10:00 am.    Please check in at [facility], [address].     You don't need to arrive more than 5-10 minutes prior to your appointment time.   In order to keep everyone safe and healthy in the clinic and to maintain social distancing, we ask that you:   Please wear a mask to the appointment-one will be provided if you do not have one   Please do not arrive earlier than 5 minutes prior to or later than your appointment- you will be asked to wait in your car if you are any earlier.   No visitors are alowed in the clinic- patients only   If you have an COVID symptoms (fever/cough/body aches/sore throat/temperature) or have been exposed to a COVID positive patient with in the last 14 days, please reschedule this appointment to a later date.    Be sure to eat a good breakfast and bathe and wash your hair prior to surgery.     If you are taking any anti-coagulants that are prescribed by your Doctor (such as Coumadin/Warfarin, Plavix, Aspirin, Ibuprofen), please continue taking them.     However, if you are taking anti-coagulants over the counter without a Doctor's order for a medical condition, please discontinue them 10 days prior to surgery.     Please wear loose comfortable clothing as it could possibly be 4-6 hours until your surgery is completed depending upon how many layers of tissue need to be removed.      Thank you,    EMMA Michaels MD

## 2020-12-07 ENCOUNTER — OFFICE VISIT (OUTPATIENT)
Dept: FAMILY MEDICINE | Facility: CLINIC | Age: 75
End: 2020-12-07
Payer: COMMERCIAL

## 2020-12-07 VITALS — SYSTOLIC BLOOD PRESSURE: 110 MMHG | DIASTOLIC BLOOD PRESSURE: 68 MMHG

## 2020-12-07 DIAGNOSIS — Z85.828 HISTORY OF BASAL CELL CARCINOMA: ICD-10-CM

## 2020-12-07 DIAGNOSIS — Z86.007 HISTORY OF SQUAMOUS CELL CARCINOMA IN SITU OF SKIN: ICD-10-CM

## 2020-12-07 DIAGNOSIS — D48.5 NEOPLASM OF UNCERTAIN BEHAVIOR OF SKIN: Primary | ICD-10-CM

## 2020-12-07 PROCEDURE — 11310 SHAVE SKIN LESION 0.5 CM/<: CPT | Performed by: FAMILY MEDICINE

## 2020-12-07 PROCEDURE — 88305 TISSUE EXAM BY PATHOLOGIST: CPT | Performed by: PATHOLOGY

## 2020-12-07 PROCEDURE — 99213 OFFICE O/P EST LOW 20 MIN: CPT | Mod: 25 | Performed by: FAMILY MEDICINE

## 2020-12-07 NOTE — PROCEDURES
Name : Shave Excision  Indication : Excision of tissue for pathology evaluation.  Location(s) : Right superior helix- 4 mm X 2 mm erythematous excoriation, no induration ? Excoriated actinic keratosis ? Basal cell carcinoma ? Squamous cell carcinoma ? other.  Completed by : Drea Mcadams MD  Photo Taken : yes.   Anesthesia : Patient was anesthetized by infiltrating the area surrounding the lesion with 1% lidocaine.   epinephrine [id number] : Yes.  Note : Discussed the risk of pain, infection, scarring, hypo- or hyperpigmentation and recurrence or need for re-treatment. The benefits of treatment and alternative treatments were also discussed.    During this procedure, the universal protocol was utilized. The patient's identity was confirmed by no less than two patient identifiers, correct procedure was verified, correct site was verified and marked as applicable and a final pause was completed.    Sterile technique was used throughout the procedure. The skin was cleaned and prepped with surgical cleanser. Once adequate anesthesia was obtained, the lesion was removed with a deep scallop shave procedure. The specimen was sent to pathology.    Direct pressure and aluminum chloride and monopolar cautery was applied for hemostasis. No bleeding was present upon the completion of the procedure. The wound was coated with antibacterial ointment. A dry sterile dressing was applied. Patient tolerated the procedure well and left in satisfactory condition.    Primary provider and referring provider will be informed regarding the tissue report when it returns.

## 2020-12-07 NOTE — LETTER
12/7/2020         RE: Alondra Morgan  [address] [facility] [address] [place] [id number]        Dear Colleague,    Thank you for referring your patient, Alondra Morgan, to the [facility]. Please see a copy of my visit note below.    [facility] - PRIMARY CARE SKIN    CC: Lesion(s)  SUBJECTIVE:   Alondra Morgan is a(n) 75 year old female who presents to clinic today for the following issue(s)     Issue One: scaling on the right superior helix that seems to come and go  Issue two : scheduled for Mohs surgery with Dr. 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      Personal history of skin cancer : YES - basal cell carcinoma on right shoulder and right nasolabial fold (s/p MMS).  Family history of skin cancer : YES - non-melanoma skin cancer in mother, sister, brother.    Personal Medical History  Eczema Psoriasis Autoimmune   NO NO NO     Family Medical History  Eczema Psoriasis Autoimmune   NO YES - in father and a brother NO     Sun Exposure History  Previous history of significant sun exposure: Travels to [facility] every January.  Blistering sunburns : YES - 5 times  Tanning beds : NO.  Sunscreen Use : YES, SPF : 30.  UV-protective clothing use : NO  Wide-brimmed hats : YES  UV-protective sunglasses : YES  Avoids mid-day sun : YES - sits in shade when in [place], [place] locations    Occupation : retired school counselor (indoor  Refer to electronic medical record (EMR) for past medical history and medications.      ROS: 14 point review of systems was negative except the symptoms listed above in the HPI.        OBJECTIVE:   GENERAL: healthy, alert and no distress.  HEENT: PERRL. Conjunctiva, sclera clear.  SKIN: Starks Skin Type - II.  Face examined. The dermatoscope was used to help evaluate pigmented lesions.  Skin Pertinent Findings:  Right superior helix- 4 mm X 2 mm erythematous excoriation, no induration ? Excoriated actinic keratosis  "? Basal cell carcinoma ? Squamous cell carcinoma ? other                              ASSESSMENT:     Encounter Diagnosis   Name Primary?     Neoplasm of uncertain behavior of skin Yes         PLAN:   Patient Instructions   FUTURE APPOINTMENTS  Follow up per pathology report. You will be notified, generally via letter or MyChart, in approximately 10 days. If there is anything we need to discuss or further treatment needed, I will call you to discuss it.      WOUND CARE INSTRUCTIONS  1. Wash hands before every dressing change.  2. Change the dressing after 24 hours and once daily, or earlier if it becomes saturated.  3. Wash the wound area with a mild soap, then rinse.  4. Gently pat dry with a sterile gauze or Q-tip.  5. Using a Q-tip, apply Vaseline or Aquaphor only over entire wound. DO NOT use Neosporin - as many people react to neomycin.  6. Finally, cover with a bandage or sterile non-stick gauze with micropore paper tape.  7. Repeat once daily until wound has healed.      Soap, water and shampoo will not hurt this area.    Do not go swimming or take baths, but showering is encouraged.    Limit use of the area where the procedure was done for a few days to allow for optimal healing.    Signs of Infection:  Infection can occur in any area where skin has been disrupted. If you notice persistent redness, swelling, colored drainage, increasing pain, fever or other signs of infection, please call us at: [phone] and ask to have me or my colleague paged. We will call you back to discuss.    If you experience bleeding:  Wash hands and hold firm pressure on the area for 10 minutes without checking to see if the bleeding has stopped. \"Checking\" pulls off the protective wound clot and restarts the bleeding all over again. Re-apply pressure for 10 minutes if necessary to stop bleeding.  Use additional sterile gauze and tape to maintain pressure once bleeding has stopped.  If bleeding continues, then call back to " clinic at [phone].    PATIENT INFORMATION : WOUNDS  During the healing process you will notice a number of changes.     All wounds normally drain.  The larger the wound the more drainage there will be.  After 7-10 days, you will notice the wound beginning to shrink and new skin will begin to grow.  The wound is healed when you can see that skin has formed over the entire area.  A healed wound has a healthy, shiny look to the surface and is red to dark pink in color to normalize.  Wounds may take approximately 4-6 weeks to heal.  Larger wounds may take 6-8 weeks. After the wound is healed you may discontinue dressing changes.    All wounds develop a small halo of redness surrounding the wound which means that healing is occurring. Severe itching with extensive redness usually indicates sensitivity to the ointment or bandage tape used to dress the wound.  You should call our office if severe itching with extensive redness develops.    Swelling  and/or discoloration around your surgical site is common, particularly when performed around the eye.  You may experience a sensation of tightness as your wound heals. This is normal and will gradually subside.  Your healed wound may be sensitive to temperature changes. This sensitivity improves with time, but if you re having a lot of discomfort, try to avoid temperature extremes.  Patients frequently experience itching after their wound appears to have healed because of the continue healing under the skin.  Plain Vaseline will help relieve the itching.          TT: 20 minutes.            Again, thank you for allowing me to participate in the care of your patient.        Sincerely,        Susy Mcadams MD

## 2020-12-07 NOTE — PATIENT INSTRUCTIONS
"FUTURE APPOINTMENTS  Follow up per pathology report. You will be notified, generally via letter or MyChart, in approximately 10 days. If there is anything we need to discuss or further treatment needed, I will call you to discuss it.      WOUND CARE INSTRUCTIONS  1. Wash hands before every dressing change.  2. Change the dressing after 24 hours and once daily, or earlier if it becomes saturated.  3. Wash the wound area with a mild soap, then rinse.  4. Gently pat dry with a sterile gauze or Q-tip.  5. Using a Q-tip, apply Vaseline or Aquaphor only over entire wound. DO NOT use Neosporin - as many people react to neomycin.  6. Finally, cover with a bandage or sterile non-stick gauze with micropore paper tape.  7. Repeat once daily until wound has healed.      Soap, water and shampoo will not hurt this area.    Do not go swimming or take baths, but showering is encouraged.    Limit use of the area where the procedure was done for a few days to allow for optimal healing.    Signs of Infection:  Infection can occur in any area where skin has been disrupted. If you notice persistent redness, swelling, colored drainage, increasing pain, fever or other signs of infection, please call us at: [phone] and ask to have me or my colleague paged. We will call you back to discuss.    If you experience bleeding:  Wash hands and hold firm pressure on the area for 10 minutes without checking to see if the bleeding has stopped. \"Checking\" pulls off the protective wound clot and restarts the bleeding all over again. Re-apply pressure for 10 minutes if necessary to stop bleeding.  Use additional sterile gauze and tape to maintain pressure once bleeding has stopped.  If bleeding continues, then call back to clinic at [phone].    PATIENT INFORMATION : WOUNDS  During the healing process you will notice a number of changes.     All wounds normally drain.  The larger the wound the more drainage there will be.  After 7-10 days, " you will notice the wound beginning to shrink and new skin will begin to grow.  The wound is healed when you can see that skin has formed over the entire area.  A healed wound has a healthy, shiny look to the surface and is red to dark pink in color to normalize.  Wounds may take approximately 4-6 weeks to heal.  Larger wounds may take 6-8 weeks. After the wound is healed you may discontinue dressing changes.    All wounds develop a small halo of redness surrounding the wound which means that healing is occurring. Severe itching with extensive redness usually indicates sensitivity to the ointment or bandage tape used to dress the wound.  You should call our office if severe itching with extensive redness develops.    Swelling  and/or discoloration around your surgical site is common, particularly when performed around the eye.  You may experience a sensation of tightness as your wound heals. This is normal and will gradually subside.  Your healed wound may be sensitive to temperature changes. This sensitivity improves with time, but if you re having a lot of discomfort, try to avoid temperature extremes.  Patients frequently experience itching after their wound appears to have healed because of the continue healing under the skin.  Plain Vaseline will help relieve the itching.

## 2020-12-07 NOTE — PROGRESS NOTES
[facility] - PRIMARY CARE SKIN    CC: Lesion(s)  SUBJECTIVE:   Alondra Morgan is a(n) 75 year old female who presents to clinic today for the following issue(s)     Issue One: scaling on the right superior helix that seems to come and go  Issue two : scheduled for Mohs surgery with Dr. 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      Personal history of skin cancer : YES - basal cell carcinoma on right shoulder and right nasolabial fold (s/p MMS).  Family history of skin cancer : YES - non-melanoma skin cancer in mother, sister, brother.    Personal Medical History  Eczema Psoriasis Autoimmune   NO NO NO     Family Medical History  Eczema Psoriasis Autoimmune   NO YES - in father and a brother NO     Sun Exposure History  Previous history of significant sun exposure: Travels to [facility] every January.  Blistering sunburns : YES - 5 times  Tanning beds : NO.  Sunscreen Use : YES, SPF : 30.  UV-protective clothing use : NO  Wide-brimmed hats : YES  UV-protective sunglasses : YES  Avoids mid-day sun : YES - sits in shade when in tropical, [place] locations    Occupation : retired school counselor (indoor  Refer to electronic medical record (EMR) for past medical history and medications.      ROS: 14 point review of systems was negative except the symptoms listed above in the HPI.        OBJECTIVE:   GENERAL: healthy, alert and no distress.  HEENT: PERRL. Conjunctiva, sclera clear.  SKIN: Starks Skin Type - II.  Face examined. The dermatoscope was used to help evaluate pigmented lesions.  Skin Pertinent Findings:  Right superior helix- 4 mm X 2 mm erythematous excoriation, no induration ? Excoriated actinic keratosis ? Basal cell carcinoma ? Squamous cell carcinoma ? other                              ASSESSMENT:     Encounter Diagnosis   Name Primary?     Neoplasm of uncertain behavior of skin Yes         PLAN:   Patient Instructions   FUTURE APPOINTMENTS  Follow up per  "pathology report. You will be notified, generally via letter or MyChart, in approximately 10 days. If there is anything we need to discuss or further treatment needed, I will call you to discuss it.      WOUND CARE INSTRUCTIONS  1. Wash hands before every dressing change.  2. Change the dressing after 24 hours and once daily, or earlier if it becomes saturated.  3. Wash the wound area with a mild soap, then rinse.  4. Gently pat dry with a sterile gauze or Q-tip.  5. Using a Q-tip, apply Vaseline or Aquaphor only over entire wound. DO NOT use Neosporin - as many people react to neomycin.  6. Finally, cover with a bandage or sterile non-stick gauze with micropore paper tape.  7. Repeat once daily until wound has healed.      Soap, water and shampoo will not hurt this area.    Do not go swimming or take baths, but showering is encouraged.    Limit use of the area where the procedure was done for a few days to allow for optimal healing.    Signs of Infection:  Infection can occur in any area where skin has been disrupted. If you notice persistent redness, swelling, colored drainage, increasing pain, fever or other signs of infection, please call us at: [phone] and ask to have me or my colleague paged. We will call you back to discuss.    If you experience bleeding:  Wash hands and hold firm pressure on the area for 10 minutes without checking to see if the bleeding has stopped. \"Checking\" pulls off the protective wound clot and restarts the bleeding all over again. Re-apply pressure for 10 minutes if necessary to stop bleeding.  Use additional sterile gauze and tape to maintain pressure once bleeding has stopped.  If bleeding continues, then call back to clinic at [phone].    PATIENT INFORMATION : WOUNDS  During the healing process you will notice a number of changes.     All wounds normally drain.  The larger the wound the more drainage there will be.  After 7-10 days, you will notice the wound beginning " to shrink and new skin will begin to grow.  The wound is healed when you can see that skin has formed over the entire area.  A healed wound has a healthy, shiny look to the surface and is red to dark pink in color to normalize.  Wounds may take approximately 4-6 weeks to heal.  Larger wounds may take 6-8 weeks. After the wound is healed you may discontinue dressing changes.    All wounds develop a small halo of redness surrounding the wound which means that healing is occurring. Severe itching with extensive redness usually indicates sensitivity to the ointment or bandage tape used to dress the wound.  You should call our office if severe itching with extensive redness develops.    Swelling  and/or discoloration around your surgical site is common, particularly when performed around the eye.  You may experience a sensation of tightness as your wound heals. This is normal and will gradually subside.  Your healed wound may be sensitive to temperature changes. This sensitivity improves with time, but if you re having a lot of discomfort, try to avoid temperature extremes.  Patients frequently experience itching after their wound appears to have healed because of the continue healing under the skin.  Plain Vaseline will help relieve the itching.          TT: 20 minutes.

## 2020-12-10 ENCOUNTER — TELEPHONE (OUTPATIENT)
Dept: FAMILY MEDICINE | Facility: CLINIC | Age: 75
End: 2020-12-10

## 2020-12-10 LAB — COPATH REPORT: NORMAL

## 2020-12-10 NOTE — TELEPHONE ENCOUNTER
Patient notified of test results and providers message, patient has no further questions. She will have Dr. Michaels do the cryo at the mohs appointment      Adina PRAJAPATIRN BSN  [facility]  [phone]

## 2020-12-10 NOTE — TELEPHONE ENCOUNTER
----- Message from Susy Mcadams MD sent at 12/10/2020 11:25 AM CST -----  Please call ,     Good news , actinic keratosis . See in 4-6 weeks for cryotherapy.    Thank you,   Susy Mcadams M.D.

## 2021-01-07 ENCOUNTER — OFFICE VISIT (OUTPATIENT)
Dept: DERMATOLOGY | Facility: CLINIC | Age: 76
End: 2021-01-07
Payer: COMMERCIAL

## 2021-01-07 VITALS — OXYGEN SATURATION: 97 % | SYSTOLIC BLOOD PRESSURE: 114 MMHG | DIASTOLIC BLOOD PRESSURE: 65 MMHG | HEART RATE: 78 BPM

## 2021-01-07 DIAGNOSIS — L81.4 LENTIGO: ICD-10-CM

## 2021-01-07 DIAGNOSIS — Z85.828 HISTORY OF SKIN CANCER: Primary | ICD-10-CM

## 2021-01-07 DIAGNOSIS — L82.1 SEBORRHEIC KERATOSIS: ICD-10-CM

## 2021-01-07 DIAGNOSIS — L57.0 AK (ACTINIC KERATOSIS): ICD-10-CM

## 2021-01-07 DIAGNOSIS — D04.39 SQUAMOUS CELL CARCINOMA IN SITU (SCCIS) OF SKIN OF RIGHT CHEEK: ICD-10-CM

## 2021-01-07 DIAGNOSIS — D18.01 ANGIOMA OF SKIN: ICD-10-CM

## 2021-01-07 DIAGNOSIS — D23.9 DERMAL NEVUS: ICD-10-CM

## 2021-01-07 PROCEDURE — 17000 DESTRUCT PREMALG LESION: CPT | Performed by: DERMATOLOGY

## 2021-01-07 PROCEDURE — 13132 CMPLX RPR F/C/C/M/N/AX/G/H/F: CPT | Mod: 59 | Performed by: DERMATOLOGY

## 2021-01-07 PROCEDURE — 17003 DESTRUCT PREMALG LES 2-14: CPT | Performed by: DERMATOLOGY

## 2021-01-07 PROCEDURE — 99203 OFFICE O/P NEW LOW 30 MIN: CPT | Mod: 25 | Performed by: DERMATOLOGY

## 2021-01-07 PROCEDURE — 17311 MOHS 1 STAGE H/N/HF/G: CPT | Performed by: DERMATOLOGY

## 2021-01-07 NOTE — PROGRESS NOTES
Surgical Office Location:  [facility]  [address] [place] [id number]

## 2021-01-07 NOTE — NURSING NOTE
"Initial /65   Pulse 78   SpO2 97%  Estimated body mass index is 28.56 kg/m  as calculated from the following:    Height as of 11/9/20: 1.626 m (5' 4\").    Weight as of 11/9/20: 75.5 kg (166 lb 6.4 oz). .    FAISAL Kitchen-BSN-N  [facility]  [phone]  "

## 2021-01-07 NOTE — PATIENT INSTRUCTIONS
Sutured Wound Care     [facility]: [phone]          ? No strenuous activity for 48 hours. Resume moderate activity in 48 hours. No heavy exercising until you are seen for follow up in one week.     ? Take Tylenol as needed for discomfort.                         ? Do not drink alcoholic beverages for 48 hours.     ? Keep the pressure bandage in place for 24 hours. If the bandage becomes blood tinged or loose, reinforce it with gauze and tape.        (Refer to the reverse side of this page for management of bleeding).    ? Remove pressure bandage in 24 hours     ? Leave the flat bandage in place until your follow up appointment.    ? Keep the bandage dry. Wash around it carefully.    ? If the tape becomes soiled or starts to come off, reinforce it with additional paper tape.    ? Do not smoke for 3 weeks; smoking is detrimental to wound healing.    ? It is normal to have swelling and bruising around the surgical site. The bruising will fade in approximately 10-14 days. Elevate the area to reduce swelling.    ? Numbness, itchiness and sensitivity to temperature changes can occur after surgery and may take up to 18 months to normalize.      POSSIBLE COMPLICATIONS    BLEEDIN. Leave the bandage in place.  2. Use tightly rolled up gauze or a cloth to apply direct pressure over the bandage for 20   minutes.  3. Reapply pressure for an additional 20 minutes if necessary  4. Call the office or go to the nearest emergency room if pressure fails to stop the bleeding.  5. Use additional gauze and tape to maintain pressure once the bleeding has stopped.        PAIN:    1. Post operative pain should slowly get better, never worse.  2. A severe increase in pain may indicate a problem. Call the office if this occurs.    In case of emergency phone:Dr Michaels [phone]

## 2021-01-07 NOTE — LETTER
1/7/2021         RE: Alondra Morgan  [address]  [facility] [address]        Dear Colleague,    Thank you for referring your patient, Alondra Morgan, to the [facility]. Please see a copy of my visit note below.    Surgical Office Location:  [facility]  [address] [place] [id number]      Alondra Morgan , a 75 year old year old female patient, I was asked to see by Dr. 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  Patient states this has been present for a while.  Patient reports the following symptoms:  Not healing .  Patient reports the following previous treatments none.  Patient reports the following modifying factors none.  Associated symptoms: none.  Patient has no other skin complaints today.  Remainder of the HPI, Meds, PMH, Allergies, FH, and SH was reviewed in chart.      Past Medical History:   Diagnosis Date     Arthritis      Atypical ductal hyperplasia of left breast 2005    left breast atypical ductal hyperplasia, completed 5 years tamoxifen in 2011     Atypical hyperplasia of breasts      Basal cell carcinoma of ala nasi      Basal cell carcinoma of shoulder      History of poliomyelitis 1947    acute polio infection age 2, resolved without any sequelae     Osteopenia      Polyp, colon     Adenomatous     Squamous cell carcinoma of skin, unspecified        Past Surgical History:   Procedure Laterality Date     C VAG HYST,RMV TUBE/OVARY       EYE SURGERY  07/2017    cataract bilat eyes     HC EXCISION BREAST LESION, OPEN >=1      ATYPICAL HYPERPLASIA X 2     HYSTERECTOMY, PAP NO LONGER INDICATED  1994     TONSILLECTOMY          Family History   Problem Relation Age of Onset     Cancer Mother         lung     Osteoporosis Mother      Eye Disorder Mother         glaucoma     Arthritis Mother         osteo     Heart Disease Mother         CHF     Hypertension Mother      Cerebrovascular Disease Father          TIAs     Osteoporosis Father      Hypertension Father      Cancer Father         BONE     No Known Problems Brother      Cerebrovascular Disease Brother      Colon Cancer Brother      Cancer Brother         PROSTATE     Arthritis Sister         PARTIAL KNEE REPLACEMENT IN 2009     Osteoporosis Sister      Breast Cancer Maternal Grandmother      Cerebrovascular Disease Maternal Grandfather      Breast Cancer Maternal Aunt      Breast Cancer Maternal Aunt      Breast Cancer Other         2 YOUNGER MATERNAL COUSINS     Asthma Daughter        Social History     Socioeconomic History     Marital status:      Spouse name: Not on file     Number of children: Not on file     Years of education: Not on file     Highest education level: Not on file   Occupational History     Not on file   Social Needs     Financial resource strain: Not on file     Food insecurity     Worry: Not on file     Inability: Not on file     Transportation needs     Medical: Not on file     Non-medical: Not on file   Tobacco Use     Smoking status: Former Smoker     Packs/day: 1.00     Years: 40.00     Pack years: 40.00     Types: Cigarettes     Quit date: 10/7/1983     Years since quittin.2     Smokeless tobacco: Never Used   Substance and Sexual Activity     Alcohol use: Yes     Comment: 4 drinks per week     Drug use: No     Sexual activity: Not Currently     Partners: Male   Lifestyle     Physical activity     Days per week: Not on file     Minutes per session: Not on file     Stress: Not on file   Relationships     Social connections     Talks on phone: Not on file     Gets together: Not on file     Attends Synagogue service: Not on file     Active member of club or organization: Not on file     Attends meetings of clubs or organizations: Not on file     Relationship status: Not on file     Intimate partner violence     Fear of current or ex partner: Not on file     Emotionally abused: Not on file     Physically abused: Not on file      Forced sexual activity: Not on file   Other Topics Concern      Service No     Blood Transfusions No     Caffeine Concern No     Occupational Exposure No     Hobby Hazards No     Sleep Concern Yes     Stress Concern No     Weight Concern Yes     Special Diet No     Back Care No     Exercise Yes     Comment: 3 X WEEK     Bike Helmet Not Asked     Seat Belt Yes     Self-Exams No     Comment: ENCOURAGED     Parent/sibling w/ CABG, MI or angioplasty before 65F 55M? Not Asked   Social History Narrative     Not on file       Outpatient Encounter Medications as of 1/7/2021   Medication Sig Dispense Refill     aspirin 81 MG tablet Take 1 tablet (81 mg) by mouth daily INDICATION: FOR HEART AND CARDIOVASCULAR HEALTH 100 tablet 3     calcium carbonate 600 mg-vitamin D 400 units (CALCIUM 600 + D) 600-400 MG-UNIT per tablet Take 1 tablet by mouth 2 times daily FOR BONE HEALTH AND FOR VITAMIN D DEFICIENCY (LOW VITAMIN D)       cholecalciferol 50 MCG (2000 UT) tablet Take 2 tablets by mouth daily INDICATION: TO TREAT VITAMIN D DEFICIENCY (LOW VITAMIN D)       FISH OIL 1000 MG OR CAPS THREE TIMES DAILY 100 3     lisinopril (ZESTRIL) 20 MG tablet Take 1 tablet (20 mg) by mouth daily 90 tablet 3     loratadine (CLARITIN) 10 MG tablet ONE DAILY FOR ALLERGIES       Pantothenic Acid 250 MG CAPS Take 1 capsule by mouth daily       No facility-administered encounter medications on file as of 1/7/2021.              Review Of Systems  Skin: As above  Eyes: negative  Ears/Nose/Throat: negative  Respiratory: No shortness of breath, dyspnea on exertion, cough, or hemoptysis  Cardiovascular: negative  Gastrointestinal: negative  Genitourinary: negative  Musculoskeletal: negative  Neurologic: negative  Psychiatric: negative  Hematologic/Lymphatic/Immunologic: negative  Endocrine: negative      O:   NAD, WDWN, Alert & Oriented, Mood & Affect wnl, Vitals stable   Here today alone   /65   Pulse 78   SpO2 97%    General  appearance jaqueline ii   Vitals stable   Alert, oriented and in no acute distress      Following lymph nodes palpated: Occipital, Cervical, Supraclavicular no lad   R lat cheek ill-defined 7mm red scaly papule   Stuck on papules and brown macules on face and neck and hands   Red papules on neck    Flesh colored papules on neck    R helix and L helix gritty scaly papule   The remainder of expanded problem focused exam was normal; the following areas were examined:  scalp/hair, conjunctiva/lids, face, neck, lips, chest, digits/nails, RUE, LUE.      Eyes: Conjunctivae/lids:Normal     ENT: Lips, buccal mucosa, tongue: normal    MSK:Normal    Cardiovascular: peripheral edema none    Pulm: Breathing Normal    Lymph Nodes: No Head and Neck Lymphadenopathy     Neuro/Psych: Orientation:Normal; Mood/Affect:Normal      A/P:  1. Hx of non-melanoma skin cancer Seborrheic keratosis, lentigo, angioma, dermal nevus  2. R lat cheek squamous cell carcinoma in situ   3. Actinic keratosis   R helix x1, L helix x1  LN2:  Treated with LN2 for 5s for 1-2 cycles. Warned risks of blistering, pain, pigment change, scarring, and incomplete resolution.  Advised patient to return if lesions do not completely resolve.  Wound care sheet given.    It was a pleasure speaking to Alondra Morgan today.  Previous clinic  notes and pertinent laboratory tests were reviewed prior to Alondra Morgan's visit.  Risk of complications and/or morbidity, of excision  discussed with Alondra Morgan today.   BENIGN LESIONS DISCUSSED WITH PATIENT:  I discussed the specifics of tumor, prognosis, and genetics of benign lesions.  I explained that treatment of these lesions would be purely cosmetic and not medically neccessary.  I discussed with patient different removal options including excision, cautery and /or laser.      Nature and genetics of benign skin lesions dicussed with patient.  Signs and Symptoms of skin cancer discussed with patient.  Patient encouraged to  perform monthly skin exams.  UV precautions reviewed with patient.  Skin care regimen reviewed with patient: Eliminate harsh soaps, i.e. Dial, zest, irsih spring; Mild soaps such as Cetaphil or Dove sensitive skin, avoid hot or cold showers, aggressive use of emollients including vanicream, cetaphil or cerave discussed with patient.    Risks of non-melanoma skin cancer discussed with patient   Return to clinic 6 months  PROCEDURE NOTE  R lat cheek squamous cell carcinoma in situ   MOHS:   Location and Ill-defined margins    The rationale for Mohs surgery was discussed with the patient and consent was obtained.  The risks and benefits as well as alternatives to therapy were discussed, in detail.  Specifically, the risks of infection, scarring, bleeding, prolonged wound healing, incomplete removal, allergy to anesthesia, nerve injury and recurrence were addressed.  Indication for Mohs was Location and Ill-defined margins. Prior to the procedure, the treatment site was clearly identified and, if available, confirmed with previous photos and confirmed by the patient   All components of the Universal Protocol/PAUSE rule were completed.  The Mohs surgeon operated in two distinct and integrated capacities as the surgeon and pathologist.      The area was prepped with Betasept.  A rim of normal appearing skin was marked circumferentially around the lesion.  The area was infiltrated with local anesthesia.  The tumor was first debulked to remove all clinically apparent tumor.  An incision following the standard Mohs approach was done and the specimen was oriented,mapped and placed in 1 block(s).  Each specimen was then chromacoded and processed in the Mohs laboratory using standard Mohs technique and submitted for frozen section histology.  Frozen section analysis showed no residual tumor but CLEAR MARGINS.      The tumor was excised using standard Mohs technique in 1 stages(s).  CLEAR MARGINS OBTAINED and Final defect size  was 1.2 cm.     We discussed the options for wound management in full with the patient including risks/benefits/ possible outcomes.        REPAIR COMPLEX: Because of the tightness of the surrounding skin and Because of the size and full thickness nature of the defect, a complex closure was planned. After LEC anesthesia and prep, Burow's triangles were excised in the relaxed skin tension lines. The wound edges were widely undermined by dissection in the subcutaneous plane until adequate tissue mobility was obtained. Hemostasis was obtained. The wound edges were closed in a layered fashion using Vicryl and Fast Absorbing Plain Gut sutures. Postoperative length was 3.1 cm.   EBL minimal; complications none; wound care routine.  The patient was discharged in good condition and will return in one week for wound evaluation.        Again, thank you for allowing me to participate in the care of your patient.        Sincerely,        Jassi Michaels MD

## 2021-01-07 NOTE — PROGRESS NOTES
Alondra Morgan , a 75 year old year old female patient, I was asked to see by Dr. 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  Patient states this has been present for a while.  Patient reports the following symptoms:  Not healing .  Patient reports the following previous treatments none.  Patient reports the following modifying factors none.  Associated symptoms: none.  Patient has no other skin complaints today.  Remainder of the HPI, Meds, PMH, Allergies, FH, and SH was reviewed in chart.      Past Medical History:   Diagnosis Date     Arthritis      Atypical ductal hyperplasia of left breast 2005    left breast atypical ductal hyperplasia, completed 5 years tamoxifen in 2011     Atypical hyperplasia of breasts      Basal cell carcinoma of ala nasi      Basal cell carcinoma of shoulder      History of poliomyelitis 1947    acute polio infection age 2, resolved without any sequelae     Osteopenia      Polyp, colon     Adenomatous     Squamous cell carcinoma of skin, unspecified        Past Surgical History:   Procedure Laterality Date     C VAG HYST,RMV TUBE/OVARY       EYE SURGERY  07/2017    cataract bilat eyes     HC EXCISION BREAST LESION, OPEN >=1      ATYPICAL HYPERPLASIA X 2     HYSTERECTOMY, PAP NO LONGER INDICATED  1994     TONSILLECTOMY          Family History   Problem Relation Age of Onset     Cancer Mother         lung     Osteoporosis Mother      Eye Disorder Mother         glaucoma     Arthritis Mother         osteo     Heart Disease Mother         CHF     Hypertension Mother      Cerebrovascular Disease Father         TIAs     Osteoporosis Father      Hypertension Father      Cancer Father         BONE     No Known Problems Brother      Cerebrovascular Disease Brother      Colon Cancer Brother      Cancer Brother         PROSTATE     Arthritis Sister         PARTIAL KNEE REPLACEMENT IN 2009     Osteoporosis Sister      Breast Cancer Maternal Grandmother      Cerebrovascular  Disease Maternal Grandfather      Breast Cancer Maternal Aunt      Breast Cancer Maternal Aunt      Breast Cancer Other         2 YOUNGER MATERNAL COUSINS     Asthma Daughter        Social History     Socioeconomic History     Marital status:      Spouse name: Not on file     Number of children: Not on file     Years of education: Not on file     Highest education level: Not on file   Occupational History     Not on file   Social Needs     Financial resource strain: Not on file     Food insecurity     Worry: Not on file     Inability: Not on file     Transportation needs     Medical: Not on file     Non-medical: Not on file   Tobacco Use     Smoking status: Former Smoker     Packs/day: 1.00     Years: 40.00     Pack years: 40.00     Types: Cigarettes     Quit date: 10/7/1983     Years since quittin.2     Smokeless tobacco: Never Used   Substance and Sexual Activity     Alcohol use: Yes     Comment: 4 drinks per week     Drug use: No     Sexual activity: Not Currently     Partners: Male   Lifestyle     Physical activity     Days per week: Not on file     Minutes per session: Not on file     Stress: Not on file   Relationships     Social connections     Talks on phone: Not on file     Gets together: Not on file     Attends Restorationist service: Not on file     Active member of club or organization: Not on file     Attends meetings of clubs or organizations: Not on file     Relationship status: Not on file     Intimate partner violence     Fear of current or ex partner: Not on file     Emotionally abused: Not on file     Physically abused: Not on file     Forced sexual activity: Not on file   Other Topics Concern      Service No     Blood Transfusions No     Caffeine Concern No     Occupational Exposure No     Hobby Hazards No     Sleep Concern Yes     Stress Concern No     Weight Concern Yes     Special Diet No     Back Care No     Exercise Yes     Comment: 3 X WEEK     Bike Helmet Not Asked     Seat  Belt Yes     Self-Exams No     Comment: ENCOURAGED     Parent/sibling w/ CABG, MI or angioplasty before 65F 55M? Not Asked   Social History Narrative     Not on file       Outpatient Encounter Medications as of 1/7/2021   Medication Sig Dispense Refill     aspirin 81 MG tablet Take 1 tablet (81 mg) by mouth daily INDICATION: FOR HEART AND CARDIOVASCULAR HEALTH 100 tablet 3     calcium carbonate 600 mg-vitamin D 400 units (CALCIUM 600 + D) 600-400 MG-UNIT per tablet Take 1 tablet by mouth 2 times daily FOR BONE HEALTH AND FOR VITAMIN D DEFICIENCY (LOW VITAMIN D)       cholecalciferol 50 MCG (2000 UT) tablet Take 2 tablets by mouth daily INDICATION: TO TREAT VITAMIN D DEFICIENCY (LOW VITAMIN D)       FISH OIL 1000 MG OR CAPS THREE TIMES DAILY 100 3     lisinopril (ZESTRIL) 20 MG tablet Take 1 tablet (20 mg) by mouth daily 90 tablet 3     loratadine (CLARITIN) 10 MG tablet ONE DAILY FOR ALLERGIES       Pantothenic Acid 250 MG CAPS Take 1 capsule by mouth daily       No facility-administered encounter medications on file as of 1/7/2021.              Review Of Systems  Skin: As above  Eyes: negative  Ears/Nose/Throat: negative  Respiratory: No shortness of breath, dyspnea on exertion, cough, or hemoptysis  Cardiovascular: negative  Gastrointestinal: negative  Genitourinary: negative  Musculoskeletal: negative  Neurologic: negative  Psychiatric: negative  Hematologic/Lymphatic/Immunologic: negative  Endocrine: negative      O:   NAD, WDWN, Alert & Oriented, Mood & Affect wnl, Vitals stable   Here today alone   /65   Pulse 78   SpO2 97%    General appearance jaqueline ii   Vitals stable   Alert, oriented and in no acute distress      Following lymph nodes palpated: Occipital, Cervical, Supraclavicular no lad   R lat cheek ill-defined 7mm red scaly papule   Stuck on papules and brown macules on face and neck and hands   Red papules on neck    Flesh colored papules on neck    R helix and L helix gritty scaly papule   The  remainder of expanded problem focused exam was normal; the following areas were examined:  scalp/hair, conjunctiva/lids, face, neck, lips, chest, digits/nails, RUE, LUE.      Eyes: Conjunctivae/lids:Normal     ENT: Lips, buccal mucosa, tongue: normal    MSK:Normal    Cardiovascular: peripheral edema none    Pulm: Breathing Normal    Lymph Nodes: No Head and Neck Lymphadenopathy     Neuro/Psych: Orientation:Normal; Mood/Affect:Normal      A/P:  1. Hx of non-melanoma skin cancer Seborrheic keratosis, lentigo, angioma, dermal nevus  2. R lat cheek squamous cell carcinoma in situ   3. Actinic keratosis   R helix x1, L helix x1  LN2:  Treated with LN2 for 5s for 1-2 cycles. Warned risks of blistering, pain, pigment change, scarring, and incomplete resolution.  Advised patient to return if lesions do not completely resolve.  Wound care sheet given.    It was a pleasure speaking to Alondra Morgan today.  Previous clinic  notes and pertinent laboratory tests were reviewed prior to Alondra Morgan's visit.  Risk of complications and/or morbidity, of excision  discussed with Alondra Morgan today.   BENIGN LESIONS DISCUSSED WITH PATIENT:  I discussed the specifics of tumor, prognosis, and genetics of benign lesions.  I explained that treatment of these lesions would be purely cosmetic and not medically neccessary.  I discussed with patient different removal options including excision, cautery and /or laser.      Nature and genetics of benign skin lesions dicussed with patient.  Signs and Symptoms of skin cancer discussed with patient.  Patient encouraged to perform monthly skin exams.  UV precautions reviewed with patient.  Skin care regimen reviewed with patient: Eliminate harsh soaps, i.e. Dial, zest, irsih spring; Mild soaps such as Cetaphil or Dove sensitive skin, avoid hot or cold showers, aggressive use of emollients including vanicream, cetaphil or cerave discussed with patient.    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  The risks and benefits as well as alternatives to therapy were discussed, in detail.  Specifically, the risks of infection, scarring, bleeding, prolonged wound healing, incomplete removal, allergy to anesthesia, nerve injury and recurrence were addressed.  Indication for Mohs was Location and Ill-defined margins. Prior to the procedure, the treatment site was clearly identified and, if available, confirmed with previous photos and confirmed by the patient   All components of the Universal Protocol/PAUSE rule were completed.  The Mohs surgeon operated in two distinct and integrated capacities as the surgeon and pathologist.      The area was prepped with Betasept.  A rim of normal appearing skin was marked circumferentially around the lesion.  The area was infiltrated with local anesthesia.  The tumor was first debulked to remove all clinically apparent tumor.  An incision following the standard Mohs approach was done and the specimen was oriented,mapped and placed in 1 block(s).  Each specimen was then chromacoded and processed in the Mohs laboratory using standard Mohs technique and submitted for frozen section histology.  Frozen section analysis showed no residual tumor but CLEAR MARGINS.      The tumor was excised using standard Mohs technique in 1 stages(s).  CLEAR MARGINS OBTAINED and Final defect size was 1.2 cm.     We discussed the options for wound management in full with the patient including risks/benefits/ possible outcomes.        REPAIR COMPLEX: Because of the tightness of the surrounding skin and Because of the size and full thickness nature of the defect, a complex closure was planned. After LEC anesthesia and prep, Burow's triangles were excised in the  relaxed skin tension lines. The wound edges were widely undermined by dissection in the subcutaneous plane until adequate tissue mobility was obtained. Hemostasis was obtained. The wound edges were closed in a layered fashion using Vicryl and Fast Absorbing Plain Gut sutures. Postoperative length was 3.1 cm.   EBL minimal; complications none; wound care routine.  The patient was discharged in good condition and will return in one week for wound evaluation.

## 2021-01-13 ENCOUNTER — ALLIED HEALTH/NURSE VISIT (OUTPATIENT)
Dept: DERMATOLOGY | Facility: CLINIC | Age: 76
End: 2021-01-13
Payer: COMMERCIAL

## 2021-01-13 DIAGNOSIS — Z48.01 ENCOUNTER FOR CHANGE OR REMOVAL OF SURGICAL WOUND DRESSING: Primary | ICD-10-CM

## 2021-01-13 PROCEDURE — 99207 PR NO CHARGE NURSE ONLY: CPT

## 2021-01-13 NOTE — NURSING NOTE
Pt returned to clinic for post surgery 1 week follow up bandage change. Pt has no complaints, denies pain. Bandage removed from right lateral cheek, area cleansed with normal saline. Site is healing and wound edges approximating well. Reapplied new steri strips and paper tape.    Advised to watch for signs/sx of infection; spreading redness, drainage, odor, fever. Call or report promptly to clinic. Pt given written instructions and informed to rtc as needed. Patient verbalized understanding.     FAISAL Kitchen-BSN-PHN  [facility]  [phone]

## 2021-01-13 NOTE — PATIENT INSTRUCTIONS

## 2021-02-08 ENCOUNTER — IMMUNIZATION (OUTPATIENT)
Dept: PEDIATRICS | Facility: CLINIC | Age: 76
End: 2021-02-08
Payer: COMMERCIAL

## 2021-02-08 PROCEDURE — 0001A PR COVID VAC PFIZER DIL RECON 30 MCG/0.3 ML IM: CPT

## 2021-02-08 PROCEDURE — 91300 PR COVID VAC PFIZER DIL RECON 30 MCG/0.3 ML IM: CPT

## 2021-03-01 ENCOUNTER — IMMUNIZATION (OUTPATIENT)
Dept: PEDIATRICS | Facility: CLINIC | Age: 76
End: 2021-03-01
Attending: INTERNAL MEDICINE
Payer: COMMERCIAL

## 2021-03-01 PROCEDURE — 91300 PR COVID VAC PFIZER DIL RECON 30 MCG/0.3 ML IM: CPT

## 2021-03-01 PROCEDURE — 0002A PR COVID VAC PFIZER DIL RECON 30 MCG/0.3 ML IM: CPT

## 2021-06-29 ENCOUNTER — HOSPITAL ENCOUNTER (OUTPATIENT)
Dept: MAMMOGRAPHY | Facility: CLINIC | Age: 76
Discharge: HOME | End: 2021-06-29
Attending: INTERNAL MEDICINE | Admitting: INTERNAL MEDICINE
Payer: COMMERCIAL

## 2021-06-29 DIAGNOSIS — Z12.31 VISIT FOR SCREENING MAMMOGRAM: ICD-10-CM

## 2021-06-29 DIAGNOSIS — N60.82 OTHER BENIGN MAMMARY DYSPLASIAS OF LEFT BREAST: ICD-10-CM

## 2021-06-29 PROCEDURE — 77063 BREAST TOMOSYNTHESIS BI: CPT

## 2021-07-07 ENCOUNTER — TRANSFERRED RECORDS (OUTPATIENT)
Dept: HEALTH INFORMATION MANAGEMENT | Facility: CLINIC | Age: 76
End: 2021-07-07

## 2021-09-05 ENCOUNTER — HEALTH MAINTENANCE LETTER (OUTPATIENT)
Age: 76
End: 2021-09-05

## 2021-10-29 ENCOUNTER — DOCUMENTATION ONLY (OUTPATIENT)
Dept: LAB | Facility: CLINIC | Age: 76
End: 2021-10-29

## 2021-10-29 DIAGNOSIS — E78.5 HYPERLIPIDEMIA LDL GOAL <130: Primary | ICD-10-CM

## 2021-10-29 DIAGNOSIS — Z13.6 CARDIOVASCULAR SCREENING; LDL GOAL LESS THAN 130: ICD-10-CM

## 2021-10-29 DIAGNOSIS — I10 ESSENTIAL HYPERTENSION: ICD-10-CM

## 2021-10-31 DIAGNOSIS — I10 ESSENTIAL HYPERTENSION WITH GOAL BLOOD PRESSURE LESS THAN 130/80: ICD-10-CM

## 2021-11-01 RX ORDER — LISINOPRIL 20 MG/1
TABLET ORAL
Qty: 90 TABLET | Refills: 0 | Status: SHIPPED | OUTPATIENT
Start: 2021-11-01 | End: 2021-11-17

## 2021-11-04 ENCOUNTER — DOCUMENTATION ONLY (OUTPATIENT)
Dept: LAB | Facility: CLINIC | Age: 76
End: 2021-11-04

## 2021-11-04 DIAGNOSIS — Z13.29 SCREENING FOR THYROID DISORDER: ICD-10-CM

## 2021-11-04 DIAGNOSIS — R79.89 ABNORMAL LFTS: ICD-10-CM

## 2021-11-04 DIAGNOSIS — E53.8 VITAMIN B12 DEFICIENCY WITHOUT ANEMIA: ICD-10-CM

## 2021-11-04 DIAGNOSIS — I10 ESSENTIAL HYPERTENSION WITH GOAL BLOOD PRESSURE LESS THAN 130/80: ICD-10-CM

## 2021-11-04 DIAGNOSIS — Z78.0 ASYMPTOMATIC POSTMENOPAUSAL STATUS: ICD-10-CM

## 2021-11-04 DIAGNOSIS — E55.9 VITAMIN D DEFICIENCY: Primary | ICD-10-CM

## 2021-11-04 DIAGNOSIS — Z13.6 CARDIOVASCULAR SCREENING; LDL GOAL LESS THAN 130: ICD-10-CM

## 2021-11-04 DIAGNOSIS — E78.5 HYPERLIPIDEMIA LDL GOAL <130: ICD-10-CM

## 2021-11-04 NOTE — PROGRESS NOTES
PATIENT COMING IN FOR LABS 11/10/2021. PLACED HMPO ORDERS BUT THE ONES PENDING DID NOT HAVE A DIAGNOSIS I COULD NOT SIGN. IF YOU IN FACT WANT THESE ORDERS TOO. PLEASE PLACE THEM. THANK YOU.

## 2021-11-10 ENCOUNTER — LAB (OUTPATIENT)
Dept: LAB | Facility: CLINIC | Age: 76
End: 2021-11-10
Payer: COMMERCIAL

## 2021-11-10 DIAGNOSIS — Z78.0 ASYMPTOMATIC POSTMENOPAUSAL STATUS: ICD-10-CM

## 2021-11-10 DIAGNOSIS — E53.8 VITAMIN B12 DEFICIENCY WITHOUT ANEMIA: ICD-10-CM

## 2021-11-10 DIAGNOSIS — Z13.6 CARDIOVASCULAR SCREENING; LDL GOAL LESS THAN 130: ICD-10-CM

## 2021-11-10 DIAGNOSIS — I10 ESSENTIAL HYPERTENSION: ICD-10-CM

## 2021-11-10 DIAGNOSIS — Z11.59 NEED FOR HEPATITIS C SCREENING TEST: ICD-10-CM

## 2021-11-10 DIAGNOSIS — I10 ESSENTIAL HYPERTENSION WITH GOAL BLOOD PRESSURE LESS THAN 130/80: ICD-10-CM

## 2021-11-10 DIAGNOSIS — E78.5 HYPERLIPIDEMIA LDL GOAL <130: ICD-10-CM

## 2021-11-10 DIAGNOSIS — Z13.29 SCREENING FOR THYROID DISORDER: ICD-10-CM

## 2021-11-10 DIAGNOSIS — E55.9 VITAMIN D DEFICIENCY: ICD-10-CM

## 2021-11-10 DIAGNOSIS — R79.89 ABNORMAL LFTS: ICD-10-CM

## 2021-11-10 LAB
ALBUMIN SERPL-MCNC: 3.3 G/DL (ref 3.4–5)
ALP SERPL-CCNC: 100 U/L (ref 40–150)
ALT SERPL W P-5'-P-CCNC: 21 U/L (ref 0–50)
ANION GAP SERPL CALCULATED.3IONS-SCNC: 6 MMOL/L (ref 3–14)
AST SERPL W P-5'-P-CCNC: 18 U/L (ref 0–45)
BILIRUB SERPL-MCNC: 0.3 MG/DL (ref 0.2–1.3)
BUN SERPL-MCNC: 18 MG/DL (ref 7–30)
CALCIUM SERPL-MCNC: 9.1 MG/DL (ref 8.5–10.1)
CHLORIDE BLD-SCNC: 98 MMOL/L (ref 94–109)
CHOLEST SERPL-MCNC: 196 MG/DL
CO2 SERPL-SCNC: 25 MMOL/L (ref 20–32)
CREAT SERPL-MCNC: 0.75 MG/DL (ref 0.52–1.04)
ERYTHROCYTE [DISTWIDTH] IN BLOOD BY AUTOMATED COUNT: 14 % (ref 10–15)
FASTING STATUS PATIENT QL REPORTED: YES
GFR SERPL CREATININE-BSD FRML MDRD: 77 ML/MIN/1.73M2
GLUCOSE BLD-MCNC: 94 MG/DL (ref 70–99)
HCT VFR BLD AUTO: 38.5 % (ref 35–47)
HCV AB SERPL QL IA: NONREACTIVE
HDLC SERPL-MCNC: 74 MG/DL
HGB BLD-MCNC: 12.3 G/DL (ref 11.7–15.7)
LDLC SERPL CALC-MCNC: 111 MG/DL
MCH RBC QN AUTO: 29.1 PG (ref 26.5–33)
MCHC RBC AUTO-ENTMCNC: 31.9 G/DL (ref 31.5–36.5)
MCV RBC AUTO: 91 FL (ref 78–100)
NONHDLC SERPL-MCNC: 122 MG/DL
PLATELET # BLD AUTO: 306 10E3/UL (ref 150–450)
POTASSIUM BLD-SCNC: 4.3 MMOL/L (ref 3.4–5.3)
PROT SERPL-MCNC: 7 G/DL (ref 6.8–8.8)
RBC # BLD AUTO: 4.22 10E6/UL (ref 3.8–5.2)
SODIUM SERPL-SCNC: 129 MMOL/L (ref 133–144)
TRIGL SERPL-MCNC: 54 MG/DL
TSH SERPL DL<=0.005 MIU/L-ACNC: 2.38 MU/L (ref 0.4–4)
WBC # BLD AUTO: 5.8 10E3/UL (ref 4–11)

## 2021-11-10 PROCEDURE — 86803 HEPATITIS C AB TEST: CPT

## 2021-11-10 PROCEDURE — 36415 COLL VENOUS BLD VENIPUNCTURE: CPT

## 2021-11-10 PROCEDURE — 85027 COMPLETE CBC AUTOMATED: CPT

## 2021-11-10 PROCEDURE — 84443 ASSAY THYROID STIM HORMONE: CPT

## 2021-11-10 PROCEDURE — 80053 COMPREHEN METABOLIC PANEL: CPT

## 2021-11-10 PROCEDURE — 80061 LIPID PANEL: CPT

## 2021-11-17 ENCOUNTER — OFFICE VISIT (OUTPATIENT)
Dept: INTERNAL MEDICINE | Facility: CLINIC | Age: 76
End: 2021-11-17
Payer: COMMERCIAL

## 2021-11-17 VITALS
WEIGHT: 166 LBS | OXYGEN SATURATION: 99 % | DIASTOLIC BLOOD PRESSURE: 68 MMHG | BODY MASS INDEX: 28.34 KG/M2 | HEIGHT: 64 IN | SYSTOLIC BLOOD PRESSURE: 118 MMHG | TEMPERATURE: 97.9 F | HEART RATE: 75 BPM

## 2021-11-17 DIAGNOSIS — B00.1 RECURRENT COLD SORES: ICD-10-CM

## 2021-11-17 DIAGNOSIS — E87.1 HYPONATREMIA: ICD-10-CM

## 2021-11-17 DIAGNOSIS — Z00.00 ENCOUNTER FOR MEDICARE ANNUAL WELLNESS EXAM: Primary | ICD-10-CM

## 2021-11-17 DIAGNOSIS — I10 ESSENTIAL HYPERTENSION WITH GOAL BLOOD PRESSURE LESS THAN 130/80: ICD-10-CM

## 2021-11-17 DIAGNOSIS — M85.80 OSTEOPENIA, UNSPECIFIED LOCATION: ICD-10-CM

## 2021-11-17 LAB — SODIUM SERPL-SCNC: 131 MMOL/L (ref 133–144)

## 2021-11-17 PROCEDURE — 84295 ASSAY OF SERUM SODIUM: CPT | Performed by: INTERNAL MEDICINE

## 2021-11-17 PROCEDURE — 99213 OFFICE O/P EST LOW 20 MIN: CPT | Mod: 25 | Performed by: INTERNAL MEDICINE

## 2021-11-17 PROCEDURE — 36415 COLL VENOUS BLD VENIPUNCTURE: CPT | Performed by: INTERNAL MEDICINE

## 2021-11-17 PROCEDURE — 99397 PER PM REEVAL EST PAT 65+ YR: CPT | Performed by: INTERNAL MEDICINE

## 2021-11-17 RX ORDER — VALACYCLOVIR HYDROCHLORIDE 1 G/1
TABLET, FILM COATED ORAL
Qty: 12 TABLET | Refills: 3 | Status: SHIPPED | OUTPATIENT
Start: 2021-11-17 | End: 2022-11-18

## 2021-11-17 RX ORDER — LISINOPRIL 20 MG/1
20 TABLET ORAL DAILY
Qty: 90 TABLET | Refills: 3 | Status: SHIPPED | OUTPATIENT
Start: 2021-11-17 | End: 2022-11-18

## 2021-11-17 ASSESSMENT — ENCOUNTER SYMPTOMS
SORE THROAT: 0
WEAKNESS: 0
DYSURIA: 0
CHILLS: 0
HEADACHES: 0
MYALGIAS: 0
NERVOUS/ANXIOUS: 0
FEVER: 0
HEARTBURN: 0
ARTHRALGIAS: 1
PARESTHESIAS: 0
ABDOMINAL PAIN: 0
HEMATOCHEZIA: 0
FREQUENCY: 0
NAUSEA: 0
EYE PAIN: 0
HEMATURIA: 0
SHORTNESS OF BREATH: 0
DIARRHEA: 0
DIZZINESS: 0
CONSTIPATION: 0
COUGH: 0
PALPITATIONS: 0
BREAST MASS: 0

## 2021-11-17 ASSESSMENT — MIFFLIN-ST. JEOR: SCORE: 1214

## 2021-11-17 ASSESSMENT — ACTIVITIES OF DAILY LIVING (ADL): CURRENT_FUNCTION: NO ASSISTANCE NEEDED

## 2021-11-17 NOTE — RESULT ENCOUNTER NOTE
"His sodium level was improved from last week, almost into the normal range.  I suspect this is likely a function of drinking too much water prior to the lab draw last week.  It is okay to liberalize the sodium intake slightly, especially for the 2 days prior to any future lab draws, such as taking in more soup or tomato juice or beef jerky.  However, there is no need to take a LARGE amount of extra sodium in yor diet, just a little.    Do not drink so much water right before a lab draw as this can alter the electrolytes.    To make sure that this approach is working, return for a \"lab only appointment\" in the next 1-2 months to recheck labs (nonfasting).  You can make this \"lab only appointment\" at any [facility] lab site, contact that clinic site directly to arrange this.  I will make contact either via phone or Dine perfectt regarding the results and any recommendations once I have reviewed the lab results.  "

## 2021-11-17 NOTE — PATIENT INSTRUCTIONS
"*  rechecking the sodium level    *  May have been transiently low due to extra fluid intake right before labs and/or a very sodium restrictive diet.      *  rechecking sodium level today.  Based on this result I bhumika make additional recommendations.     *  Recurrent cold sores:      --at the start of the cold sore symptoms ( even before the cold sore appears, take Valacyclovir ( valtrex) 2000 mg (2 x 1000 mg tablets) and then a second 2000 mg dose 12 hours later ( 4 tablets per episode)    *  Continue all medications at the same doses.  Contact your usual pharmacy if you need refills.    *  Return to see me in 1 year, sooner if needed.  Use [employer] or Call [phone] to schedule the appointment with me.      OSTEOPENIA:  ====================================================    Get your bone density checked at your convenience.  osteoporosis happens very frequently in women over age 50.  Early treatment with sertain medications can lowe the risk of a fracture on your spine or hips.  There are now symptoms of osteopenia (mild thinning of bones) or osteoporosis ( moderate/major thinning of bones) until fractures happen.    I would also recomend the following:  * continue Calcium (at least 1200 mg per day) and Vitamin D3 (at least 2000 IU) supplements either separately or together in the form of Caltrate (or similar product).   * regular exercise to keep muscles strong and weight stable  * no smoking  * avoid falls    If your bone density is low enough to require a medication, then I will contact you.         5 GOALS TO PREVENT VASCULAR DISEASE:     1.  Aggressive blood pressure control, under 130/80 ideally.  Using medications if needed.    Your blood pressure is under good control    BP Readings from Last 4 Encounters:   11/17/21 118/68   01/07/21 114/65   12/07/20 110/68   11/17/20 118/62       2.  Aggressive LDL cholesterol (\"bad cholesterol\") lowering as indicated.    Your goal is an LDL under 130 for sure, " preferably under 100.  (If you have diabetes or previous vascular disease, the the LDL goals would be under 100 for sure, preferably under 70.)    New guidelines identify four high-risk groups who could benefit from statins:   *people with pre-existing heart disease, such as those who have had a heart attack;   *people ages 40 to 75 who have diabetes of any type  *patients ages 40 to 75 with at least a 7.5% risk of developing cardiovascular disease over the next decade, according to a formula described in the guidelines  *patients with the sort of super-high cholesterol that sometimes runs in families, as evidenced by an LDL of 190 milligrams per deciliter or higher    Your cholesterol levels are well controlled.    Recent Labs   Lab Test 11/10/21  0802 10/23/18  1009 05/05/16  0845 02/24/15  0838 01/09/14  0854   CHOL 196 199   < > 190 224*   HDL 74 71   < > 78 73   * 112*   < > 97 136*   TRIG 54 81   < > 74 78   CHOLHDLRATIO  --   --   --  2.4 3.1    < > = values in this interval not displayed.       3.  Aggressive diabetic prevention, screening and/or management.      You do not have diabetes as of the most recent blood tests.     4.  No smoking    5.  Consider daily preventative aspirin over age 50 if you have enough cardiac risk factors to place you at higher risk for the presence of vascular disease.    If you have any reason not to take aspirin such easy bruising or bleeding, stomach problems, other anticoagulant medications, or any other side effects, then you should not take Aspirin.     --Based on your current cardiac disease risk profile and/or age over 75, you do NOT need to take daily preventative aspirin.        Preventive Health Recommendations  Female Ages 75+    Yearly exam: See your health care provider every year in order to  o Review health changes.   o Discuss preventive care.    o Review your medicines if your doctor has prescribed any.      Get a Pap test every three years (unless you  "have an abnormal result and your provider advises testing more often).    No more PAP smear needed.      You do not need a Pap test if your uterus was removed (hysterectomy) and you have not had cancer.    You should be tested each year for STDs (sexually transmitted diseases) if you're at risk.     Routine screening mammogram no longer indicated.    No routine screening colonoscopy indiacted     Have a cholesterol test every 5 years, or more often if advised.    Have a diabetes test (fasting glucose) every three years. If you are at risk for diabetes, you should have this test more often.     If you are at risk for osteoporosis (brittle bone disease), think about having a bone density scan (DEXA).    Shots:   *  Get a flu shot each year.   *  Get a tetanus shot every 10 years.    *  Pneumonia vaccine up to date.      Nutrition:     Eat at least 5 servings of fruits and vegetables each day.    Eat whole-grain bread, whole-wheat pasta and brown rice instead of white grains and rice.    Talk to your provider about Calcium and Vitamin D.    --Calcium: aim for 1200 mg per day (any brand is fine)   --Vitamin D3 at least 1000 units per day (any brand is fine)       --Good Grains:  Oats, brown rice, Quinoa (these do not raise the blood sugar as much)     --Bad grains:  Anything made from wheat or white rice     (because these raise the blood sugars significantly, and the possible gluten issue from wheat for some people).      --Proteins:  Aim for \"lean proteins\" including chicken, fish, seafood, pork, turkey, and eggs (in moderation); Eat red meat only occasionally        Lifestyle    Exercise at least 150 minutes a week (30 minutes a day, 5 days a week). This will help you control your weight and prevent disease.    Limit alcohol to one drink per day.    No smoking.     Wear sunscreen to prevent skin cancer.     See your dentist every six months for an exam and cleaning.    See your eye doctor every 1 to 2 " years.            Patient Education   Personalized Prevention Plan  You are due for the preventive services outlined below.  Your care team is available to assist you in scheduling these services.  If you have already completed any of these items, please share that information with your care team to update in your medical record.  Health Maintenance Due   Topic Date Due     FALL RISK ASSESSMENT  11/09/2021

## 2021-11-17 NOTE — PROGRESS NOTES
"SUBJECTIVE:   Alondra Morgan is a 76 year old female who presents for Preventive Visit.      Patient has been advised of split billing requirements and indicates understanding: Yes   Are you in the first 12 months of your Medicare coverage?  No    Healthy Habits:     In general, how would you rate your overall health?  Good    Frequency of exercise:  2-3 days/week    Duration of exercise:  30-45 minutes    Do you usually eat at least 4 servings of fruit and vegetables a day, include whole grains    & fiber and avoid regularly eating high fat or \"junk\" foods?  Yes    Taking medications regularly:  Yes    Barriers to taking medications:  None    Medication side effects:  None    Ability to successfully perform activities of daily living:  No assistance needed    Home Safety:  No safety concerns identified    Hearing Impairment:  No hearing concerns    In the past 6 months, have you been bothered by leaking of urine?  No    In general, how would you rate your overall mental or emotional health?  Excellent      PHQ-2 Total Score: 0    Additional concerns today:  No       Do you feel safe in your environment? Yes    Have you ever done Advance Care Planning? (For example, a Health Directive, POLST, or a discussion with a medical provider or your loved ones about your wishes): No, advance care planning information given to patient to review.  Patient plans to discuss their wishes with loved ones or provider.         Fall risk  Fallen 2 or more times in the past year?: No  Any fall with injury in the past year?: No    Cognitive Screening   1) Repeat 3 items (Leader, Season, Table)    2) Clock draw: NORMAL  3) 3 item recall: Recalls 3 objects  Results: 3 items recalled: COGNITIVE IMPAIRMENT LESS LIKELY    Mini-CogTM Copyright ANDREW Jarvis. Licensed by the author for use in [facility]; reprinted with permission ([email or web address]). All rights reserved.      Do you have sleep apnea, excessive snoring or daytime " drowsiness?: no    Reviewed and updated as needed this visit by clinical staff  Tobacco  Allergies  Meds  Problems            Reviewed and updated as needed this visit by Provider               Social History     Tobacco Use     Smoking status: Former Smoker     Packs/day: 1.00     Years: 40.00     Pack years: 40.00     Types: Cigarettes     Quit date: 10/7/1983     Years since quittin.1     Smokeless tobacco: Never Used   Substance Use Topics     Alcohol use: Yes     Comment: 4 drinks per week     If you drink alcohol do you typically have >3 drinks per day or >7 drinks per week? No    Alcohol Use 2021   Prescreen: >3 drinks/day or >7 drinks/week? No   Prescreen: >3 drinks/day or >7 drinks/week? -       1.    Hypertension:  History of hypertension, on medication.  No reported side effects from medications.    Reviewed last 6 BP readings in chart:  BP Readings from Last 6 Encounters:   21 118/68   21 114/65   20 110/68   20 118/62   20 108/72   19 116/78     No active cardiac complaints or symptoms with exercise.     2.  history of osteopenai, reviewed last DEXA    3.  Reports cold sores on the upper lips, 3-4 times pe year  otc topical not helpful   wanbts valtrex.     4.  sodiuum again midlly low     Current providers sharing in care for this patient include:   Patient Care Team:  Ivan Mcpherson MD as PCP - General (Internal Medicine)  Ivan Mcpherson MD as Assigned PCP  Jassi Michaels MD as Assigned Surgical Provider    The following health maintenance items are reviewed in Epic and correct as of today:  Health Maintenance Due   Topic Date Due     FALL RISK ASSESSMENT  2021         **I reviewed the information recorded in the patient's [facility] chart (including but not limited to medical history, surgical history, family history, problem list, medication list, and allergy list) and updated the information as indicated based on the  "patients reported information.         Pertinent mammograms are reviewed under the imaging tab.    Review of Systems   Constitutional: Negative for chills and fever.   HENT: Negative for congestion, ear pain, hearing loss and sore throat.    Eyes: Negative for pain and visual disturbance.   Respiratory: Negative for cough and shortness of breath.    Cardiovascular: Negative for chest pain, palpitations and peripheral edema.   Gastrointestinal: Negative for abdominal pain, constipation, diarrhea, heartburn, hematochezia and nausea.   Breasts:  Negative for tenderness, breast mass and discharge.   Genitourinary: Negative for dysuria, frequency, genital sores, hematuria, pelvic pain, urgency, vaginal bleeding and vaginal discharge.   Musculoskeletal: Positive for arthralgias. Negative for myalgias.   Skin: Negative for rash.   Neurological: Negative for dizziness, weakness, headaches and paresthesias.   Psychiatric/Behavioral: Negative for mood changes. The patient is not nervous/anxious.      Constitutional, HEENT, cardiovascular, pulmonary, gi and gu systems are negative, except as otherwise noted.    OBJECTIVE:   /68   Pulse 75   Temp 97.9  F (36.6  C) (Oral)   Ht 1.619 m (5' 3.75\")   Wt 75.3 kg (166 lb)   LMP  (LMP Unknown)   SpO2 99%   Breastfeeding No   BMI 28.72 kg/m   Estimated body mass index is 28.72 kg/m  as calculated from the following:    Height as of this encounter: 1.619 m (5' 3.75\").    Weight as of this encounter: 75.3 kg (166 lb).  Physical Exam  GENERAL alert and no distress  EYES:  Normal sclera,conjunctiva, EOMI  HENT: oral and posterior pharynx without lesions or erythema, facies symmetric  NECK: Neck supple. No LAD, without thyroidmegaly.  RESP: Clear to ausculation bilaterally without wheezes or crackles. Normal BS in all fields.  CV: RRR normal S1S2 without murmurs, rubs or gallops.  LYMPH: no cervical lymph adenopathy appreciated  MS: extremities- no gross deformities of the " visible extremities noted,   EXT:  no lower extremity edema  PSYCH: Alert and oriented times 3; speech- coherent  SKIN:  No obvious significant skin lesions on visible portions of face     Diagnostic Test Results:  Labs reviewed in Epic    ASSESSMENT / PLAN:     (Z00.00) Encounter for Medicare annual wellness exam  (primary encounter diagnosis)  Comment: Discussed cardiac disease risk factors and cardiac disease risk factor modification, including diabetes screening, blood pressure screening (and management if indicated), and cholesterol screening.   Reviewed immunzation guidelines, including pneumococcal vaccines, annual influenza, and shingles vaccines.   Discussed routine cancer screenings, including skin cancer, colon cancer screening for everyone until age 80, prostate cancer screening in men until age 75, mammogram and PAP/pelvic for women until age 75.   Recommended regular dentist visits to care for remaining teeth.   Recommended regular screening for vision and glaucoma.   Recommended safe driving and accident avoidance.   Plan:     (B00.1) Recurrent cold sores  Comment: valtrex as neededm 2000 BID x 2 dosees at onset of cold sore  If they occur with any regularlrity, can consider daily prophylactic valtrex   Plan: valACYclovir (VALTREX) 1000 mg tablet            (I10) Essential hypertension with goal blood pressure less than 130/80  Comment: This condition is currently controlled on the current medical regimen.  Continue current therapy.   Plan: lisinopril (ZESTRIL) 20 MG tablet            (M85.80) Osteopenia, unspecified location  Comment: Reviewed DEXA scan results showing osteopenia.  There is no need for a specific medication for osteopenia/osteoporosis, but if it decreases further, on emay be indiacted.  * continue Calcium (at least 1200 mg per day) and Vitamin D (at least 2000 units per day) supplements either separately or together in the form of Caltrate (or similar product).   * regular exercise  "to keep muscles strong and weight stable  * no smoking  * avoid falls  *Repeat DEXA (bone density test) in 5 years.    Plan:     (E87.1) Hyponatremia  Comment: Na+ slightly low without obviosu explanation. She does report drinking a lot of excessive water prior to lab drfaw to get her veins to stand up better.     *  May have been transiently low due to extra fluid intake right before labs and/or a very sodium restrictive diet.      *  rechecking sodium level today.  Based on this result I bhumika make additional recommendations.     Plan: Sodium, Sodium               Patient has been advised of split billing requirements and indicates understanding: Yes  COUNSELING:  Reviewed preventive health counseling, as reflected in patient instructions       Regular exercise       Healthy diet/nutrition       Vision screening       Hearing screening       Dental care       Bladder control       Fall risk prevention       Immunizations    up to date              Osteoporosis prevention/bone health       Colon cancer screening    Estimated body mass index is 28.72 kg/m  as calculated from the following:    Height as of this encounter: 1.619 m (5' 3.75\").    Weight as of this encounter: 75.3 kg (166 lb).        She reports that she quit smoking about 38 years ago. Her smoking use included cigarettes. She has a 40.00 pack-year smoking history. She has never used smokeless tobacco.      Appropriate preventive services were discussed with this patient, including applicable screening as appropriate for cardiovascular disease, diabetes, osteopenia/osteoporosis, and glaucoma.  As appropriate for age/gender, discussed screening for colorectal cancer, prostate cancer, breast cancer, and cervical cancer. Checklist reviewing preventive services available has been given to the patient.    Reviewed patients plan of care and provided an AVS. The  for Alondra meets the Care Plan requirement. This Care Plan has been established and reviewed with " the .    Counseling Resources:  ATP IV Guidelines  Pooled Cohorts Equation Calculator  Breast Cancer Risk Calculator  Breast Cancer: Medication to Reduce Risk  FRAX Risk Assessment  ICSI Preventive Guidelines  Dietary Guidelines for Americans, 2010  USDA's MyPlate  ASA Prophylaxis  Lung CA Screening    Ivan Mcpherson MD  [facility]    Identified Health Risks:

## 2022-01-01 ENCOUNTER — APPOINTMENT (OUTPATIENT)
Dept: GENERAL RADIOLOGY | Facility: CLINIC | Age: 77
End: 2022-01-01
Attending: PHYSICIAN ASSISTANT
Payer: COMMERCIAL

## 2022-01-01 ENCOUNTER — HOSPITAL ENCOUNTER (EMERGENCY)
Facility: CLINIC | Age: 77
Discharge: HOME | End: 2022-01-01
Attending: PHYSICIAN ASSISTANT | Admitting: PHYSICIAN ASSISTANT
Payer: COMMERCIAL

## 2022-01-01 VITALS
OXYGEN SATURATION: 96 % | TEMPERATURE: 97.6 F | DIASTOLIC BLOOD PRESSURE: 69 MMHG | RESPIRATION RATE: 16 BRPM | HEART RATE: 76 BPM | SYSTOLIC BLOOD PRESSURE: 151 MMHG

## 2022-01-01 DIAGNOSIS — S93.601A RIGHT FOOT SPRAIN, INITIAL ENCOUNTER: ICD-10-CM

## 2022-01-01 PROCEDURE — 99283 EMERGENCY DEPT VISIT LOW MDM: CPT

## 2022-01-01 PROCEDURE — 73630 X-RAY EXAM OF FOOT: CPT | Mod: RT

## 2022-01-01 NOTE — ED PROVIDER NOTES
"  History     Chief Complaint:  Foot Pain (Pt complains of right foot pain that started last Wednesday, pt states she heard a \"pop\" and has worsening pain today.)      HPI   Alondra Morgan is a 76 year old female who presents with right foot pain over the lateral portion of her foot.  She states that she stepped earlier today and felt a pop and had sudden onset of pain there.  She has been able to walk on it since but it hurts.  She did not take any medicine at home.  No numbness or weakness in the toes.  No fall or other injury.    Review of Systems   All other systems reviewed and are negative.    Allergies:  Alendronic Acid  Pollen Extract      Medications:    aspirin 81 MG tablet  calcium carbonate 600 mg-vitamin D 400 units (CALCIUM 600 + D) 600-400 MG-UNIT per tablet  cholecalciferol 50 MCG (2000 UT) tablet  FISH OIL 1000 MG OR CAPS  lisinopril (ZESTRIL) 20 MG tablet  loratadine (CLARITIN) 10 MG tablet  Pantothenic Acid 250 MG CAPS  valACYclovir (VALTREX) 1000 mg tablet        Past Medical History:    Past Medical History:   Diagnosis Date     Arthritis      Atypical ductal hyperplasia of left breast 2005     Atypical hyperplasia of breasts      Basal cell carcinoma of ala nasi      Basal cell carcinoma of shoulder      History of poliomyelitis 1947     Osteopenia      Polyp, colon      Squamous cell carcinoma of skin, unspecified      Patient Active Problem List    Diagnosis Date Noted     History of squamous cell carcinoma in situ of skin 12/07/2020     Priority: Medium     right lateral face       History of basal cell carcinoma 11/17/2017     Priority: Medium     Nasal congestion 05/12/2016     Priority: Medium     Vitamin B12 deficiency (non anemic) 05/12/2016     Priority: Medium     Nonspecific abnormal results of thyroid function study 05/12/2016     Priority: Medium     Postmenopausal status 12/04/2015     Priority: Medium     Essential hypertension 05/21/2015     Priority: Medium     Hypertension " 07/30/2014     Priority: Medium     Alcohol consuption of more than two drinks per day 01/28/2013     Priority: Medium     Abnormal LFTs 01/28/2013     Priority: Medium     MODERATE ALCOHOL INTAKE       Tubular adenoma of colon 01/28/2013     Priority: Medium     Heme positive stool 01/28/2013     Priority: Medium     FH: colon cancer 01/28/2013     Priority: Medium     Hyperlipidemia LDL goal <130 01/28/2013     Priority: Medium     Vitamin B12 deficiency without anemia 10/03/2011     Priority: Medium     Diagnosis updated by automated process. Provider to review and confirm.       Family history of colon cancer 10/03/2011     Priority: Medium     Asymptomatic postmenopausal status 10/03/2011     Priority: Medium     Problem list name updated by automated process. Provider to review       FHx: osteoporosis 10/03/2011     Priority: Medium     Melanocytic nevus 10/03/2011     Priority: Medium     (Problem list name updated by automated process. Provider to review and confirm.)       Medicare annual wellness visit, subsequent 09/28/2011     Priority: Medium     Advance Directive Problem List Overview:   Name Relationship Phone    Primary Health Care Agent            Alternative Health Care Agent          Discussed advance care planning with patient; information given to patient to review. 9/28/2011          Vitamin D deficiency 07/06/2011     Priority: Medium     Osteopenia      Priority: Medium     Arthritis      Priority: Medium     Atypical ductal hyperplasia of left breast 2005     Priority: Medium     left breast atypical ductal hyperplasia, completed 5 years tamoxifen in 2011          Past Surgical History:    Past Surgical History:   Procedure Laterality Date     EYE SURGERY  07/2017    cataract bilat eyes     HC EXCISION BREAST LESION, OPEN >=1      ATYPICAL HYPERPLASIA X 2     HYSTERECTOMY, PAP NO LONGER INDICATED  1994     TONSILLECTOMY       ZZC VAG HYST,RMV TUBE/OVARY         Family History:    Family  History   Problem Relation Age of Onset     Cancer Mother         lung     Osteoporosis Mother      Eye Disorder Mother         glaucoma     Arthritis Mother         osteo     Heart Disease Mother         CHF     Hypertension Mother      Cerebrovascular Disease Father         TIAs     Osteoporosis Father      Hypertension Father      Cancer Father         BONE     No Known Problems Brother      Cerebrovascular Disease Brother      Colon Cancer Brother      Cancer Brother         PROSTATE     Arthritis Sister         PARTIAL KNEE REPLACEMENT IN 2009     Osteoporosis Sister      Breast Cancer Maternal Grandmother      Cerebrovascular Disease Maternal Grandfather      Breast Cancer Maternal Aunt      Breast Cancer Maternal Aunt      Breast Cancer Other         2 YOUNGER MATERNAL COUSINS     Asthma Daughter        Social History:  Presents with daughter    Physical Exam     Patient Vitals for the past 24 hrs:   BP Temp Temp src Pulse Resp SpO2   01/01/22 1443 (!) 151/69 97.6  F (36.4  C) Temporal 76 16 96 %       Physical Exam  General: Alert and oriented.    Head: Normocephalic. External ears and nose normal.    Eyes: Pupils equal and round.  Normal tracking.    Pulmonary/Chest: Effort and rate normal.  No peripheral edema.    MSK: Mild tenderness to palpation over the base of the fifth metatarsal on the right foot without bruising or swelling.  No other gross bruising swelling or deformity to foot.  Normal ROM in ankle foot and toes.  No swelling or ttp over the medial malleolus.  Negative squeeze test for syndesmosis injury. No focal lateral talar tenderness. No tenderness over shaft or proximal tibia/fibula and normal ROM in knee without pain.       SKIN:  Warm and dry with strong DP or posterior tibial pulses and normal capillary refill.  No bruising or swelling over plantar surface of foot.    NEURO:  Normal strength and sensation throughout the foot and toes.     PSYCH:  Normal affect      Emergency Department  Course     Imaging:  Foot  XR, G/E 3 views, right   Final Result   IMPRESSION: Negative for fracture or joint malalignment. Moderate joint space narrowing throughout the midfoot joints, without significant arthritic changes (hypertrophic spurring). Mild-moderate degenerative arthritic changes in the toes.        Emergency Department Course:  Reviewed:  I reviewed nursing notes, vitals, past history and care everywhere    Assessments:  1510 I obtained history and examined the patient as noted above.   1545 I rechecked the patient and explained findings.     Interventions:  Medications - No data to display    Disposition:  The patient was discharged to home.    Impression & Plan      Medical Decision Makin-year-old female presents with right foot pain after hearing a pop.  She has been able to bear weight on it since but it is painful.  X-rays are negative for fracture or dislocation.  No evidence of ankle injury or Lisfranc injury.  CMS intact.  Likely sprain.  Discussed [facility], follow-up with Ortho for repeat imaging and evaluation if not improving as expected in 1 week.  Return precautions for new or worsening symptoms given.    Diagnosis:    ICD-10-CM    1. Right foot sprain, initial encounter  S93.601A        Discharge Medications:  New Prescriptions    No medications on file         Scribe Disclosure:  Edson EVANS PA-C, am serving as a scribe at 3:14 PM on 2022 to document services personally performed by Edson Daniels PA-C based on my observations and the provider's statements to me.      Edson Daniels PA-C  [phone]

## 2022-01-06 ENCOUNTER — VIRTUAL VISIT (OUTPATIENT)
Dept: INTERNAL MEDICINE | Facility: CLINIC | Age: 77
End: 2022-01-06
Payer: COMMERCIAL

## 2022-01-06 VITALS — TEMPERATURE: 98.6 F | OXYGEN SATURATION: 97 % | WEIGHT: 160 LBS | BODY MASS INDEX: 27.68 KG/M2

## 2022-01-06 DIAGNOSIS — Z20.822 EXPOSURE TO 2019 NOVEL CORONAVIRUS: ICD-10-CM

## 2022-01-06 DIAGNOSIS — R05.9 COUGH: Primary | ICD-10-CM

## 2022-01-06 PROCEDURE — 99213 OFFICE O/P EST LOW 20 MIN: CPT | Mod: 95 | Performed by: INTERNAL MEDICINE

## 2022-01-06 NOTE — PATIENT INSTRUCTIONS
"Discharge Instructions for COVID-19 Patients  You have--or may have--COVID-19. Please follow the instructions listed below.   If you have a weakened immune system, discuss with your doctor any other actions you need to take.  How can I protect others?  If you have symptoms (fever, cough, body aches or trouble breathing):    Stay home and away from others (self-isolate) until:  ? Your other symptoms have resolved (gotten better). And   ? You've had no fever--and no medicine that reduces fever--for 1 full day (24 hours). And   ? At least 10 days have passed since your symptoms started. (You may need to wait 20 days. Follow the advice of your care team.)  If you don't show symptoms, but testing showed that you have COVID-19:    Stay home and away from others (self-isolate) until at least 10 days have passed since the date of your first positive COVID-19 test.  During this time    Stay in your own room, even for meals. Use your own bathroom if you can.    Stay away from others in your home. No hugging, kissing or shaking hands. No visitors.    Don't go to work, school or anywhere else.    Clean \"high touch\" surfaces often (doorknobs, counters, handles). Use household cleaning spray or wipes.    You'll find a full list of  on the EPA website: www.epa.gov/pesticide-registration/list-n-disinfectants-use-against-sars-cov-2.    Cover your mouth and nose with a mask or other face covering to avoid spreading germs.    Wash your hands and face often. Use soap and water.    Caregivers in these groups are at risk for severe illness due to COVID-19:  ? People 65 years and older  ? People who live in a nursing home or long-term care facility  ? People with chronic disease (lung, heart, cancer, diabetes, kidney, liver, immunologic)  ? People who have a weakened immune system, including those who:    Are in cancer treatment    Take medicine that weakens the immune system, such as corticosteroids    Had a bone marrow or organ " transplant    Have an immune deficiency    Have poorly controlled HIV or AIDS    Are obese (body mass index of 40 or higher)    Smoke regularly    Caregivers should wear gloves while washing dishes, handling laundry and cleaning bedrooms and bathrooms.    Use caution when washing and drying laundry: Don't shake dirty laundry and use the warmest water setting that you can.    For more tips on managing your health at home, go to www.cdc.gov/coronavirus/2019-ncov/downloads/10Things.pdf.  How can I take care of myself at home?  1. Get lots of rest. Drink extra fluids (unless a doctor has told you not to).  2. Take Tylenol (acetaminophen) for fever or pain. If you have liver or kidney problems, ask your family doctor if it's okay to take Tylenol.   Adults can take either:   ? 650 mg (two 325 mg pills) every 4 to 6 hours, or   ? 1,000 mg (two 500 mg pills) every 8 hours as needed.  ? Note: Don't take more than 3,000 mg in one day. Acetaminophen is found in many medicines (both prescribed and over-the-counter medicines). Read all labels to be sure you don't take too much.   For children, check the Tylenol bottle for the right dose. The dose is based on the child's age or weight.  3. If you have other health problems (like cancer, heart failure, an organ transplant or severe kidney disease): Call your specialty clinic if you don't feel better in the next 2 days.  4. Know when to call 911. Emergency warning signs include:  ? Trouble breathing or shortness of breath  ? Pain or pressure in the chest that doesn't go away  ? Feeling confused like you haven't felt before, or not being able to wake up  ? Bluish-colored lips or face  5. Your doctor may have prescribed a blood thinner medicine. Follow their instructions.  Where can I get more information?     [facility] - About COVID-19:   [email or web address]    CDC - What to Do If You're Sick:  www.cdc.gov/coronavirus/2019-ncov/about/steps-when-sick.html    CDC - Ending Home Isolation: www.cdc.gov/coronavirus/2019-ncov/hcp/disposition-in-home-patients.html    CDC - Caring for Someone: www.cdc.gov/coronavirus/2019-ncov/if-you-are-sick/care-for-someone.html    [facility] - Interim Guidance for Hospital Discharge to Home: www.health.[facility].[place]./diseases/coronavirus/hcp/hospdischarge.pdf    Below are the COVID-19 hotlines at the [place] Department of Health ([facility]). Interpreters are available.  ? For health questions: Call 830-250-0705 or 1-889.983.3979 (7 a.m. to 7 p.m.)  ? For questions about schools and childcare: Call 371-961-5908 or 1-229.306.8010 (7 a.m. to 7 p.m.)    For informational purposes only. Not to replace the advice of your health care provider. Clinically reviewed by Dr. Gregory Gregory.   Copyright   2020 [facility]. All rights reserved. [employer] [id number] - REV 01/05/21.

## 2022-01-06 NOTE — PROGRESS NOTES
".Alondra is a 76 year old who is being evaluated via a billable telephone visit.      What phone number would you like to be contacted at? [phone]  How would you like to obtain your AVS? Mail a copy    Assessment & Plan     Cough, URI and history of COVID exposure  We will screen for coronavirus.  Discussed to isolate until test results are back.  Let us know if developing worsening cough, shortness of breath although oxygen levels at home.  - Symptomatic; Yes; 1/4/2022 COVID-19 Virus (Coronavirus) by PCR; Future       BMI:   Estimated body mass index is 27.68 kg/m  as calculated from the following:    Height as of 11/17/21: 1.619 m (5' 3.75\").    Weight as of this encounter: 72.6 kg (160 lb).       Brea Larios MD  [facility]    Subjective   Alondra is a 76 year old who presents for the following health issues: COVID concern    HPI     Patient is a 76-year-old female with history of polio, hypertension, who reported that she was exposed to COVID on December 25 and 26.  2 days ago she developed cold symptoms including cough, laryngitis and mildly sore throat and postnasal drainage.  She denies fevers or chills, energy and appetite are good.  Cough is dry, no respiratory difficulty.  Of note she was also seen in the emergency room on January 1 with right foot pain.    Review of Systems   As above      Objective           Vitals:  No vitals were obtained today due to virtual visit.    Physical Exam       Pleasant female, awake alert and oriented, no shortness of breath, cough or respiratory difficulty noted.    Phone call duration: 7 minutes  "

## 2022-01-10 ENCOUNTER — TELEPHONE (OUTPATIENT)
Dept: INTERNAL MEDICINE | Facility: CLINIC | Age: 77
End: 2022-01-10
Payer: COMMERCIAL

## 2022-01-10 NOTE — TELEPHONE ENCOUNTER
"Patient had saliva test done at [facility] on 1/7/22. Symptoms started 1/5/22  Patient did a virtual visit on 1/6/22. Patient states that she is not sure where she got COVID 19.  Patient is vaccinated and boosted for COVID 19.    Patient states that she just feels like she has a bad cold. Reports temperature of 99.1 F, O 2 sat 97 % .  States that yesterday she just felt \"out of it.\" States that she is feeling better today.    Reviewed isolation guidelines. Patient agrees to call back if new or worsening symptoms. Denies loss of smell or taste.   Kristen Lopez RN    "

## 2022-07-13 ENCOUNTER — HOSPITAL ENCOUNTER (OUTPATIENT)
Dept: MAMMOGRAPHY | Facility: CLINIC | Age: 77
Discharge: HOME | End: 2022-07-13
Attending: INTERNAL MEDICINE | Admitting: INTERNAL MEDICINE
Payer: COMMERCIAL

## 2022-07-13 DIAGNOSIS — N60.82 OTHER BENIGN MAMMARY DYSPLASIAS OF LEFT BREAST: ICD-10-CM

## 2022-07-13 DIAGNOSIS — Z12.31 VISIT FOR SCREENING MAMMOGRAM: ICD-10-CM

## 2022-07-13 PROCEDURE — 77067 SCR MAMMO BI INCL CAD: CPT

## 2022-07-19 ENCOUNTER — TRANSFERRED RECORDS (OUTPATIENT)
Dept: INTERNAL MEDICINE | Facility: CLINIC | Age: 77
End: 2022-07-19

## 2022-10-23 ENCOUNTER — HEALTH MAINTENANCE LETTER (OUTPATIENT)
Age: 77
End: 2022-10-23

## 2022-11-11 ENCOUNTER — LAB (OUTPATIENT)
Dept: LAB | Facility: CLINIC | Age: 77
End: 2022-11-11
Payer: COMMERCIAL

## 2022-11-11 DIAGNOSIS — E78.5 HYPERLIPIDEMIA LDL GOAL <130: ICD-10-CM

## 2022-11-11 DIAGNOSIS — I10 ESSENTIAL HYPERTENSION: ICD-10-CM

## 2022-11-11 DIAGNOSIS — Z13.220 SCREENING FOR HYPERLIPIDEMIA: ICD-10-CM

## 2022-11-11 LAB
ALBUMIN SERPL-MCNC: 3.5 G/DL (ref 3.4–5)
ALP SERPL-CCNC: 89 U/L (ref 40–150)
ALT SERPL W P-5'-P-CCNC: 21 U/L (ref 0–50)
ANION GAP SERPL CALCULATED.3IONS-SCNC: 4 MMOL/L (ref 3–14)
AST SERPL W P-5'-P-CCNC: 19 U/L (ref 0–45)
BILIRUB SERPL-MCNC: 0.4 MG/DL (ref 0.2–1.3)
BUN SERPL-MCNC: 18 MG/DL (ref 7–30)
CALCIUM SERPL-MCNC: 9.1 MG/DL (ref 8.5–10.1)
CHLORIDE BLD-SCNC: 100 MMOL/L (ref 94–109)
CHOLEST SERPL-MCNC: 195 MG/DL
CO2 SERPL-SCNC: 30 MMOL/L (ref 20–32)
CREAT SERPL-MCNC: 0.68 MG/DL (ref 0.52–1.04)
ERYTHROCYTE [DISTWIDTH] IN BLOOD BY AUTOMATED COUNT: 13.8 % (ref 10–15)
FASTING STATUS PATIENT QL REPORTED: YES
GFR SERPL CREATININE-BSD FRML MDRD: 88 ML/MIN/1.73M2
GLUCOSE BLD-MCNC: 93 MG/DL (ref 70–99)
HCT VFR BLD AUTO: 36.4 % (ref 35–47)
HDLC SERPL-MCNC: 81 MG/DL
HGB BLD-MCNC: 12 G/DL (ref 11.7–15.7)
LDLC SERPL CALC-MCNC: 101 MG/DL
MCH RBC QN AUTO: 29.8 PG (ref 26.5–33)
MCHC RBC AUTO-ENTMCNC: 33 G/DL (ref 31.5–36.5)
MCV RBC AUTO: 90 FL (ref 78–100)
NONHDLC SERPL-MCNC: 114 MG/DL
PLATELET # BLD AUTO: 269 10E3/UL (ref 150–450)
POTASSIUM BLD-SCNC: 4.1 MMOL/L (ref 3.4–5.3)
PROT SERPL-MCNC: 7 G/DL (ref 6.8–8.8)
RBC # BLD AUTO: 4.03 10E6/UL (ref 3.8–5.2)
SODIUM SERPL-SCNC: 134 MMOL/L (ref 133–144)
TRIGL SERPL-MCNC: 65 MG/DL
TSH SERPL DL<=0.005 MIU/L-ACNC: 1.6 MU/L (ref 0.4–4)
WBC # BLD AUTO: 4.7 10E3/UL (ref 4–11)

## 2022-11-11 PROCEDURE — 84443 ASSAY THYROID STIM HORMONE: CPT

## 2022-11-11 PROCEDURE — 80053 COMPREHEN METABOLIC PANEL: CPT

## 2022-11-11 PROCEDURE — 36415 COLL VENOUS BLD VENIPUNCTURE: CPT

## 2022-11-11 PROCEDURE — 80061 LIPID PANEL: CPT

## 2022-11-11 PROCEDURE — 85027 COMPLETE CBC AUTOMATED: CPT

## 2022-11-18 ENCOUNTER — OFFICE VISIT (OUTPATIENT)
Dept: INTERNAL MEDICINE | Facility: CLINIC | Age: 77
End: 2022-11-18
Payer: COMMERCIAL

## 2022-11-18 VITALS
WEIGHT: 154.8 LBS | HEIGHT: 64 IN | SYSTOLIC BLOOD PRESSURE: 128 MMHG | BODY MASS INDEX: 26.43 KG/M2 | HEART RATE: 75 BPM | TEMPERATURE: 97.6 F | DIASTOLIC BLOOD PRESSURE: 76 MMHG | OXYGEN SATURATION: 99 %

## 2022-11-18 DIAGNOSIS — B00.1 RECURRENT COLD SORES: ICD-10-CM

## 2022-11-18 DIAGNOSIS — Z12.11 SCREEN FOR COLON CANCER: ICD-10-CM

## 2022-11-18 DIAGNOSIS — I10 ESSENTIAL HYPERTENSION WITH GOAL BLOOD PRESSURE LESS THAN 130/80: ICD-10-CM

## 2022-11-18 DIAGNOSIS — Z00.00 ENCOUNTER FOR MEDICARE ANNUAL WELLNESS EXAM: Primary | ICD-10-CM

## 2022-11-18 DIAGNOSIS — Z23 NEED FOR TDAP VACCINATION: ICD-10-CM

## 2022-11-18 DIAGNOSIS — Z80.0 FAMILY HISTORY OF COLON CANCER: ICD-10-CM

## 2022-11-18 DIAGNOSIS — M85.80 OSTEOPENIA, UNSPECIFIED LOCATION: ICD-10-CM

## 2022-11-18 PROCEDURE — 99213 OFFICE O/P EST LOW 20 MIN: CPT | Mod: 25 | Performed by: INTERNAL MEDICINE

## 2022-11-18 PROCEDURE — 90715 TDAP VACCINE 7 YRS/> IM: CPT | Performed by: INTERNAL MEDICINE

## 2022-11-18 PROCEDURE — G0439 PPPS, SUBSEQ VISIT: HCPCS | Performed by: INTERNAL MEDICINE

## 2022-11-18 PROCEDURE — 90471 IMMUNIZATION ADMIN: CPT | Performed by: INTERNAL MEDICINE

## 2022-11-18 RX ORDER — LISINOPRIL 20 MG/1
20 TABLET ORAL DAILY
Qty: 90 TABLET | Refills: 3 | Status: SHIPPED | OUTPATIENT
Start: 2022-11-18 | End: 2023-10-16

## 2022-11-18 RX ORDER — VALACYCLOVIR HYDROCHLORIDE 1 G/1
TABLET, FILM COATED ORAL
Qty: 12 TABLET | Refills: 3 | Status: SHIPPED | OUTPATIENT
Start: 2022-11-18 | End: 2024-02-20

## 2022-11-18 ASSESSMENT — ENCOUNTER SYMPTOMS
HEADACHES: 0
HEMATURIA: 0
PARESTHESIAS: 0
COUGH: 0
ARTHRALGIAS: 0
FREQUENCY: 0
HEARTBURN: 0
NERVOUS/ANXIOUS: 0
SHORTNESS OF BREATH: 0
BREAST MASS: 0
CONSTIPATION: 0
SORE THROAT: 0
NAUSEA: 0
FEVER: 0
CHILLS: 0
DIZZINESS: 0
EYE PAIN: 0
DIARRHEA: 0
JOINT SWELLING: 0
WEAKNESS: 0
PALPITATIONS: 0
HEMATOCHEZIA: 0
DYSURIA: 0
MYALGIAS: 0
ABDOMINAL PAIN: 0

## 2022-11-18 ASSESSMENT — ACTIVITIES OF DAILY LIVING (ADL): CURRENT_FUNCTION: NO ASSISTANCE NEEDED

## 2022-11-18 NOTE — PATIENT INSTRUCTIONS
" Continue all medications at the same doses.  Contact your usual pharmacy if you need refills.      Due for one last colonoscopy spring 2023 (before you turn 78) due to the your prior history of pre-cancerous polyps and your family history of colon cancer     --[facility] [phone] (fax [phone])       Return to see me in 1 year sooner if needed.  Use [employer] or Call [phone] to schedule this appointment.              Osteopenia:  I recomend the following:  * continue Calcium (at least 1200 mg per day) and Vitamin D (at least 2000 units per day) supplements either separately or together in the form of Caltrate (or similar product).   * regular exercise to keep muscles strong and weight stable  * no smoking  * avoid falls  *Repeat DEXA (bone density test) in 2-3 years.      5 GOALS TO PREVENT VASCULAR DISEASE:     1.  Aggressive blood pressure control, under 130/80 ideally.  Using medications if needed.    Your blood pressure is under good control    BP Readings from Last 4 Encounters:   11/18/22 128/76   01/01/22 (!) 151/69   11/17/21 118/68   01/07/21 114/65       2.  Aggressive LDL cholesterol (\"bad cholesterol\") lowering as indicated.    Your goal is an LDL under 130 for sure, preferably under 100.  (If you have diabetes or previous vascular disease, the the LDL goals would be under 100 for sure, preferably under 70.)    New guidelines identify four high-risk groups who could benefit from statins:   *people with pre-existing heart disease, such as those who have had a heart attack;   *people ages 40 to 75 who have diabetes of any type  *patients ages 40 to 75 with at least a 7.5% risk of developing cardiovascular disease over the next decade, according to a formula described in the guidelines  *patients with the sort of super-high cholesterol that sometimes runs in families, as evidenced by an LDL of 190 milligrams per deciliter or higher    Your cholesterol levels are well " controlled.    Recent Labs   Lab Test 11/11/22  0856 11/10/21  0802 05/05/16  0845 02/24/15  0838   CHOL 195 196   < > 190   HDL 81 74   < > 78   * 111*   < > 97   TRIG 65 54   < > 74   CHOLHDLRATIO  --   --   --  2.4    < > = values in this interval not displayed.       3.  Aggressive diabetic prevention, screening and/or management.      You do not have diabetes as of the most recent blood tests.     4.  No smoking    5.  Consider daily preventative aspirin over age 50 if you have enough cardiac risk factors to place you at higher risk for the presence of vascular disease.    If you have any reason not to take aspirin such easy bruising or bleeding, stomach problems, other anticoagulant medications, or any other side effects, then you should not take Aspirin.     --Based on your current cardiac disease risk profile and/or age over 75, you do NOT need to take daily preventative aspirin.      Preventive Health Recommendations  Female Ages 75+    Yearly exam: See your health care provider every year in order to  Review health changes.   Discuss preventive care.    Review your medicines if your doctor has prescribed any.    Get a Pap test every three years (unless you have an abnormal result and your provider advises testing more often).  No more PAP smear needed.    You do not need a Pap test if your uterus was removed (hysterectomy) and you have not had cancer.  You should be tested each year for STDs (sexually transmitted diseases) if you're at risk.   Routine screening mammogram no longer indicated.  No routine screening colonoscopy indiacted   Have a cholesterol test every 5 years, or more often if advised.  Have a diabetes test (fasting glucose) every three years. If you are at risk for diabetes, you should have this test more often.   If you are at risk for osteoporosis (brittle bone disease), think about having a bone density scan (DEXA).    Shots:   *  Get a flu shot each year.   *  Get a tetanus shot  "every 10 years.    *  Pneumonia vaccine up to date.      Nutrition:   Eat at least 5 servings of fruits and vegetables each day.  Eat whole-grain bread, whole-wheat pasta and brown rice instead of white grains and rice.  Talk to your provider about Calcium and Vitamin D.    --Calcium: aim for 1200 mg per day (any brand is fine)   --Vitamin D3 at least 1000 units per day (any brand is fine)       --Good Grains:  Oats, brown rice, Quinoa (these do not raise the blood sugar as much)     --Bad grains:  Anything made from wheat or white rice     (because these raise the blood sugars significantly, and the possible gluten issue from wheat for some people).      --Proteins:  Aim for \"lean proteins\" including chicken, fish, seafood, pork, turkey, and eggs (in moderation); Eat red meat only occasionally        Lifestyle  Exercise at least 150 minutes a week (30 minutes a day, 5 days a week). This will help you control your weight and prevent disease.  Limit alcohol to one drink per day.  No smoking.   Wear sunscreen to prevent skin cancer.   See your dentist every six months for an exam and cleaning.  See your eye doctor every 1 to 2 years.            Patient Education   Personalized Prevention Plan  You are due for the preventive services outlined below.  Your care team is available to assist you in scheduling these services.  If you have already completed any of these items, please share that information with your care team to update in your medical record.  Health Maintenance Due   Topic Date Due    Osteoporosis Screening  09/08/2022    ANNUAL REVIEW OF HM ORDERS  11/04/2022    Annual Wellness Visit  11/17/2022        "

## 2022-11-18 NOTE — PROGRESS NOTES
"SUBJECTIVE:   Alondra is a 77 year old who presents for Preventive Visit.  Patient has been advised of split billing requirements and indicates understanding: Yes  Are you in the first 12 months of your Medicare coverage?  No    Healthy Habits:     In general, how would you rate your overall health?  Good    Frequency of exercise:  6-7 days/week    Duration of exercise:  30-45 minutes    Do you usually eat at least 4 servings of fruit and vegetables a day, include whole grains    & fiber and avoid regularly eating high fat or \"junk\" foods?  Yes    Taking medications regularly:  Yes    Barriers to taking medications:  None    Medication side effects:  None    Ability to successfully perform activities of daily living:  No assistance needed    Home Safety:  No safety concerns identified    Hearing Impairment:  No hearing concerns    In the past 6 months, have you been bothered by leaking of urine?  No    In general, how would you rate your overall mental or emotional health?  Excellent      PHQ-2 Total Score: 0    Additional concerns today:  No    Have you ever done Advance Care Planning? (For example, a Health Directive, POLST, or a discussion with a medical provider or your loved ones about your wishes): No, advance care planning information given to patient to review.  Patient plans to discuss their wishes with loved ones or provider.      Fall risk  Fallen 2 or more times in the past year?: No  Any fall with injury in the past year?: No    Cognitive Screening   1) Repeat 3 items (Leader, Season, Table) pass   2) Clock draw: NORMAL  3) 3 item recall: Recalls 3 objects  Results: 3 items recalled: COGNITIVE IMPAIRMENT LESS LIKELY    Mini-CogTM Copyright ANDREW Jarvis. Licensed by the author for use in [facility]; reprinted with permission (anabela@.[facility]). All rights reserved.      Do you have sleep apnea, excessive snoring or daytime drowsiness?: no    Reviewed and updated as needed this visit by clinical " staff   Tobacco  Allergies  Meds  Problems             Reviewed and updated as needed this visit by Provider    Allergies  Meds  Problems            Social History     Tobacco Use     Smoking status: Former     Packs/day: 1.00     Years: 40.00     Pack years: 40.00     Types: Cigarettes     Quit date: 10/7/1983     Years since quittin.1     Smokeless tobacco: Never   Substance Use Topics     Alcohol use: Yes     Comment: 4 drinks per week     If you drink alcohol do you typically have >3 drinks per day or >7 drinks per week? No    Alcohol Use 2022   Prescreen: >3 drinks/day or >7 drinks/week? No   Prescreen: >3 drinks/day or >7 drinks/week? -   No flowsheet data found.      1.    Hypertension:  History of hypertension, on medication.  No reported side effects from medications.    Reviewed last 6 BP readings in chart:  BP Readings from Last 6 Encounters:   22 128/76   22 (!) 151/69   21 118/68   21 114/65   20 110/68   20 118/62     No active cardiac complaints or symptoms with exercise.             Current providers sharing in care for this patient include:   Patient Care Team:  Ivan Mcpherson MD as PCP - General (Internal Medicine)  Ivan Mcpherson MD as Assigned PCP    The following health maintenance items are reviewed in Epic and correct as of today:  Health Maintenance   Topic Date Due     DEXA  2022     CMP  2023     LIPID  2023     TSH W/FREE T4 REFLEX  2023     CBC  2023     MEDICARE ANNUAL WELLNESS VISIT  2023     ANNUAL REVIEW OF HM ORDERS  2023     FALL RISK ASSESSMENT  2023     ADVANCE CARE PLANNING  2027     DTAP/TDAP/TD IMMUNIZATION (4 - Td or Tdap) 2032     HEPATITIS C SCREENING  Completed     PHQ-2 (once per calendar year)  Completed     INFLUENZA VACCINE  Completed     Pneumococcal Vaccine: 65+ Years  Completed     ZOSTER IMMUNIZATION  Completed     COVID-19 Vaccine   "Completed     IPV IMMUNIZATION  Aged Out     MENINGITIS IMMUNIZATION  Aged Out     MAMMO SCREENING  Discontinued     COLORECTAL CANCER SCREENING  Discontinued       1.    Hypertension:  History of hypertension, on medication.  No reported side effects from medications.    Reviewed last 6 BP readings in chart:  BP Readings from Last 6 Encounters:   11/18/22 128/76   01/01/22 (!) 151/69   11/17/21 118/68   01/07/21 114/65   12/07/20 110/68   11/17/20 118/62     No active cardiac complaints or symptoms with exercise.     2.  Osteopenia with mildly decreaed bone density.   takign Calcium and Vit D            Pertinent mammograms are reviewed under the imaging tab.    Review of Systems   Constitutional: Negative for chills and fever.   HENT: Negative for congestion, ear pain, hearing loss and sore throat.    Eyes: Positive for visual disturbance. Negative for pain.   Respiratory: Negative for cough and shortness of breath.    Cardiovascular: Negative for chest pain, palpitations and peripheral edema.   Gastrointestinal: Negative for abdominal pain, constipation, diarrhea, heartburn, hematochezia and nausea.   Breasts:  Negative for tenderness, breast mass and discharge.   Genitourinary: Negative for dysuria, frequency, genital sores, hematuria, pelvic pain, urgency, vaginal bleeding and vaginal discharge.   Musculoskeletal: Negative for arthralgias, joint swelling and myalgias.   Skin: Negative for rash.   Neurological: Negative for dizziness, weakness, headaches and paresthesias.   Psychiatric/Behavioral: Negative for mood changes. The patient is not nervous/anxious.      Constitutional, HEENT, cardiovascular, pulmonary, gi and gu systems are negative, except as otherwise noted.    OBJECTIVE:   /76   Pulse 75   Temp 97.6  F (36.4  C) (Tympanic)   Ht 1.619 m (5' 3.75\")   Wt 70.2 kg (154 lb 12.8 oz)   LMP  (LMP Unknown)   SpO2 99%   Breastfeeding No   BMI 26.78 kg/m   Estimated body mass index is 26.78 kg/m  " "as calculated from the following:    Height as of this encounter: 1.619 m (5' 3.75\").    Weight as of this encounter: 70.2 kg (154 lb 12.8 oz).  Physical Exam  GENERAL alert and no distress  EYES:  Normal sclera,conjunctiva, EOMI  HENT: oral and posterior pharynx without lesions or erythema, facies symmetric  NECK: Neck supple. No LAD, without thyroidmegaly.  RESP: Clear to ausculation bilaterally without wheezes or crackles. Normal BS in all fields.  CV: RRR normal S1S2 without murmurs, rubs or gallops.  LYMPH: no cervical lymph adenopathy appreciated  MS: extremities- no gross deformities of the visible extremities noted,   EXT:  no lower extremity edema  PSYCH: Alert and oriented times 3; speech- coherent  SKIN:  No obvious significant skin lesions on visible portions of face     Diagnostic Test Results:  Labs reviewed in Epic    ASSESSMENT / PLAN:     (Z00.00) Encounter for Medicare annual wellness exam  (primary encounter diagnosis)  Comment: Discussed cardiac disease risk factors and cardiac disease risk factor modification, including diabetes screening, blood pressure screening (and management if indicated), and cholesterol screening.   Reviewed immunzation guidelines, including pneumococcal vaccines, annual influenza, and shingles vaccines.   Discussed routine cancer screenings, including skin cancer, colon cancer screening for everyone until age 80, prostate cancer screening in men until age 75, mammogram and PAP/pelvic for women until age 75.   Recommended regular dentist visits to care for remaining teeth.   Recommended regular screening for vision and glaucoma.   Recommended safe driving and accident avoidance.   Plan: REVIEW OF HEALTH MAINTENANCE PROTOCOL ORDERS            (I10) Essential hypertension with goal blood pressure less than 130/80  Comment: This condition is currently controlled on the current medical regimen.  Continue current therapy.   Plan: REVIEW OF HEALTH MAINTENANCE PROTOCOL ORDERS, "         lisinopril (ZESTRIL) 20 MG tablet            (M85.80) Osteopenia, unspecified location  Comment: Reviewed DEXA scan results showing osteopenia.  There is no need for a specific medication for osteopenia/osteoporosis, but if it decreases further, on emay be indiacted.  * continue Calcium (at least 1200 mg per day) and Vitamin D (at least 2000 units per day) supplements either separately or together in the form of Caltrate (or similar product).   * regular exercise to keep muscles strong and weight stable  * no smoking  * avoid falls  *Repeat DEXA (bone density test) in 3 years.    Plan: REVIEW OF HEALTH MAINTENANCE PROTOCOL ORDERS            (Z12.11) Screen for colon cancer  Comment: one last colonoscopy before turning 78 due to prior history of pre-cancerous polyps and family history of colon cancer.  Any subsequent colonoscopy required would be done as a diagnostic colonoscopy.   Plan: Colonoscopy Screening  Referral            (Z80.0) Family history of colon cancer  Comment:   Plan:     (B00.1) Recurrent cold sores  Comment: refill for occ use, she says it works well   Plan: valACYclovir (VALTREX) 1000 mg tablet            (Z23) Need for Tdap vaccination  Comment:   Plan: TDAP VACCINE (Adacel, Boostrix)  [[id number]]               Patient has been advised of split billing requirements and indicates understanding: Yes      COUNSELING:  Reviewed preventive health counseling, as reflected in patient instructions       Regular exercise       Healthy diet/nutrition       Vision screening       Hearing screening       Dental care       Bladder control       Fall risk prevention       Immunizations    Vaccinated for: TDAP             Aspirin prophylaxis no longer needed       Colon cancer screening one last colonsocopy before age 80, then no further colon cancer screening lieky needed        She reports that she quit smoking about 39 years ago. Her smoking use included cigarettes. She has a 40.00 pack-year  smoking history. She has never used smokeless tobacco.      Appropriate preventive services were discussed with this patient, including applicable screening as appropriate for cardiovascular disease, diabetes, osteopenia/osteoporosis, and glaucoma.  As appropriate for age/gender, discussed screening for colorectal cancer, prostate cancer, breast cancer, and cervical cancer. Checklist reviewing preventive services available has been given to the patient.    Reviewed patients plan of care and provided an AVS. The  armida Cantu meets the Care Plan requirement. This Care Plan has been established and reviewed with the .          Ivan Mcpherson MD  [facility]    Identified Health Risks:

## 2023-02-22 NOTE — ED TRIAGE NOTES
"Pt complains of right foot pain that started last Wednesday, pt states she heard a \"pop\" and has worsening pain today.  "
None

## 2023-07-17 ENCOUNTER — HOSPITAL ENCOUNTER (OUTPATIENT)
Dept: MAMMOGRAPHY | Facility: CLINIC | Age: 78
Discharge: HOME | End: 2023-07-17
Attending: INTERNAL MEDICINE | Admitting: INTERNAL MEDICINE
Payer: COMMERCIAL

## 2023-07-17 DIAGNOSIS — Z12.31 VISIT FOR SCREENING MAMMOGRAM: ICD-10-CM

## 2023-07-17 PROCEDURE — 77067 SCR MAMMO BI INCL CAD: CPT

## 2023-07-24 ENCOUNTER — HOSPITAL ENCOUNTER (OUTPATIENT)
Dept: MAMMOGRAPHY | Facility: CLINIC | Age: 78
Discharge: HOME | End: 2023-07-24
Attending: INTERNAL MEDICINE
Payer: COMMERCIAL

## 2023-07-24 DIAGNOSIS — R92.8 ABNORMAL MAMMOGRAM: ICD-10-CM

## 2023-07-24 PROCEDURE — 77061 BREAST TOMOSYNTHESIS UNI: CPT | Mod: LT

## 2023-07-24 PROCEDURE — 76642 ULTRASOUND BREAST LIMITED: CPT | Mod: LT

## 2023-07-27 ENCOUNTER — HOSPITAL ENCOUNTER (OUTPATIENT)
Dept: MAMMOGRAPHY | Facility: CLINIC | Age: 78
Discharge: HOME | End: 2023-07-27
Attending: INTERNAL MEDICINE
Payer: COMMERCIAL

## 2023-07-27 DIAGNOSIS — R92.8 ABNORMAL MAMMOGRAM: ICD-10-CM

## 2023-07-27 PROCEDURE — 88305 TISSUE EXAM BY PATHOLOGIST: CPT | Mod: TC | Performed by: INTERNAL MEDICINE

## 2023-07-27 PROCEDURE — A4648 IMPLANTABLE TISSUE MARKER: HCPCS

## 2023-07-27 PROCEDURE — 999N000065 MA POST PROCEDURE LEFT

## 2023-07-27 PROCEDURE — 250N000009 HC RX 250: Performed by: INTERNAL MEDICINE

## 2023-07-27 RX ADMIN — LIDOCAINE HYDROCHLORIDE 5 ML: 10 INJECTION, SOLUTION EPIDURAL; INFILTRATION; INTRACAUDAL; PERINEURAL at 08:46

## 2023-07-27 NOTE — DISCHARGE INSTRUCTIONS

## 2023-07-31 PROCEDURE — 88305 TISSUE EXAM BY PATHOLOGIST: CPT | Mod: 26 | Performed by: PATHOLOGY

## 2023-07-31 PROCEDURE — 88360 TUMOR IMMUNOHISTOCHEM/MANUAL: CPT | Mod: 26 | Performed by: PATHOLOGY

## 2023-07-31 PROCEDURE — 88342 IMHCHEM/IMCYTCHM 1ST ANTB: CPT | Mod: 26 | Performed by: PATHOLOGY

## 2023-08-01 ENCOUNTER — TELEPHONE (OUTPATIENT)
Dept: MAMMOGRAPHY | Facility: CLINIC | Age: 78
End: 2023-08-01
Payer: COMMERCIAL

## 2023-08-01 NOTE — PROGRESS NOTES
Call placed to [facility] regarding Alondra's pathology results from LEFT breast biopsy performed on 7/27/2023 revealing:     [facility]  Alondra Morgan [id number]  F, 1945  Surgical Pathology Report (Final result) [id number]  Authorizing Provider: Maria G Cunningham MD Ordering Provider: Maria G Cunningham MD  Ordering Location: [facility]  Collected: 07/27/2023 08:48 AM  Pathologist: Rosales Vela MD Received: 07/27/2023 11:37 AM  .  Specimens  A Breast, Left  .  .  Final Diagnosis  A. Left breast needle core biopsy at 6:00, 5 cm from the nipple:  - Invasive ductal carcinoma  - Summers grade: II (of possible III)  - Summers score 6 (of possible 9)  - Scoring criteria: tubules = 3, nuclear pleomorphism = 2, mitosis = 1.  - Greatest contiguous histologic dimension of invasive carcinoma: 5 mm  - Lymphovascular invasion: Not identified  - Perineural invasion: Not identified  - Ductal carcinoma in-situ: Not identified  - Estrogen receptor: POSITIVE (% with strong nuclear staining)  - Progesterone receptor: POSITIVE (% with moderate nuclear staining)  - Her2/kelsey by IHC: NEGATIVE (Score = 0)  Electronically signed by Rosales Vela MD on 7/31/2023 at 6:40 PM     Per Breast Center Radiologist, Dr. Cam Dias, results are concordant with imaging findings.     Recommendation: Surgical and Oncologic Consultations    Left a message with Dr. Cunningham's staff regarding results and recommendations.  Pathology report faxed to office for review.  Awaiting a callback confirming they will contact Alondra and arrange follow care.          Monik Maier RN BSN  Procedure Nurse  [facility]  [phone]

## 2023-08-01 NOTE — PROGRESS NOTES
Received a call back from [facility] regarding Alondra's results and recommendations.     Dr. Cunningham will call Alondra and arrange follow up care.  Clinic RN made aware that Alondra requested immediate release of results and did view them in My Chart last evening.  [facility] has my number should any further assistance be needed.  I will fax my notes, along with with pathology report to Dr. Cunningham.  Thank you for referring Alondra to our clinic. It was a pleasure taking care of her.      Monik Maier, RN  Procedure Nurse  [facility]  [phone]

## 2023-08-01 NOTE — TELEPHONE ENCOUNTER
Received a call from Alondra regarding pathology results and follow up care.   verified.    Alondra called to discuss her pathology results and had questions about follow up.  We discussed her results in detail and Dr. Cam Dias's recommendations for surgical and oncology consultations.  No issues with biopsy site reported.      [facility]  Alondra Morgan [id number]  F, 1945  Surgical Pathology Report (Final result) [id number]  Authorizing Provider: Maria G Cunningham MD Ordering Provider: Maria G Cunningham MD  Ordering Location: [facility]  Collected: 2023 08:48 AM  Pathologist: Rosales Vela MD Received: 2023 11:37 AM  .  Specimens  A Breast, Left  .  .  Final Diagnosis  A. Left breast needle core biopsy at 6:00, 5 cm from the nipple:  - Invasive ductal carcinoma  - Charity grade: II (of possible III)  - Charity score 6 (of possible 9)  - Scoring criteria: tubules = 3, nuclear pleomorphism = 2, mitosis = 1.  - Greatest contiguous histologic dimension of invasive carcinoma: 5 mm  - Lymphovascular invasion: Not identified  - Perineural invasion: Not identified  - Ductal carcinoma in-situ: Not identified  - Estrogen receptor: POSITIVE (% with strong nuclear staining)  - Progesterone receptor: POSITIVE (% with moderate nuclear staining)  - Her2/kelsey by IHC: NEGATIVE (Score = 0)  Electronically signed by Rosales Vela MD on 2023 at 6:40 PM     Per Breast Center Radiologist, Dr. Cam Dias, results are concordant with imaging findings.     Recommendation: Surgical and Oncologic Consultations      I informed her that I had spoken with Dr. Cunningham's office this morning and they would be reaching out to her to arrange her follow up care as they now have breast surgeons on staff.  Alondra does have an appointment with Dr. Cunningham this  but states she saw Dr. Cannon with her previous cancer and would like to see her  again.  She also requested that I help her arrange an appointment to see a breast surgeon.  I explained the role of our nurse navigators and told her that I would have FAISAL Dunbar call her today to set up appointments with Dr. Cannon and Dr. Mitchell.  She appreciated the assistance and understands that she will be hearing from Dr. Cunningham's office as well.  She has my number for any further questions or concerns.      Monik Maier RN  [facility]  [phone]

## 2023-08-01 NOTE — CONFIDENTIAL NOTE
I called Alondra to help set her up with Dr. Cannon and one of our surgeons per her request.     Recommended follow up is Medical Oncology & Surgical Consultation.   Surgical Consult has been arranged with Dr. Chika Mitchell on 8/8/23 @ 10:15 am at the [facility].   Medical Oncology Consult has been arranged with Dr. Cannon on 8/8/23 @ 9:30 am at [facility].   Patient has directions and phone numbers.    We discussed her diagnosis and I answered specific questions regarding her receptor results: estrogen, progesterone and HER2.     Questions were answered and I explained my role as Breast Care Nurse Coordinator in assisting her with appointments, resources and social support.  New diagnosis information packet will be available for patient at surgical consult.  I will follow up with the patient. She has my phone number if she has further questions.  Patient verbalized understanding and agrees with the plan of care.    Bettina Urena, RN, BSN, PHN  Breast Care Nurse Coordinator  [facility] [place]  [phone]

## 2023-08-03 ENCOUNTER — TRANSFERRED RECORDS (OUTPATIENT)
Dept: HEALTH INFORMATION MANAGEMENT | Facility: CLINIC | Age: 78
End: 2023-08-03
Payer: COMMERCIAL

## 2023-08-04 ENCOUNTER — TRANSFERRED RECORDS (OUTPATIENT)
Dept: HEALTH INFORMATION MANAGEMENT | Facility: CLINIC | Age: 78
End: 2023-08-04
Payer: COMMERCIAL

## 2023-08-05 NOTE — PROGRESS NOTES
[facility]    Hematology/Oncology New Patient Note      Today's Date: 08/08/23    Reason for Consult: Left breast cancer.    HISTORY OF PRESENT ILLNESS: Alondra Morgan is a 77 year old female s/p hysterectomy 1995who presents with the following oncologic history:  11/9/2005: Left breast biopsy of microcalcifications showed focal atypical ductal hyperplasia; no invasive malignancy. Took tamoxifen for 5 years.  11/29/2005: Excision of left breast lesion showed focal atypical ductal hyperplasia; no malignancy.  7/17/2023: Screening mammogram showed asymmetry in left breast at 6-7:00. Right breast negative.  7/24/2023: Diagnostic left mammogram showed mass in left lower breast adjacent to a previous needle biopsy marker clip. Left breast U/S  showed 4 mm nodule at 6:00, 5 cm from nipple, 8 mm from prior biopsy.  7/27/2023: Left breast biopsy at 6:00, 5 cm from nipple showed 5 mm of grade 2 invasive ductal carcinoma, no LVI, ER positive at %, TN positive at %, HER-2 negative (IHC = 0).    Alondra reports she did not feel a breast lump prior to her cancer diagnosis.    Family history positive for aunts, cousins and grandmother with breast cancer. Brother with colon cancer.    REVIEW OF SYSTEMS:   14 point ROS was reviewed and is negative other than as noted above in HPI.       HOME MEDICATIONS:  Current Outpatient Medications   Medication Sig Dispense Refill    calcium carbonate 600 mg-vitamin D 400 units 600-400 MG-UNIT per tablet Take 1 tablet by mouth 2 times daily FOR BONE HEALTH AND FOR VITAMIN D DEFICIENCY (LOW VITAMIN D)      cholecalciferol 50 MCG (2000 UT) tablet Take 2 tablets by mouth daily INDICATION: TO TREAT VITAMIN D DEFICIENCY (LOW VITAMIN D)      FISH OIL 1000 MG OR CAPS THREE TIMES DAILY 100 3    lisinopril (ZESTRIL) 20 MG tablet Take 1 tablet (20 mg) by mouth daily 90 tablet 3    loratadine (CLARITIN) 10 MG tablet ONE DAILY FOR ALLERGIES      Pantothenic Acid 250 MG CAPS  Take 1 capsule by mouth daily      valACYclovir (VALTREX) 1000 mg tablet TAKE 2000 MG AT ONSET OF COLD SORE, FOLLOWED BY SECOND 2000 MG DOSE 12 HOURS LATER; REPEAT AS NEEDED 12 tablet 3         ALLERGIES:  Allergies   Allergen Reactions    Alendronate GI Disturbance     Severe GERD    Pollen Extract          PAST MEDICAL HISTORY:  Past Medical History:   Diagnosis Date    Arthritis     Atypical ductal hyperplasia of left breast     left breast atypical ductal hyperplasia, completed 5 years tamoxifen in     Atypical hyperplasia of breasts     Basal cell carcinoma of ala nasi     Basal cell carcinoma of shoulder     Family history of colon cancer     BROTHER COLON CANCER AGE 68    History of poliomyelitis 1947    acute polio infection age 2, resolved without any sequelae    Osteopenia     Polyp, colon     Adenomatous    Squamous cell carcinoma of skin, unspecified          PAST SURGICAL HISTORY:  Past Surgical History:   Procedure Laterality Date    EYE SURGERY  2017    cataract bilat eyes    HC EXCISION BREAST LESION, OPEN >=1      ATYPICAL HYPERPLASIA X 2    HYSTERECTOMY, PAP NO LONGER INDICATED      TONSILLECTOMY      ZZC VAG HYST,RMV TUBE/OVARY           SOCIAL HISTORY:  Social History     Socioeconomic History    Marital status:      Spouse name: Not on file    Number of children: Not on file    Years of education: Not on file    Highest education level: Not on file   Occupational History    Not on file   Tobacco Use    Smoking status: Former     Packs/day: 1.00     Years: 40.00     Pack years: 40.00     Types: Cigarettes     Quit date: 10/7/1983     Years since quittin.8    Smokeless tobacco: Never   Substance and Sexual Activity    Alcohol use: Yes     Comment: 4 drinks per week    Drug use: No    Sexual activity: Not Currently     Partners: Male   Other Topics Concern     Service No    Blood Transfusions No    Caffeine Concern No    Occupational Exposure No    Hobby Hazards  "No    Sleep Concern Yes    Stress Concern No    Weight Concern Yes    Special Diet No    Back Care No    Exercise Yes     Comment: 3 X WEEK    Bike Helmet Not Asked    Seat Belt Yes    Self-Exams No     Comment: ENCOURAGED    Parent/sibling w/ CABG, MI or angioplasty before 65F 55M? Not Asked   Social History Narrative    Not on file     Social Determinants of Health     Financial Resource Strain: Not on file   Food Insecurity: Not on file   Transportation Needs: Not on file   Physical Activity: Not on file   Stress: Not on file   Social Connections: Not on file   Intimate Partner Violence: Not on file   Housing Stability: Not on file         FAMILY HISTORY:  Family History   Problem Relation Age of Onset    Cancer Mother         lung    Osteoporosis Mother     Eye Disorder Mother         glaucoma    Arthritis Mother         osteo    Heart Disease Mother         CHF    Hypertension Mother     Cerebrovascular Disease Father         TIAs    Osteoporosis Father     Hypertension Father     Cancer Father         BONE    Arthritis Sister         PARTIAL KNEE REPLACEMENT IN     Osteoporosis Sister     No Known Problems Brother     Cerebrovascular Disease Brother     Colon Cancer Brother 70        B 1939    Cancer Brother         PROSTATE    Breast Cancer Maternal Grandmother     Cerebrovascular Disease Maternal Grandfather     Asthma Daughter     Breast Cancer Maternal Aunt     Breast Cancer Maternal Aunt     Breast Cancer Other         2 YOUNGER MATERNAL COUSINS         PHYSICAL EXAM:  Vital signs:  /88 (BP Location: Left arm, Patient Position: Chair, Cuff Size: Adult Regular)   Pulse 72   Temp 98.1  F (36.7  C) (Oral)   Resp 16   Ht 1.6 m (5' 3\")   Wt 69.9 kg (154 lb)   LMP  (LMP Unknown)   SpO2 99%   BMI 27.28 kg/m     ECO  GENERAL/CONSTITUTIONAL: No acute distress.  EYES:  No scleral icterus.  ENT/MOUTH: Neck supple. Oropharynx grossly clear.  LYMPH: No cervical, supraclavicular, axillary " adenopathy.   BREAST: No palpable masses in either breast. Nipples are everted bilaterally with no discharge. No erythema, ulceration, or dimpling of the skin.   RESPIRATORY: No audible cough or wheezing.   GASTROINTESTINAL: No hepatosplenomegaly, masses, or tenderness.  No guarding.  No distention.  MUSCULOSKELETAL: Warm and well-perfused, no cyanosis, clubbing, or edema.  NEUROLOGIC: No focal motor deficits. Alert, oriented, answers questions appropriately.  INTEGUMENTARY: No rashes or jaundice.  GAIT: Steady, does not use assistive device      LABS:  CBC RESULTS:   Recent Labs   Lab Test 11/11/22  0856   WBC 4.7   RBC 4.03   HGB 12.0   HCT 36.4   MCV 90   MCH 29.8   MCHC 33.0   RDW 13.8          Recent Labs   Lab Test 11/11/22  0856 11/17/21  1133 11/10/21  0802    131* 129*   POTASSIUM 4.1  --  4.3   CHLORIDE 100  --  98   CO2 30  --  25   ANIONGAP 4  --  6   GLC 93  --  94   BUN 18  --  18   CR 0.68  --  0.75   YANICK 9.1  --  9.1         PATHOLOGY:  Reviewed as per HPI.    IMAGING:  Reviewed as per HPI.    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  She has a very small grade 2 left breast lesion with favorable biomarkers, in which the tumor is strongly ER/KS positive and HER-2 negative.  --She will see and proceed with surgical resection upfront with Dr. Mitchell.  --We discussed role of adjuvant radiation to reduce risk of local recurrence.  --I further discussed role of adjuvant endocrine therapy with anastrozole for a total of 5 years to reduce risk of breast cancer recurrence by a relative 50%.  --I discussed the potential side effects of anastrozole,  including but not limited to: fatigue, myalgias/arthralgias, bone density loss, decrease sexual drive, vaginal dryness, nausea, headache, difficulty sleeping, hot flashes, night sweats, small cardiovascular risk.   --Alternate option is tamoxifen.  --She is willing to consider adjuvant endocrine therapy.  --Will obtain a more current DEXA scan to reevaluate her osteopenia.  --Case discussed with Dr. Mitchell today.    Return after surgery.    Jenelle Cannon MD  Hematology/Oncology  [facility]    Total time spent today: 60 minutes in chart review, patient evaluation, counseling, documentation, test and/or medication/prescription orders, and coordination of care.

## 2023-08-07 ENCOUNTER — TRANSFERRED RECORDS (OUTPATIENT)
Dept: HEALTH INFORMATION MANAGEMENT | Facility: CLINIC | Age: 78
End: 2023-08-07
Payer: COMMERCIAL

## 2023-08-08 ENCOUNTER — OFFICE VISIT (OUTPATIENT)
Dept: SURGERY | Facility: CLINIC | Age: 78
End: 2023-08-08
Attending: INTERNAL MEDICINE
Payer: COMMERCIAL

## 2023-08-08 ENCOUNTER — PATIENT OUTREACH (OUTPATIENT)
Dept: ONCOLOGY | Facility: CLINIC | Age: 78
End: 2023-08-08

## 2023-08-08 ENCOUNTER — TELEPHONE (OUTPATIENT)
Dept: SURGERY | Facility: CLINIC | Age: 78
End: 2023-08-08

## 2023-08-08 ENCOUNTER — PRE VISIT (OUTPATIENT)
Dept: ONCOLOGY | Facility: CLINIC | Age: 78
End: 2023-08-08
Payer: COMMERCIAL

## 2023-08-08 ENCOUNTER — OFFICE VISIT (OUTPATIENT)
Dept: ONCOLOGY | Facility: CLINIC | Age: 78
End: 2023-08-08
Attending: INTERNAL MEDICINE
Payer: COMMERCIAL

## 2023-08-08 VITALS
BODY MASS INDEX: 27.29 KG/M2 | WEIGHT: 154 LBS | TEMPERATURE: 98.1 F | HEIGHT: 63 IN | OXYGEN SATURATION: 99 % | HEART RATE: 72 BPM | SYSTOLIC BLOOD PRESSURE: 136 MMHG | DIASTOLIC BLOOD PRESSURE: 88 MMHG | RESPIRATION RATE: 16 BRPM

## 2023-08-08 VITALS
SYSTOLIC BLOOD PRESSURE: 136 MMHG | RESPIRATION RATE: 16 BRPM | HEART RATE: 72 BPM | HEIGHT: 63 IN | OXYGEN SATURATION: 99 % | DIASTOLIC BLOOD PRESSURE: 88 MMHG | BODY MASS INDEX: 27.29 KG/M2 | WEIGHT: 154 LBS | TEMPERATURE: 98.1 F

## 2023-08-08 DIAGNOSIS — C50.512 MALIGNANT NEOPLASM OF LOWER-OUTER QUADRANT OF LEFT BREAST OF FEMALE, ESTROGEN RECEPTOR POSITIVE (H): Primary | ICD-10-CM

## 2023-08-08 DIAGNOSIS — Z17.0 MALIGNANT NEOPLASM OF LOWER-OUTER QUADRANT OF LEFT BREAST OF FEMALE, ESTROGEN RECEPTOR POSITIVE (H): Primary | ICD-10-CM

## 2023-08-08 DIAGNOSIS — Z17.0 MALIGNANT NEOPLASM OF OVERLAPPING SITES OF LEFT BREAST IN FEMALE, ESTROGEN RECEPTOR POSITIVE (H): Primary | ICD-10-CM

## 2023-08-08 DIAGNOSIS — C50.812 MALIGNANT NEOPLASM OF OVERLAPPING SITES OF LEFT BREAST IN FEMALE, ESTROGEN RECEPTOR POSITIVE (H): Primary | ICD-10-CM

## 2023-08-08 PROCEDURE — 99205 OFFICE O/P NEW HI 60 MIN: CPT | Performed by: SURGERY

## 2023-08-08 PROCEDURE — 99205 OFFICE O/P NEW HI 60 MIN: CPT | Performed by: INTERNAL MEDICINE

## 2023-08-08 PROCEDURE — G0463 HOSPITAL OUTPT CLINIC VISIT: HCPCS | Performed by: SURGERY

## 2023-08-08 RX ORDER — CEFAZOLIN SODIUM 2 G/100ML
2 INJECTION, SOLUTION INTRAVENOUS
Status: CANCELLED | OUTPATIENT
Start: 2023-08-08

## 2023-08-08 RX ORDER — CEFAZOLIN SODIUM 2 G/100ML
2 INJECTION, SOLUTION INTRAVENOUS SEE ADMIN INSTRUCTIONS
Status: CANCELLED | OUTPATIENT
Start: 2023-08-08

## 2023-08-08 ASSESSMENT — PAIN SCALES - GENERAL
PAINLEVEL: NO PAIN (0)
PAINLEVEL: NO PAIN (0)

## 2023-08-08 NOTE — TELEPHONE ENCOUNTER
Orders received for lumpectomy and sentinel lymph node biopsy with Dr. Chika Mitchell.       Left message for patient to call me at their convenience to schedule surgery.     Mary WATKINS    Surgery Coordinator  [facility]  [facility]  [phone]

## 2023-08-08 NOTE — LETTER
8/8/2023         RE: Alondra Morgan  [address]  [place] [address]        Dear Colleague,    Thank you for referring your patient, Alondra Morgan, to the [facility]. Please see a copy of my visit note below.    [facility] [place]    Hematology/Oncology New Patient Note      Today's Date: 08/08/23    Reason for Consult: Left breast cancer.    HISTORY OF PRESENT ILLNESS: Alondra Morgan is a 77 year old female s/p hysterectomy 1995who presents with the following oncologic history:  11/9/2005: Left breast biopsy of microcalcifications showed focal atypical ductal hyperplasia; no invasive malignancy. Took tamoxifen for 5 years.  11/29/2005: Excision of left breast lesion showed focal atypical ductal hyperplasia; no malignancy.  7/17/2023: Screening mammogram showed asymmetry in left breast at 6-7:00. Right breast negative.  7/24/2023: Diagnostic left mammogram showed mass in left lower breast adjacent to a previous needle biopsy marker clip. Left breast U/S  showed 4 mm nodule at 6:00, 5 cm from nipple, 8 mm from prior biopsy.  7/27/2023: Left breast biopsy at 6:00, 5 cm from nipple showed 5 mm of grade 2 invasive ductal carcinoma, no LVI, ER positive at %, CA positive at %, HER-2 negative (IHC = 0).    Alondra reports she did not feel a breast lump prior to her cancer diagnosis.    Family history positive for aunts, cousins and grandmother with breast cancer. Brother with colon cancer.    REVIEW OF SYSTEMS:   14 point ROS was reviewed and is negative other than as noted above in HPI.       HOME MEDICATIONS:  Current Outpatient Medications   Medication Sig Dispense Refill     calcium carbonate 600 mg-vitamin D 400 units 600-400 MG-UNIT per tablet Take 1 tablet by mouth 2 times daily FOR BONE HEALTH AND FOR VITAMIN D DEFICIENCY (LOW VITAMIN D)       cholecalciferol 50 MCG (2000 UT) tablet Take 2 tablets by mouth daily INDICATION: TO TREAT VITAMIN D  DEFICIENCY (LOW VITAMIN D)       FISH OIL 1000 MG OR CAPS THREE TIMES DAILY 100 3     lisinopril (ZESTRIL) 20 MG tablet Take 1 tablet (20 mg) by mouth daily 90 tablet 3     loratadine (CLARITIN) 10 MG tablet ONE DAILY FOR ALLERGIES       Pantothenic Acid 250 MG CAPS Take 1 capsule by mouth daily       valACYclovir (VALTREX) 1000 mg tablet TAKE 2000 MG AT ONSET OF COLD SORE, FOLLOWED BY SECOND 2000 MG DOSE 12 HOURS LATER; REPEAT AS NEEDED 12 tablet 3         ALLERGIES:  Allergies   Allergen Reactions     Alendronate GI Disturbance     Severe GERD     Pollen Extract          PAST MEDICAL HISTORY:  Past Medical History:   Diagnosis Date     Arthritis      Atypical ductal hyperplasia of left breast     left breast atypical ductal hyperplasia, completed 5 years tamoxifen in      Atypical hyperplasia of breasts      Basal cell carcinoma of ala nasi      Basal cell carcinoma of shoulder      Family history of colon cancer     BROTHER COLON CANCER AGE 68     History of poliomyelitis 1947    acute polio infection age 2, resolved without any sequelae     Osteopenia      Polyp, colon     Adenomatous     Squamous cell carcinoma of skin, unspecified          PAST SURGICAL HISTORY:  Past Surgical History:   Procedure Laterality Date     EYE SURGERY  2017    cataract bilat eyes     HC EXCISION BREAST LESION, OPEN >=1      ATYPICAL HYPERPLASIA X 2     HYSTERECTOMY, PAP NO LONGER INDICATED       TONSILLECTOMY       ZZC VAG HYST,RMV TUBE/OVARY           SOCIAL HISTORY:  Social History     Socioeconomic History     Marital status:      Spouse name: Not on file     Number of children: Not on file     Years of education: Not on file     Highest education level: Not on file   Occupational History     Not on file   Tobacco Use     Smoking status: Former     Packs/day: 1.00     Years: 40.00     Pack years: 40.00     Types: Cigarettes     Quit date: 10/7/1983     Years since quittin.8     Smokeless tobacco: Never    Substance and Sexual Activity     Alcohol use: Yes     Comment: 4 drinks per week     Drug use: No     Sexual activity: Not Currently     Partners: Male   Other Topics Concern      Service No     Blood Transfusions No     Caffeine Concern No     Occupational Exposure No     Hobby Hazards No     Sleep Concern Yes     Stress Concern No     Weight Concern Yes     Special Diet No     Back Care No     Exercise Yes     Comment: 3 X WEEK     Bike Helmet Not Asked     Seat Belt Yes     Self-Exams No     Comment: ENCOURAGED     Parent/sibling w/ CABG, MI or angioplasty before 65F 55M? Not Asked   Social History Narrative     Not on file     Social Determinants of Health     Financial Resource Strain: Not on file   Food Insecurity: Not on file   Transportation Needs: Not on file   Physical Activity: Not on file   Stress: Not on file   Social Connections: Not on file   Intimate Partner Violence: Not on file   Housing Stability: Not on file         FAMILY HISTORY:  Family History   Problem Relation Age of Onset     Cancer Mother         lung     Osteoporosis Mother      Eye Disorder Mother         glaucoma     Arthritis Mother         osteo     Heart Disease Mother         CHF     Hypertension Mother      Cerebrovascular Disease Father         TIAs     Osteoporosis Father      Hypertension Father      Cancer Father         BONE     Arthritis Sister         PARTIAL KNEE REPLACEMENT IN 2009     Osteoporosis Sister      No Known Problems Brother      Cerebrovascular Disease Brother      Colon Cancer Brother 70        B 1939     Cancer Brother         PROSTATE     Breast Cancer Maternal Grandmother      Cerebrovascular Disease Maternal Grandfather      Asthma Daughter      Breast Cancer Maternal Aunt      Breast Cancer Maternal Aunt      Breast Cancer Other         2 YOUNGER MATERNAL COUSINS         PHYSICAL EXAM:  Vital signs:  /88 (BP Location: Left arm, Patient Position: Chair, Cuff Size: Adult Regular)   Pulse  "72   Temp 98.1  F (36.7  C) (Oral)   Resp 16   Ht 1.6 m (5' 3\")   Wt 69.9 kg (154 lb)   LMP  (LMP Unknown)   SpO2 99%   BMI 27.28 kg/m     ECO  GENERAL/CONSTITUTIONAL: No acute distress.  EYES:  No scleral icterus.  ENT/MOUTH: Neck supple. Oropharynx grossly clear.  LYMPH: No cervical, supraclavicular, axillary adenopathy.   BREAST: No palpable masses in either breast. Nipples are everted bilaterally with no discharge. No erythema, ulceration, or dimpling of the skin.   RESPIRATORY: No audible cough or wheezing.   GASTROINTESTINAL: No hepatosplenomegaly, masses, or tenderness.  No guarding.  No distention.  MUSCULOSKELETAL: Warm and well-perfused, no cyanosis, clubbing, or edema.  NEUROLOGIC: No focal motor deficits. Alert, oriented, answers questions appropriately.  INTEGUMENTARY: No rashes or jaundice.  GAIT: Steady, does not use assistive device      LABS:  CBC RESULTS:   Recent Labs   Lab Test 22  0856   WBC 4.7   RBC 4.03   HGB 12.0   HCT 36.4   MCV 90   MCH 29.8   MCHC 33.0   RDW 13.8          Recent Labs   Lab Test 22  0856 21  1133 11/10/21  0802    131* 129*   POTASSIUM 4.1  --  4.3   CHLORIDE 100  --  98   CO2 30  --  25   ANIONGAP 4  --  6   GLC 93  --  94   BUN 18  --  18   CR 0.68  --  0.75   YANICK 9.1  --  9.1         PATHOLOGY:  Reviewed as per HPI.    IMAGING:  Reviewed as per HPI.    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  She has a very small grade 2 left breast lesion with favorable biomarkers, in which the tumor is strongly ER/AL positive and HER-2 negative.  --She will see and proceed with surgical resection upfront " "with Dr. Mitchell.  --We discussed role of adjuvant radiation to reduce risk of local recurrence.  --I further discussed role of adjuvant endocrine therapy with anastrozole for a total of 5 years to reduce risk of breast cancer recurrence by a relative 50%.  --I discussed the potential side effects of anastrozole, including but not limited to: fatigue, myalgias/arthralgias, bone density loss, decrease sexual drive, vaginal dryness, nausea, headache, difficulty sleeping, hot flashes, night sweats, small cardiovascular risk.   --Alternate option is tamoxifen.  --She is willing to consider adjuvant endocrine therapy.  --Will obtain a more current DEXA scan to reevaluate her osteopenia.  --Case discussed with Dr. Mitchell today.    Return after surgery.    Jenelle Cannon MD  Hematology/Oncology  [facility]    Total time spent today: 60 minutes in chart review, patient evaluation, counseling, documentation, test and/or medication/prescription orders, and coordination of care.      Oncology Rooming Note    August 8, 2023 9:45 AM   Alondra Morgan is a 77 year old female who presents for:    Chief Complaint   Patient presents with     Oncology Clinic Visit     Left Breast IDC, ER/WV+ HER2     Initial Vitals: /88 (BP Location: Left arm, Patient Position: Chair, Cuff Size: Adult Regular)   Pulse 72   Temp 98.1  F (36.7  C) (Oral)   Resp 16   Ht 1.6 m (5' 3\")   Wt 69.9 kg (154 lb)   LMP  (LMP Unknown)   SpO2 99%   BMI 27.28 kg/m   Estimated body mass index is 27.28 kg/m  as calculated from the following:    Height as of this encounter: 1.6 m (5' 3\").    Weight as of this encounter: 69.9 kg (154 lb). Body surface area is 1.76 meters squared.  No Pain (0) Comment: Data Unavailable   No LMP recorded (lmp unknown). Patient has had a hysterectomy.  Allergies reviewed: Yes  Medications reviewed: Yes    Medications: Medication refills not needed today.  Pharmacy name entered into EPIC:  "   [id number] - [place], [place] - [address] [id number] - [place], [place] - [address]    Clinical concerns: Dr Hawkins  was was notified.      Arelis Chan [facility] [address] [facility]    Breast Patients      1-Do you have any of the following symptoms? Other: None  2-In which breast are you having the symptoms? left  3-Have you had a Mammogram? Yes  4-Have you ever had a breast cyst drained? No  5-Have you ever had a breast biopsy? Yes  6-Have you ever had Breast Cancer? Yes:  Left   -   Date:  2003    7-Is there a history of Breast Cancer in your family? Yes   Relationship to you:    Great Grandmother  Aunt  Cousin  8-Have you ever had Ovarian Cancer? No  9-Is there a history of Ovarian Cancer in your family? No  10-Is there a history of Colon Cancer in your family?  Yes   Relationship to you:    Brother  11-Is there a history of Uterine Cancer in your family?  No  12-Any known genetic abnormalities in your family?  No  13-Summarize your caffeine intake (i.e. coffee, tea, chocolate, soda etc.): Hardly any      14-What age did your periods begin?  12    15-Date your last menstrual period began?  1995/hysterectomy  16-Number of full-term pregnancies:  2    17-Your age when your first child was born? 23  18-Did you nurse your children? No  19-Are you pregnant now? No  20-Have you begun menopause? Yes  Age Menopause began:  1993 21-Have you had either ovary removed?Yes  Date of Surgery:  1995  22-Do you have breast implants? No   23-Have you used hormone replacement therapy?  No  24-Have you taken oral contraceptive pills?  Yes, For how many years?  Ages 20-50  25-Have you had an intrauterine device (IUD) placed?  No  26-What is your current bra size?  38C                   Again, thank you for allowing me to participate in the care of your patient.        Sincerely,        Jenelle Cannon MD

## 2023-08-08 NOTE — PROGRESS NOTES
"Oncology Rooming Note    August 8, 2023 9:45 AM   Alondra Morgan is a 77 year old female who presents for:    Chief Complaint   Patient presents with    Oncology Clinic Visit     Left Breast IDC, ER/SC+ HER2     Initial Vitals: /88 (BP Location: Left arm, Patient Position: Chair, Cuff Size: Adult Regular)   Pulse 72   Temp 98.1  F (36.7  C) (Oral)   Resp 16   Ht 1.6 m (5' 3\")   Wt 69.9 kg (154 lb)   LMP  (LMP Unknown)   SpO2 99%   BMI 27.28 kg/m   Estimated body mass index is 27.28 kg/m  as calculated from the following:    Height as of this encounter: 1.6 m (5' 3\").    Weight as of this encounter: 69.9 kg (154 lb). Body surface area is 1.76 meters squared.  No Pain (0) Comment: Data Unavailable   No LMP recorded (lmp unknown). Patient has had a hysterectomy.  Allergies reviewed: Yes  Medications reviewed: Yes    Medications: Medication refills not needed today.  Pharmacy name entered into [facility]:    [id number] - [place], [place] [facility] [address] [id number] [place], [place] - [address] [facility] [address]    Clinical concerns: Dr Hawkins  was was notified.      Arelis Chan [facility]    Breast Patients      1-Do you have any of the following symptoms? Other: None  2-In which breast are you having the symptoms? left  3-Have you had a Mammogram? Yes  4-Have you ever had a breast cyst drained? No  5-Have you ever had a breast biopsy? Yes  6-Have you ever had Breast Cancer? Yes:  Left   -   Date:  2003    7-Is there a history of Breast Cancer in your family? Yes   Relationship to you:    Great Grandmother  Aunt  Cousin  8-Have you ever had Ovarian Cancer? No  9-Is there a history of Ovarian Cancer in your family? No  10-Is there a history of Colon Cancer in your family?  Yes   Relationship to you:    Brother  11-Is there a history of Uterine Cancer in your family?  No  12-Any known genetic abnormalities in your family?  No  13-Summarize your caffeine intake (i.e. coffee, tea, chocolate, soda etc.): " Hardly any      14-What age did your periods begin?  12    15-Date your last menstrual period began?  1995/hysterectomy  16-Number of full-term pregnancies:  2    17-Your age when your first child was born? 23  18-Did you nurse your children? No  19-Are you pregnant now? No  20-Have you begun menopause? Yes  Age Menopause began:  1993 21-Have you had either ovary removed?Yes  Date of Surgery:  1995 22-Do you have breast implants? No   23-Have you used hormone replacement therapy?  No  24-Have you taken oral contraceptive pills?  Yes, For how many years?  Ages 22-50  25-Have you had an intrauterine device (IUD) placed?  No  26-What is your current bra size?  38C

## 2023-08-08 NOTE — NURSING NOTE
Breast Nurse Care Coordination:      I introduced self to patient and two friends, and explained my role of breast nurse coordinator. I accompanied Alondra to her surgical consultation on 8/8/23 with Dr. Mitchell at the [facility].       Alondra was given the new breast cancer patient packet which includes educational material and support resources such as American Cancer Society, Fighting Cancer through Diet and Lifestyle, [employer], [employer], Pathways and the [facility] Breast Cancer Support Group.  I also enclosed a copy of her left breast biopsy pathology report (7/27/23).       At the end of the consultation, we reviewed Alondra's plan of care and education.  The plan is for patient to have a left lumpectomy with left sentinel lymph node biopsy. I informed her that she will need a pre-op exam with her primary care provider within 30 days before surgery.       I gave patient educational materials regarding Exercises After Breast Surgery and Lumpectomy.  Informed patient for lumpectomy surgery, she will want to have two good supportive sports bras that open in the front to wear the 2 weeks following her surgery. I gave patient information on different options to purchase sports bras.      I answered her questions and encouraged patient to call me back with any future questions or concerns.  Alondra has my contact information, and knows to contact me in the future with any questions or concerns.     Bettina Urena, RN, BSN, PHN  Breast Care Nurse Coordinator  [facility] [place] [place]  [phone]

## 2023-08-08 NOTE — PROGRESS NOTES
[facility] Breast Surgery Consultation    HPI:   Alondra Morgan is a 77 year old female who is seen in consultation at the request of Dr. Mcpherson for evaluation of left breast invasive ductal carcinoma, grade 2 ER 91 to 100% positive [place] 91 to 100% positive and HER2 negative at 6:00, 5 cm from nipple measuring 4 mm.     Alondra had a screening mammogram on 7/17/2023 which revealed a possible asymmetry in the left breast at 6-7 o'clock.  Diagnostic imaging then revealed a 4 mm hypoechoic nodule located 8 mm away from a prior biopsy site at 6:00 5 cm from nipple.    Alondra has a personal history of left breast atypical ductal hyperplasia in 2005.  She underwent excisional biopsy and then was treated with tamoxifen which she completed in 2011.  She had followed previously with Dr. Cannon and more recently was seeing Dr. Cunningham.     Hormonal history:  menarche 12, 2 children, 1st at age 23,  post menopausal, ><10 years  OCP use, no HRT, no fertility treatment.     Family history of breast cancer: Yes - multiple aunts, maternal cousins, maternal grandmother  Family history of ovarian cancer:  no  Family history of colon cancer: Yes - brother  Family history of prostate cancer: Yes - brother    Brother was recently diagnosed with gastric/liver cancer and had genetics and was negative for BRCA and Sainz.   Different Brother with prostate cancer      Past Medical History:   has a past medical history of Arthritis, Atypical ductal hyperplasia of left breast (2005), Atypical hyperplasia of breasts, Basal cell carcinoma of ala nasi, Basal cell carcinoma of shoulder, Family history of colon cancer, History of poliomyelitis (1947), Osteopenia, Polyp, colon, and Squamous cell carcinoma of skin, unspecified.      Current Outpatient Medications:     calcium carbonate 600 mg-vitamin D 400 units 600-400 MG-UNIT per tablet, Take 1 tablet by mouth 2 times daily FOR BONE HEALTH AND FOR VITAMIN D DEFICIENCY (LOW VITAMIN D), Disp: , Rfl:      cholecalciferol 50 MCG (2000 UT) tablet, Take 2 tablets by mouth daily INDICATION: TO TREAT VITAMIN D DEFICIENCY (LOW VITAMIN D), Disp: , Rfl:     FISH OIL 1000 MG OR CAPS, THREE TIMES DAILY, Disp: 100, Rfl: 3    lisinopril (ZESTRIL) 20 MG tablet, Take 1 tablet (20 mg) by mouth daily, Disp: 90 tablet, Rfl: 3    loratadine (CLARITIN) 10 MG tablet, ONE DAILY FOR ALLERGIES, Disp: , Rfl:     Pantothenic Acid 250 MG CAPS, Take 1 capsule by mouth daily, Disp: , Rfl:     valACYclovir (VALTREX) 1000 mg tablet, TAKE 2000 MG AT ONSET OF COLD SORE, FOLLOWED BY SECOND 2000 MG DOSE 12 HOURS LATER; REPEAT AS NEEDED, Disp: 12 tablet, Rfl: 3    Past Surgical History:  Past Surgical History:   Procedure Laterality Date    EYE SURGERY  2017    cataract bilat eyes    HC EXCISION BREAST LESION, OPEN >=1      ATYPICAL HYPERPLASIA X 2    HYSTERECTOMY, PAP NO LONGER INDICATED      TONSILLECTOMY      ZZC VAG HYST,RMV TUBE/OVARY             Allergies   Allergen Reactions    Alendronate GI Disturbance     Severe GERD    Pollen Extract         Social History:  Social History     Socioeconomic History    Marital status:      Spouse name: Not on file    Number of children: Not on file    Years of education: Not on file    Highest education level: Not on file   Occupational History    Not on file   Tobacco Use    Smoking status: Former     Packs/day: 1.00     Years: 40.00     Pack years: 40.00     Types: Cigarettes     Quit date: 10/7/1983     Years since quittin.8    Smokeless tobacco: Never   Substance and Sexual Activity    Alcohol use: Yes     Comment: 4 drinks per week    Drug use: No    Sexual activity: Not Currently     Partners: Male   Other Topics Concern     Service No    Blood Transfusions No    Caffeine Concern No    Occupational Exposure No    Hobby Hazards No    Sleep Concern Yes    Stress Concern No    Weight Concern Yes    Special Diet No    Back Care No    Exercise Yes     Comment: 3 X WEEK    Bike  "Helmet Not Asked    Seat Belt Yes    Self-Exams No     Comment: ENCOURAGED    Parent/sibling w/ CABG, MI or angioplasty before 65F 55M? Not Asked   Social History Narrative    Not on file     Social Determinants of Health     Financial Resource Strain: Not on file   Food Insecurity: Not on file   Transportation Needs: Not on file   Physical Activity: Not on file   Stress: Not on file   Social Connections: Not on file   Intimate Partner Violence: Not on file   Housing Stability: Not on file        ROS:  The 10 point review of systems is negative other than noted in the HPI and above.    PE:  Vitals: /88   Pulse 72   Temp 98.1  F (36.7  C) (Oral)   Resp 16   Ht 1.6 m (5' 3\")   Wt 69.9 kg (154 lb)   LMP  (LMP Unknown)   SpO2 99%   BMI 27.28 kg/m    General appearance: well-nourished, sitting comfortably, no apparent distress  Psych: normal affect, pleasant  HEENT:  Head normocephalic and atraumatic, pupils equal and round, conjunctivae clear, mucous membranes moist, external ears and nose normal  Neck: Supple without thyromegaly or masses  Lungs: Respirations unlabored  Lymphatic: No cervical, or supraclavicular lymphadenopathy  Extremities: Without edema  Musculoskeletal:  Normal station and gait  Neurologic: nonfocal, grossly intact times four extremities, alert and oriented times three  Psychiatric: Mood and affect are appropriate  Skin: Without lesions or rashes    Breast:  A bilateral breast exam was performed in the supine position.. Bilateral breasts were palpated in a circumferential clockwise fashion including the supraclavicular and axillary areas.   Breasts are symmetric. Skin is normal. Nipples everted and normal. No palpable masses in either breast. Tissue is heterogeneously dense.     Lymph:       No supraclavicular/infraclavicular adenopathy.   Axillary adenopathy: none    Assessment:  left breast invasive ductal carcinoma, grade 2 ER 91 to 100% positive [place] 91 to 100% positive and HER2 " negative at 6:00, 5 cm from nipple measuring 4 mm.     Plan:   Alondra Morgan is a 77 year old female has newly diagnosed left breast cancer.  I reviewed the imaging and pathology reports with her and her sister and daughter and explained the findings.  We talked about the fact that this is invasive ductal carcinoma  that is small in size and was found on screening mammogram.  We discussed the receptor status. We discussed that breast cancer is treated in a multidisciplinary fashion and she will also meet with oncology as well as radiation oncology pending surgical decision making and final pathology results. She has met with Dr. Cannon as part of our new multidisciplinary breast cancer clinic.     We next discussed the surgical options for treatment.  I described the procedures for tag localized lumpectomy with sentinel lymph node biopsy and mastectomy with sentinel lymph node biopsy, possible axillary node dissection including the details of the procedures, the risks, anesthesia and expected recovery.      I reviewed the data regarding lumpectomy and radiation vs mastectomy that shows that the local recurrence risk is slightly higher for lumpectomy and radiation vs mastectomy (3-5% vs. 1-2%), but the survival at 20 years is the same.  I also explained that since this is a small tumor and was not palpable on clinical breast exam prior to the biopsy, I would ask radiology to place a radiofrequency ID tag pre-operatively for localization. We discussed the role of oncoplastic lumpectomy. We discussed the risk of margin positivity following partial mastectomy surgery and need for possible return to the operating room for additional procedures if margins are positive.     I advised that lymph node biopsy is recommended whenever we are dealing with invasive breast cancer and described the procedure for sentinel lymph node biopsy.  We discussed that in women over 70 it is reasonable to consider omission of SLNB. She is  otherwise fit and life expectacy >10 years as such we discussed plan for SLNB as it could change our adjuvant therapy recommendations. We discussed the use of technetium and sometimes methylene blue to identify sentinel nodes. We talked about the risk for lymphedema which is small with removal of only a few nodes, but certainly not zero.      We talked about post-lumpectomy radiation, the course and usual side effects. We discussed that with lumpectomy, radiation is typically recommended to decrease risk of recurrence. It may be necessary following mastectomy depending on final pathology and if beata involvement is present. We discussed possibility of omitting radiation as well given age >70 and a small mass. Final pathology will guide this.     In addition, I have recommended genetic counseling.  She would be a candidate in that situation based on her family history and new diagnosis.  The natural history of BRCA mutations and breast cancer were discussed with the patient.        Plan:   Genetics referral  Tag localized left breast lumpectomy with left SLNB    60 minutes total time spent on the date of this encounter doing: chart review, review of test results, patient visit, physical exam, education, counseling, developing plan of care, and documenting.    Chika Mitchell MD      Please route or send letter to:  Primary Care Provider (PCP) and Referring Provider

## 2023-08-08 NOTE — LETTER
August 10, 2023          Ivan Mcpherson MD  [address] [place] [address]      RE:   Alondra Morgan 1945      Dear Colleague,    Thank you for referring your patient, Alondra Morgan, to [facility]. Please see a copy of my visit note below.    Alondra Morgan is a 77 year old female who is seen in consultation at the request of Dr. Mcpherson for evaluation of left breast invasive ductal carcinoma, grade 2 ER 91 to 100% positive [place] 91 to 100% positive and HER2 negative at 6:00, 5 cm from nipple measuring 4 mm.      Alondra had a screening mammogram on 7/17/2023 which revealed a possible asymmetry in the left breast at 6-7 o'clock.  Diagnostic imaging then revealed a 4 mm hypoechoic nodule located 8 mm away from a prior biopsy site at 6:00 5 cm from nipple.     lAondra has a personal history of left breast atypical ductal hyperplasia in 2005.  She underwent excisional biopsy and then was treated with tamoxifen which she completed in 2011.  She had followed previously with Dr. Cannon and more recently was seeing Dr. Cunningham.      Hormonal history:  menarche 12, 2 children, 1st at age 23,  post menopausal, ><10 years  OCP use, no HRT, no fertility treatment.      Family history of breast cancer: Yes - multiple aunts, maternal cousins, maternal grandmother  Family history of ovarian cancer:  no  Family history of colon cancer: Yes - brother  Family history of prostate cancer: Yes - brother     Brother was recently diagnosed with gastric/liver cancer and had genetics and was negative for BRCA and Sainz.   Different Brother with prostate cancer        Past Medical History:   has a past medical history of Arthritis, Atypical ductal hyperplasia of left breast (2005), Atypical hyperplasia of breasts, Basal cell carcinoma of ala nasi, Basal cell carcinoma of shoulder, Family history of colon cancer, History of poliomyelitis (1947), Osteopenia, Polyp, colon, and Squamous cell  carcinoma of skin, unspecified.        Current Outpatient Medications:     calcium carbonate 600 mg-vitamin D 400 units 600-400 MG-UNIT per tablet, Take 1 tablet by mouth 2 times daily FOR BONE HEALTH AND FOR VITAMIN D DEFICIENCY (LOW VITAMIN D), Disp: , Rfl:     cholecalciferol 50 MCG (2000 UT) tablet, Take 2 tablets by mouth daily INDICATION: TO TREAT VITAMIN D DEFICIENCY (LOW VITAMIN D), Disp: , Rfl:     FISH OIL 1000 MG OR CAPS, THREE TIMES DAILY, Disp: 100, Rfl: 3    lisinopril (ZESTRIL) 20 MG tablet, Take 1 tablet (20 mg) by mouth daily, Disp: 90 tablet, Rfl: 3    loratadine (CLARITIN) 10 MG tablet, ONE DAILY FOR ALLERGIES, Disp: , Rfl:     Pantothenic Acid 250 MG CAPS, Take 1 capsule by mouth daily, Disp: , Rfl:     valACYclovir (VALTREX) 1000 mg tablet, TAKE 2000 MG AT ONSET OF COLD SORE, FOLLOWED BY SECOND 2000 MG DOSE 12 HOURS LATER; REPEAT AS NEEDED, Disp: 12 tablet, Rfl: 3     Past Surgical History:  Past Surgical History         Past Surgical History:   Procedure Laterality Date    EYE SURGERY   2017     cataract bilat eyes    HC EXCISION BREAST LESION, OPEN >=1         ATYPICAL HYPERPLASIA X 2    HYSTERECTOMY, PAP NO LONGER INDICATED       TONSILLECTOMY        ZZC VAG HYST,RMV TUBE/OVARY                                     Allergies   Allergen Reactions    Alendronate GI Disturbance       Severe GERD    Pollen Extract                       Social History:  Social History   Social History            Socioeconomic History    Marital status:        Spouse name: Not on file    Number of children: Not on file    Years of education: Not on file    Highest education level: Not on file   Occupational History    Not on file   Tobacco Use    Smoking status: Former       Packs/day: 1.00       Years: 40.00       Pack years: 40.00       Types: Cigarettes       Quit date: 10/7/1983       Years since quittin.8    Smokeless tobacco: Never   Substance and Sexual Activity    Alcohol use: Yes        "Comment: 4 drinks per week    Drug use: No    Sexual activity: Not Currently       Partners: Male   Other Topics Concern     Service No    Blood Transfusions No    Caffeine Concern No    Occupational Exposure No    Hobby Hazards No    Sleep Concern Yes    Stress Concern No    Weight Concern Yes    Special Diet No    Back Care No    Exercise Yes       Comment: 3 X WEEK    Bike Helmet Not Asked    Seat Belt Yes    Self-Exams No       Comment: ENCOURAGED    Parent/sibling w/ CABG, MI or angioplasty before 65F 55M? Not Asked   Social History Narrative    Not on file      Social Determinants of Health      Financial Resource Strain: Not on file   Food Insecurity: Not on file   Transportation Needs: Not on file   Physical Activity: Not on file   Stress: Not on file   Social Connections: Not on file   Intimate Partner Violence: Not on file   Housing Stability: Not on file                        ROS:  The 10 point review of systems is negative other than noted in the HPI and above.     PE:  Vitals: /88   Pulse 72   Temp 98.1  F (36.7  C) (Oral)   Resp 16   Ht 1.6 m (5' 3\")   Wt 69.9 kg (154 lb)   LMP  (LMP Unknown)   SpO2 99%   BMI 27.28 kg/m    General appearance: well-nourished, sitting comfortably, no apparent distress  Psych: normal affect, pleasant  HEENT:  Head normocephalic and atraumatic, pupils equal and round, conjunctivae clear, mucous membranes moist, external ears and nose normal  Neck: Supple without thyromegaly or masses  Lungs: Respirations unlabored  Lymphatic: No cervical, or supraclavicular lymphadenopathy  Extremities: Without edema  Musculoskeletal:  Normal station and gait  Neurologic: nonfocal, grossly intact times four extremities, alert and oriented times three  Psychiatric: Mood and affect are appropriate  Skin: Without lesions or rashes     Breast:  A bilateral breast exam was performed in the supine position.. Bilateral breasts were palpated in a circumferential clockwise " fashion including the supraclavicular and axillary areas.   Breasts are symmetric. Skin is normal. Nipples everted and normal. No palpable masses in either breast. Tissue is heterogeneously dense.      Lymph:                            No supraclavicular/infraclavicular adenopathy.               Axillary adenopathy: none     Assessment:  left breast invasive ductal carcinoma, grade 2 ER 91 to 100% positive CT 91 to 100% positive and HER2 negative at 6:00, 5 cm from nipple measuring 4 mm.      Plan:   Alondra Morgan is a 77 year old female has newly diagnosed left breast cancer.  I reviewed the imaging and pathology reports with her and her sister and daughter and explained the findings.  We talked about the fact that this is invasive ductal carcinoma  that is small in size and was found on screening mammogram.  We discussed the receptor status. We discussed that breast cancer is treated in a multidisciplinary fashion and she will also meet with oncology as well as radiation oncology pending surgical decision making and final pathology results. She has met with Dr. Cannon as part of our new multidisciplinary breast cancer clinic.      We next discussed the surgical options for treatment.  I described the procedures for tag localized lumpectomy with sentinel lymph node biopsy and mastectomy with sentinel lymph node biopsy, possible axillary node dissection including the details of the procedures, the risks, anesthesia and expected recovery.       I reviewed the data regarding lumpectomy and radiation vs mastectomy that shows that the local recurrence risk is slightly higher for lumpectomy and radiation vs mastectomy (3-5% vs. 1-2%), but the survival at 20 years is the same.  I also explained that since this is a small tumor and was not palpable on clinical breast exam prior to the biopsy, I would ask radiology to place a radiofrequency ID tag pre-operatively for localization. We discussed the role of oncoplastic  lumpectomy. We discussed the risk of margin positivity following partial mastectomy surgery and need for possible return to the operating room for additional procedures if margins are positive.      I advised that lymph node biopsy is recommended whenever we are dealing with invasive breast cancer and described the procedure for sentinel lymph node biopsy.  We discussed that in women over 70 it is reasonable to consider omission of SLNB. She is otherwise fit and life expectacy >10 years as such we discussed plan for SLNB as it could change our adjuvant therapy recommendations. We discussed the use of technetium and sometimes methylene blue to identify sentinel nodes. We talked about the risk for lymphedema which is small with removal of only a few nodes, but certainly not zero.       We talked about post-lumpectomy radiation, the course and usual side effects. We discussed that with lumpectomy, radiation is typically recommended to decrease risk of recurrence. It may be necessary following mastectomy depending on final pathology and if beata involvement is present. We discussed possibility of omitting radiation as well given age >70 and a small mass. Final pathology will guide this.      In addition, I have recommended genetic counseling.  She would be a candidate in that situation based on her family history and new diagnosis.  The natural history of BRCA mutations and breast cancer were discussed with the patient.         Plan:   Genetics referral  Tag localized left breast lumpectomy with left SLNB    Again, thank you for allowing me to participate in the care of your patient.      Sincerely,      Chika Mitchell MD

## 2023-08-08 NOTE — NURSING NOTE
Breast Patients      1-Do you have any of the following symptoms? Other: None  2-In which breast are you having the symptoms? left  3-Have you had a Mammogram? Yes  4-Have you ever had a breast cyst drained? No  5-Have you ever had a breast biopsy? Yes  6-Have you ever had Breast Cancer? Yes:  Left   -   Date:  2003   7-Is there a history of Breast Cancer in your family? Yes   Relationship to you:    Great Grandmother  Aunt  Cousin  8-Have you ever had Ovarian Cancer? No  9-Is there a history of Ovarian Cancer in your family? No  10-Is there a history of Colon Cancer in your family?  Yes   Relationship to you:    Brother  11-Is there a history of Uterine Cancer in your family?  No  12-Any known genetic abnormalities in your family?  No  13-Summarize your caffeine intake (i.e. coffee, tea, chocolate, soda etc.): Hardly any      14-What age did your periods begin? 12   15-Date your last menstrual period began? 1995/hysterectomy  16-Number of full-term pregnancies: 2   17-Your age when your first child was born? 23  18-Did you nurse your children? No  19-Are you pregnant now? No  20-Have you begun menopause? Yes  Age Menopause began:  1993 21-Have you had either ovary removed?Yes  Date of Surgery:  1995  22-Do you have breast implants? No   23-Have you used hormone replacement therapy?  No  24-Have you taken oral contraceptive pills?  Yes, For how many years?  Ages 20-50  25-Have you had an intrauterine device (IUD) placed?  No  26-What is your current bra size?  38

## 2023-08-08 NOTE — PATIENT INSTRUCTIONS
Lab draw today.  Arrange for DEXA scan.  RTC MD 1-3 weeks after breast surgery (date to be determined).

## 2023-08-17 ENCOUNTER — TELEPHONE (OUTPATIENT)
Dept: SURGERY | Facility: CLINIC | Age: 78
End: 2023-08-17
Payer: COMMERCIAL

## 2023-08-17 NOTE — TELEPHONE ENCOUNTER
Type of surgery: RFID localized left breast lumpectomy with left sentinel lymph node biopsy  Location of surgery: [facility]  Date and time of surgery: 9/13/23 at 10:30am  Surgeon: Dr. Chika Mitchell  Pre-Op Appt Date: patient to schedule  Post-Op Appt Date: patient to schedule   Packet sent out: Yes  Pre-cert/Authorization completed:  Not Applicable  Date: 8/17/23

## 2023-08-25 ENCOUNTER — OFFICE VISIT (OUTPATIENT)
Dept: INTERNAL MEDICINE | Facility: CLINIC | Age: 78
End: 2023-08-25
Payer: COMMERCIAL

## 2023-08-25 VITALS
HEIGHT: 63 IN | DIASTOLIC BLOOD PRESSURE: 79 MMHG | HEART RATE: 57 BPM | BODY MASS INDEX: 27.02 KG/M2 | SYSTOLIC BLOOD PRESSURE: 124 MMHG | WEIGHT: 152.5 LBS | OXYGEN SATURATION: 99 % | TEMPERATURE: 97 F

## 2023-08-25 DIAGNOSIS — Z01.818 PREOP GENERAL PHYSICAL EXAM: Primary | ICD-10-CM

## 2023-08-25 DIAGNOSIS — B00.1 RECURRENT COLD SORES: ICD-10-CM

## 2023-08-25 DIAGNOSIS — I10 ESSENTIAL HYPERTENSION WITH GOAL BLOOD PRESSURE LESS THAN 130/80: ICD-10-CM

## 2023-08-25 DIAGNOSIS — C50.512 MALIGNANT NEOPLASM OF LOWER-OUTER QUADRANT OF LEFT BREAST OF FEMALE, ESTROGEN RECEPTOR POSITIVE (H): ICD-10-CM

## 2023-08-25 DIAGNOSIS — Z17.0 MALIGNANT NEOPLASM OF LOWER-OUTER QUADRANT OF LEFT BREAST OF FEMALE, ESTROGEN RECEPTOR POSITIVE (H): ICD-10-CM

## 2023-08-25 LAB
ALBUMIN SERPL BCG-MCNC: 4.4 G/DL (ref 3.5–5.2)
ALP SERPL-CCNC: 87 U/L (ref 35–104)
ALT SERPL W P-5'-P-CCNC: 14 U/L (ref 0–50)
ANION GAP SERPL CALCULATED.3IONS-SCNC: 8 MMOL/L (ref 7–15)
AST SERPL W P-5'-P-CCNC: 29 U/L (ref 0–45)
BILIRUB SERPL-MCNC: 0.4 MG/DL
BUN SERPL-MCNC: 18 MG/DL (ref 8–23)
CALCIUM SERPL-MCNC: 9.8 MG/DL (ref 8.8–10.2)
CHLORIDE SERPL-SCNC: 99 MMOL/L (ref 98–107)
CREAT SERPL-MCNC: 0.73 MG/DL (ref 0.51–0.95)
DEPRECATED HCO3 PLAS-SCNC: 26 MMOL/L (ref 22–29)
ERYTHROCYTE [DISTWIDTH] IN BLOOD BY AUTOMATED COUNT: 13.3 % (ref 10–15)
GFR SERPL CREATININE-BSD FRML MDRD: 83 ML/MIN/1.73M2
GLUCOSE SERPL-MCNC: 92 MG/DL (ref 70–99)
HCT VFR BLD AUTO: 39.5 % (ref 35–47)
HGB BLD-MCNC: 13.1 G/DL (ref 11.7–15.7)
MCH RBC QN AUTO: 30.2 PG (ref 26.5–33)
MCHC RBC AUTO-ENTMCNC: 33.2 G/DL (ref 31.5–36.5)
MCV RBC AUTO: 91 FL (ref 78–100)
PLATELET # BLD AUTO: 282 10E3/UL (ref 150–450)
POTASSIUM SERPL-SCNC: 4.7 MMOL/L (ref 3.4–5.3)
PROT SERPL-MCNC: 7.2 G/DL (ref 6.4–8.3)
RBC # BLD AUTO: 4.34 10E6/UL (ref 3.8–5.2)
SODIUM SERPL-SCNC: 133 MMOL/L (ref 136–145)
WBC # BLD AUTO: 5.7 10E3/UL (ref 4–11)

## 2023-08-25 PROCEDURE — 93000 ELECTROCARDIOGRAM COMPLETE: CPT | Performed by: INTERNAL MEDICINE

## 2023-08-25 PROCEDURE — 85027 COMPLETE CBC AUTOMATED: CPT | Performed by: INTERNAL MEDICINE

## 2023-08-25 PROCEDURE — 80053 COMPREHEN METABOLIC PANEL: CPT | Performed by: INTERNAL MEDICINE

## 2023-08-25 PROCEDURE — 99214 OFFICE O/P EST MOD 30 MIN: CPT | Mod: 25 | Performed by: INTERNAL MEDICINE

## 2023-08-25 PROCEDURE — 36415 COLL VENOUS BLD VENIPUNCTURE: CPT | Performed by: INTERNAL MEDICINE

## 2023-08-25 NOTE — PATIENT INSTRUCTIONS
"      PRE-OPERATIVE INSTRUCTIONS:     *  Contact your surgeon if there is any change in your health. This includes signs of new infection, such as cold or flu (such as a sore throat, runny nose, cough, rash or fever).  Elective surgeries may need to be postponed if there is significant illness present.    *       *      *  Stop aspirin of any kind for 7 days before procedure.   (even low dose daily aspirin).    *  No NSAIDs (Motrin, Advil, ibuprofen, Aleve, etc) within 5-7 days of surgery.    *  Stop any Fish Oil or multi vitamin (and specifically anything containing vitamin E) supplements for 7 days prior to surgery because these can affect platelet function.      *  Tylenol (acetaminophen) is OK to take if needed, this medication has no effect on platelet function.  Follow instructions on the bottle    *  Prepare your body as instructed by the surgery clinic.  If instructed, Take a shower or bath the night before surgery. Use any special soaps or cleaning instructions according to instructions from your surgeon.  If you do not have soap from your surgeon, use your regular soap. Do not shave or scrub the surgery site.  Wear clean pajamas and have clean sheets on your bed     *  ON THE MORNING OF SURGERY:     --Do NOT take Lisinopril on the morning of surgery.         *  Resume all medications at the same doses after surgery (no \"make up\" doses needed), unless instructed otherwise by the medical staff.     *  Attend all follow up appointments with the surgeons (and/or therapists if applicable) as instructed.     *  Contact the surgeon's office for any specific questions about after-surgery cares and follow up instructions.      *  You do not need to necessarily return to see anyone in the primary care clinic after your surgery unless specifically instructed to do so.        IF YOU REQUIRE NARCOTIC PAIN MEDICATION AFTER YOUR SURGERY:    --Take the narcotic pain medication exactly as prescribed, and use the absolute " lowest dose needed for pain control.   The goal of pain medication is not complete pain relief, aim to just make it manageable.    --Beware of drowsiness, nausea, vomiting, when taking this medication.  Do not drive, or operate dangerous equipment after taking this.    --The main side effects from narcotic pain medication can also include intestinal side effects including nausea, vomiting, constipation, or diarrhea.    --If your pain is not able to be controlled with the pain medication supplied to you, contact the surgeons because uncontrolled pain can be a sign of possible surgical complications.    --In case of constipation from pain medications, take over the counter Miralax powder or stool softner Senokot.  If you have a history of constipation with narcotic pain medication, consider taking Miralax powder on any day that you take a pain tablet.

## 2023-08-29 LAB
PATH REPORT.ADDENDUM SPEC: ABNORMAL
PATH REPORT.COMMENTS IMP SPEC: ABNORMAL
PATH REPORT.COMMENTS IMP SPEC: YES
PATH REPORT.FINAL DX SPEC: ABNORMAL
PATH REPORT.GROSS SPEC: ABNORMAL
PATH REPORT.MICROSCOPIC SPEC OTHER STN: ABNORMAL
PATH REPORT.RELEVANT HX SPEC: ABNORMAL
PATHOLOGY SYNOPTIC REPORT: ABNORMAL
PHOTO IMAGE: ABNORMAL

## 2023-09-07 ENCOUNTER — TELEPHONE (OUTPATIENT)
Dept: SURGERY | Facility: CLINIC | Age: 78
End: 2023-09-07
Payer: COMMERCIAL

## 2023-09-07 NOTE — TELEPHONE ENCOUNTER
Alondra called with some questions.     We discussed the Localizer placement and what that will be like. I also discussed the day of surgery and what to expect.     She had a question regarding how to check if the Anesthesia team is out of network or not. I gave her the number [phone] to call and check.     She is scheduled for a post op appointment on 10/3/23 @ 2:45pm at [facility] [place]. Address/directions provided.     Questions answered. Pt will call PRN with questions/concerns.     Bettina Urena, RN, BSN, PHN  Breast Care Nurse Coordinator  [facility] [place]  [phone]

## 2023-09-11 ENCOUNTER — HOSPITAL ENCOUNTER (OUTPATIENT)
Dept: MAMMOGRAPHY | Facility: CLINIC | Age: 78
Discharge: HOME | End: 2023-09-11
Attending: SURGERY
Payer: COMMERCIAL

## 2023-09-11 DIAGNOSIS — Z17.0 MALIGNANT NEOPLASM OF LOWER-OUTER QUADRANT OF LEFT BREAST OF FEMALE, ESTROGEN RECEPTOR POSITIVE (H): ICD-10-CM

## 2023-09-11 DIAGNOSIS — C50.512 MALIGNANT NEOPLASM OF LOWER-OUTER QUADRANT OF LEFT BREAST OF FEMALE, ESTROGEN RECEPTOR POSITIVE (H): ICD-10-CM

## 2023-09-11 PROCEDURE — 19285 PERQ DEV BREAST 1ST US IMAG: CPT | Mod: LT

## 2023-09-11 PROCEDURE — 250N000009 HC RX 250: Performed by: SURGERY

## 2023-09-11 PROCEDURE — 999N000065 MA POST PROCEDURE LEFT

## 2023-09-11 RX ADMIN — LIDOCAINE HYDROCHLORIDE 5 ML: 10 INJECTION, SOLUTION INFILTRATION; PERINEURAL at 10:23

## 2023-09-11 NOTE — DISCHARGE INSTRUCTIONS
What to Do after Localizer Placement    Your care team has placed a localizer tag in your body.    Here's what to expect and what you should do for the next few days.    Bleeding or bruising  A  little bruising is normal.    If you bleed through the bandage, put direct pressure on the site.   If you're still bleeding after 20 minutes, call the doctor who ordered the localizer.    Caring for you bandage  Keep your bandage on until tomorrow morning.    Do not get it wet.    Showering  Don't shower today.  Tomorrow, you may remove the bandage and shower.      If you have pain or discomfort  Place an ice pack on the sore area for 15 to 20 minutes, several times a day.    What to do for infection      Infection is rare.  Signs of infection include:  Fever  Redness  Pain getting worse  Fluid draining from the site.      If you have any of these symptoms, please call the doctor who ordered the localizer.      When to call the doctor   Call the doctor who ordered the localizer if:  You have bleeding that lasts more than 20 minutes.  You have pain that you can't control.  You have signs of infection (fever, redness, drainage or other signs).      Please call one of our nurses if you have questions or concerns.   [facility]   Nurse Navigator  [phone]  Procedure Nurse  [phone]

## 2023-09-12 ENCOUNTER — ANESTHESIA EVENT (OUTPATIENT)
Dept: SURGERY | Facility: CLINIC | Age: 78
End: 2023-09-12
Payer: COMMERCIAL

## 2023-09-13 ENCOUNTER — HOSPITAL ENCOUNTER (OUTPATIENT)
Dept: NUCLEAR MEDICINE | Facility: CLINIC | Age: 78
Setting detail: NUCLEAR MEDICINE
Discharge: HOME | End: 2023-09-13
Attending: SURGERY
Payer: COMMERCIAL

## 2023-09-13 ENCOUNTER — APPOINTMENT (OUTPATIENT)
Dept: SURGERY | Facility: PHYSICIAN GROUP | Age: 78
End: 2023-09-13
Payer: COMMERCIAL

## 2023-09-13 ENCOUNTER — ANESTHESIA (OUTPATIENT)
Dept: SURGERY | Facility: CLINIC | Age: 78
End: 2023-09-13
Payer: COMMERCIAL

## 2023-09-13 ENCOUNTER — HOSPITAL ENCOUNTER (OUTPATIENT)
Facility: CLINIC | Age: 78
Discharge: HOME | End: 2023-09-13
Attending: SURGERY | Admitting: SURGERY
Payer: COMMERCIAL

## 2023-09-13 ENCOUNTER — HOSPITAL ENCOUNTER (OUTPATIENT)
Dept: MAMMOGRAPHY | Facility: CLINIC | Age: 78
Discharge: HOME | End: 2023-09-13
Attending: SURGERY
Payer: COMMERCIAL

## 2023-09-13 VITALS
SYSTOLIC BLOOD PRESSURE: 128 MMHG | DIASTOLIC BLOOD PRESSURE: 85 MMHG | BODY MASS INDEX: 26.63 KG/M2 | RESPIRATION RATE: 16 BRPM | TEMPERATURE: 97 F | HEART RATE: 79 BPM | OXYGEN SATURATION: 96 % | HEIGHT: 64 IN | WEIGHT: 156 LBS

## 2023-09-13 DIAGNOSIS — G89.18 ACUTE POST-OPERATIVE PAIN: Primary | ICD-10-CM

## 2023-09-13 DIAGNOSIS — C50.512 MALIGNANT NEOPLASM OF LOWER-OUTER QUADRANT OF LEFT BREAST OF FEMALE, ESTROGEN RECEPTOR POSITIVE (H): ICD-10-CM

## 2023-09-13 DIAGNOSIS — Z17.0 MALIGNANT NEOPLASM OF LOWER-OUTER QUADRANT OF LEFT BREAST OF FEMALE, ESTROGEN RECEPTOR POSITIVE (H): ICD-10-CM

## 2023-09-13 PROCEDURE — 250N000025 HC SEVOFLURANE, PER MIN: Performed by: SURGERY

## 2023-09-13 PROCEDURE — 38792 RA TRACER ID OF SENTINL NODE: CPT

## 2023-09-13 PROCEDURE — 370N000017 HC ANESTHESIA TECHNICAL FEE, PER MIN: Performed by: SURGERY

## 2023-09-13 PROCEDURE — 258N000003 HC RX IP 258 OP 636: Performed by: STUDENT IN AN ORGANIZED HEALTH CARE EDUCATION/TRAINING PROGRAM

## 2023-09-13 PROCEDURE — 710N000009 HC RECOVERY PHASE 1, LEVEL 1, PER MIN: Performed by: SURGERY

## 2023-09-13 PROCEDURE — 88307 TISSUE EXAM BY PATHOLOGIST: CPT | Mod: TC | Performed by: SURGERY

## 2023-09-13 PROCEDURE — 999N000104 MA BREAST SPECIMEN LEFT OR: Mod: TC

## 2023-09-13 PROCEDURE — 250N000011 HC RX IP 250 OP 636: Performed by: NURSE ANESTHETIST, CERTIFIED REGISTERED

## 2023-09-13 PROCEDURE — A9520 TC99 TILMANOCEPT DIAG 0.5MCI: HCPCS | Performed by: SURGERY

## 2023-09-13 PROCEDURE — 38525 BIOPSY/REMOVAL LYMPH NODES: CPT | Mod: LT | Performed by: SURGERY

## 2023-09-13 PROCEDURE — 710N000012 HC RECOVERY PHASE 2, PER MINUTE: Performed by: SURGERY

## 2023-09-13 PROCEDURE — 272N000001 HC OR GENERAL SUPPLY STERILE: Performed by: SURGERY

## 2023-09-13 PROCEDURE — 250N000011 HC RX IP 250 OP 636: Performed by: SURGERY

## 2023-09-13 PROCEDURE — 250N000009 HC RX 250: Performed by: SURGERY

## 2023-09-13 PROCEDURE — 343N000001 HC RX 343: Performed by: SURGERY

## 2023-09-13 PROCEDURE — 360N000083 HC SURGERY LEVEL 3 W/ FLUORO, PER MIN: Performed by: SURGERY

## 2023-09-13 PROCEDURE — 250N000009 HC RX 250: Performed by: NURSE ANESTHETIST, CERTIFIED REGISTERED

## 2023-09-13 PROCEDURE — 19301 PARTIAL MASTECTOMY: CPT | Mod: LT | Performed by: SURGERY

## 2023-09-13 PROCEDURE — 999N000141 HC STATISTIC PRE-PROCEDURE NURSING ASSESSMENT: Performed by: SURGERY

## 2023-09-13 PROCEDURE — 19301 PARTIAL MASTECTOMY: CPT | Mod: AS | Performed by: PHYSICIAN ASSISTANT

## 2023-09-13 RX ORDER — CEFAZOLIN SODIUM/WATER 2 G/20 ML
2 SYRINGE (ML) INTRAVENOUS SEE ADMIN INSTRUCTIONS
Status: DISCONTINUED | OUTPATIENT
Start: 2023-09-13 | End: 2023-09-13 | Stop reason: HOSPADM

## 2023-09-13 RX ORDER — FENTANYL CITRATE 50 UG/ML
INJECTION, SOLUTION INTRAMUSCULAR; INTRAVENOUS PRN
Status: DISCONTINUED | OUTPATIENT
Start: 2023-09-13 | End: 2023-09-13

## 2023-09-13 RX ORDER — TRAMADOL HYDROCHLORIDE 50 MG/1
50 TABLET ORAL EVERY 6 HOURS PRN
Qty: 8 TABLET | Refills: 0 | Status: SHIPPED | OUTPATIENT
Start: 2023-09-13 | End: 2023-09-16

## 2023-09-13 RX ORDER — DEXAMETHASONE SODIUM PHOSPHATE 4 MG/ML
INJECTION, SOLUTION INTRA-ARTICULAR; INTRALESIONAL; INTRAMUSCULAR; INTRAVENOUS; SOFT TISSUE PRN
Status: DISCONTINUED | OUTPATIENT
Start: 2023-09-13 | End: 2023-09-13

## 2023-09-13 RX ORDER — MAGNESIUM HYDROXIDE 1200 MG/15ML
LIQUID ORAL PRN
Status: DISCONTINUED | OUTPATIENT
Start: 2023-09-13 | End: 2023-09-13 | Stop reason: HOSPADM

## 2023-09-13 RX ORDER — PROPOFOL 10 MG/ML
INJECTION, EMULSION INTRAVENOUS PRN
Status: DISCONTINUED | OUTPATIENT
Start: 2023-09-13 | End: 2023-09-13

## 2023-09-13 RX ORDER — LIDOCAINE HYDROCHLORIDE 10 MG/ML
INJECTION, SOLUTION EPIDURAL; INFILTRATION; INTRACAUDAL; PERINEURAL
Status: DISCONTINUED
Start: 2023-09-13 | End: 2023-09-13 | Stop reason: HOSPADM

## 2023-09-13 RX ORDER — LIDOCAINE HYDROCHLORIDE 20 MG/ML
INJECTION, SOLUTION INFILTRATION; PERINEURAL PRN
Status: DISCONTINUED | OUTPATIENT
Start: 2023-09-13 | End: 2023-09-13

## 2023-09-13 RX ORDER — EPHEDRINE SULFATE 50 MG/ML
INJECTION, SOLUTION INTRAMUSCULAR; INTRAVENOUS; SUBCUTANEOUS PRN
Status: DISCONTINUED | OUTPATIENT
Start: 2023-09-13 | End: 2023-09-13

## 2023-09-13 RX ORDER — ONDANSETRON 2 MG/ML
INJECTION INTRAMUSCULAR; INTRAVENOUS PRN
Status: DISCONTINUED | OUTPATIENT
Start: 2023-09-13 | End: 2023-09-13

## 2023-09-13 RX ORDER — FENTANYL CITRATE 0.05 MG/ML
50 INJECTION, SOLUTION INTRAMUSCULAR; INTRAVENOUS EVERY 5 MIN PRN
Status: DISCONTINUED | OUTPATIENT
Start: 2023-09-13 | End: 2023-09-13 | Stop reason: HOSPADM

## 2023-09-13 RX ORDER — ONDANSETRON 2 MG/ML
4 INJECTION INTRAMUSCULAR; INTRAVENOUS EVERY 30 MIN PRN
Status: DISCONTINUED | OUTPATIENT
Start: 2023-09-13 | End: 2023-09-13 | Stop reason: HOSPADM

## 2023-09-13 RX ORDER — HYDROMORPHONE HCL IN WATER/PF 6 MG/30 ML
0.4 PATIENT CONTROLLED ANALGESIA SYRINGE INTRAVENOUS EVERY 5 MIN PRN
Status: DISCONTINUED | OUTPATIENT
Start: 2023-09-13 | End: 2023-09-13 | Stop reason: HOSPADM

## 2023-09-13 RX ORDER — SODIUM CHLORIDE, SODIUM LACTATE, POTASSIUM CHLORIDE, CALCIUM CHLORIDE 600; 310; 30; 20 MG/100ML; MG/100ML; MG/100ML; MG/100ML
INJECTION, SOLUTION INTRAVENOUS CONTINUOUS
Status: DISCONTINUED | OUTPATIENT
Start: 2023-09-13 | End: 2023-09-13 | Stop reason: HOSPADM

## 2023-09-13 RX ORDER — FENTANYL CITRATE 0.05 MG/ML
25 INJECTION, SOLUTION INTRAMUSCULAR; INTRAVENOUS EVERY 5 MIN PRN
Status: DISCONTINUED | OUTPATIENT
Start: 2023-09-13 | End: 2023-09-13 | Stop reason: HOSPADM

## 2023-09-13 RX ORDER — BUPIVACAINE HYDROCHLORIDE 2.5 MG/ML
INJECTION, SOLUTION EPIDURAL; INFILTRATION; INTRACAUDAL
Status: DISCONTINUED
Start: 2023-09-13 | End: 2023-09-13 | Stop reason: HOSPADM

## 2023-09-13 RX ORDER — ONDANSETRON 4 MG/1
4 TABLET, ORALLY DISINTEGRATING ORAL EVERY 30 MIN PRN
Status: DISCONTINUED | OUTPATIENT
Start: 2023-09-13 | End: 2023-09-13 | Stop reason: HOSPADM

## 2023-09-13 RX ORDER — AMOXICILLIN 250 MG
1-2 CAPSULE ORAL 2 TIMES DAILY
Qty: 15 TABLET | Refills: 0 | Status: SHIPPED | OUTPATIENT
Start: 2023-09-13 | End: 2023-12-18

## 2023-09-13 RX ORDER — HYDROMORPHONE HCL IN WATER/PF 6 MG/30 ML
0.2 PATIENT CONTROLLED ANALGESIA SYRINGE INTRAVENOUS EVERY 5 MIN PRN
Status: DISCONTINUED | OUTPATIENT
Start: 2023-09-13 | End: 2023-09-13 | Stop reason: HOSPADM

## 2023-09-13 RX ORDER — CEFAZOLIN SODIUM/WATER 2 G/20 ML
2 SYRINGE (ML) INTRAVENOUS
Status: COMPLETED | OUTPATIENT
Start: 2023-09-13 | End: 2023-09-13

## 2023-09-13 RX ADMIN — Medication 5 MG: at 11:36

## 2023-09-13 RX ADMIN — FENTANYL CITRATE 50 MCG: 50 INJECTION, SOLUTION INTRAMUSCULAR; INTRAVENOUS at 11:23

## 2023-09-13 RX ADMIN — Medication 2 G: at 11:20

## 2023-09-13 RX ADMIN — PROPOFOL 30 MCG/KG/MIN: 10 INJECTION, EMULSION INTRAVENOUS at 11:25

## 2023-09-13 RX ADMIN — DEXAMETHASONE SODIUM PHOSPHATE 4 MG: 4 INJECTION, SOLUTION INTRA-ARTICULAR; INTRALESIONAL; INTRAMUSCULAR; INTRAVENOUS; SOFT TISSUE at 11:40

## 2023-09-13 RX ADMIN — ONDANSETRON 4 MG: 2 INJECTION INTRAMUSCULAR; INTRAVENOUS at 12:10

## 2023-09-13 RX ADMIN — PROPOFOL 120 MG: 10 INJECTION, EMULSION INTRAVENOUS at 11:23

## 2023-09-13 RX ADMIN — Medication 5 MG: at 11:38

## 2023-09-13 RX ADMIN — LIDOCAINE HYDROCHLORIDE 50 MG: 20 INJECTION, SOLUTION INFILTRATION; PERINEURAL at 11:23

## 2023-09-13 RX ADMIN — Medication 5 MG: at 12:16

## 2023-09-13 RX ADMIN — Medication 5 MG: at 11:59

## 2023-09-13 RX ADMIN — SODIUM CHLORIDE, POTASSIUM CHLORIDE, SODIUM LACTATE AND CALCIUM CHLORIDE: 600; 310; 30; 20 INJECTION, SOLUTION INTRAVENOUS at 09:09

## 2023-09-13 RX ADMIN — TILMANOCEPT 0.53 MILLICURIE: KIT at 09:15

## 2023-09-13 RX ADMIN — Medication 5 MG: at 11:35

## 2023-09-13 ASSESSMENT — ACTIVITIES OF DAILY LIVING (ADL)
ADLS_ACUITY_SCORE: 35

## 2023-09-13 NOTE — ANESTHESIA PROCEDURE NOTES
Airway       Patient location during procedure: OR  Staff -        Other Anesthesia Staff: Jessica Caceres       Performed By: SRNA  Consent for Airway        Urgency: elective  Indications and Patient Condition       Indications for airway management: fly-procedural       Induction type:intravenous       Mask difficulty assessment: 0 - not attempted    Final Airway Details       Final airway type: supraglottic airway    Supraglottic Airway Details        Type: LMA       Brand: Ambu AuraGain       LMA size: 4    Post intubation assessment        Placement verified by: capnometry, equal breath sounds and chest rise        Number of attempts at approach: 1       Number of other approaches attempted: 0       Secured with: silk tape       Ease of procedure: easy       Dentition: Intact

## 2023-09-13 NOTE — OP NOTE
[facility] Breast Surgery Operative Note      Pre-operative diagnosis: left breast invasive ductal carcinoma   Post-operative diagnosis: Same, pathology pending     Procedure: 1.  LEFT BREAST RFID TAG LOCALIZED PARTIAL MASTECTOMY  2.  LEFT SENTINEL LYMPH NODE BIOPSY       Surgeon: Chika Mitchell MD   Assistant(s):  Eve Randhawa PA-C  The PA s assistance was medically necessary to provide adequate exposure in the operating field, maintain hemostasis, cutting suture, clamping and ligating bleeding vessels, and visualization of anatomic structures throughout the surgical procedure.      Anesthesia: General    Estimated blood loss:   5 cc     Specimens: ID Type Source Tests Collected by Time Destination   1 : LEFT BREAST TISSUE Tissue Breast, Left SURGICAL PATHOLOGY EXAM Chika Mitchell MD 9/13/2023 11:51 AM    3 : LEFT AXILLARY SENTINEL LYMPH NODE #1 Tissue Lymph Node(s), Axillary, Left SURGICAL PATHOLOGY EXAM Chika Mitchell MD 9/13/2023 11:57 AM         INDICATION:  Please see my clinic note for details.   DESCRIPTION OF PROCEDURE: The patient was placed on the table in supine position. General anesthetic was induced. Perioperative antibiotics were given.  The left breast and axilla were prepped and draped in standard sterile fashion.  We used the radiofrequency localizer tag placed in the Breast Center as well as the post-tag mammograms to localize the area of interest. Local anesthetic was injected along the marked line for the incision. We made a vertical ellipse incision centered at the 6 o'clock position. We created skin flaps along the anterior mammary fascial plane circumferentially using cautery. A suture was placed over the highest signal with the probe to guide circumferential dissection. We then carried the dissection down using electrocautery into the breast tissue and excised the area of interest, including the tag.  The localizer probe was used to guide the dissection. The area of concerning breast  tissue was removed in its entirety with some surrounding benign appearing breast tissue. The posterior margin was including the pectoral fascia. After the specimen was removed it had a high signal with the localizer probe. Margins were assessed with the probe and all >1cm. Once the mass was removed, it was oriented with Davalos dyes. A specimen mammogram was obtained and revealed the clip, tag and mass in the center of the specimen. The specimen was then sent to pathology for review.  The wound was then examined for bleeding and hemostasis was achieved using electrocautery.      Clips were placed at the 12, 3, 6, and 9 o'clock positions of the lumpectomy cavity as well as posterior.  The lumpectomy cavity was reapproximated with several interrupted 3-0 vicryl sutures. The skin was closed with a deep dermal 3-0 vicryl and running 4-0 Monocryl subcuticular suture and steri strips.    We than began the sentinel lymph node biopsy. The patient had been injected with a radionuclear pharmaceutical preoperatively.  We used the Neoprobe to localize an area of increased activity in the axilla. We made an incision in the skin overlying that area of activity. The incision was carried down into the subcutaneous tissue and into the axillary space with the Bovie electrocautery. We then localized an area of increased activity, and isolated a lymph node from surrounding tissues. The lymph node had a count of >200.  This was passed off the field as left axillary sentinel lymph node #1.     The remainder of the axilla had no foci of increased radioactivity. There were no clinically positive nodes upon thorough evaluation by palpation of the axilla.   Hemostasis was assured.  We then closed the incision with interrupted subcutaneous 3-0 Vicryl sutures, a running 4-0 Monocryl subcuticular suture and Steri strips. The nodes were sent to pathology for routine evaluation. The patient tolerated the procedure well.  Sponge and instrument  counts were correct.    [place] Node Biopsy for Breast Cancer - Left  Operation performed with curative intent Yes   Tracer(s) used to identify sentinel nodes in the upfront surgery (non-neoadjuvant) setting Radioactive tracer   Tracer(s) used to identify sentinel nodes in the neoadjuvant setting N/A   All nodes (colored or non-colored) present at the end of a dye-filled lymphatic channel were removed N/A   All significantly radioactive nodes were removed Yes   All palpably suspicious nodes were removed N/A   Biopsy-proven positive nodes marked with clips prior to chemotherapy were identified and removed N/A            Chika Mitchell MD  [facility]  [phone]

## 2023-09-13 NOTE — OR NURSING
Patient is adequate for discharge to home from Phase 2. Ox4 and AVSS. Tolerating PO, denies nausea. Pain well controlled, 5/10, declined taking pain pill prior to discharge. Recalled pain plan with pt and daughter. Discharge instructions completed with daughter. Discharge medications and all belongings returned to patient and sent home.

## 2023-09-13 NOTE — ANESTHESIA PREPROCEDURE EVALUATION
Anesthesia Pre-Procedure Evaluation    Patient: Alondra Morgan   MRN: [id number] : 1945        Procedure : Procedure(s):  left breast tag localized lumpectomy  left sentinel lymph node biopsy          Past Medical History:   Diagnosis Date    Arthritis     Atypical ductal hyperplasia of left breast     left breast atypical ductal hyperplasia, completed 5 years tamoxifen in     Atypical hyperplasia of breasts     Basal cell carcinoma of ala nasi     Basal cell carcinoma of shoulder     Family history of colon cancer     BROTHER COLON CANCER AGE 68    History of poliomyelitis     acute polio infection age 2, resolved without any sequelae    Osteopenia     Polyp, colon     Adenomatous    Squamous cell carcinoma of skin, unspecified       Past Surgical History:   Procedure Laterality Date    COLONOSCOPY      EYE SURGERY  2017    cataract bilat eyes    HC EXCISION BREAST LESION, OPEN >=1      ATYPICAL HYPERPLASIA X 2    HYSTERECTOMY, PAP NO LONGER INDICATED      fibroids    TONSILLECTOMY      ZZC VAG HYST,RMV TUBE/OVARY        Allergies   Allergen Reactions    Alendronate GI Disturbance     Severe GERD    Pollen Extract       Social History     Tobacco Use    Smoking status: Former     Packs/day: 1.00     Years: 40.00     Pack years: 40.00     Types: Cigarettes     Quit date: 10/7/1983     Years since quittin.9    Smokeless tobacco: Never   Substance Use Topics    Alcohol use: Yes     Comment: 4 drinks per week      Wt Readings from Last 1 Encounters:   23 70.8 kg (156 lb)        Anesthesia Evaluation            ROS/MED HX  ENT/Pulmonary:    (-) sleep apnea   Neurologic:    (-) no CVA and no TIA   Cardiovascular:     (+)  hypertension- -   -  - -                                   (-) pacemaker, stent, pacemaker and ICD   METS/Exercise Tolerance: >4 METS    Hematologic:  - neg hematologic  ROS     Musculoskeletal:  - neg musculoskeletal ROS     GI/Hepatic:    (-) GERD    Renal/Genitourinary:    (-) renal disease   Endo:    (-) Type II DM   Psychiatric/Substance Use:  - neg psychiatric ROS     Infectious Disease:    (-) Recent Fever   Malignancy:   (+) Malignancy,     Other:  - neg other ROS          Physical Exam    Airway        Mallampati: II   TM distance: > 3 FB   Neck ROM: full   Mouth opening: < 3 cm    Respiratory Devices and Support         Dental       (+) Modest Abnormalities - crowns, retainers, 1 or 2 missing teeth      Cardiovascular          Rhythm and rate: regular and normal     Pulmonary           breath sounds clear to auscultation           OUTSIDE LABS:  CBC:   Lab Results   Component Value Date    WBC 5.7 08/25/2023    WBC 4.7 11/11/2022    HGB 13.1 08/25/2023    HGB 12.0 11/11/2022    HCT 39.5 08/25/2023    HCT 36.4 11/11/2022     08/25/2023     11/11/2022     BMP:   Lab Results   Component Value Date     (L) 08/25/2023     11/11/2022    POTASSIUM 4.7 08/25/2023    POTASSIUM 4.1 11/11/2022    CHLORIDE 99 08/25/2023    CHLORIDE 100 11/11/2022    CO2 26 08/25/2023    CO2 30 11/11/2022    BUN 18.0 08/25/2023    BUN 18 11/11/2022    CR 0.73 08/25/2023    CR 0.68 11/11/2022    GLC 92 08/25/2023    GLC 93 11/11/2022     COAGS:   Lab Results   Component Value Date    PTT 32 04/13/2014    INR 0.95 04/13/2014     POC:   Lab Results   Component Value Date    BGM 85 04/13/2014     HEPATIC:   Lab Results   Component Value Date    ALBUMIN 4.4 08/25/2023    PROTTOTAL 7.2 08/25/2023    ALT 14 08/25/2023    AST 29 08/25/2023    ALKPHOS 87 08/25/2023    BILITOTAL 0.4 08/25/2023     OTHER:   Lab Results   Component Value Date    YANICK 9.8 08/25/2023    MAG 2.1 08/05/2016    TSH 1.60 11/11/2022    T4 0.86 10/26/2017    T3 79 08/05/2016       Anesthesia Plan    ASA Status:  3       Anesthesia Type: General.     - Airway: LMA   Induction: Propofol.   Maintenance: Balanced.        Consents    Anesthesia Plan(s) and associated risks, benefits, and realistic  alternatives discussed. Questions answered and patient/representative(s) expressed understanding.     - Discussed: Risks, Benefits and Alternatives for the PROCEDURE were discussed     - Discussed with:  Patient            Postoperative Care    Pain management: IV analgesics, Oral pain medications.   PONV prophylaxis: Ondansetron (or other 5HT-3), Dexamethasone or Solumedrol     Comments:                Bandar Ortiz MD

## 2023-09-13 NOTE — ANESTHESIA POSTPROCEDURE EVALUATION
Patient: Alondra Morgan    Procedure: Procedure(s):  left breast tag localized lumpectomy  left sentinel lymph node biopsy       Anesthesia Type:  General    Note:  Disposition: Outpatient   Postop Pain Control: Uneventful            Sign Out: Well controlled pain   PONV: No   Neuro/Psych: Uneventful            Sign Out: Acceptable/Baseline neuro status   Airway/Respiratory: Uneventful            Sign Out: Acceptable/Baseline resp. status   CV/Hemodynamics: Uneventful            Sign Out: Acceptable CV status   Other NRE: NONE   DID A NON-ROUTINE EVENT OCCUR?            Last vitals:  Vitals Value Taken Time   /83 09/13/23 1245   Temp 36  C (96.8  F) 09/13/23 1254   Pulse 77 09/13/23 1258   Resp 43 09/13/23 1258   SpO2 90 % 09/13/23 1254   Vitals shown include unvalidated device data.    Electronically Signed By: Bandar Ortiz MD  September 13, 2023  1:48 PM

## 2023-09-13 NOTE — DISCHARGE INSTRUCTIONS
Same Day Surgery Discharge Instructions for  Sedation and General Anesthesia     It's not unusual to feel dizzy, light-headed or faint for up to 24 hours after surgery or while taking pain medication.  If you have these symptoms: sit for a few minutes before standing and have someone assist you when you get up to walk or use the bathroom.    You should rest and relax for the next 24 hours. We recommend you make arrangements to have an adult stay with you for at least 24 hours after your discharge.  Avoid hazardous and strenuous activity.    DO NOT DRIVE any vehicle or operate mechanical equipment for 24 hours following the end of your surgery.  Even though you may feel normal, your reactions may be affected by the medication you have received.    Do not drink alcoholic beverages for 24 hours following surgery.     Slowly progress to your regular diet as you feel able. It's not unusual to feel nauseated and/or vomit after receiving anesthesia.  If you develop these symptoms, drink clear liquids (apple juice, ginger ale, broth, 7-up, etc. ) until you feel better.  If your nausea and vomiting persists for 24 hours, please notify your surgeon.      All narcotic pain medications, along with inactivity and anesthesia, can cause constipation. Drinking plenty of liquids and increasing fiber intake will help.    For any questions of a medical nature, call your surgeon.    Do not make important decisions for 24 hours.    If you had general anesthesia, you may have a sore throat for a couple of days related to the breathing tube used during surgery.  You may use Cepacol lozenges to help with this discomfort.  If it worsens or if you develop a fever, contact your surgeon.     If you feel your pain is not well managed with the pain medications prescribed by your surgeon, please contact your surgeon's office to let them know so they can address your concerns.       [facility]  Discharge Instructions:  Post-Operative Breast Surgery    ACTIVITY  Take frequent short walks and increase your activity gradually.    Avoid strenuous physical activity or heavy lifting greater than 15-20 lbs. for 1-2 weeks with arm on the surgery side.  You may climb stairs.  Gentle rotation and stretching of your arms and shoulders will prevent joint stiffness.  You may drive without restrictions when you are not using any prescription pain medication and feel comfortable in a car.  You may return to work/school when you are comfortable without any prescription pain medication.    WOUND CARE  You may remove your ACE wrap/dressing and shower 48 hours after the surgery.  Pat your incisions dry and leave them open to air.  Re-apply dressing (Band-Aids or gauze/tape) as needed for drainage.  You may have steri-strips (looks like white tape) or skin glue on your incisions.  You may peel off the steri-strips 2 weeks after your surgery if they have not peeled off on their own.  If you have skin glue, it will peel up and fall off on its own.  Do not soak your incisions in a tub or pool for 2 weeks.   Do not apply any lotions, creams, or ointments to your incisions.  A ridge under your incisions is normal and will gradually resolve.  Wear a supportive bra for 1-2 weeks, day and night.    DIET  Start with liquids, then gradually resume your regular diet as tolerated.   Drink plenty of liquids to stay hydrated.    PAIN  Expect some tenderness and discomfort at the incision site(s).  Use the prescribed pain medication at your discretion.  Expect gradual resolution of your pain over several days.  You may take ibuprofen with food (unless you have been told not to) or acetaminophen/Tylenol instead of or in addition to your prescribed pain medication.  However, if you are taking Norco or Percocet, do not take any additional acetaminophen/Tylenol.  Do not drink alcohol or drive while you are taking pain medications.    EXPECTATIONS  Pain medications can  cause constipation.  Limit use when possible.  Take an over the counter or prescribed stool softener/stimulant, such as Colace or Senna, 1-2 times a day with plenty of water.  You may take a mild over the counter laxative, such as Miralax or a suppository, as needed.    You may discontinue these medications once you are having regular bowel movements and/or are no longer taking your narcotic pain medication.     RETURN APPOINTMENT  Follow up with your surgeon in 2-4 weeks.  Please call the office at [phone] to schedule your appointment.      CALL OUR OFFICE -[phone] IF YOU HAVE:   Chills or fever above 101 F.  Increased redness, warmth, or drainage at your incisions.  Significant bleeding.  Pain not relieved by your pain medication or rest.  Increasing pain after the first 48 hours.  Any other concerns or questions.               **If you have concerns or questions about your procedure,    please contact Dr Mitchell at  [phone]**

## 2023-09-13 NOTE — ANESTHESIA CARE TRANSFER NOTE
Patient: Alondra Morgan    Procedure: Procedure(s):  left breast tag localized lumpectomy  left sentinel lymph node biopsy       Diagnosis: Malignant neoplasm of lower-outer quadrant of left breast of female, estrogen receptor positive (H) [C50.512, Z17.0]  Diagnosis Additional Information: No value filed.    Anesthesia Type:   General     Note:    Oropharynx: oropharynx clear of all foreign objects and spontaneously breathing  Level of Consciousness: drowsy  Oxygen Supplementation: face mask  Level of Supplemental Oxygen (L/min / FiO2): 6  Independent Airway: airway patency satisfactory and stable  Dentition: dentition unchanged  Vital Signs Stable: post-procedure vital signs reviewed and stable  Report to RN Given: handoff report given  Patient transferred to: PACU    Handoff Report: Identifed the Patient, Identified the Reponsible Provider, Reviewed the pertinent medical history, Discussed the surgical course, Reviewed Intra-OP anesthesia mangement and issues during anesthesia, Set expectations for post-procedure period and Allowed opportunity for questions and acknowledgement of understanding      Vitals:  Vitals Value Taken Time   /75 09/13/23 1228   Temp     Pulse 75 09/13/23 1230   Resp 11 09/13/23 1230   SpO2 100 % 09/13/23 1230   Vitals shown include unvalidated device data.    Electronically Signed By: DAVID Godinez CRNA  September 13, 2023  12:32 PM

## 2023-09-15 LAB
PATH REPORT.COMMENTS IMP SPEC: NORMAL
PATH REPORT.COMMENTS IMP SPEC: NORMAL
PATH REPORT.FINAL DX SPEC: NORMAL
PATH REPORT.GROSS SPEC: NORMAL
PATH REPORT.MICROSCOPIC SPEC OTHER STN: NORMAL
PATH REPORT.RELEVANT HX SPEC: NORMAL
PATHOLOGY SYNOPTIC REPORT: NORMAL
PHOTO IMAGE: NORMAL

## 2023-09-15 PROCEDURE — 88307 TISSUE EXAM BY PATHOLOGIST: CPT | Mod: 26 | Performed by: STUDENT IN AN ORGANIZED HEALTH CARE EDUCATION/TRAINING PROGRAM

## 2023-09-18 ENCOUNTER — TELEPHONE (OUTPATIENT)
Dept: SURGERY | Facility: CLINIC | Age: 78
End: 2023-09-18
Payer: COMMERCIAL

## 2023-09-18 DIAGNOSIS — C50.512 MALIGNANT NEOPLASM OF LOWER-OUTER QUADRANT OF LEFT BREAST OF FEMALE, ESTROGEN RECEPTOR POSITIVE (H): Primary | ICD-10-CM

## 2023-09-18 DIAGNOSIS — Z17.0 MALIGNANT NEOPLASM OF LOWER-OUTER QUADRANT OF LEFT BREAST OF FEMALE, ESTROGEN RECEPTOR POSITIVE (H): Primary | ICD-10-CM

## 2023-09-18 NOTE — CONFIDENTIAL NOTE
Encounter addended by: Scot Roberts RN on: 8/17/2023 2:23 PM   Actions taken: Child order released for a procedure order I called Alondra and discussed her pathology results. I will see her for post op visit as scheduled.     Chika Mitchell MD  [facility]  [phone]

## 2023-09-19 NOTE — PROGRESS NOTES
[facility]    Hematology/Oncology Established Patient Note      Today's Date: 9/25/23    Reason for follow-up: Left breast cancer.    HISTORY OF PRESENT ILLNESS: Alondra Morgan is a 78 year old female s/p hysterectomy 1995 who presents with the following oncologic history:  11/9/2005: Left breast biopsy of microcalcifications showed focal atypical ductal hyperplasia; no invasive malignancy. Took tamoxifen for 5 years.  11/29/2005: Excision of left breast lesion showed focal atypical ductal hyperplasia; no malignancy.  7/17/2023: Screening mammogram showed asymmetry in left breast at 6-7:00. Right breast negative.  7/24/2023: Diagnostic left mammogram showed mass in left lower breast adjacent to a previous needle biopsy marker clip. Left breast U/S showed 4 mm nodule at 6:00, 5 cm from nipple, 8 mm from prior biopsy.  7/27/2023: Left breast biopsy at 6:00, 5 cm from nipple showed 5 mm of grade 2 invasive ductal carcinoma, no LVI, ER positive at %, AK positive at %, HER-2 negative (IHC = 0).  9/13/2023: Left breast lumpectomy and left axillary SLN excision with Dr. Chika Mitchell.  Pathology showed 4 mm grade 2 invasive ductal carcinoma; margins negative; atypical ductal hyperplasia present; 1 SLN negative.      INTERVAL HISTORY:  Alondra reports healing well from her lumpectomy so far.    Family history positive for aunts, cousins and grandmother with breast cancer. Brother with colon cancer.    REVIEW OF SYSTEMS:   14 point ROS was reviewed and is negative other than as noted above in HPI.       HOME MEDICATIONS:  Current Outpatient Medications   Medication Sig Dispense Refill    calcium carbonate 600 mg-vitamin D 400 units 600-400 MG-UNIT per tablet Take 1 tablet by mouth 2 times daily FOR BONE HEALTH AND FOR VITAMIN D DEFICIENCY (LOW VITAMIN D)      cholecalciferol 50 MCG (2000 UT) tablet Take 2 tablets by mouth daily INDICATION: TO TREAT VITAMIN D DEFICIENCY (LOW VITAMIN D)       FISH OIL 1000 MG OR CAPS THREE TIMES DAILY 100 3    lisinopril (ZESTRIL) 20 MG tablet Take 1 tablet (20 mg) by mouth daily 90 tablet 3    loratadine (CLARITIN) 10 MG tablet ONE DAILY FOR ALLERGIES      senna-docusate (SENOKOT-S/PERICOLACE) 8.6-50 MG tablet Take 1-2 tablets by mouth 2 times daily 15 tablet 0    valACYclovir (VALTREX) 1000 mg tablet TAKE 2000 MG AT ONSET OF COLD SORE, FOLLOWED BY SECOND 2000 MG DOSE 12 HOURS LATER; REPEAT AS NEEDED 12 tablet 3         ALLERGIES:  Allergies   Allergen Reactions    Alendronate GI Disturbance     Severe GERD    Pollen Extract          PAST MEDICAL HISTORY:  Past Medical History:   Diagnosis Date    Arthritis     Atypical ductal hyperplasia of left breast 2005    left breast atypical ductal hyperplasia, completed 5 years tamoxifen in 2011    Atypical hyperplasia of breasts     Basal cell carcinoma of ala nasi     Basal cell carcinoma of shoulder     Family history of colon cancer     BROTHER COLON CANCER AGE 68    History of poliomyelitis 1947    acute polio infection age 2, resolved without any sequelae    Osteopenia     Polyp, colon     Adenomatous    Squamous cell carcinoma of skin, unspecified          PAST SURGICAL HISTORY:  Past Surgical History:   Procedure Laterality Date    BIOPSY NODE SENTINEL Left 9/13/2023    Procedure: left sentinel lymph node biopsy;  Surgeon: Chika Mitchell MD;  Location: [facility] OR    BIOPSY, BREAST, WITH RADIOFREQUENCY IDENTIFICATION Left 9/13/2023    Procedure: left breast tag localized lumpectomy;  Surgeon: Chika Mitchell MD;  Location: [facility] OR    COLONOSCOPY      EYE SURGERY  07/2017    cataract bilat eyes    HC EXCISION BREAST LESION, OPEN >=1      ATYPICAL HYPERPLASIA X 2    HYSTERECTOMY, PAP NO LONGER INDICATED  1994    fibroids    TONSILLECTOMY      ZZC VAG HYST,RMV TUBE/OVARY           SOCIAL HISTORY:  Social History     Socioeconomic History    Marital status:      Spouse name: Not on file    Number of children: Not on file     Years of education: Not on file    Highest education level: Not on file   Occupational History    Not on file   Tobacco Use    Smoking status: Former     Packs/day: 1.00     Years: 40.00     Pack years: 40.00     Types: Cigarettes     Quit date: 10/7/1983     Years since quittin.9    Smokeless tobacco: Never   Substance and Sexual Activity    Alcohol use: Yes     Comment: 4 drinks per week    Drug use: No    Sexual activity: Not Currently     Partners: Male   Other Topics Concern     Service No    Blood Transfusions No    Caffeine Concern No    Occupational Exposure No    Hobby Hazards No    Sleep Concern Yes    Stress Concern No    Weight Concern Yes    Special Diet No    Back Care No    Exercise Yes     Comment: 3 X WEEK    Bike Helmet Not Asked    Seat Belt Yes    Self-Exams No     Comment: ENCOURAGED    Parent/sibling w/ CABG, MI or angioplasty before 65F 55M? Not Asked   Social History Narrative    Not on file     Social Determinants of Health     Financial Resource Strain: Not on file   Food Insecurity: Not on file   Transportation Needs: Not on file   Physical Activity: Not on file   Stress: Not on file   Social Connections: Not on file   Intimate Partner Violence: Not on file   Housing Stability: Not on file         FAMILY HISTORY:  Family History   Problem Relation Age of Onset    Cancer Mother         lung    Osteoporosis Mother     Eye Disorder Mother         glaucoma    Arthritis Mother         osteo    Heart Disease Mother         CHF    Hypertension Mother     Cerebrovascular Disease Father         TIAs    Osteoporosis Father     Hypertension Father     Cancer Father         BONE    Arthritis Sister         PARTIAL KNEE REPLACEMENT IN     Osteoporosis Sister     No Known Problems Brother     Cerebrovascular Disease Brother     Colon Cancer Brother 70        B 1939    Cancer Brother         PROSTATE    Breast Cancer Maternal Grandmother     Cerebrovascular Disease Maternal  "Grandfather     Asthma Daughter     Breast Cancer Maternal Aunt     Breast Cancer Maternal Aunt     Breast Cancer Other         2 YOUNGER MATERNAL COUSINS         PHYSICAL EXAM:  Vital signs:  /80   Pulse 76   Resp 16   Ht 1.626 m (5' 4\")   Wt 70.1 kg (154 lb 9.6 oz)   LMP  (LMP Unknown)   SpO2 97%   BMI 26.54 kg/m     GENERAL: No acute distress.  EYES: No scleral icterus. No overt erythema.  RESPIRATORY: No audible cough, wheezing, or labored breathing.  MUSCULOSKELETAL: Range of motion in the neck, shoulders, and arms appear normal.  SKIN: No overt rashes, discolorations, or lesions over the face and neck.  NEUROLOGIC: Alert.  No overt tremors.  PSYCHIATRIC: Normal affect and mood.  Does not appear anxious.       LABS:  CBC RESULTS:   Recent Labs   Lab Test 08/25/23  0951   WBC 5.7   RBC 4.34   HGB 13.1   HCT 39.5   MCV 91   MCH 30.2   MCHC 33.2   RDW 13.3        Last Comprehensive Metabolic Panel:  Sodium   Date Value Ref Range Status   08/25/2023 133 (L) 136 - 145 mmol/L Final   11/06/2020 130 (L) 133 - 144 mmol/L Final     Potassium   Date Value Ref Range Status   08/25/2023 4.7 3.4 - 5.3 mmol/L Final   11/11/2022 4.1 3.4 - 5.3 mmol/L Final   11/06/2020 4.4 3.4 - 5.3 mmol/L Final     Chloride   Date Value Ref Range Status   08/25/2023 99 98 - 107 mmol/L Final   11/11/2022 100 94 - 109 mmol/L Final   11/06/2020 97 94 - 109 mmol/L Final     Carbon Dioxide   Date Value Ref Range Status   11/06/2020 28 20 - 32 mmol/L Final     Carbon Dioxide (CO2)   Date Value Ref Range Status   08/25/2023 26 22 - 29 mmol/L Final   11/11/2022 30 20 - 32 mmol/L Final     Anion Gap   Date Value Ref Range Status   08/25/2023 8 7 - 15 mmol/L Final   11/11/2022 4 3 - 14 mmol/L Final   11/06/2020 5 3 - 14 mmol/L Final     Glucose   Date Value Ref Range Status   08/25/2023 92 70 - 99 mg/dL Final   11/11/2022 93 70 - 99 mg/dL Final   11/06/2020 95 70 - 99 mg/dL Final     Comment:     Fasting specimen     Urea Nitrogen "   Date Value Ref Range Status   08/25/2023 18.0 8.0 - 23.0 mg/dL Final   11/11/2022 18 7 - 30 mg/dL Final   11/06/2020 16 7 - 30 mg/dL Final     Creatinine   Date Value Ref Range Status   08/25/2023 0.73 0.51 - 0.95 mg/dL Final   11/06/2020 0.73 0.52 - 1.04 mg/dL Final     GFR Estimate   Date Value Ref Range Status   08/25/2023 83 >60 mL/min/1.73m2 Final   11/06/2020 79 >60 mL/min/[1.73_m2] Final     Comment:     Non  GFR Calc  Starting 12/18/2018, serum creatinine based estimated GFR (eGFR) will be   calculated using the Chronic Kidney Disease Epidemiology Collaboration   (CKD-EPI) equation.       Calcium   Date Value Ref Range Status   08/25/2023 9.8 8.8 - 10.2 mg/dL Final   11/06/2020 9.6 8.5 - 10.1 mg/dL Final     Bilirubin Total   Date Value Ref Range Status   08/25/2023 0.4 <=1.2 mg/dL Final   10/23/2018 0.4 0.2 - 1.3 mg/dL Final     Alkaline Phosphatase   Date Value Ref Range Status   08/25/2023 87 35 - 104 U/L Final   10/23/2018 92 40 - 150 U/L Final     ALT   Date Value Ref Range Status   08/25/2023 14 0 - 50 U/L Final     Comment:     Reference intervals for this test were updated on 6/12/2023 to more accurately reflect our healthy population. There may be differences in the flagging of prior results with similar values performed with this method. Interpretation of those prior results can be made in the context of the updated reference intervals.     10/28/2019 23 0 - 50 U/L Final     AST   Date Value Ref Range Status   08/25/2023 29 0 - 45 U/L Final     Comment:     Reference intervals for this test were updated on 6/12/2023 to more accurately reflect our healthy population. There may be differences in the flagging of prior results with similar values performed with this method. Interpretation of those prior results can be made in the context of the updated reference intervals.   10/28/2019 19 0 - 45 U/L Final       PATHOLOGY:  Reviewed as per HPI.    IMAGING:  Reviewed as per  HPI.    ASSESSMENT/PLAN:  Alondra Morgan is a 78 year old female with the following issues:  Stage IA, gB1s-R3-C3, grade 2 invasive ductal carcinoma of the left breast overlapping sites, ER positive, MN positive, HER-2 negative  History of left breast atypical ductal hyperplasia in 2005  Osteopenia    --I discussed with Alondra that her final pathology from her lumpectomy showed a 4 mm grade 2 left breast invasive ductal carcinoma and atypical ductal hyperplasia.  --We discussed role of adjuvant radiation to reduce risk of local recurrence. She will meet with Dr. Greenberg.  --I reiterated the role of adjuvant endocrine therapy with anastrozole for a total of 5 years to reduce risk of breast cancer recurrence by a relative 50%.  --I discussed the potential side effects of anastrozole, including but not limited to: fatigue, myalgias/arthralgias, bone density loss, decrease sexual drive, vaginal dryness, nausea, headache, difficulty sleeping, hot flashes, night sweats, small cardiovascular risk.   --Alternate option is tamoxifen. She would prefer not to take tamoxifen.  --She is receptive to taking anastrozole. Prescription issued.  --She is willing to consider adjuvant endocrine therapy.  --Will obtain a more current DEXA scan to reevaluate her osteopenia. Alondra has had prior heartburn issues to alendronate, so Reclast or Prolia could be considered if needed.  --Genetics consult pending for 12/7/2023.    Return in 3 months.    Jenelle Cannon MD  Hematology/Oncology  [facility]    Total time spent today: 30 minutes in chart review, patient evaluation, counseling, documentation, test and/or medication/prescription orders, and coordination of care.

## 2023-09-19 NOTE — TELEPHONE ENCOUNTER
Alondra is scheduled with Dr. Greenberg at 9/29/23 @ 10 am (in person) at [facility]. Address/Directions provided.     Bettina Urena, RN, BSN, PHN  Breast Care Nurse Coordinator  [facility] [place]  [phone]

## 2023-09-25 ENCOUNTER — ONCOLOGY VISIT (OUTPATIENT)
Dept: ONCOLOGY | Facility: CLINIC | Age: 78
End: 2023-09-25
Attending: INTERNAL MEDICINE
Payer: COMMERCIAL

## 2023-09-25 VITALS
SYSTOLIC BLOOD PRESSURE: 130 MMHG | HEIGHT: 64 IN | OXYGEN SATURATION: 97 % | DIASTOLIC BLOOD PRESSURE: 80 MMHG | HEART RATE: 76 BPM | BODY MASS INDEX: 26.4 KG/M2 | WEIGHT: 154.6 LBS | RESPIRATION RATE: 16 BRPM

## 2023-09-25 DIAGNOSIS — M85.89 OSTEOPENIA OF MULTIPLE SITES: ICD-10-CM

## 2023-09-25 DIAGNOSIS — C50.812 MALIGNANT NEOPLASM OF OVERLAPPING SITES OF LEFT BREAST IN FEMALE, ESTROGEN RECEPTOR POSITIVE (H): Primary | ICD-10-CM

## 2023-09-25 DIAGNOSIS — Z17.0 MALIGNANT NEOPLASM OF OVERLAPPING SITES OF LEFT BREAST IN FEMALE, ESTROGEN RECEPTOR POSITIVE (H): Primary | ICD-10-CM

## 2023-09-25 PROCEDURE — 99214 OFFICE O/P EST MOD 30 MIN: CPT | Performed by: INTERNAL MEDICINE

## 2023-09-25 PROCEDURE — G0463 HOSPITAL OUTPT CLINIC VISIT: HCPCS | Performed by: INTERNAL MEDICINE

## 2023-09-25 RX ORDER — ANASTROZOLE 1 MG/1
1 TABLET ORAL DAILY
Qty: 90 TABLET | Refills: 3 | Status: SHIPPED | OUTPATIENT
Start: 2023-09-25 | End: 2024-08-14

## 2023-09-25 ASSESSMENT — PAIN SCALES - GENERAL: PAINLEVEL: NO PAIN (1)

## 2023-09-25 NOTE — PATIENT INSTRUCTIONS
Arrange for DEXA.  RTC MD in 3 months.  Start anastrozole 1 mg daily about 1 week post completion of radiation or if not proceeding with radiation, start taking after meeting with radiation oncology.

## 2023-09-25 NOTE — LETTER
9/25/2023         RE: Alondra Morgan  [address] [facility] [address] [place] [id number]        Dear Colleague,    Thank you for referring your patient, Alondra Morgan, to the [facility]. Please see a copy of my visit note below.    [facility]    Hematology/Oncology Established Patient Note      Today's Date: 9/25/23    Reason for follow-up: Left breast cancer.    HISTORY OF PRESENT ILLNESS: Alondra Morgan is a 78 year old female s/p hysterectomy 1995 who presents with the following oncologic history:  11/9/2005: Left breast biopsy of microcalcifications showed focal atypical ductal hyperplasia; no invasive malignancy. Took tamoxifen for 5 years.  11/29/2005: Excision of left breast lesion showed focal atypical ductal hyperplasia; no malignancy.  7/17/2023: Screening mammogram showed asymmetry in left breast at 6-7:00. Right breast negative.  7/24/2023: Diagnostic left mammogram showed mass in left lower breast adjacent to a previous needle biopsy marker clip. Left breast U/S showed 4 mm nodule at 6:00, 5 cm from nipple, 8 mm from prior biopsy.  7/27/2023: Left breast biopsy at 6:00, 5 cm from nipple showed 5 mm of grade 2 invasive ductal carcinoma, no LVI, ER positive at %, AR positive at %, HER-2 negative (IHC = 0).  9/13/2023: Left breast lumpectomy and left axillary SLN excision with Dr. Chika Mitchell.  Pathology showed 4 mm grade 2 invasive ductal carcinoma; margins negative; atypical ductal hyperplasia present; 1 SLN negative.      INTERVAL HISTORY:  Alondra reports healing well from her lumpectomy so far.    Family history positive for aunts, cousins and grandmother with breast cancer. Brother with colon cancer.    REVIEW OF SYSTEMS:   14 point ROS was reviewed and is negative other than as noted above in HPI.       HOME MEDICATIONS:  Current Outpatient Medications   Medication Sig Dispense Refill     calcium carbonate 600 mg-vitamin D 400 units  600-400 MG-UNIT per tablet Take 1 tablet by mouth 2 times daily FOR BONE HEALTH AND FOR VITAMIN D DEFICIENCY (LOW VITAMIN D)       cholecalciferol 50 MCG (2000 UT) tablet Take 2 tablets by mouth daily INDICATION: TO TREAT VITAMIN D DEFICIENCY (LOW VITAMIN D)       FISH OIL 1000 MG OR CAPS THREE TIMES DAILY 100 3     lisinopril (ZESTRIL) 20 MG tablet Take 1 tablet (20 mg) by mouth daily 90 tablet 3     loratadine (CLARITIN) 10 MG tablet ONE DAILY FOR ALLERGIES       senna-docusate (SENOKOT-S/PERICOLACE) 8.6-50 MG tablet Take 1-2 tablets by mouth 2 times daily 15 tablet 0     valACYclovir (VALTREX) 1000 mg tablet TAKE 2000 MG AT ONSET OF COLD SORE, FOLLOWED BY SECOND 2000 MG DOSE 12 HOURS LATER; REPEAT AS NEEDED 12 tablet 3         ALLERGIES:  Allergies   Allergen Reactions     Alendronate GI Disturbance     Severe GERD     Pollen Extract          PAST MEDICAL HISTORY:  Past Medical History:   Diagnosis Date     Arthritis      Atypical ductal hyperplasia of left breast 2005    left breast atypical ductal hyperplasia, completed 5 years tamoxifen in 2011     Atypical hyperplasia of breasts      Basal cell carcinoma of ala nasi      Basal cell carcinoma of shoulder      Family history of colon cancer     BROTHER COLON CANCER AGE 68     History of poliomyelitis 1947    acute polio infection age 2, resolved without any sequelae     Osteopenia      Polyp, colon     Adenomatous     Squamous cell carcinoma of skin, unspecified          PAST SURGICAL HISTORY:  Past Surgical History:   Procedure Laterality Date     BIOPSY NODE SENTINEL Left 9/13/2023    Procedure: left sentinel lymph node biopsy;  Surgeon: Chika Mitchell MD;  Location:  OR     BIOPSY, BREAST, WITH RADIOFREQUENCY IDENTIFICATION Left 9/13/2023    Procedure: left breast tag localized lumpectomy;  Surgeon: Chika Mitchell MD;  Location:  OR     COLONOSCOPY       EYE SURGERY  07/2017    cataract bilat eyes     HC EXCISION BREAST LESION, OPEN >=1       ATYPICAL HYPERPLASIA X 2     HYSTERECTOMY, PAP NO LONGER INDICATED      fibroids     TONSILLECTOMY       ZZC VAG HYST,RMV TUBE/OVARY           SOCIAL HISTORY:  Social History     Socioeconomic History     Marital status:      Spouse name: Not on file     Number of children: Not on file     Years of education: Not on file     Highest education level: Not on file   Occupational History     Not on file   Tobacco Use     Smoking status: Former     Packs/day: 1.00     Years: 40.00     Pack years: 40.00     Types: Cigarettes     Quit date: 10/7/1983     Years since quittin.9     Smokeless tobacco: Never   Substance and Sexual Activity     Alcohol use: Yes     Comment: 4 drinks per week     Drug use: No     Sexual activity: Not Currently     Partners: Male   Other Topics Concern      Service No     Blood Transfusions No     Caffeine Concern No     Occupational Exposure No     Hobby Hazards No     Sleep Concern Yes     Stress Concern No     Weight Concern Yes     Special Diet No     Back Care No     Exercise Yes     Comment: 3 X WEEK     Bike Helmet Not Asked     Seat Belt Yes     Self-Exams No     Comment: ENCOURAGED     Parent/sibling w/ CABG, MI or angioplasty before 65F 55M? Not Asked   Social History Narrative     Not on file     Social Determinants of Health     Financial Resource Strain: Not on file   Food Insecurity: Not on file   Transportation Needs: Not on file   Physical Activity: Not on file   Stress: Not on file   Social Connections: Not on file   Intimate Partner Violence: Not on file   Housing Stability: Not on file         FAMILY HISTORY:  Family History   Problem Relation Age of Onset     Cancer Mother         lung     Osteoporosis Mother      Eye Disorder Mother         glaucoma     Arthritis Mother         osteo     Heart Disease Mother         CHF     Hypertension Mother      Cerebrovascular Disease Father         TIAs     Osteoporosis Father      Hypertension Father      Cancer  "Father         BONE     Arthritis Sister         PARTIAL KNEE REPLACEMENT IN 2009     Osteoporosis Sister      No Known Problems Brother      Cerebrovascular Disease Brother      Colon Cancer Brother 70        B 1939     Cancer Brother         PROSTATE     Breast Cancer Maternal Grandmother      Cerebrovascular Disease Maternal Grandfather      Asthma Daughter      Breast Cancer Maternal Aunt      Breast Cancer Maternal Aunt      Breast Cancer Other         2 YOUNGER MATERNAL COUSINS         PHYSICAL EXAM:  Vital signs:  /80   Pulse 76   Resp 16   Ht 1.626 m (5' 4\")   Wt 70.1 kg (154 lb 9.6 oz)   LMP  (LMP Unknown)   SpO2 97%   BMI 26.54 kg/m     GENERAL: No acute distress.  EYES: No scleral icterus. No overt erythema.  RESPIRATORY: No audible cough, wheezing, or labored breathing.  MUSCULOSKELETAL: Range of motion in the neck, shoulders, and arms appear normal.  SKIN: No overt rashes, discolorations, or lesions over the face and neck.  NEUROLOGIC: Alert.  No overt tremors.  PSYCHIATRIC: Normal affect and mood.  Does not appear anxious.       LABS:  CBC RESULTS:   Recent Labs   Lab Test 08/25/23  0951   WBC 5.7   RBC 4.34   HGB 13.1   HCT 39.5   MCV 91   MCH 30.2   MCHC 33.2   RDW 13.3        Last Comprehensive Metabolic Panel:  Sodium   Date Value Ref Range Status   08/25/2023 133 (L) 136 - 145 mmol/L Final   11/06/2020 130 (L) 133 - 144 mmol/L Final     Potassium   Date Value Ref Range Status   08/25/2023 4.7 3.4 - 5.3 mmol/L Final   11/11/2022 4.1 3.4 - 5.3 mmol/L Final   11/06/2020 4.4 3.4 - 5.3 mmol/L Final     Chloride   Date Value Ref Range Status   08/25/2023 99 98 - 107 mmol/L Final   11/11/2022 100 94 - 109 mmol/L Final   11/06/2020 97 94 - 109 mmol/L Final     Carbon Dioxide   Date Value Ref Range Status   11/06/2020 28 20 - 32 mmol/L Final     Carbon Dioxide (CO2)   Date Value Ref Range Status   08/25/2023 26 22 - 29 mmol/L Final   11/11/2022 30 20 - 32 mmol/L Final     Anion Gap "   Date Value Ref Range Status   08/25/2023 8 7 - 15 mmol/L Final   11/11/2022 4 3 - 14 mmol/L Final   11/06/2020 5 3 - 14 mmol/L Final     Glucose   Date Value Ref Range Status   08/25/2023 92 70 - 99 mg/dL Final   11/11/2022 93 70 - 99 mg/dL Final   11/06/2020 95 70 - 99 mg/dL Final     Comment:     Fasting specimen     Urea Nitrogen   Date Value Ref Range Status   08/25/2023 18.0 8.0 - 23.0 mg/dL Final   11/11/2022 18 7 - 30 mg/dL Final   11/06/2020 16 7 - 30 mg/dL Final     Creatinine   Date Value Ref Range Status   08/25/2023 0.73 0.51 - 0.95 mg/dL Final   11/06/2020 0.73 0.52 - 1.04 mg/dL Final     GFR Estimate   Date Value Ref Range Status   08/25/2023 83 >60 mL/min/1.73m2 Final   11/06/2020 79 >60 mL/min/[1.73_m2] Final     Comment:     Non  GFR Calc  Starting 12/18/2018, serum creatinine based estimated GFR (eGFR) will be   calculated using the Chronic Kidney Disease Epidemiology Collaboration   (CKD-EPI) equation.       Calcium   Date Value Ref Range Status   08/25/2023 9.8 8.8 - 10.2 mg/dL Final   11/06/2020 9.6 8.5 - 10.1 mg/dL Final     Bilirubin Total   Date Value Ref Range Status   08/25/2023 0.4 <=1.2 mg/dL Final   10/23/2018 0.4 0.2 - 1.3 mg/dL Final     Alkaline Phosphatase   Date Value Ref Range Status   08/25/2023 87 35 - 104 U/L Final   10/23/2018 92 40 - 150 U/L Final     ALT   Date Value Ref Range Status   08/25/2023 14 0 - 50 U/L Final     Comment:     Reference intervals for this test were updated on 6/12/2023 to more accurately reflect our healthy population. There may be differences in the flagging of prior results with similar values performed with this method. Interpretation of those prior results can be made in the context of the updated reference intervals.     10/28/2019 23 0 - 50 U/L Final     AST   Date Value Ref Range Status   08/25/2023 29 0 - 45 U/L Final     Comment:     Reference intervals for this test were updated on 6/12/2023 to more accurately reflect our  healthy population. There may be differences in the flagging of prior results with similar values performed with this method. Interpretation of those prior results can be made in the context of the updated reference intervals.   10/28/2019 19 0 - 45 U/L Final       PATHOLOGY:  Reviewed as per HPI.    IMAGING:  Reviewed as per HPI.    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  --We discussed role of adjuvant radiation to reduce risk of local recurrence. She will meet with Dr. Greenberg.  --I reiterated the role of adjuvant endocrine therapy with anastrozole for a total of 5 years to reduce risk of breast cancer recurrence by a relative 50%.  --I discussed the potential side effects of anastrozole, including but not limited to: fatigue, myalgias/arthralgias, bone density loss, decrease sexual drive, vaginal dryness, nausea, headache, difficulty sleeping, hot flashes, night sweats, small cardiovascular risk.   --Alternate option is tamoxifen. She would prefer not to take tamoxifen.  --She is receptive to taking anastrozole. Prescription issued.  --She is willing to consider adjuvant endocrine therapy.  --Will obtain a more current DEXA scan to reevaluate her osteopenia. Alondra has had prior heartburn issues to alendronate, so Reclast or Prolia could be considered if needed.  --Genetics consult pending for 12/7/2023.    Return in 3 months.    Jenelle Cannon MD  Hematology/Oncology  [facility]    Total time spent today: 30 minutes in chart review, patient evaluation, counseling,  "documentation, test and/or medication/prescription orders, and coordination of care.      Oncology Rooming Note    September 25, 2023 8:45 AM   Alondra Morgan is a 78 year old female who presents for:    Chief Complaint   Patient presents with     Oncology Clinic Visit     Initial Vitals: Resp 16   Ht 1.626 m (5' 4\")   Wt 70.1 kg (154 lb 9.6 oz)   LMP  (LMP Unknown)   BMI 26.54 kg/m   Estimated body mass index is 26.54 kg/m  as calculated from the following:    Height as of this encounter: 1.626 m (5' 4\").    Weight as of this encounter: 70.1 kg (154 lb 9.6 oz). Body surface area is 1.78 meters squared.  No Pain (1) Comment: Data Unavailable   No LMP recorded (lmp unknown). Patient has had a hysterectomy.  Allergies reviewed: Yes  Medications reviewed: Yes    Medications: Medication refills not needed today.  Pharmacy name entered into [facility]:    [id number] [place], [place] - [address] [facility]/PHARMACY #[id number] [place], [place] - [address] [facility]        Sole Boogie MA              Again, thank you for allowing me to participate in the care of your patient.        Sincerely,        Jenelle Cannon MD  "

## 2023-09-25 NOTE — PROGRESS NOTES
"Oncology Rooming Note    September 25, 2023 8:45 AM   Alondra Morgan is a 78 year old female who presents for:    Chief Complaint   Patient presents with    Oncology Clinic Visit     Initial Vitals: Resp 16   Ht 1.626 m (5' 4\")   Wt 70.1 kg (154 lb 9.6 oz)   LMP  (LMP Unknown)   BMI 26.54 kg/m   Estimated body mass index is 26.54 kg/m  as calculated from the following:    Height as of this encounter: 1.626 m (5' 4\").    Weight as of this encounter: 70.1 kg (154 lb 9.6 oz). Body surface area is 1.78 meters squared.  No Pain (1) Comment: Data Unavailable   No LMP recorded (lmp unknown). Patient has had a hysterectomy.  Allergies reviewed: Yes  Medications reviewed: Yes    Medications: Medication refills not needed today.  Pharmacy name entered into [employer]:    [facility][id number] [place], [place] - [address] [facility]/PHARMACY #[id number] [place], [place] - [address] [facility]        Sole Boogie MA            "

## 2023-09-29 ENCOUNTER — TRANSFERRED RECORDS (OUTPATIENT)
Dept: HEALTH INFORMATION MANAGEMENT | Facility: CLINIC | Age: 78
End: 2023-09-29
Payer: COMMERCIAL

## 2023-10-03 ENCOUNTER — OFFICE VISIT (OUTPATIENT)
Dept: SURGERY | Facility: CLINIC | Age: 78
End: 2023-10-03
Payer: COMMERCIAL

## 2023-10-03 DIAGNOSIS — Z09 SURGERY FOLLOW-UP EXAMINATION: Primary | ICD-10-CM

## 2023-10-03 PROCEDURE — 99024 POSTOP FOLLOW-UP VISIT: CPT | Performed by: SURGERY

## 2023-10-03 NOTE — PROGRESS NOTES
[facility] Breast Surgery Postoperative Note    S: Alondra is recovering well. She has no breast pain.     Breasts: left lateral incision is healing well. No erythema or induration. Mild contour defect.     Pathology: reviewed and copy given to pt    A/P  Alondra Morgan is recovering from a left breast partial mastectomy with left SLNB on 9/13/2023. 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 Her sentinel node was negative and margins negative.   She has follow up in place with Dr. Cannon. She is leaning toward 5 day course of radiation and trial of hormone blockade. I would recommend a left mammogram in 6 months and bilateral in one year. Follow up with me after imaging.     Thank you for the opportunity to help in her care.    Chika Mitchell MD  [facility]  [phone]    Please route or send letter to:  Primary Care Provider (PCP) and Referring Provider

## 2023-10-12 ENCOUNTER — ANCILLARY PROCEDURE (OUTPATIENT)
Dept: BONE DENSITY | Facility: CLINIC | Age: 78
End: 2023-10-12
Attending: INTERNAL MEDICINE
Payer: COMMERCIAL

## 2023-10-12 DIAGNOSIS — M85.89 OSTEOPENIA OF MULTIPLE SITES: ICD-10-CM

## 2023-10-12 PROCEDURE — 77085 DXA BONE DENSITY AXL VRT FX: CPT | Performed by: INTERNAL MEDICINE

## 2023-10-15 DIAGNOSIS — I10 ESSENTIAL HYPERTENSION WITH GOAL BLOOD PRESSURE LESS THAN 130/80: ICD-10-CM

## 2023-10-16 RX ORDER — LISINOPRIL 20 MG/1
20 TABLET ORAL DAILY
Qty: 90 TABLET | Refills: 0 | Status: SHIPPED | OUTPATIENT
Start: 2023-10-16 | End: 2024-01-16

## 2023-10-19 ENCOUNTER — PATIENT OUTREACH (OUTPATIENT)
Dept: CARE COORDINATION | Facility: CLINIC | Age: 78
End: 2023-10-19
Payer: COMMERCIAL

## 2023-10-25 DIAGNOSIS — M85.89 OSTEOPENIA OF MULTIPLE SITES: Primary | ICD-10-CM

## 2023-10-25 DIAGNOSIS — Z79.811 USE OF AROMATASE INHIBITORS: ICD-10-CM

## 2023-10-25 DIAGNOSIS — Z17.0 MALIGNANT NEOPLASM OF LOWER-OUTER QUADRANT OF LEFT BREAST OF FEMALE, ESTROGEN RECEPTOR POSITIVE (H): ICD-10-CM

## 2023-10-25 DIAGNOSIS — C50.512 MALIGNANT NEOPLASM OF LOWER-OUTER QUADRANT OF LEFT BREAST OF FEMALE, ESTROGEN RECEPTOR POSITIVE (H): ICD-10-CM

## 2023-10-25 RX ORDER — MEPERIDINE HYDROCHLORIDE 25 MG/ML
25 INJECTION INTRAMUSCULAR; INTRAVENOUS; SUBCUTANEOUS EVERY 30 MIN PRN
Status: CANCELLED | OUTPATIENT
Start: 2023-11-01

## 2023-10-25 RX ORDER — ALBUTEROL SULFATE 0.83 MG/ML
2.5 SOLUTION RESPIRATORY (INHALATION)
Status: CANCELLED | OUTPATIENT
Start: 2023-11-01

## 2023-10-25 RX ORDER — EPINEPHRINE 1 MG/ML
0.3 INJECTION, SOLUTION, CONCENTRATE INTRAVENOUS EVERY 5 MIN PRN
Status: CANCELLED | OUTPATIENT
Start: 2023-11-01

## 2023-10-25 RX ORDER — ALBUTEROL SULFATE 90 UG/1
1-2 AEROSOL, METERED RESPIRATORY (INHALATION)
Status: CANCELLED
Start: 2023-11-01

## 2023-10-25 RX ORDER — DIPHENHYDRAMINE HYDROCHLORIDE 50 MG/ML
50 INJECTION INTRAMUSCULAR; INTRAVENOUS
Status: CANCELLED
Start: 2023-11-01

## 2023-11-01 ENCOUNTER — TRANSFERRED RECORDS (OUTPATIENT)
Dept: HEALTH INFORMATION MANAGEMENT | Facility: CLINIC | Age: 78
End: 2023-11-01
Payer: COMMERCIAL

## 2023-11-02 ENCOUNTER — PATIENT OUTREACH (OUTPATIENT)
Dept: CARE COORDINATION | Facility: CLINIC | Age: 78
End: 2023-11-02
Payer: COMMERCIAL

## 2023-12-06 NOTE — PROGRESS NOTES
Virtual Visit Details  Type of service:  Telephone Visit   Phone call duration: 50 minutes     12/7/2023    Referring Provider: Chika Mitchell MD     Presenting Information:   I met with Alondra Morgan today for her telephone genetic counseling visit through the Cancer Risk Management Program to discuss her personal history of breast cancer and family history of breast, prostate, stomach, and colon cancer. She is here today to review this history, cancer screening recommendations, and available genetic testing options.    Personal History:  Alondra is a 78 year old female. She was diagnosed with breast cancer. In 2005, Alondra had a left breast biopsy and excision that revealed focal atypical ductal hyperplasia. More recently on 7/17/2023, a screening mammogram found asymmetry in the left breast.  A diagnostic mammogram and ultrasound on 7/24/2023 showed a mass. On 7/27/2023, a left breast biopsy revealed invasive ductal carcinoma (ER/MS +, HER2 -). Alondra underwent a left breast lumpectomy. Pathology revealed invasive ductal carcinoma with atypical ductal hyperplasia. Treatment also includes radiation therapy. Of note, Alondra has a history of basal cell carcinoma and squamous cell carcinoma on her face and back.    In her hormonal-based medical history, she had her first menstrual period at age 12 and her first child at age 23. Alondra underwent a hysterectomy and bilateral salpingo-oophorectomy in 1995. She reports no history of hormone replacement therapy. Alondra reports oral contraceptive use for approximately 30 years.       Alondra began having colonoscopies in her 50's. Her most recent colonoscopy in August 2023 found two 4-5 mm tubular adenomas in the ascending colon. Alondra reports a history of dermatological exams. She does not regularly do any other cancer screening at this time. Alondra reported a history of smoking for approximately 25 years and consumes approximately 10 alcoholic beverages per week.      Family History: (Please see scanned pedigree for detailed family history information)  Alondra's brother was diagnosed with colon cancer at age 75. He is currently 84.  Another brother was diagnosed with non-metastatic prostate cancer at age 60. He is currently 74.  Another brother was diagnosed with stomach cancer at age 81. It was reported that he recently had negative genetic testing for 47 genes, including the Sainz syndrome genes. A copy of his report was unavailable for review today. He is currently 82.    Alondra's mother possibly had a diagnosis of lung cancer. However, details are unknown. She passed away at age 80.  Two maternal aunts were diagnosed with breast cancer in their 80's and have since passed away.  Two maternal first cousin were diagnosed with breast cancer in their 40's. Both are currently in their 70's.  Another maternal first cousin was diagnosed with Hodgkin's lymphoma in his 30's. He is currently in his 70's.  Another maternal first cousin was diagnosed with testicular cancer in his 20's. He is currently in his 70's.   Maternal grandmother diagnosed with breast cancer. She passed away at age 71.  Alondra's father was diagnosed with bone cancer at age 78. He passed away at age 80. Of note, he was an only child.   Her maternal ethnicity is [place] and [place]. Her paternal ethnicity is Russian and English.  There is no known Ashkenazi Yazidism ancestry on either side of her family. There is no reported consanguinity.    Discussion:  Alondra's personal history of breast cancer and family history of breast and prostate cancer is suggestive of a hereditary cancer syndrome.  We reviewed the features of sporadic, familial, and hereditary cancers.   The vast majority of cancers are considered sporadic and not primarily due to an inherited factor. Individuals can develop cancer due to aging, chance events, environmental exposures or lifestyles.  Features typically seen in high risk families include:  cancers diagnosed under age 50, multiple relatives with similar cancers on the same side of the family, cancers in multiple generations, and relatives with certain rare cancers (i.e., male breast cancer).   We discussed the natural history and genetics of several hereditary cancer syndromes, including Hereditary Breast and Ovarian Cancer Syndrome (HBOC).   The most common cause of hereditary breast and ovarian cancer is HBOC, which is caused by mutations in the BRCA1 and BRCA2 genes. Individuals with HBOC syndrome are at increased risk for several different cancers including: female and male breast cancer, ovarian cancer, prostate cancer, melanoma, and pancreatic cancer. HBOC typically presents with multiple family members diagnosed with breast cancer before age 50 and/or ovarian cancer.   A detailed handout regarding information about HBOC and related hereditary cancer syndromes will be provided to Alondra via [employer] and can be found in the after visit summary. Topics included: inheritance pattern, cancer risks, cancer screening recommendations, and also risks, benefits and limitations of testing.  In looking at Alondra's personal and family history, it is possible that a cancer susceptibility gene is present due to her personal history of breast cancer, two maternal aunts diagnosed with breast cancer, two paternal first cousins diagnosed with breast cancer in their 40's, and her maternal grandmother diagnosed with breast cancer.  Based on her personal and family history, Alondra meets current National Comprehensive Cancer Network (NCCN) criteria for genetic testing of high-penetrance breast cancer susceptibility genes (including BRCA1, BRCA2, CDH1, PALB2, PTEN, STK11, and TP53).   We discussed that there are additional genes that could cause increased risk for breast cancer. As many of these genes present with overlapping features in a family and accurate cancer risk cannot always be established based upon the  pedigree analysis alone, it would be reasonable for Alondra to consider panel genetic testing to analyze multiple genes at once.  We reviewed genetic testing options for Alondra based on her personal and family history: a panel of genes associated with an increased risk for hereditary breast and gynecologic cancers, or larger panel options to include genes associated with increased risk for multiple different cancer types. Alondra expressed interest in the BRCA1 and BRCA2 genes with automatic reflex to the GYN Expanded panel through [facility].  Genetic testing is available for 21 genes associated with increased risk for breast and gynecological cancers: Gyn Expanded Panel: ANGELINE, BARD1, BRCA1, BRCA2, BRIP1, CDH1, CHEK2, DICER1, EPCAM, MLH1, MSH2, MSH6, NF1, PALB2, PMS2, PTEN, RAD51C, RAD51D, SMARCA4, STK11 and TP53.    We discussed that some of the genes in the Gyn Expanded Panel are associated with specific hereditary cancer syndromes and published management guidelines: Hereditary Breast and Ovarian Cancer syndrome (BRCA1, BRCA2), Sainz syndrome (MLH1, MSH2, MSH6, PMS2, EPCAM), Peutz-Jeghers syndrome (STK11), Hereditary Diffuse Gastric Cancer (CDH1), Cowden syndrome (PTEN), Li Fraumeni syndrome (TP53), and Neurofibromatosis type 1 (NF1).   Risk-reducing salpingo-oophorectomy can be considered in women with mutations in BRIP1, RAD51C, or RAD51D.  Breast and/or other cancer risk management guidelines are available for ANGELINE, CHEK2, PALB2, and NF1.  The remaining genes (BARD1, DICER1, and SMARCA4) are associated with increased cancer risk and may allow us to make medical recommendations when mutations are identified.   Alondra would like to submit a blood sample for her genetic testing. She will go to her nearest [facility] at her earliest convenience to get her blood drawn for her genetic testing.   Verbal consent was given over the phone and written on the consent  form. Turnaround time is approximately 3-4 weeks.  Medical Management: For Alondra, we reviewed that the information from genetic testing may determine:  additional cancer screening for which Alondra may qualify (i.e. mammogram and breast MRI, more frequent colonoscopies, more frequent dermatologic exams, etc.),  options for risk reducing surgeries Alondra could consider (i.e. bilateral mastectomy, etc.),    and targeted chemotherapies if she were to develop certain cancers in the future (i.e. immunotherapy for individuals with Sainz syndrome, PARP inhibitors, etc.).   These recommendations and possible targeted chemotherapies will be discussed in detail once genetic testing is completed.     Plan:  1) Today Alondra elected to proceed with the BRCA1 and BRCA2 genes with automatic reflex to the GYN Expanded panel through [facility]. Therefore, consent was reviewed and verbally obtained for this testing.   2) Alondra plans to schedule her blood draw appointment at a clinic that is convenient to her.  3) A prior authorization will be initiated.  4) The results should be available in 3-4 weeks after prior authorization approval is received.  5) Alondra will either have a telephone visit or video visit to discuss the results.  Additional recommendations about screening will be made at that time.    Alondra is 78 year old and is being evaluated via a billable telephone visit.    Time spent on phone: 50 minutes    Dianelys Bauman MS, [facility] [place]  Licensed, Certified Genetic Counselor  Phone: [phone]

## 2023-12-07 ENCOUNTER — VIRTUAL VISIT (OUTPATIENT)
Dept: ONCOLOGY | Facility: CLINIC | Age: 78
End: 2023-12-07
Attending: SURGERY
Payer: COMMERCIAL

## 2023-12-07 DIAGNOSIS — Z80.0 FAMILY HISTORY OF COLON CANCER: ICD-10-CM

## 2023-12-07 DIAGNOSIS — C50.512 MALIGNANT NEOPLASM OF LOWER-OUTER QUADRANT OF LEFT BREAST OF FEMALE, ESTROGEN RECEPTOR POSITIVE (H): Primary | ICD-10-CM

## 2023-12-07 DIAGNOSIS — Z80.0 FAMILY HISTORY OF STOMACH CANCER: ICD-10-CM

## 2023-12-07 DIAGNOSIS — Z80.42 FAMILY HISTORY OF PROSTATE CANCER: ICD-10-CM

## 2023-12-07 DIAGNOSIS — Z80.3 FAMILY HISTORY OF MALIGNANT NEOPLASM OF BREAST: ICD-10-CM

## 2023-12-07 DIAGNOSIS — Z17.0 MALIGNANT NEOPLASM OF LOWER-OUTER QUADRANT OF LEFT BREAST OF FEMALE, ESTROGEN RECEPTOR POSITIVE (H): Primary | ICD-10-CM

## 2023-12-07 PROCEDURE — 96040 HC GENETIC COUNSELING, EACH 30 MINUTES: CPT | Mod: TEL,95

## 2023-12-07 NOTE — Clinical Note
12/7/2023         RE: Alondra Morgan  [address] [address]        Dear Colleague,    Thank you for referring your patient, Alondra Morgan, to the [facility]. Please see a copy of my visit note below.    Virtual Visit Details  Type of service:  Telephone Visit   Phone call duration: 50 minutes     12/7/2023    Referring Provider: Chika Mitchell MD     Presenting Information:   I met with Alondra Morgan today for her telephone genetic counseling visit through the Cancer Risk Management Program to discuss her personal history of breast cancer and family history of breast, prostate, and colon cancer. She is here today to review this history, cancer screening recommendations, and available genetic testing options.    Personal History:  Alondra is a 78 year old female. She was diagnosed with breast cancer. In 2005, Alondra had a left breast biopsy and excision that revealed focal atypical ductal hyperplasia. More recently on 7/17/2023, a screening mammogram found asymmetry in the left breast.  A diagnostic mammogram and ultrasound on 7/24/2023 showed a mass. On 7/27/2023, a left breast biopsy revealed invasive ductal carcinoma (ER/MS +, HER2 -). Alondra underwent a left breast lumpectomy. Pathology revealed invasive ductal carcinoma with atypical ductal hyperplasia. Treatment also includes radiation therapy. Of note, Alondra has a history of basal cell carcinoma and squamous cell carcinoma on her face and back.    In her hormonal-based medical history, she had her first menstrual period at age 12 and her first child at age 23. Alondra underwent a hysterectomy and bilateral salpingo-oophorectomy in 1995. She reports no history of hormone replacement therapy. Alondra reports oral contraceptive use for approximately 30 years.       Alondra began having colonoscopies in her 50's. Her most recent colonoscopy in August 2023 found two 4-5 mm tubular adenomas in the ascending  colon. Alondra reports a history of dermatological exams. She does not regularly do any other cancer screening at this time. Alondra reported a history of smoking for approximately 25 years and consumes approximately 10 alcoholic beverages per week.     Family History: (Please see scanned pedigree for detailed family history information)  Alondra's brother was diagnosed with colon cancer at age 75. He is currently 84.  Another brother was diagnosed with prostate cancer at age 60. He is currently 74.  Another brother was diagnosed with stomach cancer at age 81. He is currently 82.    Alondra's mother possibly had a diagnosed with lung cancer. However, details are unknown. She passed away at age 80.  Two maternal aunts were diagnosed with breast cancer.  Two maternal first cousin were diagnosed with breast cancer at age 40. They are both in their 70's.  Maternal grandmother diagnosed with breast cancer. She passed away at age 71.  Alondra's father was diagnosed with bone cancer at age 78. He passed away at age 80.  Her maternal ethnicity is [place] and [place]. Her paternal ethnicity is [place] and English.  There is no known Ashkenazi Anabaptist ancestry on either side of her family. There is no reported consanguinity.    Discussion:  Alondra's personal history of breast cancer and family history of breast and prostate cancer is suggestive of a hereditary cancer syndrome.  We reviewed the features of sporadic, familial, and hereditary cancers.   The vast majority of cancers are considered sporadic and not primarily due to an inherited factor. Individuals can develop cancer due to aging, chance events, environmental exposures or lifestyles.  Features typically seen in high risk families include: cancers diagnosed under age 50, multiple relatives with similar cancers on the same side of the family, cancers in multiple generations, and relatives with certain rare cancers (i.e., male breast cancer).   We discussed the natural history  and genetics of several hereditary cancer syndromes, including Hereditary Breast and Ovarian Cancer Syndrome (HBOC).   The most common cause of hereditary breast and ovarian cancer is HBOC, which is caused by mutations in the BRCA1 and BRCA2 genes. Individuals with HBOC syndrome are at increased risk for several different cancers including: female and male breast cancer, ovarian cancer, prostate cancer, melanoma, and pancreatic cancer. HBOC typically presents with multiple family members diagnosed with breast cancer before age 50 and/or ovarian cancer.   A detailed handout regarding information about HBOC and related hereditary cancer syndromes will be provided to Alondra via [employer] and can be found in the after visit summary. Topics included: inheritance pattern, cancer risks, cancer screening recommendations, and also risks, benefits and limitations of testing.  In looking at Alondra's personal and family history, it is possible that a cancer susceptibility gene is present due to her personal history of breast cancer, two maternal aunts diagnosed with breast cancer, two paternal first cousins diagnosed with breast cancer at age 40, and her maternal grandmother diagnosed with breast cancer.  Based on her personal and family history, Alondra meets current National Comprehensive Cancer Network (NCCN) criteria for genetic testing of high-penetrance breast cancer susceptibility genes (including BRCA1, BRCA2, CDH1, PALB2, PTEN, STK11, and TP53).   We discussed that there are additional genes that could cause increased risk for breast cancer. As many of these genes present with overlapping features in a family and accurate cancer risk cannot always be established based upon the pedigree analysis alone, it would be reasonable for Alondra to consider panel genetic testing to analyze multiple genes at once.  We reviewed genetic testing options for Alondra based on her personal and family history: a panel of genes associated  with an increased risk for hereditary breast and gynecologic cancers, or larger panel options to include genes associated with increased risk for multiple different cancer types. Alondra expressed interest in the BRCA1 and BRCA2 genes with automatic reflex to the GYN Expanded panel through [facility]'s [facility].  Genetic testing is available for 21 genes associated with increased risk for breast and gynecological cancers: Gyn Expanded Panel: ANGELINE, BARD1, BRCA1, BRCA2, BRIP1, CDH1, CHEK2, DICER1, EPCAM, MLH1, MSH2, MSH6, NF1, PALB2, PMS2, PTEN, RAD51C, RAD51D, SMARCA4, STK11 and TP53.    We discussed that some of the genes in the Gyn Expanded Panel are associated with specific hereditary cancer syndromes and published management guidelines: Hereditary Breast and Ovarian Cancer syndrome (BRCA1, BRCA2), Sainz syndrome (MLH1, MSH2, MSH6, PMS2, EPCAM), Peutz-Jeghers syndrome (STK11), Hereditary Diffuse Gastric Cancer (CDH1), Cowden syndrome (PTEN), Li Fraumeni syndrome (TP53), and Neurofibromatosis type 1 (NF1).   Risk-reducing salpingo-oophorectomy can be considered in women with mutations in BRIP1, RAD51C, or RAD51D.  Breast and/or other cancer risk management guidelines are available for ANGELINE, CHEK2, PALB2, and NF1.  The remaining genes (BARD1, DICER1, and SMARCA4) are associated with increased cancer risk and may allow us to make medical recommendations when mutations are identified.   Alondra would like to submit a blood sample for her genetic testing. She will go to her nearest [facility] clinic at her earliest convenience to get her blood drawn for her genetic testing.   Verbal consent was given over the phone and written on the consent form. Turnaround time is approximately 3-4 weeks once the lab receives the sample.  Medical Management: For Alondra, we reviewed that the information from genetic testing may determine:  additional cancer screening for which Alondra may qualify (i.e.  mammogram and breast MRI, more frequent colonoscopies, more frequent dermatologic exams, etc.),  options for risk reducing surgeries Alondra could consider (i.e. bilateral mastectomy, surgery to remove her ovaries and/or uterus, etc.),    and targeted chemotherapies if she were to develop certain cancers in the future (i.e. immunotherapy for individuals with Sainz syndrome, PARP inhibitors, etc.).   These recommendations and possible targeted chemotherapies will be discussed in detail once genetic testing is completed.     Plan:  1) Today Alondra elected to proceed with the BRCA1 and BRCA2 genes with automatic reflex to the GYN Expanded panel through [facility]. Therefore, consent was reviewed and verbally obtained for this testing.   2) Alondra plans to schedule her blood draw appointment at a clinic that is convenient to her.  3) A prior authorization will be initiated.  4) The results should be available in 3-4 weeks after prior authorization approval is received.  5) Alondra will either have a telephone visit or video visit to discuss the results.  Additional recommendations about screening will be made at that time.    Alondra is 78 year old and is being evaluated via a billable telephone visit.    Time spent on phone: 50 minutes    Dianelys Bauman MS, [facility] [place]  Licensed, Certified Genetic Counselor  Phone: [phone]      Again, thank you for allowing me to participate in the care of your patient.        Sincerely,        Dianelys Bauman GC

## 2023-12-07 NOTE — NURSING NOTE
Is the patient currently in the state of [place]? YES    Visit mode:TELEPHONE    If the visit is dropped, the patient can be reconnected by: TELEPHONE VISIT: Phone number: [phone]    Will anyone else be joining the visit? NO  (If patient encounters technical issues they should call [phone])    How would you like to obtain your AVS? MyChart    Are changes needed to the allergy or medication list? N/A    Reason for visit: Consult    Torsten ANDINO

## 2023-12-07 NOTE — LETTER
Cancer Risk Management  Program Locations    [facility]  [facility]  [facility]  [facility]  Mailing Address  Cancer Risk Management Program  [address]  [place] [address]    New patient appointments  [phone]    December 7, 2023    Alondra Morgan  [address]  [place] [address]    Dear Alondra,    It was a pleasure talking with you via your virtual genetic counseling visit on 12/7/2023.  Below is a copy of the progress note from that recent visit through the Cancer Risk Management Program.  If you have any additional questions, please feel free to contact me.    Referring Provider: Chika Mitchell MD     Presenting Information:   I met with Alondra Morgan today for her telephone genetic counseling visit through the Cancer Risk Management Program to discuss her personal history of breast cancer and family history of breast, prostate, stomach, and colon cancer. She is here today to review this history, cancer screening recommendations, and available genetic testing options.    Personal History:  Alondra is a 78 year old female. She was diagnosed with breast cancer. In 2005, Alondra had a left breast biopsy and excision that revealed focal atypical ductal hyperplasia. More recently on 7/17/2023, a screening mammogram found asymmetry in the left breast.  A diagnostic mammogram and ultrasound on 7/24/2023 showed a mass. On 7/27/2023, a left breast biopsy revealed invasive ductal carcinoma (ER/LA +, HER2 -). Alondra underwent a left breast lumpectomy. Pathology revealed invasive ductal carcinoma with atypical ductal hyperplasia. Treatment also includes radiation therapy. Of note, Alondra has a history of basal cell carcinoma and squamous cell carcinoma on her face and back.    In her hormonal-based medical history, she had  her first menstrual period at age 12 and her first child at age 23. Alondra underwent a hysterectomy and bilateral salpingo-oophorectomy in 1995. She reports no history of hormone replacement therapy. Alondra reports oral contraceptive use for approximately 30 years.       Alondra began having colonoscopies in her 50's. Her most recent colonoscopy in August 2023 found two 4-5 mm tubular adenomas in the ascending colon. Alondra reports a history of dermatological exams. She does not regularly do any other cancer screening at this time. Alondra reported a history of smoking for approximately 25 years and consumes approximately 10 alcoholic beverages per week.     Family History: (Please see scanned pedigree for detailed family history information)  Alondra's brother was diagnosed with colon cancer at age 75. He is currently 84.  Another brother was diagnosed with non-metastatic prostate cancer at age 60. He is currently 74.  Another brother was diagnosed with stomach cancer at age 81. It was reported that he recently had negative genetic testing for 47 genes, including the Sainz syndrome genes. A copy of his report was unavailable for review today. He is currently 82.    Alondra's mother possibly had a diagnosis of lung cancer. However, details are unknown. She passed away at age 80.  Two maternal aunts were diagnosed with breast cancer in their 80's and have since passed away.  Two maternal first cousin were diagnosed with breast cancer in their 40's. Both are currently in their 70's.  Another maternal first cousin was diagnosed with Hodgkin's lymphoma in his 30's. He is currently in his 70's.  Another maternal first cousin was diagnosed with testicular cancer in his 20's. He is currently in his 70's.   Maternal grandmother diagnosed with breast cancer. She passed away at age 71.  Alondra's father was diagnosed with bone cancer at age 78. He passed away at age 80. Of note, he was an only child.   Her maternal ethnicity is  [place] and [place]. Her paternal ethnicity is [place] and [place].  There is no known Ashkenazi Muslim ancestry on either side of her family. There is no reported consanguinity.    Discussion:  Alondra's personal history of breast cancer and family history of breast and prostate cancer is suggestive of a hereditary cancer syndrome.  We reviewed the features of sporadic, familial, and hereditary cancers.   The vast majority of cancers are considered sporadic and not primarily due to an inherited factor. Individuals can develop cancer due to aging, chance events, environmental exposures or lifestyles.  Features typically seen in high risk families include: cancers diagnosed under age 50, multiple relatives with similar cancers on the same side of the family, cancers in multiple generations, and relatives with certain rare cancers (i.e., male breast cancer).   We discussed the natural history and genetics of several hereditary cancer syndromes, including Hereditary Breast and Ovarian Cancer Syndrome (HBOC).   The most common cause of hereditary breast and ovarian cancer is HBOC, which is caused by mutations in the BRCA1 and BRCA2 genes. Individuals with HBOC syndrome are at increased risk for several different cancers including: female and male breast cancer, ovarian cancer, prostate cancer, melanoma, and pancreatic cancer. HBOC typically presents with multiple family members diagnosed with breast cancer before age 50 and/or ovarian cancer.   A detailed handout regarding information about HBOC and related hereditary cancer syndromes will be provided to Alondra via [employer] and can be found in the after visit summary. Topics included: inheritance pattern, cancer risks, cancer screening recommendations, and also risks, benefits and limitations of testing.  In looking at Alondra's personal and family history, it is possible that a cancer susceptibility gene is present due to her personal history of breast cancer, two maternal  aunts diagnosed with breast cancer, two paternal first cousins diagnosed with breast cancer on their 40's, and her maternal grandmother diagnosed with breast cancer.  Based on her personal and family history, Alondra meets current National Comprehensive Cancer Network (NCCN) criteria for genetic testing of high-penetrance breast cancer susceptibility genes (including BRCA1, BRCA2, CDH1, PALB2, PTEN, STK11, and TP53).   We discussed that there are additional genes that could cause increased risk for breast cancer. As many of these genes present with overlapping features in a family and accurate cancer risk cannot always be established based upon the pedigree analysis alone, it would be reasonable for Alondra to consider panel genetic testing to analyze multiple genes at once.  We reviewed genetic testing options for Alondra based on her personal and family history: a panel of genes associated with an increased risk for hereditary breast and gynecologic cancers, or larger panel options to include genes associated with increased risk for multiple different cancer types. Alondra expressed interest in the BRCA1 and BRCA2 genes with automatic reflex to the GYN Expanded panel through [facility].  Genetic testing is available for 21 genes associated with increased risk for breast and gynecological cancers: Gyn Expanded Panel: ANGELINE, BARD1, BRCA1, BRCA2, BRIP1, CDH1, CHEK2, DICER1, EPCAM, MLH1, MSH2, MSH6, NF1, PALB2, PMS2, PTEN, RAD51C, RAD51D, SMARCA4, STK11 and TP53.    We discussed that some of the genes in the Gyn Expanded Panel are associated with specific hereditary cancer syndromes and published management guidelines: Hereditary Breast and Ovarian Cancer syndrome (BRCA1, BRCA2), Sainz syndrome (MLH1, MSH2, MSH6, PMS2, EPCAM), Peutz-Jeghers syndrome (STK11), Hereditary Diffuse Gastric Cancer (CDH1), Cowden syndrome (PTEN), Li Fraumeni syndrome (TP53), and Neurofibromatosis type 1  (NF1).   Risk-reducing salpingo-oophorectomy can be considered in women with mutations in BRIP1, RAD51C, or RAD51D.  Breast and/or other cancer risk management guidelines are available for ANGELINE, CHEK2, PALB2, and NF1.  The remaining genes (BARD1, DICER1, and SMARCA4) are associated with increased cancer risk and may allow us to make medical recommendations when mutations are identified.   Alondra would like to submit a blood sample for her genetic testing. She will go to her nearest [facility] clinic at her earliest convenience to get her blood drawn for her genetic testing.   Verbal consent was given over the phone and written on the consent form. Turnaround time is approximately 3-4 weeks.  Medical Management: For Alondra, we reviewed that the information from genetic testing may determine:  additional cancer screening for which Alondra may qualify (i.e. mammogram and breast MRI, more frequent colonoscopies, more frequent dermatologic exams, etc.),  options for risk reducing surgeries Alondra could consider (i.e. bilateral mastectomy, etc.),    and targeted chemotherapies if she were to develop certain cancers in the future (i.e. immunotherapy for individuals with Sainz syndrome, PARP inhibitors, etc.).   These recommendations and possible targeted chemotherapies will be discussed in detail once genetic testing is completed.     Plan:  1) Today Alondra elected to proceed with the BRCA1 and BRCA2 genes with automatic reflex to the GYN Expanded panel through [facility]. Therefore, consent was reviewed and verbally obtained for this testing.   2) Alondra plans to schedule her blood draw appointment at a clinic that is convenient to her.  3) A prior authorization will be initiated.  4) The results should be available in 3-4 weeks after prior authorization approval is received.  5) Alondra will either have a telephone visit or video visit to discuss the results.  Additional  recommendations about screening will be made at that time.    Alondra is 78 year old and is being evaluated via a billable telephone visit.    Time spent on phone: 50 minutes    Dianelys Bauman MS, [facility] [place]  Licensed, Certified Genetic Counselor  Phone: [phone]

## 2023-12-14 NOTE — PROGRESS NOTES
[facility]    Hematology/Oncology Established Patient Note      Today's Date: 12/18/2023    Reason for follow-up: Left breast cancer.    HISTORY OF PRESENT ILLNESS: Alondra Morgan is a 78 year old female s/p hysterectomy 1995 who presents with the following oncologic history:  11/9/2005: Left breast biopsy of microcalcifications showed focal atypical ductal hyperplasia; no invasive malignancy. Took tamoxifen for 5 years.  11/29/2005: Excision of left breast lesion showed focal atypical ductal hyperplasia; no malignancy.  7/17/2023: Screening mammogram showed asymmetry in left breast at 6-7:00. Right breast negative.  7/24/2023: Diagnostic left mammogram showed mass in left lower breast adjacent to a previous needle biopsy marker clip. Left breast U/S showed 4 mm nodule at 6:00, 5 cm from nipple, 8 mm from prior biopsy.  7/27/2023: Left breast biopsy at 6:00, 5 cm from nipple showed 5 mm of grade 2 invasive ductal carcinoma, no LVI, ER positive at %, IN positive at %, HER-2 negative (IHC = 0).  9/13/2023: Left breast lumpectomy and left axillary SLN excision with Dr. Chika Mitchell.  Pathology showed 4 mm grade 2 invasive ductal carcinoma; margins negative; atypical ductal hyperplasia present; 1 SLN negative.  10/26/2023-11/1/2023: Completed adjuvant radiation to left breast in 6 fractions.  11/25/2023: Started anastrozole.      INTERVAL HISTORY:  Alondra reports no new joint issues after starting anastrozole.    REVIEW OF SYSTEMS:   14 point ROS was reviewed and is negative other than as noted above in HPI.       HOME MEDICATIONS:  Current Outpatient Medications   Medication Sig Dispense Refill    anastrozole (ARIMIDEX) 1 MG tablet Take 1 tablet (1 mg) by mouth daily 90 tablet 3    calcium carbonate 600 mg-vitamin D 400 units 600-400 MG-UNIT per tablet Take 1 tablet by mouth 2 times daily FOR BONE HEALTH AND FOR VITAMIN D DEFICIENCY (LOW VITAMIN D)      cholecalciferol 50 MCG (2000  UT) tablet Take 2 tablets by mouth daily INDICATION: TO TREAT VITAMIN D DEFICIENCY (LOW VITAMIN D)      FISH OIL 1000 MG OR CAPS THREE TIMES DAILY 100 3    lisinopril (ZESTRIL) 20 MG tablet Take 1 tablet (20 mg) by mouth daily 90 tablet 0    loratadine (CLARITIN) 10 MG tablet ONE DAILY FOR ALLERGIES      valACYclovir (VALTREX) 1000 mg tablet TAKE 2000 MG AT ONSET OF COLD SORE, FOLLOWED BY SECOND 2000 MG DOSE 12 HOURS LATER; REPEAT AS NEEDED 12 tablet 3         ALLERGIES:  Allergies   Allergen Reactions    Alendronate GI Disturbance     Severe GERD    Pollen Extract          PAST MEDICAL HISTORY:  Past Medical History:   Diagnosis Date    Arthritis     Atypical ductal hyperplasia of left breast 2005    left breast atypical ductal hyperplasia, completed 5 years tamoxifen in 2011    Atypical hyperplasia of breasts     Basal cell carcinoma of ala nasi     Basal cell carcinoma of shoulder     Family history of colon cancer     BROTHER COLON CANCER AGE 68    History of poliomyelitis 1947    acute polio infection age 2, resolved without any sequelae    Osteopenia     Polyp, colon     Adenomatous    Squamous cell carcinoma of skin, unspecified          PAST SURGICAL HISTORY:  Past Surgical History:   Procedure Laterality Date    BIOPSY NODE SENTINEL Left 9/13/2023    Procedure: left sentinel lymph node biopsy;  Surgeon: Chika Mitchell MD;  Location:  OR    BIOPSY, BREAST, WITH RADIOFREQUENCY IDENTIFICATION Left 9/13/2023    Procedure: left breast tag localized lumpectomy;  Surgeon: Chika Mitchell MD;  Location:  OR    COLONOSCOPY      EYE SURGERY  07/2017    cataract bilat eyes    HC EXCISION BREAST LESION, OPEN >=1      ATYPICAL HYPERPLASIA X 2    HYSTERECTOMY, PAP NO LONGER INDICATED  1994    fibroids    TONSILLECTOMY      ZZC VAG HYST,RMV TUBE/OVARY           SOCIAL HISTORY:  Social History     Socioeconomic History    Marital status:      Spouse name: Not on file    Number of children: Not on file     Years of education: Not on file    Highest education level: Not on file   Occupational History    Not on file   Tobacco Use    Smoking status: Former     Packs/day: 1.00     Years: 40.00     Additional pack years: 0.00     Total pack years: 40.00     Types: Cigarettes     Quit date: 10/7/1983     Years since quittin.2    Smokeless tobacco: Never   Substance and Sexual Activity    Alcohol use: Yes     Comment: 4 drinks per week    Drug use: No    Sexual activity: Not Currently     Partners: Male   Other Topics Concern     Service No    Blood Transfusions No    Caffeine Concern No    Occupational Exposure No    Hobby Hazards No    Sleep Concern Yes    Stress Concern No    Weight Concern Yes    Special Diet No    Back Care No    Exercise Yes     Comment: 3 X WEEK    Bike Helmet Not Asked    Seat Belt Yes    Self-Exams No     Comment: ENCOURAGED    Parent/sibling w/ CABG, MI or angioplasty before 65F 55M? Not Asked   Social History Narrative    Not on file     Social Determinants of Health     Financial Resource Strain: Not on file   Food Insecurity: Not on file   Transportation Needs: Not on file   Physical Activity: Not on file   Stress: Not on file   Social Connections: Not on file   Interpersonal Safety: Not on file   Housing Stability: Not on file         FAMILY HISTORY:  Family History   Problem Relation Age of Onset    Cancer Mother         lung    Osteoporosis Mother     Eye Disorder Mother         glaucoma    Arthritis Mother         osteo    Heart Disease Mother         CHF    Hypertension Mother     Cerebrovascular Disease Father         TIAs    Osteoporosis Father     Hypertension Father     Cancer Father         BONE    Arthritis Sister         PARTIAL KNEE REPLACEMENT IN     Osteoporosis Sister     No Known Problems Brother     Cerebrovascular Disease Brother     Colon Cancer Brother 70        B 1939    Cancer Brother         PROSTATE    Breast Cancer Maternal Grandmother      "Cerebrovascular Disease Maternal Grandfather     Asthma Daughter     Breast Cancer Maternal Aunt     Breast Cancer Maternal Aunt     Breast Cancer Other         2 YOUNGER MATERNAL COUSINS         PHYSICAL EXAM:  Vital signs:  BP (!) 134/96   Pulse 78   Resp 16   Ht 1.626 m (5' 4\")   Wt 70.6 kg (155 lb 9.6 oz)   LMP  (LMP Unknown)   SpO2 100%   BMI 26.71 kg/m     ECO  GENERAL/CONSTITUTIONAL: No acute distress.  EYES: No erythema or scleral icterus.  ENT/MOUTH: Neck supple.  LYMPH: No cervical, supraclavicular, axillary adenopathy.   BREAST: No palpable masses in either breast. Nipples are everted bilaterally with no discharge. No erythema, ulceration, or dimpling of the skin.   RESPIRATORY: No audible cough or wheezing.  GASTROINTESTINAL: No hepatosplenomegaly, masses, or tenderness. No guarding.  No distention.  MUSCULOSKELETAL: Warm and well-perfused, no cyanosis, clubbing, or edema.  NEUROLOGIC: No focal motor deficits. Alert, oriented, answers questions appropriately.  INTEGUMENTARY: No rashes or jaundice.  GAIT: Steady, does not use assistive device       LABS:  CBC RESULTS:   Recent Labs   Lab Test 23  0951   WBC 5.7   RBC 4.34   HGB 13.1   HCT 39.5   MCV 91   MCH 30.2   MCHC 33.2   RDW 13.3        Last Comprehensive Metabolic Panel:  Sodium   Date Value Ref Range Status   2023 133 (L) 136 - 145 mmol/L Final   2020 130 (L) 133 - 144 mmol/L Final     Potassium   Date Value Ref Range Status   2023 4.7 3.4 - 5.3 mmol/L Final   2022 4.1 3.4 - 5.3 mmol/L Final   2020 4.4 3.4 - 5.3 mmol/L Final     Chloride   Date Value Ref Range Status   2023 99 98 - 107 mmol/L Final   2022 100 94 - 109 mmol/L Final   2020 97 94 - 109 mmol/L Final     Carbon Dioxide   Date Value Ref Range Status   2020 28 20 - 32 mmol/L Final     Carbon Dioxide (CO2)   Date Value Ref Range Status   2023 26 22 - 29 mmol/L Final   2022 30 20 - 32 mmol/L Final "     Anion Gap   Date Value Ref Range Status   08/25/2023 8 7 - 15 mmol/L Final   11/11/2022 4 3 - 14 mmol/L Final   11/06/2020 5 3 - 14 mmol/L Final     Glucose   Date Value Ref Range Status   08/25/2023 92 70 - 99 mg/dL Final   11/11/2022 93 70 - 99 mg/dL Final   11/06/2020 95 70 - 99 mg/dL Final     Comment:     Fasting specimen     Urea Nitrogen   Date Value Ref Range Status   08/25/2023 18.0 8.0 - 23.0 mg/dL Final   11/11/2022 18 7 - 30 mg/dL Final   11/06/2020 16 7 - 30 mg/dL Final     Creatinine   Date Value Ref Range Status   08/25/2023 0.73 0.51 - 0.95 mg/dL Final   11/06/2020 0.73 0.52 - 1.04 mg/dL Final     GFR Estimate   Date Value Ref Range Status   08/25/2023 83 >60 mL/min/1.73m2 Final   11/06/2020 79 >60 mL/min/[1.73_m2] Final     Comment:     Non  GFR Calc  Starting 12/18/2018, serum creatinine based estimated GFR (eGFR) will be   calculated using the Chronic Kidney Disease Epidemiology Collaboration   (CKD-EPI) equation.       Calcium   Date Value Ref Range Status   08/25/2023 9.8 8.8 - 10.2 mg/dL Final   11/06/2020 9.6 8.5 - 10.1 mg/dL Final     Bilirubin Total   Date Value Ref Range Status   08/25/2023 0.4 <=1.2 mg/dL Final   10/23/2018 0.4 0.2 - 1.3 mg/dL Final     Alkaline Phosphatase   Date Value Ref Range Status   08/25/2023 87 35 - 104 U/L Final   10/23/2018 92 40 - 150 U/L Final     ALT   Date Value Ref Range Status   08/25/2023 14 0 - 50 U/L Final     Comment:     Reference intervals for this test were updated on 6/12/2023 to more accurately reflect our healthy population. There may be differences in the flagging of prior results with similar values performed with this method. Interpretation of those prior results can be made in the context of the updated reference intervals.     10/28/2019 23 0 - 50 U/L Final     AST   Date Value Ref Range Status   08/25/2023 29 0 - 45 U/L Final     Comment:     Reference intervals for this test were updated on 6/12/2023 to more accurately  reflect our healthy population. There may be differences in the flagging of prior results with similar values performed with this method. Interpretation of those prior results can be made in the context of the updated reference intervals.   10/28/2019 19 0 - 45 U/L Final       PATHOLOGY:  None new.    IMAGING:  None new.    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  --She is tolerating anastrozole well so far with no significant adverse effects.  --Advised adjuvant endocrine therapy for a total of 5 years to reduce risk of breast cancer recurrence by a relative 50%.  --Alternate option is tamoxifen. She would prefer not to take tamoxifen.  --Alondra has had prior heartburn issues to alendronate, so she was started on Prolia for her osteopenia.  --Genetics consult completed 12/7/2023; she is contemplating whether to proceed with genetic testing; discussed in detail today.  --Due for left diagnostic mammogram in 4/2024 and bilateral screening mammogram in 10/2024, then yearly thereafter.    Return in 4 months.    Jenelle Cannon MD  Hematology/Oncology  [facility]    Total time spent today: 30 minutes in chart review, patient evaluation, counseling, documentation, test and/or medication/prescription orders, and coordination of care.

## 2023-12-18 ENCOUNTER — ONCOLOGY VISIT (OUTPATIENT)
Dept: ONCOLOGY | Facility: CLINIC | Age: 78
End: 2023-12-18
Attending: INTERNAL MEDICINE
Payer: COMMERCIAL

## 2023-12-18 VITALS
SYSTOLIC BLOOD PRESSURE: 134 MMHG | BODY MASS INDEX: 26.56 KG/M2 | WEIGHT: 155.6 LBS | OXYGEN SATURATION: 100 % | HEIGHT: 64 IN | RESPIRATION RATE: 16 BRPM | DIASTOLIC BLOOD PRESSURE: 96 MMHG | HEART RATE: 78 BPM

## 2023-12-18 DIAGNOSIS — Z17.0 MALIGNANT NEOPLASM OF OVERLAPPING SITES OF LEFT BREAST IN FEMALE, ESTROGEN RECEPTOR POSITIVE (H): Primary | ICD-10-CM

## 2023-12-18 DIAGNOSIS — C50.812 MALIGNANT NEOPLASM OF OVERLAPPING SITES OF LEFT BREAST IN FEMALE, ESTROGEN RECEPTOR POSITIVE (H): Primary | ICD-10-CM

## 2023-12-18 DIAGNOSIS — M85.89 OSTEOPENIA OF MULTIPLE SITES: ICD-10-CM

## 2023-12-18 PROCEDURE — 99214 OFFICE O/P EST MOD 30 MIN: CPT | Performed by: INTERNAL MEDICINE

## 2023-12-18 PROCEDURE — G0463 HOSPITAL OUTPT CLINIC VISIT: HCPCS | Performed by: INTERNAL MEDICINE

## 2023-12-18 ASSESSMENT — PAIN SCALES - GENERAL: PAINLEVEL: NO PAIN (0)

## 2023-12-18 NOTE — PROGRESS NOTES
"Oncology Rooming Note    December 18, 2023 10:19 AM   Alondra Morgan is a 78 year old female who presents for:    Chief Complaint   Patient presents with    Oncology Clinic Visit     Initial Vitals: Resp 16   Ht 1.626 m (5' 4\")   Wt 70.6 kg (155 lb 9.6 oz)   LMP  (LMP Unknown)   BMI 26.71 kg/m   Estimated body mass index is 26.71 kg/m  as calculated from the following:    Height as of this encounter: 1.626 m (5' 4\").    Weight as of this encounter: 70.6 kg (155 lb 9.6 oz). Body surface area is 1.79 meters squared.  No Pain (0) Comment: Data Unavailable   No LMP recorded (lmp unknown). Patient has had a hysterectomy.  Allergies reviewed: Yes  Medications reviewed: Yes    Medications: Medication refills not needed today.  Pharmacy name entered into [email or web address]:    [id number] [place], [place] - [id number] [id number] [place], [place] - [address]        Sole Boogie MA            "

## 2023-12-18 NOTE — LETTER
12/18/2023         RE: Alondra Morgan  [address] [facility] [address] [place] [id number]        Dear Colleague,    Thank you for referring your patient, Alondra Morgan, to the [facility]. Please see a copy of my visit note below.    [facility]    Hematology/Oncology Established Patient Note      Today's Date: 12/18/2023    Reason for follow-up: Left breast cancer.    HISTORY OF PRESENT ILLNESS: Alondra Morgan is a 78 year old female s/p hysterectomy 1995 who presents with the following oncologic history:  11/9/2005: Left breast biopsy of microcalcifications showed focal atypical ductal hyperplasia; no invasive malignancy. Took tamoxifen for 5 years.  11/29/2005: Excision of left breast lesion showed focal atypical ductal hyperplasia; no malignancy.  7/17/2023: Screening mammogram showed asymmetry in left breast at 6-7:00. Right breast negative.  7/24/2023: Diagnostic left mammogram showed mass in left lower breast adjacent to a previous needle biopsy marker clip. Left breast U/S showed 4 mm nodule at 6:00, 5 cm from nipple, 8 mm from prior biopsy.  7/27/2023: Left breast biopsy at 6:00, 5 cm from nipple showed 5 mm of grade 2 invasive ductal carcinoma, no LVI, ER positive at %, ID positive at %, HER-2 negative (IHC = 0).  9/13/2023: Left breast lumpectomy and left axillary SLN excision with Dr. Chika Mitchell.  Pathology showed 4 mm grade 2 invasive ductal carcinoma; margins negative; atypical ductal hyperplasia present; 1 SLN negative.  10/26/2023-11/1/2023: Completed adjuvant radiation to left breast in 6 fractions.  11/25/2023: Started anastrozole.      INTERVAL HISTORY:  Alondra reports no new joint issues after starting anastrozole.    REVIEW OF SYSTEMS:   14 point ROS was reviewed and is negative other than as noted above in HPI.       HOME MEDICATIONS:  Current Outpatient Medications   Medication Sig Dispense Refill     anastrozole (ARIMIDEX) 1 MG  tablet Take 1 tablet (1 mg) by mouth daily 90 tablet 3     calcium carbonate 600 mg-vitamin D 400 units 600-400 MG-UNIT per tablet Take 1 tablet by mouth 2 times daily FOR BONE HEALTH AND FOR VITAMIN D DEFICIENCY (LOW VITAMIN D)       cholecalciferol 50 MCG (2000 UT) tablet Take 2 tablets by mouth daily INDICATION: TO TREAT VITAMIN D DEFICIENCY (LOW VITAMIN D)       FISH OIL 1000 MG OR CAPS THREE TIMES DAILY 100 3     lisinopril (ZESTRIL) 20 MG tablet Take 1 tablet (20 mg) by mouth daily 90 tablet 0     loratadine (CLARITIN) 10 MG tablet ONE DAILY FOR ALLERGIES       valACYclovir (VALTREX) 1000 mg tablet TAKE 2000 MG AT ONSET OF COLD SORE, FOLLOWED BY SECOND 2000 MG DOSE 12 HOURS LATER; REPEAT AS NEEDED 12 tablet 3         ALLERGIES:  Allergies   Allergen Reactions     Alendronate GI Disturbance     Severe GERD     Pollen Extract          PAST MEDICAL HISTORY:  Past Medical History:   Diagnosis Date     Arthritis      Atypical ductal hyperplasia of left breast 2005    left breast atypical ductal hyperplasia, completed 5 years tamoxifen in 2011     Atypical hyperplasia of breasts      Basal cell carcinoma of ala nasi      Basal cell carcinoma of shoulder      Family history of colon cancer     BROTHER COLON CANCER AGE 68     History of poliomyelitis 1947    acute polio infection age 2, resolved without any sequelae     Osteopenia      Polyp, colon     Adenomatous     Squamous cell carcinoma of skin, unspecified          PAST SURGICAL HISTORY:  Past Surgical History:   Procedure Laterality Date     BIOPSY NODE SENTINEL Left 9/13/2023    Procedure: left sentinel lymph node biopsy;  Surgeon: Chika Mitchell MD;  Location:  OR     BIOPSY, BREAST, WITH RADIOFREQUENCY IDENTIFICATION Left 9/13/2023    Procedure: left breast tag localized lumpectomy;  Surgeon: Chika Mitchell MD;  Location:  OR     COLONOSCOPY       EYE SURGERY  07/2017    cataract bilat eyes     HC EXCISION BREAST LESION, OPEN >=1      ATYPICAL  HYPERPLASIA X 2     HYSTERECTOMY, PAP NO LONGER INDICATED      fibroids     TONSILLECTOMY       ZZC VAG HYST,RMV TUBE/OVARY           SOCIAL HISTORY:  Social History     Socioeconomic History     Marital status:      Spouse name: Not on file     Number of children: Not on file     Years of education: Not on file     Highest education level: Not on file   Occupational History     Not on file   Tobacco Use     Smoking status: Former     Packs/day: 1.00     Years: 40.00     Additional pack years: 0.00     Total pack years: 40.00     Types: Cigarettes     Quit date: 10/7/1983     Years since quittin.2     Smokeless tobacco: Never   Substance and Sexual Activity     Alcohol use: Yes     Comment: 4 drinks per week     Drug use: No     Sexual activity: Not Currently     Partners: Male   Other Topics Concern      Service No     Blood Transfusions No     Caffeine Concern No     Occupational Exposure No     Hobby Hazards No     Sleep Concern Yes     Stress Concern No     Weight Concern Yes     Special Diet No     Back Care No     Exercise Yes     Comment: 3 X WEEK     Bike Helmet Not Asked     Seat Belt Yes     Self-Exams No     Comment: ENCOURAGED     Parent/sibling w/ CABG, MI or angioplasty before 65F 55M? Not Asked   Social History Narrative     Not on file     Social Determinants of Health     Financial Resource Strain: Not on file   Food Insecurity: Not on file   Transportation Needs: Not on file   Physical Activity: Not on file   Stress: Not on file   Social Connections: Not on file   Interpersonal Safety: Not on file   Housing Stability: Not on file         FAMILY HISTORY:  Family History   Problem Relation Age of Onset     Cancer Mother         lung     Osteoporosis Mother      Eye Disorder Mother         glaucoma     Arthritis Mother         osteo     Heart Disease Mother         CHF     Hypertension Mother      Cerebrovascular Disease Father         TIAs     Osteoporosis Father       "Hypertension Father      Cancer Father         BONE     Arthritis Sister         PARTIAL KNEE REPLACEMENT IN      Osteoporosis Sister      No Known Problems Brother      Cerebrovascular Disease Brother      Colon Cancer Brother 70        B 1939     Cancer Brother         PROSTATE     Breast Cancer Maternal Grandmother      Cerebrovascular Disease Maternal Grandfather      Asthma Daughter      Breast Cancer Maternal Aunt      Breast Cancer Maternal Aunt      Breast Cancer Other         2 YOUNGER MATERNAL COUSINS         PHYSICAL EXAM:  Vital signs:  BP (!) 134/96   Pulse 78   Resp 16   Ht 1.626 m (5' 4\")   Wt 70.6 kg (155 lb 9.6 oz)   LMP  (LMP Unknown)   SpO2 100%   BMI 26.71 kg/m     ECO  GENERAL/CONSTITUTIONAL: No acute distress.  EYES: No erythema or scleral icterus.  ENT/MOUTH: Neck supple.  LYMPH: No cervical, supraclavicular, axillary adenopathy.   BREAST: No palpable masses in either breast. Nipples are everted bilaterally with no discharge. No erythema, ulceration, or dimpling of the skin.   RESPIRATORY: No audible cough or wheezing.  GASTROINTESTINAL: No hepatosplenomegaly, masses, or tenderness. No guarding.  No distention.  MUSCULOSKELETAL: Warm and well-perfused, no cyanosis, clubbing, or edema.  NEUROLOGIC: No focal motor deficits. Alert, oriented, answers questions appropriately.  INTEGUMENTARY: No rashes or jaundice.  GAIT: Steady, does not use assistive device       LABS:  CBC RESULTS:   Recent Labs   Lab Test 23  0951   WBC 5.7   RBC 4.34   HGB 13.1   HCT 39.5   MCV 91   MCH 30.2   MCHC 33.2   RDW 13.3        Last Comprehensive Metabolic Panel:  Sodium   Date Value Ref Range Status   2023 133 (L) 136 - 145 mmol/L Final   2020 130 (L) 133 - 144 mmol/L Final     Potassium   Date Value Ref Range Status   2023 4.7 3.4 - 5.3 mmol/L Final   2022 4.1 3.4 - 5.3 mmol/L Final   2020 4.4 3.4 - 5.3 mmol/L Final     Chloride   Date Value Ref Range Status "   08/25/2023 99 98 - 107 mmol/L Final   11/11/2022 100 94 - 109 mmol/L Final   11/06/2020 97 94 - 109 mmol/L Final     Carbon Dioxide   Date Value Ref Range Status   11/06/2020 28 20 - 32 mmol/L Final     Carbon Dioxide (CO2)   Date Value Ref Range Status   08/25/2023 26 22 - 29 mmol/L Final   11/11/2022 30 20 - 32 mmol/L Final     Anion Gap   Date Value Ref Range Status   08/25/2023 8 7 - 15 mmol/L Final   11/11/2022 4 3 - 14 mmol/L Final   11/06/2020 5 3 - 14 mmol/L Final     Glucose   Date Value Ref Range Status   08/25/2023 92 70 - 99 mg/dL Final   11/11/2022 93 70 - 99 mg/dL Final   11/06/2020 95 70 - 99 mg/dL Final     Comment:     Fasting specimen     Urea Nitrogen   Date Value Ref Range Status   08/25/2023 18.0 8.0 - 23.0 mg/dL Final   11/11/2022 18 7 - 30 mg/dL Final   11/06/2020 16 7 - 30 mg/dL Final     Creatinine   Date Value Ref Range Status   08/25/2023 0.73 0.51 - 0.95 mg/dL Final   11/06/2020 0.73 0.52 - 1.04 mg/dL Final     GFR Estimate   Date Value Ref Range Status   08/25/2023 83 >60 mL/min/1.73m2 Final   11/06/2020 79 >60 mL/min/[1.73_m2] Final     Comment:     Non  GFR Calc  Starting 12/18/2018, serum creatinine based estimated GFR (eGFR) will be   calculated using the Chronic Kidney Disease Epidemiology Collaboration   (CKD-EPI) equation.       Calcium   Date Value Ref Range Status   08/25/2023 9.8 8.8 - 10.2 mg/dL Final   11/06/2020 9.6 8.5 - 10.1 mg/dL Final     Bilirubin Total   Date Value Ref Range Status   08/25/2023 0.4 <=1.2 mg/dL Final   10/23/2018 0.4 0.2 - 1.3 mg/dL Final     Alkaline Phosphatase   Date Value Ref Range Status   08/25/2023 87 35 - 104 U/L Final   10/23/2018 92 40 - 150 U/L Final     ALT   Date Value Ref Range Status   08/25/2023 14 0 - 50 U/L Final     Comment:     Reference intervals for this test were updated on 6/12/2023 to more accurately reflect our healthy population. There may be differences in the flagging of prior results with similar values  performed with this method. Interpretation of those prior results can be made in the context of the updated reference intervals.     10/28/2019 23 0 - 50 U/L Final     AST   Date Value Ref Range Status   08/25/2023 29 0 - 45 U/L Final     Comment:     Reference intervals for this test were updated on 6/12/2023 to more accurately reflect our healthy population. There may be differences in the flagging of prior results with similar values performed with this method. Interpretation of those prior results can be made in the context of the updated reference intervals.   10/28/2019 19 0 - 45 U/L Final       PATHOLOGY:  None new.    IMAGING:  None new.    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  --She is tolerating anastrozole well so far with no significant adverse effects.  --Advised adjuvant endocrine therapy for a total of 5 years to reduce risk of breast cancer recurrence by a relative 50%.  --Alternate option is tamoxifen. She would prefer not to take tamoxifen.  --Alondra has had prior heartburn issues to alendronate, so she was started on Prolia for her osteopenia.  --Genetics consult completed 12/7/2023; she is contemplating whether to proceed with genetic testing; discussed in detail today.  --Due for left diagnostic mammogram in 4/2024 and bilateral screening mammogram in 10/2024, then yearly thereafter.    Return in 4 months.    Jenelle Cannon MD  Hematology/Oncology  [facility]    Total time spent today: 30 minutes in chart review, patient evaluation, counseling, documentation, test and/or medication/prescription orders, and coordination of care.      Oncology Rooming  "Note    December 18, 2023 10:19 AM   Alondra Morgan is a 78 year old female who presents for:    Chief Complaint   Patient presents with     Oncology Clinic Visit     Initial Vitals: Resp 16   Ht 1.626 m (5' 4\")   Wt 70.6 kg (155 lb 9.6 oz)   LMP  (LMP Unknown)   BMI 26.71 kg/m   Estimated body mass index is 26.71 kg/m  as calculated from the following:    Height as of this encounter: 1.626 m (5' 4\").    Weight as of this encounter: 70.6 kg (155 lb 9.6 oz). Body surface area is 1.79 meters squared.  No Pain (0) Comment: Data Unavailable   No LMP recorded (lmp unknown). Patient has had a hysterectomy.  Allergies reviewed: Yes  Medications reviewed: Yes    Medications: Medication refills not needed today.  Pharmacy name entered into [email or web address].:    [id number] [place], [place] - [address] [id number] [place], [place] - [address] [facility]        Sole Boogie MA              Again, thank you for allowing me to participate in the care of your patient.        Sincerely,        Jenelle Cannon MD  "

## 2024-01-09 ENCOUNTER — LAB (OUTPATIENT)
Dept: LAB | Facility: CLINIC | Age: 79
End: 2024-01-09
Payer: COMMERCIAL

## 2024-01-09 DIAGNOSIS — Z80.3 FAMILY HISTORY OF MALIGNANT NEOPLASM OF BREAST: ICD-10-CM

## 2024-01-09 DIAGNOSIS — Z80.0 FAMILY HISTORY OF STOMACH CANCER: ICD-10-CM

## 2024-01-09 DIAGNOSIS — Z80.0 FAMILY HISTORY OF COLON CANCER: ICD-10-CM

## 2024-01-09 DIAGNOSIS — Z80.42 FAMILY HISTORY OF PROSTATE CANCER: ICD-10-CM

## 2024-01-09 DIAGNOSIS — C50.512 MALIGNANT NEOPLASM OF LOWER-OUTER QUADRANT OF LEFT BREAST OF FEMALE, ESTROGEN RECEPTOR POSITIVE (H): ICD-10-CM

## 2024-01-09 DIAGNOSIS — Z17.0 MALIGNANT NEOPLASM OF LOWER-OUTER QUADRANT OF LEFT BREAST OF FEMALE, ESTROGEN RECEPTOR POSITIVE (H): ICD-10-CM

## 2024-01-09 LAB
INTERPRETATION: NORMAL
INTERPRETATION: NORMAL
LAB PDF RESULT: NORMAL
LAB PDF RESULT: NORMAL
SIGNIFICANT RESULTS: NORMAL
SIGNIFICANT RESULTS: NORMAL
SPECIMEN DESCRIPTION: NORMAL
SPECIMEN DESCRIPTION: NORMAL
TEST DETAILS, MDL: NORMAL
TEST DETAILS, MDL: NORMAL

## 2024-01-10 ENCOUNTER — TELEPHONE (OUTPATIENT)
Dept: LAB | Facility: CLINIC | Age: 79
End: 2024-01-10
Payer: COMMERCIAL

## 2024-01-10 LAB — INTERPRETATION: NORMAL

## 2024-01-10 NOTE — PROGRESS NOTES
I called Alondra to update her on insurance coverage for her genetic testing. PA was approved and is valid through 2/6/24. However, I was unable to reach Alondra. I left a voicemail with my name, phone number, and a brief reason for calling. I also sent a [employer] message to Alondra.      Gabino León  Genomics Billing    [facility]  Molecular Diagnostics Laboratory  [address] [address]  [email or web address]  Office: [phone]  Fax:   Employed by [facility]

## 2024-01-11 ENCOUNTER — LAB (OUTPATIENT)
Dept: LAB | Facility: CLINIC | Age: 79
End: 2024-01-11
Payer: COMMERCIAL

## 2024-01-11 DIAGNOSIS — Z17.0 MALIGNANT NEOPLASM OF LOWER-OUTER QUADRANT OF LEFT BREAST OF FEMALE, ESTROGEN RECEPTOR POSITIVE (H): Primary | ICD-10-CM

## 2024-01-11 DIAGNOSIS — C50.512 MALIGNANT NEOPLASM OF LOWER-OUTER QUADRANT OF LEFT BREAST OF FEMALE, ESTROGEN RECEPTOR POSITIVE (H): Primary | ICD-10-CM

## 2024-01-11 DIAGNOSIS — Z80.0 FAMILY HISTORY OF COLON CANCER: ICD-10-CM

## 2024-01-11 DIAGNOSIS — Z80.0 FAMILY HISTORY OF STOMACH CANCER: ICD-10-CM

## 2024-01-11 DIAGNOSIS — Z80.42 FAMILY HISTORY OF PROSTATE CANCER: ICD-10-CM

## 2024-01-11 DIAGNOSIS — Z80.3 FAMILY HISTORY OF MALIGNANT NEOPLASM OF BREAST: ICD-10-CM

## 2024-01-11 PROCEDURE — 36415 COLL VENOUS BLD VENIPUNCTURE: CPT

## 2024-01-11 PROCEDURE — G0452 MOLECULAR PATHOLOGY INTERPR: HCPCS | Mod: 26 | Performed by: PATHOLOGY

## 2024-01-11 PROCEDURE — 81162 BRCA1&2 GEN FULL SEQ DUP/DEL: CPT | Performed by: CLINICAL NURSE SPECIALIST

## 2024-01-14 ENCOUNTER — HEALTH MAINTENANCE LETTER (OUTPATIENT)
Age: 79
End: 2024-01-14

## 2024-01-16 DIAGNOSIS — I10 ESSENTIAL HYPERTENSION WITH GOAL BLOOD PRESSURE LESS THAN 130/80: ICD-10-CM

## 2024-01-16 RX ORDER — LISINOPRIL 20 MG/1
20 TABLET ORAL DAILY
Qty: 90 TABLET | Refills: 0 | Status: SHIPPED | OUTPATIENT
Start: 2024-01-16 | End: 2024-02-20

## 2024-02-06 ENCOUNTER — DOCUMENTATION ONLY (OUTPATIENT)
Dept: INTERNAL MEDICINE | Facility: CLINIC | Age: 79
End: 2024-02-06
Payer: COMMERCIAL

## 2024-02-06 DIAGNOSIS — Z17.0 MALIGNANT NEOPLASM OF LOWER-OUTER QUADRANT OF LEFT BREAST OF FEMALE, ESTROGEN RECEPTOR POSITIVE (H): ICD-10-CM

## 2024-02-06 DIAGNOSIS — Z13.6 CARDIOVASCULAR SCREENING; LDL GOAL LESS THAN 130: ICD-10-CM

## 2024-02-06 DIAGNOSIS — I10 ESSENTIAL HYPERTENSION: ICD-10-CM

## 2024-02-06 DIAGNOSIS — Z13.29 SCREENING FOR THYROID DISORDER: ICD-10-CM

## 2024-02-06 DIAGNOSIS — E78.5 HYPERLIPIDEMIA LDL GOAL <130: Primary | ICD-10-CM

## 2024-02-06 DIAGNOSIS — C50.512 MALIGNANT NEOPLASM OF LOWER-OUTER QUADRANT OF LEFT BREAST OF FEMALE, ESTROGEN RECEPTOR POSITIVE (H): ICD-10-CM

## 2024-02-06 NOTE — PROGRESS NOTES
Alondra Morgan has an upcoming lab appointment:    Future Appointments   Date Time Provider Department Center   2/14/2024 10:00 AM [facility]    2/20/2024 10:00 AM Ivan Patel MD [facility]   4/15/2024  2:00 PM [id number]   4/17/2024  1:30 PM Jenelle Cannon MD [facility]     Patient is scheduled for the following lab(s):   Scheduling Notes: labs patel   Made On: 12/28/2023 11:02 AM By: JAKE JANSEN       There is no order available. Please review and place either future orders or HMPO (Review of Health Maintenance Protocol Orders), as appropriate.    Health Maintenance Due   Topic    LIPID     TSH W/FREE T4 REFLEX     ANNUAL REVIEW OF HM ORDERS      If no labs are needed at this time please have the Care Team contact patient regarding this information and cancel lab appointment. Thank you.     Federica DE LA TORRE

## 2024-02-09 NOTE — PROGRESS NOTES
"2/13/2024    Virtual Visit Details  Type of service:  Telephone Visit   Phone call duration: 5 minutes     Referring Provider:  Chika Mitchell MD     Presenting Information:  I spoke to Alondra today to discuss her genetic testing results. Her blood was drawn on 1/9/2024. The BRCA1 and BRCA2 genes with automatic reflex to the GYN Expanded panel was ordered from [facility]'s Molecular Diagnostics Laboratory. This testing was done because of Alondra's personal history of breast cancer and family history of breast and prostate cancer.    Genetic Testing Result: NEGATIVE  Alondra tested negative for mutations in the ANGELINE, BARD1, BRCA1, BRCA2, BRIP1, CDH1, CHEK2, DICER1, EPCAM, MLH1, MRE11, MSH2, MSH6, MUTYH, NBN, NF1, PALB2, PMS2, PTEN, RAD50, RAD51C, RAD51D, SMARCA4, STK11 and TP53 gene. No mutations were found in any of the 25 genes analyzed. This test involved sequencing and deletion/duplication analysis of all genes with the exceptions of EPCAM (deletions/duplications only). Please see a copy of the scanned test report for complete details regarding testing methodology/limitations.    A copy of the test report can be found in the Laboratory tab, dated 1/11/2024, and named \"Next generation sequencing\".     Interpretation:  We discussed several different interpretations of this negative test result.    One explanation may be that there is a different gene or combination of genes and environment that are associated with the cancers in this family.  It is possible that her relatives diagnosed with cancer did have a mutation and she did not inherit it.  There is also a small possibility that there is a mutation in one of these genes, and the testing laboratory could not find it with their current testing methods.       Screening:  Based on this negative test result, it is important for Alondra and her relatives to refer back to the family history for appropriate cancer screening.    Alondra should continue to follow up " with her oncology team as recommended about her [facility] cancer surveillance.   Alondra's close female relatives remain at increased risk for breast cancer given their family history. Breast cancer screening is generally recommended to begin approximately 10 years younger than the earliest age of breast cancer diagnosis in the family, or at age 40, whichever comes first. In this family, screening may begin at age 40. Breast screening options should be discussed with an individual's primary care provider and a genetic counselor, to determine at what age to begin screening, what screening is appropriate, and if additional screening (such as breast MRI) is necessary based on personal/family history factors.    Other population cancer screening options, such as those recommended by the American Cancer Society and the National Comprehensive Cancer Network (NCCN), are also appropriate for Alondra and her family. These screening recommendations may change if there are changes to Alondra's personal and/or family history of cancer.   Final screening recommendations should be made by each individual's primary care provider.    Inheritance:  We reviewed autosomal dominant inheritance. We discussed that Alondra did not pass on an identifiable mutation in these genes to her children based on this test result.  Mutations in these genes do not skip generations.      Additional Testing Considerations:  Although Alondra's genetic testing result was negative, other relatives may still carry a gene mutation associated with breast cancer. Genetic counseling is recommended for Alondra's siblings and other maternal relatives to discuss genetic testing options. If any of these relatives do pursue genetic testing, Alondra is encouraged to contact me so that we may review the impact of their test results on her.    Summary:  We do not have an explanation for Alondra's personal and family history of cancer. While no genetic changes were identified,  Alondra may still be at risk for certain cancers due to family history, environmental factors, or other genetic causes not identified by this test.  Because of that, it is important that she continue with cancer screening based on her personal and family history as discussed above.    Genetic testing is rapidly advancing, and new cancer susceptibility genes will most likely be identified in the future.  Therefore, I encouraged Alondra to contact me regularly or if there are changes in her personal or family history.  This may change how we assess her cancer risk, screening, and the testing we would offer.    Plan:  1. A copy of the test results will be mailed to Alondra.  2. She plans to follow-up with her other providers.  3. She should contact me regularly, or sooner if her family history changes.    If Alondra has any further questions, I encouraged her to contact me at [phone].     Time spent over the phone: 5 minutes.    Dianelys Bauman MS, [facility]  Licensed, Certified Genetic Counselor  Phone: [phone]

## 2024-02-13 ENCOUNTER — VIRTUAL VISIT (OUTPATIENT)
Dept: ONCOLOGY | Facility: CLINIC | Age: 79
End: 2024-02-13
Payer: COMMERCIAL

## 2024-02-13 DIAGNOSIS — Z80.0 FAMILY HISTORY OF STOMACH CANCER: ICD-10-CM

## 2024-02-13 DIAGNOSIS — Z80.42 FAMILY HISTORY OF PROSTATE CANCER: ICD-10-CM

## 2024-02-13 DIAGNOSIS — C50.812 MALIGNANT NEOPLASM OF OVERLAPPING SITES OF LEFT BREAST IN FEMALE, ESTROGEN RECEPTOR POSITIVE (H): Primary | ICD-10-CM

## 2024-02-13 DIAGNOSIS — Z80.0 FAMILY HISTORY OF COLON CANCER: ICD-10-CM

## 2024-02-13 DIAGNOSIS — Z80.3 FAMILY HISTORY OF MALIGNANT NEOPLASM OF BREAST: ICD-10-CM

## 2024-02-13 DIAGNOSIS — Z17.0 MALIGNANT NEOPLASM OF OVERLAPPING SITES OF LEFT BREAST IN FEMALE, ESTROGEN RECEPTOR POSITIVE (H): Primary | ICD-10-CM

## 2024-02-13 PROCEDURE — 999N000069 HC STATISTIC GENETIC COUNSELING, < 16 MIN: Mod: TEL,95

## 2024-02-13 NOTE — PATIENT INSTRUCTIONS
Negative Genetic Test Result    Genetic Testing  Genetic testing involved a blood or saliva test which looked at the genetic information in select genes for variants associated with cancer risk.  This testing may have included analysis of a single gene due to a known variant in the family, multiple genes most associated with the cancers in a family, or an expanded panel of genes related to many types of cancers.     Results  There are several possible genetic test results, including:   Positive--a harmful mutation (also known as a  pathogenic  or  likely pathogenic  variant) was identified in a gene associated with increased cancer risk.  These risks, as well as medical management options, depend on the specific genetic variant identified.    Negative--no variants were identified in the genes analyzed   Variant of unknown significance--a variant was identified in one or more genes, though it is currently unclear how this impacts cancer risk in the family.  Genetic testing labs are working to collect evidence about these uncertain variants and may provide updates in the future.    What Does a Negative Genetic Test Result Mean?  A negative genetic test results means that no genetic changes (variants) were detected in the genes tested. While no inherited risk factors for cancer were identified, you and your family may be at risk for certain cancers due to family history, environmental factors, or other genetic causes not identified by this test.  It is also possible that your family may still be at risk of carrying a genetic risk factor which you did not inherit. Your genetic counselor can help interpret the result for you and your relatives.      It is important to note which genes were included in your test. A list of these genes can be found on your test result.    Screening Recommendations  A combination of personal and family history factors may inform cancer risk and medical management recommendations.   Population cancer screening options, such as those recommended by the American Cancer Society and the National Comprehensive Cancer Network (NCCN) are appropriate for many families at average risk for cancer.  However, earlier and/or more frequent screening may be recommended based on personal factors (lifestyle, exposures, medications, screening results), family history of cancer, and sometimes genetic factors.  These cancer risk management options should be discussed in more detail with an individual's medical providers.      Please call us if you have any questions or concerns.   Cancer Risk Management Program (Appointments: [phone])  Tom Terry, MS [facility]  [phone]  Dianelys Bauman, MS, [facility] [phone]  Edith Mariano, MS, [facility]  [phone]  Carolann Raya, MS, [facility]  [phone]  Rosalinda Batista, MS, [facility]  [phone]  Kim Aragon, MS, [facility] [phone]  Katelyn Long, MS, [facility] [phone]

## 2024-02-13 NOTE — NURSING NOTE
Is the patient currently in the state of [place]? YES    Visit mode:TELEPHONE    If the visit is dropped, the patient can be reconnected by: TELEPHONE VISIT: Phone number: [phone]    Will anyone else be joining the visit? NO  (If patient encounters technical issues they should call [phone]    How would you like to obtain your AVS? MyChart    Are changes needed to the allergy or medication list? N/A    Reason for visit: TASHIA ANDINO

## 2024-02-13 NOTE — Clinical Note
"    2/13/2024         RE: Alondra Morgan  [address] [address]        Dear Colleague,    Thank you for referring your patient, Alondra Morgan, to the [facility]. Please see a copy of my visit note below.    2/13/2024    Virtual Visit Details  Type of service:  Telephone Visit   Phone call duration: 5 minutes     Referring Provider:  Chika Mitchell MD     Presenting Information:  I spoke to Alondra today to discuss her genetic testing results. Her blood was drawn on 1/9/2024. The BRCA1 and BRCA2 genes with automatic reflex to the GYN Expanded panel was ordered from [facility]'s [facility]. This testing was done because of Alondra's personal history of breast cancer and family history of breast and prostate cancer.    Genetic Testing Result: NEGATIVE  Alondra tested negative for mutations in the ANGELINE, BARD1, BRCA1, BRCA2, BRIP1, CDH1, CHEK2, DICER1, EPCAM, MLH1, MRE11, MSH2, MSH6, MUTYH, NBN, NF1, PALB2, PMS2, PTEN, RAD50, RAD51C, RAD51D, SMARCA4, STK11 and TP53 gene. No mutations were found in any of the 25 genes analyzed. This test involved sequencing and deletion/duplication analysis of all genes with the exceptions of EPCAM (deletions/duplications only). Please see a copy of the scanned test report for complete details regarding testing methodology/limitations.    A copy of the test report can be found in the Laboratory tab, dated 1/11/2024, and named \"Next generation sequencing\".     Interpretation:  We discussed several different interpretations of this negative test result.    One explanation may be that there is a different gene or combination of genes and environment that are associated with the cancers in this family.  It is possible that her relatives diagnosed with cancer did have a mutation and she did not inherit it.  There is also a small possibility that there is a mutation in one of these genes, and the testing " laboratory could not find it with their current testing methods.       Screening:  Based on this negative test result, it is important for Alondra and her relatives to refer back to the family history for appropriate cancer screening.    Alondra should continue to follow up with her oncology team as recommended about her [facility] cancer surveillance.   Alondra's close female relatives remain at increased risk for breast cancer given their family history. Breast cancer screening is generally recommended to begin approximately 10 years younger than the earliest age of breast cancer diagnosis in the family, or at age 40, whichever comes first. In this family, screening may begin at age 40. Breast screening options should be discussed with an individual's primary care provider and a genetic counselor, to determine at what age to begin screening, what screening is appropriate, and if additional screening (such as breast MRI) is necessary based on personal/family history factors.    Other population cancer screening options, such as those recommended by the American Cancer Society and the National Comprehensive Cancer Network (NCCN), are also appropriate for Alondra and her family. These screening recommendations may change if there are changes to Alondra's personal and/or family history of cancer.   Final screening recommendations should be made by each individual's primary care provider.    Inheritance:  We reviewed autosomal dominant inheritance. We discussed that Alondra did not pass on an identifiable mutation in these genes to her children based on this test result.  Mutations in these genes do not skip generations.      Additional Testing Considerations:  Although Alondra's genetic testing result was negative, other relatives may still carry a gene mutation associated with breast cancer. Genetic counseling is recommended for Alondra's siblings and other maternal relatives to discuss genetic testing options. If any of these  relatives do pursue genetic testing, Alondra is encouraged to contact me so that we may review the impact of their test results on her.    Summary:  We do not have an explanation for Alondra's personal and family history of cancer. While no genetic changes were identified, Alondra may still be at risk for certain cancers due to family history, environmental factors, or other genetic causes not identified by this test.  Because of that, it is important that she continue with cancer screening based on her personal and family history as discussed above.    Genetic testing is rapidly advancing, and new cancer susceptibility genes will most likely be identified in the future.  Therefore, I encouraged Alondra to contact me regularly or if there are changes in her personal or family history.  This may change how we assess her cancer risk, screening, and the testing we would offer.    Plan:  1. A copy of the test results will be mailed to Alondra.  2. She plans to follow-up with her other providers.  3. She should contact me regularly, or sooner if her family history changes.    If Alonrda has any further questions, I encouraged her to contact me at [phone].     Time spent over the phone: 5 minutes.    Dianelys Bauman MS, [facility]  Licensed, Certified Genetic Counselor  Phone: [phone]      Again, thank you for allowing me to participate in the care of your patient.        Sincerely,        Dianelys Bauman GC

## 2024-02-13 NOTE — LETTER
"Cancer Risk Management  Program Locations    [facility]  [facility]  Mailing Address  Cancer Risk Management Program  [address]  [place] [address]    New patient appointments  [phone]    February 13, 2024    Alondra Morgan  [address] [facility] [address]  [place] [facility] [place] [address]    Dear Alondra,    It was a pleasure talking with you via your virtual genetic counseling visit on 2/13/2024.  Below is a copy of the progress note from that recent visit through the Cancer Risk Management Program.  If you have any additional questions, please feel free to contact me.      Referring Provider:  Chika Mitchell MD     Presenting Information:  I spoke to Alondra today to discuss her genetic testing results. Her blood was drawn on 1/9/2024. The BRCA1 and BRCA2 genes with automatic reflex to the GYN Expanded panel was ordered from [facility]'s Molecular Diagnostics Laboratory. This testing was done because of Alondra's personal history of breast cancer and family history of breast and prostate cancer.    Genetic Testing Result: NEGATIVE  Alondra tested negative for mutations in the ANGELINE, BARD1, BRCA1, BRCA2, BRIP1, CDH1, CHEK2, DICER1, EPCAM, MLH1, MRE11, MSH2, MSH6, MUTYH, NBN, NF1, PALB2, PMS2, PTEN, RAD50, RAD51C, RAD51D, SMARCA4, STK11 and TP53 gene. No mutations were found in any of the 25 genes analyzed. This test involved sequencing and deletion/duplication analysis of all genes with the exceptions of EPCAM (deletions/duplications only). Please see a copy of the scanned test report for complete details regarding testing methodology/limitations.    A copy of the test report can be found in the Laboratory tab, dated 1/11/2024, and named \"Next generation sequencing\".     Interpretation:  We discussed several different " interpretations of this negative test result.    One explanation may be that there is a different gene or combination of genes and environment that are associated with the cancers in this family.  It is possible that her relatives diagnosed with cancer did have a mutation and she did not inherit it.  There is also a small possibility that there is a mutation in one of these genes, and the testing laboratory could not find it with their current testing methods.       Screening:  Based on this negative test result, it is important for Alondra and her relatives to refer back to the family history for appropriate cancer screening.    Alondra should continue to follow up with her oncology team as recommended about her [facility] cancer surveillance.   Alondra's close female relatives remain at increased risk for breast cancer given their family history. Breast cancer screening is generally recommended to begin approximately 10 years younger than the earliest age of breast cancer diagnosis in the family, or at age 40, whichever comes first. In this family, screening may begin at age 40. Breast screening options should be discussed with an individual's primary care provider and a genetic counselor, to determine at what age to begin screening, what screening is appropriate, and if additional screening (such as breast MRI) is necessary based on personal/family history factors.    Other population cancer screening options, such as those recommended by the American Cancer Society and the National Comprehensive Cancer Network (NCCN), are also appropriate for Alondra and her family. These screening recommendations may change if there are changes to Alondra's personal and/or family history of cancer.   Final screening recommendations should be made by each individual's primary care provider.    Inheritance:  We reviewed autosomal dominant inheritance. We discussed that Alondra did not pass on an identifiable mutation in these genes to her  children based on this test result.  Mutations in these genes do not skip generations.      Additional Testing Considerations:  Although Alondra's genetic testing result was negative, other relatives may still carry a gene mutation associated with breast cancer. Genetic counseling is recommended for Alondra's siblings and other maternal relatives to discuss genetic testing options. If any of these relatives do pursue genetic testing, Alondra is encouraged to contact me so that we may review the impact of their test results on her.    Summary:  We do not have an explanation for Alondra's personal and family history of cancer. While no genetic changes were identified, Alondra may still be at risk for certain cancers due to family history, environmental factors, or other genetic causes not identified by this test.  Because of that, it is important that she continue with cancer screening based on her personal and family history as discussed above.    Genetic testing is rapidly advancing, and new cancer susceptibility genes will most likely be identified in the future.  Therefore, I encouraged Alondra to contact me regularly or if there are changes in her personal or family history.  This may change how we assess her cancer risk, screening, and the testing we would offer.    Plan:  1. A copy of the test results will be mailed to Alondra.  2. She plans to follow-up with her other providers.  3. She should contact me regularly, or sooner if her family history changes.    If Alondra has any further questions, I encouraged her to contact me at [phone].     Time spent over the phone: 5 minutes.    Dianelys Bauman MS, [facility]  Licensed, Certified Genetic Counselor  Phone: [phone]

## 2024-02-14 ENCOUNTER — LAB (OUTPATIENT)
Dept: LAB | Facility: CLINIC | Age: 79
End: 2024-02-14
Payer: COMMERCIAL

## 2024-02-14 DIAGNOSIS — C50.812 MALIGNANT NEOPLASM OF OVERLAPPING SITES OF LEFT BREAST IN FEMALE, ESTROGEN RECEPTOR POSITIVE (H): ICD-10-CM

## 2024-02-14 DIAGNOSIS — Z17.0 MALIGNANT NEOPLASM OF OVERLAPPING SITES OF LEFT BREAST IN FEMALE, ESTROGEN RECEPTOR POSITIVE (H): ICD-10-CM

## 2024-02-14 DIAGNOSIS — E78.5 HYPERLIPIDEMIA LDL GOAL <130: Primary | ICD-10-CM

## 2024-02-14 LAB
ALBUMIN SERPL BCG-MCNC: 4.2 G/DL (ref 3.5–5.2)
ALP SERPL-CCNC: 89 U/L (ref 40–150)
ALT SERPL W P-5'-P-CCNC: 17 U/L (ref 0–50)
ANION GAP SERPL CALCULATED.3IONS-SCNC: 9 MMOL/L (ref 7–15)
AST SERPL W P-5'-P-CCNC: 23 U/L (ref 0–45)
BASOPHILS # BLD AUTO: 0 10E3/UL (ref 0–0.2)
BASOPHILS NFR BLD AUTO: 1 %
BILIRUB SERPL-MCNC: 0.4 MG/DL
BUN SERPL-MCNC: 13.1 MG/DL (ref 8–23)
CALCIUM SERPL-MCNC: 10.1 MG/DL (ref 8.8–10.2)
CHLORIDE SERPL-SCNC: 100 MMOL/L (ref 98–107)
CHOLEST SERPL-MCNC: 229 MG/DL
CREAT SERPL-MCNC: 0.73 MG/DL (ref 0.51–0.95)
DEPRECATED HCO3 PLAS-SCNC: 25 MMOL/L (ref 22–29)
EGFRCR SERPLBLD CKD-EPI 2021: 83 ML/MIN/1.73M2
EOSINOPHIL # BLD AUTO: 0.2 10E3/UL (ref 0–0.7)
EOSINOPHIL NFR BLD AUTO: 3 %
ERYTHROCYTE [DISTWIDTH] IN BLOOD BY AUTOMATED COUNT: 12.9 % (ref 10–15)
FASTING STATUS PATIENT QL REPORTED: YES
GLUCOSE SERPL-MCNC: 89 MG/DL (ref 70–99)
HCT VFR BLD AUTO: 40.5 % (ref 35–47)
HDLC SERPL-MCNC: 77 MG/DL
HGB BLD-MCNC: 13.6 G/DL (ref 11.7–15.7)
IMM GRANULOCYTES # BLD: 0 10E3/UL
IMM GRANULOCYTES NFR BLD: 0 %
LDLC SERPL CALC-MCNC: 136 MG/DL
LYMPHOCYTES # BLD AUTO: 1.8 10E3/UL (ref 0.8–5.3)
LYMPHOCYTES NFR BLD AUTO: 38 %
MCH RBC QN AUTO: 30 PG (ref 26.5–33)
MCHC RBC AUTO-ENTMCNC: 33.6 G/DL (ref 31.5–36.5)
MCV RBC AUTO: 89 FL (ref 78–100)
MONOCYTES # BLD AUTO: 0.6 10E3/UL (ref 0–1.3)
MONOCYTES NFR BLD AUTO: 12 %
NEUTROPHILS # BLD AUTO: 2.2 10E3/UL (ref 1.6–8.3)
NEUTROPHILS NFR BLD AUTO: 46 %
NONHDLC SERPL-MCNC: 152 MG/DL
PLATELET # BLD AUTO: 276 10E3/UL (ref 150–450)
POTASSIUM SERPL-SCNC: 4.5 MMOL/L (ref 3.4–5.3)
PROT SERPL-MCNC: 7 G/DL (ref 6.4–8.3)
RBC # BLD AUTO: 4.53 10E6/UL (ref 3.8–5.2)
SODIUM SERPL-SCNC: 134 MMOL/L (ref 135–145)
TRIGL SERPL-MCNC: 80 MG/DL
TSH SERPL DL<=0.005 MIU/L-ACNC: 1.81 UIU/ML (ref 0.3–4.2)
WBC # BLD AUTO: 4.8 10E3/UL (ref 4–11)

## 2024-02-14 PROCEDURE — 80053 COMPREHEN METABOLIC PANEL: CPT

## 2024-02-14 PROCEDURE — 85025 COMPLETE CBC W/AUTO DIFF WBC: CPT

## 2024-02-14 PROCEDURE — 80061 LIPID PANEL: CPT

## 2024-02-14 PROCEDURE — 36415 COLL VENOUS BLD VENIPUNCTURE: CPT

## 2024-02-14 PROCEDURE — 84443 ASSAY THYROID STIM HORMONE: CPT

## 2024-02-20 ENCOUNTER — OFFICE VISIT (OUTPATIENT)
Dept: INTERNAL MEDICINE | Facility: CLINIC | Age: 79
End: 2024-02-20
Payer: COMMERCIAL

## 2024-02-20 VITALS
DIASTOLIC BLOOD PRESSURE: 86 MMHG | SYSTOLIC BLOOD PRESSURE: 130 MMHG | RESPIRATION RATE: 14 BRPM | HEART RATE: 70 BPM | OXYGEN SATURATION: 98 % | HEIGHT: 64 IN | BODY MASS INDEX: 26.36 KG/M2 | TEMPERATURE: 97.6 F | WEIGHT: 154.4 LBS

## 2024-02-20 DIAGNOSIS — Z00.00 MEDICARE ANNUAL WELLNESS VISIT, SUBSEQUENT: Primary | ICD-10-CM

## 2024-02-20 DIAGNOSIS — I10 ESSENTIAL HYPERTENSION WITH GOAL BLOOD PRESSURE LESS THAN 130/80: ICD-10-CM

## 2024-02-20 DIAGNOSIS — B00.1 RECURRENT COLD SORES: ICD-10-CM

## 2024-02-20 DIAGNOSIS — Z13.6 CARDIOVASCULAR SCREENING; LDL GOAL LESS THAN 130: ICD-10-CM

## 2024-02-20 DIAGNOSIS — C50.812 MALIGNANT NEOPLASM OF OVERLAPPING SITES OF LEFT BREAST IN FEMALE, ESTROGEN RECEPTOR POSITIVE (H): ICD-10-CM

## 2024-02-20 DIAGNOSIS — Z17.0 MALIGNANT NEOPLASM OF OVERLAPPING SITES OF LEFT BREAST IN FEMALE, ESTROGEN RECEPTOR POSITIVE (H): ICD-10-CM

## 2024-02-20 PROBLEM — C44.91 BASAL CELL CARCINOMA (BCC): Status: ACTIVE | Noted: 2024-02-20

## 2024-02-20 PROBLEM — K64.9 HEMORRHOIDS: Status: ACTIVE | Noted: 2018-06-14

## 2024-02-20 PROBLEM — N64.4 BREAST PAIN: Status: ACTIVE | Noted: 2024-02-20

## 2024-02-20 PROBLEM — N60.09 CYST OF BREAST: Status: ACTIVE | Noted: 2024-02-20

## 2024-02-20 PROBLEM — D12.0 BENIGN NEOPLASM OF CECUM: Status: ACTIVE | Noted: 2018-06-18

## 2024-02-20 PROBLEM — D12.2 BENIGN NEOPLASM OF ASCENDING COLON: Status: ACTIVE | Noted: 2023-08-09

## 2024-02-20 PROBLEM — M19.90 OSTEOARTHRITIS: Status: ACTIVE | Noted: 2024-02-20

## 2024-02-20 PROBLEM — Z86.0100 HISTORY OF COLONIC POLYPS: Status: ACTIVE | Noted: 2018-06-18

## 2024-02-20 PROCEDURE — 99214 OFFICE O/P EST MOD 30 MIN: CPT | Mod: 25 | Performed by: INTERNAL MEDICINE

## 2024-02-20 PROCEDURE — G0439 PPPS, SUBSEQ VISIT: HCPCS | Performed by: INTERNAL MEDICINE

## 2024-02-20 RX ORDER — VALACYCLOVIR HYDROCHLORIDE 1 G/1
TABLET, FILM COATED ORAL
Qty: 12 TABLET | Refills: 3 | Status: SHIPPED | OUTPATIENT
Start: 2024-02-20

## 2024-02-20 RX ORDER — LISINOPRIL 20 MG/1
20 TABLET ORAL DAILY
Qty: 90 TABLET | Refills: 3 | Status: SHIPPED | OUTPATIENT
Start: 2024-02-20

## 2024-02-20 SDOH — HEALTH STABILITY: PHYSICAL HEALTH: ON AVERAGE, HOW MANY DAYS PER WEEK DO YOU ENGAGE IN MODERATE TO STRENUOUS EXERCISE (LIKE A BRISK WALK)?: 6 DAYS

## 2024-02-20 ASSESSMENT — SOCIAL DETERMINANTS OF HEALTH (SDOH): HOW OFTEN DO YOU GET TOGETHER WITH FRIENDS OR RELATIVES?: MORE THAN THREE TIMES A WEEK

## 2024-02-20 ASSESSMENT — PAIN SCALES - GENERAL: PAINLEVEL: MILD PAIN (2)

## 2024-02-20 NOTE — PROGRESS NOTES
Preventive Care Visit  [facility]  Ivan Mcpherson MD, Internal Medicine  Feb 20, 2024    Assessment & Plan     (Z00.00) Medicare annual wellness visit, subsequent  (primary encounter diagnosis)  Comment: Discussed cardiac disease risk factors and cardiac disease risk factor modification, including diabetes screening, blood pressure screening (and management if indicated), and cholesterol screening.   Reviewed immunzation guidelines, including pneumococcal vaccines, annual influenza, and shingles vaccines.   Discussed routine cancer screenings, including skin cancer, colon cancer screening for everyone until age 80, prostate cancer screening in men until age 75, mammogram and PAP/pelvic for women until age 75.   Recommended regular dentist visits to care for remaining teeth.   Recommended regular screening for vision and glaucoma.   Recommended safe driving and accident avoidance.    Counseling:         Reviewed preventive health counseling, as reflected in patient instructions       Regular exercise       Healthy diet/nutrition       Vision screening       Hearing screening       Immunizations  up to date            Routine colorectal Cancer Screening no longer indicated over age 80    Patient has been advised of split billing requirements and indicates understanding: Yes    Plan: PRIMARY CARE FOLLOW-UP SCHEDULING            (C50.812,  Z17.0) Malignant neoplasm of overlapping sites of left breast in female, estrogen receptor positive (H)  Comment: Known issue that I take into account for their medical decisions, managed by specialist.    Continue current therapy as directed by their specialist(s).   Plan: PRIMARY CARE FOLLOW-UP SCHEDULING            (I10) Essential hypertension with goal blood pressure less than 130/80  Comment: This condition is currently controlled on the current medical regimen.  Continue current therapy.   Plan: lisinopril (ZESTRIL) 20 MG tablet, PRIMARY CARE         "FOLLOW-UP SCHEDULING            (B00.1) Recurrent cold sores  Comment: This condition is currently controlled on the current medical regimen.  Continue current therapy.   Plan: valACYclovir (VALTREX) 1000 mg tablet, PRIMARY         CARE FOLLOW-UP SCHEDULING            (Z13.6) CARDIOVASCULAR SCREENING; LDL GOAL LESS THAN 130  Comment: Discussed cardiac disease risk factors and cardiac disease risk factor modification.   Plan: PRIMARY CARE FOLLOW-UP SCHEDULING               Patient has been advised of split billing requirements and indicates understanding: Yes          BMI  Estimated body mass index is 26.5 kg/m  as calculated from the following:    Height as of this encounter: 1.626 m (5' 4\").    Weight as of this encounter: 70 kg (154 lb 6.4 oz).       Counseling  Appropriate preventive services were discussed with this patient, including applicable screening as appropriate for fall prevention, nutrition, physical activity, Tobacco-use cessation, weight loss and cognition.  Checklist reviewing preventive services available has been given to the patient.  Reviewed patient's diet, addressing concerns and/or questions.   She is at risk for psychosocial distress and has been provided with information to reduce risk.   Information on urinary incontinence and treatment options given to patient.           Subjective   Alondra is a 78 year old, presenting for the following:  Medicare Visit        2/20/2024     9:40 AM   Additional Questions   Roomed by Ameena CONKLIN CMA     Health Care Directive  Patient does not have a Health Care Directive or Living Will:     HPI    1.)  Hypertension:  History of hypertension, on medication.  No reported side effects from medications.    Reviewed last 6 BP readings in chart:  BP Readings from Last 6 Encounters:   02/20/24 130/86   12/18/23 (!) 134/96   09/25/23 130/80   09/13/23 128/85   08/25/23 124/79   08/08/23 136/88     No active cardiac complaints or symptoms with exercise.     2.)  " history of breast cancer.  Followed by [facility].    3.)  recurrent rare episodes of cold sores, takes valtrex with good results when needed, needs refills.         2/20/2024   General Health   How would you rate your overall physical health? Good   Feel stress (tense, anxious, or unable to sleep) Only a little   (!) STRESS CONCERN      2/20/2024   Nutrition   Diet: Regular (no restrictions)         2/20/2024   Exercise   Days per week of moderate/strenous exercise 6 days         2/20/2024   Social Factors   Frequency of gathering with friends or relatives More than three times a week   Worry food won't last until get money to buy more No   Food not last or not have enough money for food? No   Do you have housing?  Yes   Are you worried about losing your housing? No   Lack of transportation? No   Unable to get utilities (heat,electricity)? No         2/20/2024   Fall Risk   Fallen 2 or more times in the past year? No    No   Trouble with walking or balance? No    No          2/20/2024   Activities of Daily Living- Home Safety   Needs help with the following daily activites None of the above   Safety concerns in the home None of the above         2/20/2024   Dental   Dentist two times every year? Yes         2/20/2024   Hearing Screening   Hearing concerns? None of the above         2/20/2024   Driving Risk Screening   Patient/family members have concerns about driving No         2/20/2024   General Alertness/Fatigue Screening   Have you been more tired than usual lately? No         2/20/2024   Urinary Incontinence Screening   Bothered by leaking urine in past 6 months Yes          No data to display                  Today's PHQ-2 Score:       2/20/2024     9:45 AM   PHQ-2 ( 1999 [employer])   Q1: Little interest or pleasure in doing things 0   Q2: Feeling down, depressed or hopeless 0   PHQ-2 Score 0   Q1: Little interest or pleasure in doing things Not at all   Q2: Feeling down, depressed or hopeless Not at all    PHQ-2 Score 0           2024   Substance Use   Alcohol more than 3/day or more than 7/wk No   Do you have a current opioid prescription? No   How severe/bad is pain from 1 to 10? 1/10   Do you use any other substances recreationally? No     Social History     Tobacco Use    Smoking status: Former     Packs/day: 1.00     Years: 40.00     Additional pack years: 0.00     Total pack years: 40.00     Types: Cigarettes     Quit date: 10/7/1983     Years since quittin.4    Smokeless tobacco: Never   Substance Use Topics    Alcohol use: Yes     Comment: 4 drinks per week    Drug use: No            No data to display                               Reviewed and updated as needed this visit by Provider         Ubaldo Mayers              **I reviewed the information recorded in the patient's EPIC chart (including but not limited to medical history, surgical history, family history, problem list, medication list, and allergy list) and updated the information as indicated based on the patients reported information.         Current providers sharing in care for this patient include:  Patient Care Team:  Ivan Mcpherson MD as PCP - General (Internal Medicine)  Ivan Mcpherson MD as Assigned PCP  Dianelys Bauman GC as Genetic Counselor (Genetic Counselor, MS)  Jenelle Cannon MD as Assigned Cancer Care Provider  Chika Mitchell MD as Assigned Surgical Provider    The following health maintenance items are reviewed in Epic and correct as of today:  Health Maintenance   Topic Date Due    IPV IMMUNIZATION (2 of 3 - Adult catch-up series) 2009    MAMMO SCREENING  2024    ANNUAL REVIEW OF HM ORDERS  2025    CMP  2025    LIPID  2025    TSH W/FREE T4 REFLEX  2025    CBC  2025    MEDICARE ANNUAL WELLNESS VISIT  2025    FALL RISK ASSESSMENT  2025    DEXA  10/12/2025    GLUCOSE  2027    ADVANCE CARE PLANNING  2027    DTAP/TDAP/TD IMMUNIZATION (4 - Td  "or Tdap) 11/18/2032    PHQ-2 (once per calendar year)  Completed    INFLUENZA VACCINE  Completed    Pneumococcal Vaccine: 65+ Years  Completed    ZOSTER IMMUNIZATION  Completed    RSV VACCINE (Pregnancy & 60+)  Completed    COVID-19 Vaccine  Completed    HPV IMMUNIZATION  Aged Out    MENINGITIS IMMUNIZATION  Aged Out    RSV MONOCLONAL ANTIBODY  Aged Out    COLORECTAL CANCER SCREENING  Discontinued         Review of Systems  Constitutional, neuro, ENT, endocrine, pulmonary, cardiac, gastrointestinal, genitourinary, musculoskeletal, integument and psychiatric systems are negative, except as otherwise noted.     Objective    Exam  /86   Pulse 70   Temp 97.6  F (36.4  C) (Tympanic)   Resp 14   Ht 1.626 m (5' 4\")   Wt 70 kg (154 lb 6.4 oz)   LMP  (LMP Unknown)   SpO2 98%   Breastfeeding No   BMI 26.50 kg/m     Estimated body mass index is 26.5 kg/m  as calculated from the following:    Height as of this encounter: 1.626 m (5' 4\").    Weight as of this encounter: 70 kg (154 lb 6.4 oz).    Physical Exam  GENERAL alert and no distress  EYES:  Normal sclera,conjunctiva, EOMI  HENT: oral and posterior pharynx without lesions or erythema, facies symmetric  NECK: Neck supple. No LAD, without thyroidmegaly.  RESP: Clear to ausculation bilaterally without wheezes or crackles. Normal BS in all fields.  CV: RRR normal S1S2 without murmurs, rubs or gallops.  LYMPH: no cervical lymph adenopathy appreciated  MS: extremities- no gross deformities of the visible extremities noted,   EXT:  no lower extremity edema  PSYCH: Alert and oriented times 3; speech- coherent  SKIN:  No obvious significant skin lesions on visible portions of face         2/20/2024   Mini Cog   Clock Draw Score 2 Normal   3 Item Recall 3 objects recalled   Mini Cog Total Score 5            Signed Electronically by: Ivan Mcpherson MD    "

## 2024-02-20 NOTE — PATIENT INSTRUCTIONS
" Continue all medications at the same doses.  Contact your usual pharmacy if you need refills.      Follow up with Dermatology clinic to follow the right ear lesion.      Return to see me in 1 year, sooner if needed.  Use [employer] or Call [phone] to schedule the appointment with me.           5 GOALS TO PREVENT VASCULAR DISEASE:     1.  Aggressive blood pressure control, under 130/80 ideally.  Using medications if needed.    Your blood pressure is under good control    BP Readings from Last 4 Encounters:   02/20/24 130/86   12/18/23 (!) 134/96   09/25/23 130/80   09/13/23 128/85       2.  Aggressive LDL cholesterol (\"bad cholesterol\") lowering as indicated.    Your goal is an LDL under 130 for sure, preferably under 100.  (If you have diabetes or previous vascular disease, the the LDL goals would be under 100 for sure, preferably under 70.)    New guidelines identify four high-risk groups who could benefit from statins:   *people with pre-existing heart disease, such as those who have had a heart attack;   *people ages 40 to 75 who have diabetes of any type  *patients ages 40 to 75 with at least a 7.5% risk of developing cardiovascular disease over the next decade, according to a formula described in the guidelines  *patients with the sort of super-high cholesterol that sometimes runs in families, as evidenced by an LDL of 190 milligrams per deciliter or higher    Your cholesterol levels are well controlled.    Recent Labs   Lab Test 02/14/24  0948 11/11/22  0856   CHOL 229* 195   HDL 77 81   * 101*   TRIG 80 65       3.  Aggressive diabetic prevention, screening and/or management.      You do not have diabetes as of the most recent blood tests.     4.  No smoking    5.  Consider daily preventative aspirin over age 50 if you have enough cardiac risk factors to place you at higher risk for the presence of vascular disease.    If you have any reason not to take aspirin such easy bruising or bleeding, " stomach problems, other anticoagulant medications, or any other side effects, then you should not take Aspirin.     --Based on your current cardiac disease risk profile and/or age over 75, you do NOT need to take daily preventative aspirin.        Preventive Health Recommendations  Female Ages 75+    Yearly exam: See your health care provider every year in order to  Review health changes in your health history  Discuss preventive care.    Review and reevaluate any chronic medical conditions  Review and renew any regular prescription medicines if you take any.  Review and aggressively manage any significant risk factors for vascular disease    Routine pelvic exams and PAP smears no longer indiacted over age 75.  You do not need a Pap test if your uterus was removed (hysterectomy) and you have not had cancer.  You should be tested each year for STDs (sexually transmitted diseases) if you're at risk for STDs.   Routine screening mammogram no longer indicated.  Routine colon cancer screening no longer recommended over age 75-80  Have a cholesterol test every 5 years, or more often if advised.  Have a diabetes test (fasting glucose) every three years. If you are at risk for diabetes, you should have this test more often.   If you are at risk for osteoporosis (brittle bone disease), think about having a bone density scan (DEXA).  Consider lung cancer screening with low-dose chest CT if you have any significant smoking history.      Shots:   Get a flu shot each year.   Covid vaccines are now recommended annually.  Get the most updated Covid vaccine when it becomes available, consider getting this at the same time as the annual influenza vaccine.   Get a tetanus shot every 10 years.  Strongly consider the Shingrix shingles vaccine to give you the best chance of avoiding future shingles infection (as many as 1 and 3 adults over age 50 may develop this condition in their lifetime).    If you have Medicare insurance,  "investigate the cost and coverage for Shingrix shingles vaccines with a pharmacist at a pharmacy.  They can tell you the coverage and cost and then give it to you if the price is acceptable.    Medicare sometimes does not cover these Shingrix shingles vaccines, and with Medicare insurance it is usually cheaper to receive this shingles vaccine from a pharmacist in a pharmacy rather than in our clinic.    At this time, you only need the 2 Shingrix vaccines and then you are done.       Nutrition:   Eat at least 5 servings of fruits and vegetables each day.  Eat whole-grain bread, whole-wheat pasta and brown rice instead of white grains and rice.  Talk to your provider about Calcium and Vitamin D.    --Calcium: aim for 1200 mg per day (any brand is fine)   --Vitamin D3 at least 1000 units per day (any brand is fine)       --Good Grains:  Oats, brown rice, Quinoa (these do not raise the blood sugar as much)     --Bad grains:  Anything made from wheat or white rice     (because these raise the blood sugars significantly, and the possible gluten issue from wheat for some people).      --Proteins:  Aim for \"lean proteins\" including chicken, fish, seafood, pork, turkey, and eggs (in moderation); Eat red meat only occasionally      Jassi Schuler:                          Lifestyle  Exercise at least 150 minutes a week (30 minutes a day, 5 days a week). This will help you control your weight and prevent disease.  Limit alcohol to one drink per day.  No smoking.   Wear sunscreen to prevent skin cancer.   See your dentist every six months for an exam and cleaning.  See your eye doctor every 1 to 2 years.      "

## 2024-03-11 ENCOUNTER — NURSE TRIAGE (OUTPATIENT)
Dept: INTERNAL MEDICINE | Facility: CLINIC | Age: 79
End: 2024-03-11

## 2024-03-11 ENCOUNTER — OFFICE VISIT (OUTPATIENT)
Dept: INTERNAL MEDICINE | Facility: CLINIC | Age: 79
End: 2024-03-11
Payer: COMMERCIAL

## 2024-03-11 VITALS
OXYGEN SATURATION: 96 % | WEIGHT: 156.8 LBS | TEMPERATURE: 98 F | SYSTOLIC BLOOD PRESSURE: 140 MMHG | BODY MASS INDEX: 26.91 KG/M2 | DIASTOLIC BLOOD PRESSURE: 78 MMHG | HEART RATE: 92 BPM

## 2024-03-11 DIAGNOSIS — J06.9 UPPER RESPIRATORY TRACT INFECTION, UNSPECIFIED TYPE: Primary | ICD-10-CM

## 2024-03-11 PROCEDURE — 99213 OFFICE O/P EST LOW 20 MIN: CPT | Performed by: INTERNAL MEDICINE

## 2024-03-11 RX ORDER — AZITHROMYCIN 250 MG/1
TABLET, FILM COATED ORAL
Qty: 6 TABLET | Refills: 0 | Status: SHIPPED | OUTPATIENT
Start: 2024-03-11 | End: 2024-03-16

## 2024-03-11 NOTE — PATIENT INSTRUCTIONS
"Azithromycin prescribed.     ---    Over-the-Counter Symptom Relief For Colds:     For sinus symptoms:    Guaifenesin (aka Mucinex, Robitussin, and Delsym): a mucolytic that can help with chest and sinus congestion; this medication works by making the mucus thinner which, in turn, makes it easier to cough up or drain from sinuses.    Fluticasone nasal spray (aka Flonase): an intranasal topical corticosteroid that decreases mucosal inflammation which, in turn, results in less mucus production and better drainage of mucus.    Phenylephrine or pseudoephedrine: oral decongestants (sold seperately or as the \"D\"-portion of medications like Claritin-D, Allegra-D, or Zyrtec-D). This class of medication is stimulating. It may make it difficult to sleep. Avoid this class of medication if you have high blood pressure, heart disease, anxiety, or bipolar disorder.     For cough:    Dextromethorphan (sold separately or as the \"DM\"-portion of Mucinex-DM, Robitussin-DM, and Delsym-DM): a cough suppressant.    Cough drops can also help with your cough.    For body aches, headaches, sore throat, and temperature regulation:     Tylenol: a fever reducer and pain reliever.    Ibuprofen: a fever reducer, pain reliever, and anti-inflammatory. Avoid ibuprofen if you are on a blood thinner, have kidney disease, or have a history of GI bleeding.     Hot tea with honey can help with sore throat.    Please stay well-rested and well-hydrated during your recovery.    "

## 2024-03-11 NOTE — TELEPHONE ENCOUNTER
Patient having cold symptoms for 1 week (3/4/24)  Brain fog  Bad cough- Productive, thick/yellow phlegm  Cough keeps her up at night  Sinus congestion  Afebrile currently    Symptoms that resolved a couple days ago:  Headache  Sore throat  Febrile (unknown temp)    Tested negative for covid today.   Breathing is normal.    Going out of town on Wednesday    Patient declines E-Visit, requests to be seen in clinic.  Dispo: See in office today or tomorrow  No in-person visits available with PCP within timeframe. Patient prefers to be seen today, appt scheduled with available team provider.     Future Appointments 3/11/2024 - 9/7/2024        Date Visit Type Length Department Provider     3/11/2024  1:30 PM ACUTE VISIT 30 min  INTERNAL MEDICINE Bhavya Thurston MD    Location Instructions:     [facility] is in the [facility] at [address] in [place]. This just east of the [address] [place] [address] exit off of [address]. Free parking is available; access the lot from [address] [place] [address] or [facility].               4/15/2024  2:00 PM MA DIAGNOSTIC LT W/ CHENG 30 min SH BREAST IMAGING [id number]    Location Instructions:     [facility]  [address], [address] [place] [address]   [facility] customers can park in the lot in front of the [facility]. Parking in this lot is free.&nbsp; If no parking is available in this lot, there is a parking ramp located on the back side of the [facility].&nbsp; The parking ramp also offers free parking.&nbsp;   Entrance and check-in location  Entering from the front parking lot: Enter at the front entrance. As you enter the lobby, the elevators are located past the information desk. Take the elevator to Floor 2. Follow the hallway to the right. The entrance for the Breast [facility] will be on your left. Entering from parking ramp: As you enter the lobby of the [address]  Building turn to walk past the elevators and go through the walkway to the [address] building. Take [facility] Building elevators to second floor. Turn right out of the elevator. Walk to end of serna and turn right. The Breast Center is located at the end of the serna on your left. Please check-in for your appointment at the desk in the Breast Center waiting area, [address].              4/17/2024  1:30 PM RETURN CCSL 20 min  CANCER CLINIC Jenelle Cannon MD              4/19/2024  1:00 PM RETURN 15 min  SURGICAL CONSULT Chika Mitchell MD    Location Instructions:     Our office is located at [address] [place] [address].Park in the [address] on the NW corner of [address] [place] [address] and [facility].Walk across the skyway veering to the left as you approach the stairway.You will see a bank of elevators to your left just past the restrooms.Take the elevator to the 4th floor.We are in [address].                           Reason for Disposition   Patient wants to be seen    Additional Information   Negative: Nasal discharge present > 10 days   Negative: Using nasal washes and pain medicine > 24 hours and sinus pain (lower forehead, cheekbone, or eye) persists   Negative: Sinus congestion (pressure, fullness) present > 10 days   Negative: Fever > 103 F (39.4 C)   Negative: Fever present > 3 days (72 hours)   Negative: Fever returns after gone for over 24 hours and symptoms worse or not improved   Negative: Sinus pain (not just congestion) and fever   Negative: Earache   Negative: Fever > 101 F (38.3 C) and over 60 years of age   Negative: Fever > 100.0 F (37.8 C) and has diabetes mellitus or a weak immune system (e.g., HIV positive, cancer chemotherapy, organ transplant, splenectomy, chronic steroids)   Negative: Fever > 100.0 F (37.8 C) and bedridden (e.g., CVA, chronic illness, recovering from surgery)   Negative: Patient sounds very sick or weak to the triager   Negative: Difficulty breathing and not from stuffy nose (e.g., not  relieved by cleaning out the nose)   Negative: Runny nose is caused by pollen or other allergies   Negative: Cough is main symptom   Negative: Sore throat is main symptom   Negative: SEVERE difficulty breathing (e.g., struggling for each breath, speaks in single words)   Negative: Very weak (can't stand)   Negative: Sounds like a life-threatening emergency to the triager    Protocols used: Common Cold-A-[place]

## 2024-03-11 NOTE — PROGRESS NOTES
ASSESSMENT/PLAN                                                      (J06.9) Upper respiratory tract infection, unspecified type  (primary encounter diagnosis)  Plan: course of azithromycin prescribed - patient to use as directed; supportive care measures reviewed (see AVS for details); if symptoms worsen, change, or do not improve, patient to contact MD.      Bhavya Thurston MD   [place] [address] [place] [id number]  T: [phone], F: [phone]    SUBJECTIVE                                                      Alondra Morgan is a very pleasant 78 year old female who presents with ongoing cold symptoms:    Symptoms started a week ago and include a frequent, productive cough, headaches, chills, runny nose, and listlessness.  Symptoms do not seem to be improving over time.      No sinus congestion, pressure, or pain.    Initial sore throat, now resolved.    No shortness of breath or wheezing.      Has been using OTC cough and cold medications with improvement in symptoms but is eager for more improvement in light of upcoming 2-week bus trip on Wednesday.    No known underlying lung disease, but patient was a smoker for 40 years.    OBJECTIVE                                                      BP (!) 140/78   Pulse 92   Temp 98  F (36.7  C)   Wt 71.1 kg (156 lb 12.8 oz)   LMP  (LMP Unknown)   SpO2 96%   BMI 26.91 kg/m    Constitutional: well (not ill) appearing  Neck: supple, symmetric, no thyromegaly or lymphadenopathy  Respiratory: normal respiratory effort; clear to auscultation bilaterally - no wheezing, crackles, or rhonchi  Psych: normal judgment and insight; normal mood and affect; recent and remote memory intact    ---    (Note documentation was completed, in part, with [employer] voice-recognition software. Documentation was reviewed, but some grammatical, spelling, and word errors may remain.)

## 2024-04-10 NOTE — PROGRESS NOTES
[facility]    Hematology/Oncology Established Patient Note      Today's Date: 4/17/2024    Reason for follow-up: Left breast cancer.    HISTORY OF PRESENT ILLNESS: Alondra Morgan is a 78 year old female s/p hysterectomy 1995 who presents with the following oncologic history:  11/9/2005: Left breast biopsy of microcalcifications showed focal atypical ductal hyperplasia; no invasive malignancy. Took tamoxifen for 5 years.  11/29/2005: Excision of left breast lesion showed focal atypical ductal hyperplasia; no malignancy.  7/17/2023: Screening mammogram showed asymmetry in left breast at 6-7:00. Right breast negative.  7/24/2023: Diagnostic left mammogram showed mass in left lower breast adjacent to a previous needle biopsy marker clip. Left breast U/S showed 4 mm nodule at 6:00, 5 cm from nipple, 8 mm from prior biopsy.  7/27/2023: Left breast biopsy at 6:00, 5 cm from nipple showed 5 mm of grade 2 invasive ductal carcinoma, no LVI, ER positive at %, NM positive at %, HER-2 negative (IHC = 0).  9/13/2023: Left breast lumpectomy and left axillary SLN excision with Dr. Chika Mitchell.  Pathology showed 4 mm grade 2 invasive ductal carcinoma; margins negative; atypical ductal hyperplasia present; 1 SLN negative.  10/26/2023-11/1/2023: Completed adjuvant radiation to left breast in 6 fractions.  11/27/2023: Started anastrozole.      INTERVAL HISTORY:  Alondra reports requiring 9 hours of sleep about 3 months after starting anastrozole.  She then started fatiguing by the mid afternoon, which improved after switching to taking anastrozole at night.  She reports intermittent left knee twinges of pain.    REVIEW OF SYSTEMS:   14 point ROS was reviewed and is negative other than as noted above in HPI.       HOME MEDICATIONS:  Current Outpatient Medications   Medication Sig Dispense Refill    anastrozole (ARIMIDEX) 1 MG tablet Take 1 tablet (1 mg) by mouth daily 90 tablet 3    calcium carbonate  600 mg-vitamin D 400 units 600-400 MG-UNIT per tablet Take 1 tablet by mouth 2 times daily FOR BONE HEALTH AND FOR VITAMIN D DEFICIENCY (LOW VITAMIN D)      cholecalciferol 50 MCG (2000 UT) tablet Take 2 tablets by mouth daily INDICATION: TO TREAT VITAMIN D DEFICIENCY (LOW VITAMIN D)      FISH OIL 1000 MG OR CAPS THREE TIMES DAILY 100 3    lisinopril (ZESTRIL) 20 MG tablet Take 1 tablet (20 mg) by mouth daily 90 tablet 3    loratadine (CLARITIN) 10 MG tablet ONE DAILY FOR ALLERGIES      valACYclovir (VALTREX) 1000 mg tablet TAKE 2000 MG AT ONSET OF COLD SORE, FOLLOWED BY SECOND 2000 MG DOSE 12 HOURS LATER; REPEAT AS NEEDED 12 tablet 3         ALLERGIES:  Allergies   Allergen Reactions    Alendronate GI Disturbance     Severe GERD    Pollen Extract          PAST MEDICAL HISTORY:  Past Medical History:   Diagnosis Date    Arthritis     Atypical ductal hyperplasia of left breast 2005    left breast atypical ductal hyperplasia, completed 5 years tamoxifen in 2011    Atypical hyperplasia of breasts     Basal cell carcinoma of ala nasi     Basal cell carcinoma of shoulder     Family history of colon cancer     BROTHER COLON CANCER AGE 68    History of poliomyelitis 1947    acute polio infection age 2, resolved without any sequelae    Osteopenia     Polyp, colon     Adenomatous    Squamous cell carcinoma of skin, unspecified          PAST SURGICAL HISTORY:  Past Surgical History:   Procedure Laterality Date    BIOPSY NODE SENTINEL Left 9/13/2023    Procedure: left sentinel lymph node biopsy;  Surgeon: Chika Mitchell MD;  Location:  OR    BIOPSY, BREAST, WITH RADIOFREQUENCY IDENTIFICATION Left 9/13/2023    Procedure: left breast tag localized lumpectomy;  Surgeon: Chika Mitchell MD;  Location:  OR    COLONOSCOPY      EYE SURGERY  07/2017    cataract bilat eyes    HC EXCISION BREAST LESION, OPEN >=1      ATYPICAL HYPERPLASIA X 2    HYSTERECTOMY, PAP NO LONGER INDICATED  1994    fibroids    TONSILLECTOMY      [facility]  VAG HYST,RMV TUBE/OVARY           SOCIAL HISTORY:  Social History     Socioeconomic History    Marital status:      Spouse name: Not on file    Number of children: Not on file    Years of education: Not on file    Highest education level: Not on file   Occupational History    Not on file   Tobacco Use    Smoking status: Former     Current packs/day: 0.00     Average packs/day: 1 pack/day for 40.0 years (40.0 ttl pk-yrs)     Types: Cigarettes     Start date: 1943     Quit date: 10/7/1983     Years since quittin.5    Smokeless tobacco: Never   Substance and Sexual Activity    Alcohol use: Yes     Comment: 4 drinks per week    Drug use: No    Sexual activity: Not Currently     Partners: Male   Other Topics Concern     Service No    Blood Transfusions No    Caffeine Concern No    Occupational Exposure No    Hobby Hazards No    Sleep Concern Yes    Stress Concern No    Weight Concern Yes    Special Diet No    Back Care No    Exercise Yes     Comment: 3 X WEEK    Bike Helmet Not Asked    Seat Belt Yes    Self-Exams No     Comment: ENCOURAGED    Parent/sibling w/ CABG, MI or angioplasty before 65F 55M? Not Asked   Social History Narrative    Not on file     Social Determinants of Health     Financial Resource Strain: Low Risk  (2024)    Financial Resource Strain     Within the past 12 months, have you or your family members you live with been unable to get utilities (heat, electricity) when it was really needed?: No   Food Insecurity: Low Risk  (2024)    Food Insecurity     Within the past 12 months, did you worry that your food would run out before you got money to buy more?: No     Within the past 12 months, did the food you bought just not last and you didn t have money to get more?: No   Transportation Needs: Low Risk  (2024)    Transportation Needs     Within the past 12 months, has lack of transportation kept you from medical appointments, getting your medicines, non-medical  meetings or appointments, work, or from getting things that you need?: No   Physical Activity: Unknown (2/20/2024)    Exercise Vital Sign     Days of Exercise per Week: 6 days     Minutes of Exercise per Session: Not on file   Stress: No Stress Concern Present (2/20/2024)    [employer] - Occupational Stress Questionnaire     Feeling of Stress : Only a little   Social Connections: Unknown (2/20/2024)    Social Connection and Isolation Panel [NHANES]     Frequency of Communication with Friends and Family: Not on file     Frequency of Social Gatherings with Friends and Family: More than three times a week     Attends Buddhism Services: Not on file     Active Member of Clubs or Organizations: Not on file     Attends Club or Organization Meetings: Not on file     Marital Status: Not on file   Interpersonal Safety: Low Risk  (2/20/2024)    Interpersonal Safety     Do you feel physically and emotionally safe where you currently live?: Yes     Within the past 12 months, have you been hit, slapped, kicked or otherwise physically hurt by someone?: No     Within the past 12 months, have you been humiliated or emotionally abused in other ways by your partner or ex-partner?: No   Housing Stability: Low Risk  (2/20/2024)    Housing Stability     Do you have housing? : Yes     Are you worried about losing your housing?: No         FAMILY HISTORY:  Family History   Problem Relation Age of Onset    Cancer Mother         lung    Osteoporosis Mother     Eye Disorder Mother         glaucoma    Arthritis Mother         osteo    Heart Disease Mother         CHF    Hypertension Mother     Cerebrovascular Disease Father         TIAs    Osteoporosis Father     Hypertension Father     Cancer Father         BONE    Arthritis Sister         PARTIAL KNEE REPLACEMENT IN 2009    Osteoporosis Sister     No Known Problems Brother     Cerebrovascular Disease Brother     Colon Cancer Brother 70        B 1939    Cancer  "Brother         PROSTATE    Breast Cancer Maternal Grandmother     Cerebrovascular Disease Maternal Grandfather     Asthma Daughter     Breast Cancer Maternal Aunt     Breast Cancer Maternal Aunt     Breast Cancer Other         2 YOUNGER MATERNAL COUSINS         PHYSICAL EXAM:  Vital signs:  /82   Pulse 77   Resp 16   Ht 1.626 m (5' 4\")   Wt 71.2 kg (157 lb)   LMP  (LMP Unknown)   SpO2 100%   BMI 26.95 kg/m     GENERAL/CONSTITUTIONAL: No acute distress.  EYES: No erythema or scleral icterus.  ENT/MOUTH: Neck supple.  LYMPH: No cervical, supraclavicular, axillary adenopathy.   BREAST: No palpable masses in either breast. Nipples are everted bilaterally with no discharge. No erythema, ulceration, or dimpling of the skin.   RESPIRATORY: No audible cough or wheezing.  GASTROINTESTINAL: No hepatosplenomegaly, masses, or tenderness. No guarding.  No distention.  MUSCULOSKELETAL: Warm and well-perfused, no cyanosis, clubbing, or edema.  NEUROLOGIC: No focal motor deficits. Alert, oriented, answers questions appropriately.  INTEGUMENTARY: No rashes or jaundice.  GAIT: Steady, does not use assistive device       LABS:  CBC RESULTS:   Recent Labs   Lab Test 02/14/24  0948   WBC 4.8   RBC 4.53   HGB 13.6   HCT 40.5   MCV 89   MCH 30.0   MCHC 33.6   RDW 12.9        Last Comprehensive Metabolic Panel:  Sodium   Date Value Ref Range Status   02/14/2024 134 (L) 135 - 145 mmol/L Final     Comment:     Reference intervals for this test were updated on 09/26/2023 to more accurately reflect our healthy population. There may be differences in the flagging of prior results with similar values performed with this method. Interpretation of those prior results can be made in the context of the updated reference intervals.    11/06/2020 130 (L) 133 - 144 mmol/L Final     Potassium   Date Value Ref Range Status   02/14/2024 4.5 3.4 - 5.3 mmol/L Final   11/11/2022 4.1 3.4 - 5.3 mmol/L Final   11/06/2020 4.4 3.4 - 5.3 " mmol/L Final     Chloride   Date Value Ref Range Status   02/14/2024 100 98 - 107 mmol/L Final   11/11/2022 100 94 - 109 mmol/L Final   11/06/2020 97 94 - 109 mmol/L Final     Carbon Dioxide   Date Value Ref Range Status   11/06/2020 28 20 - 32 mmol/L Final     Carbon Dioxide (CO2)   Date Value Ref Range Status   02/14/2024 25 22 - 29 mmol/L Final   11/11/2022 30 20 - 32 mmol/L Final     Anion Gap   Date Value Ref Range Status   02/14/2024 9 7 - 15 mmol/L Final   11/11/2022 4 3 - 14 mmol/L Final   11/06/2020 5 3 - 14 mmol/L Final     Glucose   Date Value Ref Range Status   02/14/2024 89 70 - 99 mg/dL Final   11/11/2022 93 70 - 99 mg/dL Final   11/06/2020 95 70 - 99 mg/dL Final     Comment:     Fasting specimen     Urea Nitrogen   Date Value Ref Range Status   02/14/2024 13.1 8.0 - 23.0 mg/dL Final   11/11/2022 18 7 - 30 mg/dL Final   11/06/2020 16 7 - 30 mg/dL Final     Creatinine   Date Value Ref Range Status   02/14/2024 0.73 0.51 - 0.95 mg/dL Final   11/06/2020 0.73 0.52 - 1.04 mg/dL Final     GFR Estimate   Date Value Ref Range Status   02/14/2024 83 >60 mL/min/1.73m2 Final   11/06/2020 79 >60 mL/min/[1.73_m2] Final     Comment:     Non  GFR Calc  Starting 12/18/2018, serum creatinine based estimated GFR (eGFR) will be   calculated using the Chronic Kidney Disease Epidemiology Collaboration   (CKD-EPI) equation.       Calcium   Date Value Ref Range Status   02/14/2024 10.1 8.8 - 10.2 mg/dL Final   11/06/2020 9.6 8.5 - 10.1 mg/dL Final     Bilirubin Total   Date Value Ref Range Status   02/14/2024 0.4 <=1.2 mg/dL Final   10/23/2018 0.4 0.2 - 1.3 mg/dL Final     Alkaline Phosphatase   Date Value Ref Range Status   02/14/2024 89 40 - 150 U/L Final     Comment:     Reference intervals for this test were updated on 11/14/2023 to more accurately reflect our healthy population. There may be differences in the flagging of prior results with similar values performed with this method. Interpretation of  those prior results can be made in the context of the updated reference intervals.   10/23/2018 92 40 - 150 U/L Final     ALT   Date Value Ref Range Status   02/14/2024 17 0 - 50 U/L Final     Comment:     Reference intervals for this test were updated on 6/12/2023 to more accurately reflect our healthy population. There may be differences in the flagging of prior results with similar values performed with this method. Interpretation of those prior results can be made in the context of the updated reference intervals.     10/28/2019 23 0 - 50 U/L Final     AST   Date Value Ref Range Status   02/14/2024 23 0 - 45 U/L Final     Comment:     Reference intervals for this test were updated on 6/12/2023 to more accurately reflect our healthy population. There may be differences in the flagging of prior results with similar values performed with this method. Interpretation of those prior results can be made in the context of the updated reference intervals.   10/28/2019 19 0 - 45 U/L Final       PATHOLOGY:  None new.    IMAGING:  None new.    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  --She has no clinical evidence for recurrent breast cancer by physical exam from today or mammogram reviewed from 4/15/2024.  --She is tolerating anastrozole well so far with mild fatigue that improved after switching to taking it at night.  --Advised adjuvant endocrine therapy for a total of 5 years to reduce risk of breast cancer recurrence by a relative 50%.  --Alternate option is tamoxifen. She would prefer not to take tamoxifen.  --Alondra has had prior heartburn issues to alendronate, so plan to start on Prolia for her  osteopenia if approved. Discussed alternate options of Zometa every 6 months or Reclast once per year (latter of which she has had and tolerated well).  --Genetics consult completed 12/7/2023; she is contemplating whether to proceed with genetic testing.  --Due for bilateral screening mammogram in 10/2024, then yearly thereafter.    Return in 4 months.    Jenelle Cannon MD  Hematology/Oncology  [facility]    Total time spent today: 30 minutes in chart review, patient evaluation, counseling, documentation, test and/or medication/prescription orders, and coordination of care.

## 2024-04-15 ENCOUNTER — HOSPITAL ENCOUNTER (OUTPATIENT)
Dept: MAMMOGRAPHY | Facility: CLINIC | Age: 79
Discharge: HOME | End: 2024-04-15
Attending: INTERNAL MEDICINE | Admitting: INTERNAL MEDICINE
Payer: COMMERCIAL

## 2024-04-15 DIAGNOSIS — Z17.0 MALIGNANT NEOPLASM OF OVERLAPPING SITES OF LEFT BREAST IN FEMALE, ESTROGEN RECEPTOR POSITIVE (H): ICD-10-CM

## 2024-04-15 DIAGNOSIS — C50.812 MALIGNANT NEOPLASM OF OVERLAPPING SITES OF LEFT BREAST IN FEMALE, ESTROGEN RECEPTOR POSITIVE (H): ICD-10-CM

## 2024-04-15 PROCEDURE — 77065 DX MAMMO INCL CAD UNI: CPT | Mod: LT

## 2024-04-17 ENCOUNTER — ONCOLOGY VISIT (OUTPATIENT)
Dept: ONCOLOGY | Facility: CLINIC | Age: 79
End: 2024-04-17
Attending: INTERNAL MEDICINE
Payer: COMMERCIAL

## 2024-04-17 VITALS
OXYGEN SATURATION: 100 % | BODY MASS INDEX: 26.8 KG/M2 | RESPIRATION RATE: 16 BRPM | SYSTOLIC BLOOD PRESSURE: 137 MMHG | DIASTOLIC BLOOD PRESSURE: 82 MMHG | WEIGHT: 157 LBS | HEIGHT: 64 IN | HEART RATE: 77 BPM

## 2024-04-17 DIAGNOSIS — C50.812 MALIGNANT NEOPLASM OF OVERLAPPING SITES OF LEFT BREAST IN FEMALE, ESTROGEN RECEPTOR POSITIVE (H): Primary | ICD-10-CM

## 2024-04-17 DIAGNOSIS — Z17.0 MALIGNANT NEOPLASM OF OVERLAPPING SITES OF LEFT BREAST IN FEMALE, ESTROGEN RECEPTOR POSITIVE (H): Primary | ICD-10-CM

## 2024-04-17 PROCEDURE — 99214 OFFICE O/P EST MOD 30 MIN: CPT | Performed by: INTERNAL MEDICINE

## 2024-04-17 PROCEDURE — G0463 HOSPITAL OUTPT CLINIC VISIT: HCPCS | Performed by: INTERNAL MEDICINE

## 2024-04-17 ASSESSMENT — PAIN SCALES - GENERAL: PAINLEVEL: NO PAIN (0)

## 2024-04-17 NOTE — LETTER
4/17/2024         RE: Alondra Morgan  [address] [facility] [address] [place] [id number]        Dear Colleague,    Thank you for referring your patient, Alondra Morgan, to the [facility]. Please see a copy of my visit note below.    [facility]    Hematology/Oncology Established Patient Note      Today's Date: 4/17/2024    Reason for follow-up: Left breast cancer.    HISTORY OF PRESENT ILLNESS: Alondra Morgan is a 78 year old female s/p hysterectomy 1995 who presents with the following oncologic history:  11/9/2005: Left breast biopsy of microcalcifications showed focal atypical ductal hyperplasia; no invasive malignancy. Took tamoxifen for 5 years.  11/29/2005: Excision of left breast lesion showed focal atypical ductal hyperplasia; no malignancy.  7/17/2023: Screening mammogram showed asymmetry in left breast at 6-7:00. Right breast negative.  7/24/2023: Diagnostic left mammogram showed mass in left lower breast adjacent to a previous needle biopsy marker clip. Left breast U/S showed 4 mm nodule at 6:00, 5 cm from nipple, 8 mm from prior biopsy.  7/27/2023: Left breast biopsy at 6:00, 5 cm from nipple showed 5 mm of grade 2 invasive ductal carcinoma, no LVI, ER positive at %, IN positive at %, HER-2 negative (IHC = 0).  9/13/2023: Left breast lumpectomy and left axillary SLN excision with Dr. Chika Mitchell.  Pathology showed 4 mm grade 2 invasive ductal carcinoma; margins negative; atypical ductal hyperplasia present; 1 SLN negative.  10/26/2023-11/1/2023: Completed adjuvant radiation to left breast in 6 fractions.  11/27/2023: Started anastrozole.      INTERVAL HISTORY:  Alondra reports requiring 9 hours of sleep about 3 months after starting anastrozole.  She then started fatiguing by the mid afternoon, which improved after switching to taking anastrozole at night.  She reports intermittent left knee twinges of pain.    REVIEW OF SYSTEMS:   14 point  ROS was reviewed and is negative other than as noted above in HPI.       HOME MEDICATIONS:  Current Outpatient Medications   Medication Sig Dispense Refill     anastrozole (ARIMIDEX) 1 MG tablet Take 1 tablet (1 mg) by mouth daily 90 tablet 3     calcium carbonate 600 mg-vitamin D 400 units 600-400 MG-UNIT per tablet Take 1 tablet by mouth 2 times daily FOR BONE HEALTH AND FOR VITAMIN D DEFICIENCY (LOW VITAMIN D)       cholecalciferol 50 MCG (2000 UT) tablet Take 2 tablets by mouth daily INDICATION: TO TREAT VITAMIN D DEFICIENCY (LOW VITAMIN D)       FISH OIL 1000 MG OR CAPS THREE TIMES DAILY 100 3     lisinopril (ZESTRIL) 20 MG tablet Take 1 tablet (20 mg) by mouth daily 90 tablet 3     loratadine (CLARITIN) 10 MG tablet ONE DAILY FOR ALLERGIES       valACYclovir (VALTREX) 1000 mg tablet TAKE 2000 MG AT ONSET OF COLD SORE, FOLLOWED BY SECOND 2000 MG DOSE 12 HOURS LATER; REPEAT AS NEEDED 12 tablet 3         ALLERGIES:  Allergies   Allergen Reactions     Alendronate GI Disturbance     Severe GERD     Pollen Extract          PAST MEDICAL HISTORY:  Past Medical History:   Diagnosis Date     Arthritis      Atypical ductal hyperplasia of left breast 2005    left breast atypical ductal hyperplasia, completed 5 years tamoxifen in 2011     Atypical hyperplasia of breasts      Basal cell carcinoma of ala nasi      Basal cell carcinoma of shoulder      Family history of colon cancer     BROTHER COLON CANCER AGE 68     History of poliomyelitis 1947    acute polio infection age 2, resolved without any sequelae     Osteopenia      Polyp, colon     Adenomatous     Squamous cell carcinoma of skin, unspecified          PAST SURGICAL HISTORY:  Past Surgical History:   Procedure Laterality Date     BIOPSY NODE SENTINEL Left 9/13/2023    Procedure: left sentinel lymph node biopsy;  Surgeon: Chika Mitchell MD;  Location: [facility] OR     BIOPSY, BREAST, WITH RADIOFREQUENCY IDENTIFICATION Left 9/13/2023    Procedure: left breast tag  localized lumpectomy;  Surgeon: Chika Mitchell MD;  Location: [facility] OR     COLONOSCOPY       EYE SURGERY  2017    cataract bilat eyes     HC EXCISION BREAST LESION, OPEN >=1      ATYPICAL HYPERPLASIA X 2     HYSTERECTOMY, PAP NO LONGER INDICATED      fibroids     TONSILLECTOMY       ZZC VAG HYST,RMV TUBE/OVARY           SOCIAL HISTORY:  Social History     Socioeconomic History     Marital status:      Spouse name: Not on file     Number of children: Not on file     Years of education: Not on file     Highest education level: Not on file   Occupational History     Not on file   Tobacco Use     Smoking status: Former     Current packs/day: 0.00     Average packs/day: 1 pack/day for 40.0 years (40.0 ttl pk-yrs)     Types: Cigarettes     Start date: 1943     Quit date: 10/7/1983     Years since quittin.5     Smokeless tobacco: Never   Substance and Sexual Activity     Alcohol use: Yes     Comment: 4 drinks per week     Drug use: No     Sexual activity: Not Currently     Partners: Male   Other Topics Concern      Service No     Blood Transfusions No     Caffeine Concern No     Occupational Exposure No     Hobby Hazards No     Sleep Concern Yes     Stress Concern No     Weight Concern Yes     Special Diet No     Back Care No     Exercise Yes     Comment: 3 X WEEK     Bike Helmet Not Asked     Seat Belt Yes     Self-Exams No     Comment: ENCOURAGED     Parent/sibling w/ CABG, MI or angioplasty before 65F 55M? Not Asked   Social History Narrative     Not on file     Social Determinants of Health     Financial Resource Strain: Low Risk  (2024)    Financial Resource Strain      Within the past 12 months, have you or your family members you live with been unable to get utilities (heat, electricity) when it was really needed?: No   Food Insecurity: Low Risk  (2024)    Food Insecurity      Within the past 12 months, did you worry that your food would run out before you got money to buy  more?: No      Within the past 12 months, did the food you bought just not last and you didn t have money to get more?: No   Transportation Needs: Low Risk  (2/20/2024)    Transportation Needs      Within the past 12 months, has lack of transportation kept you from medical appointments, getting your medicines, non-medical meetings or appointments, work, or from getting things that you need?: No   Physical Activity: Unknown (2/20/2024)    Exercise Vital Sign      Days of Exercise per Week: 6 days      Minutes of Exercise per Session: Not on file   Stress: No Stress Concern Present (2/20/2024)    [employer] - Occupational Stress Questionnaire      Feeling of Stress : Only a little   Social Connections: Unknown (2/20/2024)    Social Connection and Isolation Panel [NHANES]      Frequency of Communication with Friends and Family: Not on file      Frequency of Social Gatherings with Friends and Family: More than three times a week      Attends Restorationism Services: Not on file      Active Member of Clubs or Organizations: Not on file      Attends Club or Organization Meetings: Not on file      Marital Status: Not on file   Interpersonal Safety: Low Risk  (2/20/2024)    Interpersonal Safety      Do you feel physically and emotionally safe where you currently live?: Yes      Within the past 12 months, have you been hit, slapped, kicked or otherwise physically hurt by someone?: No      Within the past 12 months, have you been humiliated or emotionally abused in other ways by your partner or ex-partner?: No   Housing Stability: Low Risk  (2/20/2024)    Housing Stability      Do you have housing? : Yes      Are you worried about losing your housing?: No         FAMILY HISTORY:  Family History   Problem Relation Age of Onset     Cancer Mother         lung     Osteoporosis Mother      Eye Disorder Mother         glaucoma     Arthritis Mother         osteo     Heart Disease Mother         CHF      "Hypertension Mother      Cerebrovascular Disease Father         TIAs     Osteoporosis Father      Hypertension Father      Cancer Father         BONE     Arthritis Sister         PARTIAL KNEE REPLACEMENT IN 2009     Osteoporosis Sister      No Known Problems Brother      Cerebrovascular Disease Brother      Colon Cancer Brother 70        B 1939     Cancer Brother         PROSTATE     Breast Cancer Maternal Grandmother      Cerebrovascular Disease Maternal Grandfather      Asthma Daughter      Breast Cancer Maternal Aunt      Breast Cancer Maternal Aunt      Breast Cancer Other         2 YOUNGER MATERNAL COUSINS         PHYSICAL EXAM:  Vital signs:  /82   Pulse 77   Resp 16   Ht 1.626 m (5' 4\")   Wt 71.2 kg (157 lb)   LMP  (LMP Unknown)   SpO2 100%   BMI 26.95 kg/m     GENERAL/CONSTITUTIONAL: No acute distress.  EYES: No erythema or scleral icterus.  ENT/MOUTH: Neck supple.  LYMPH: No cervical, supraclavicular, axillary adenopathy.   BREAST: No palpable masses in either breast. Nipples are everted bilaterally with no discharge. No erythema, ulceration, or dimpling of the skin.   RESPIRATORY: No audible cough or wheezing.  GASTROINTESTINAL: No hepatosplenomegaly, masses, or tenderness. No guarding.  No distention.  MUSCULOSKELETAL: Warm and well-perfused, no cyanosis, clubbing, or edema.  NEUROLOGIC: No focal motor deficits. Alert, oriented, answers questions appropriately.  INTEGUMENTARY: No rashes or jaundice.  GAIT: Steady, does not use assistive device       LABS:  CBC RESULTS:   Recent Labs   Lab Test 02/14/24  0948   WBC 4.8   RBC 4.53   HGB 13.6   HCT 40.5   MCV 89   MCH 30.0   MCHC 33.6   RDW 12.9        Last Comprehensive Metabolic Panel:  Sodium   Date Value Ref Range Status   02/14/2024 134 (L) 135 - 145 mmol/L Final     Comment:     Reference intervals for this test were updated on 09/26/2023 to more accurately reflect our healthy population. There may be differences in the flagging of " prior results with similar values performed with this method. Interpretation of those prior results can be made in the context of the updated reference intervals.    11/06/2020 130 (L) 133 - 144 mmol/L Final     Potassium   Date Value Ref Range Status   02/14/2024 4.5 3.4 - 5.3 mmol/L Final   11/11/2022 4.1 3.4 - 5.3 mmol/L Final   11/06/2020 4.4 3.4 - 5.3 mmol/L Final     Chloride   Date Value Ref Range Status   02/14/2024 100 98 - 107 mmol/L Final   11/11/2022 100 94 - 109 mmol/L Final   11/06/2020 97 94 - 109 mmol/L Final     Carbon Dioxide   Date Value Ref Range Status   11/06/2020 28 20 - 32 mmol/L Final     Carbon Dioxide (CO2)   Date Value Ref Range Status   02/14/2024 25 22 - 29 mmol/L Final   11/11/2022 30 20 - 32 mmol/L Final     Anion Gap   Date Value Ref Range Status   02/14/2024 9 7 - 15 mmol/L Final   11/11/2022 4 3 - 14 mmol/L Final   11/06/2020 5 3 - 14 mmol/L Final     Glucose   Date Value Ref Range Status   02/14/2024 89 70 - 99 mg/dL Final   11/11/2022 93 70 - 99 mg/dL Final   11/06/2020 95 70 - 99 mg/dL Final     Comment:     Fasting specimen     Urea Nitrogen   Date Value Ref Range Status   02/14/2024 13.1 8.0 - 23.0 mg/dL Final   11/11/2022 18 7 - 30 mg/dL Final   11/06/2020 16 7 - 30 mg/dL Final     Creatinine   Date Value Ref Range Status   02/14/2024 0.73 0.51 - 0.95 mg/dL Final   11/06/2020 0.73 0.52 - 1.04 mg/dL Final     GFR Estimate   Date Value Ref Range Status   02/14/2024 83 >60 mL/min/1.73m2 Final   11/06/2020 79 >60 mL/min/[1.73_m2] Final     Comment:     Non  GFR Calc  Starting 12/18/2018, serum creatinine based estimated GFR (eGFR) will be   calculated using the Chronic Kidney Disease Epidemiology Collaboration   (CKD-EPI) equation.       Calcium   Date Value Ref Range Status   02/14/2024 10.1 8.8 - 10.2 mg/dL Final   11/06/2020 9.6 8.5 - 10.1 mg/dL Final     Bilirubin Total   Date Value Ref Range Status   02/14/2024 0.4 <=1.2 mg/dL Final   10/23/2018 0.4 0.2 - 1.3  mg/dL Final     Alkaline Phosphatase   Date Value Ref Range Status   02/14/2024 89 40 - 150 U/L Final     Comment:     Reference intervals for this test were updated on 11/14/2023 to more accurately reflect our healthy population. There may be differences in the flagging of prior results with similar values performed with this method. Interpretation of those prior results can be made in the context of the updated reference intervals.   10/23/2018 92 40 - 150 U/L Final     ALT   Date Value Ref Range Status   02/14/2024 17 0 - 50 U/L Final     Comment:     Reference intervals for this test were updated on 6/12/2023 to more accurately reflect our healthy population. There may be differences in the flagging of prior results with similar values performed with this method. Interpretation of those prior results can be made in the context of the updated reference intervals.     10/28/2019 23 0 - 50 U/L Final     AST   Date Value Ref Range Status   02/14/2024 23 0 - 45 U/L Final     Comment:     Reference intervals for this test were updated on 6/12/2023 to more accurately reflect our healthy population. There may be differences in the flagging of prior results with similar values performed with this method. Interpretation of those prior results can be made in the context of the updated reference intervals.   10/28/2019 19 0 - 45 U/L Final       PATHOLOGY:  None new.    IMAGING:  None new.    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  --She has no clinical evidence for recurrent breast cancer by physical exam from today or mammogram reviewed from 4/15/2024.  --She is tolerating anastrozole well so far with mild  "fatigue that improved after switching to taking it at night.  --Advised adjuvant endocrine therapy for a total of 5 years to reduce risk of breast cancer recurrence by a relative 50%.  --Alternate option is tamoxifen. She would prefer not to take tamoxifen.  --Alondra has had prior heartburn issues to alendronate, so plan to start on Prolia for her osteopenia if approved. Discussed alternate options of Zometa every 6 months or Reclast once per year (latter of which she has had and tolerated well).  --Genetics consult completed 12/7/2023; she is contemplating whether to proceed with genetic testing.  --Due for bilateral screening mammogram in 10/2024, then yearly thereafter.    Return in 4 months.    Jenelle Cannon MD  Hematology/Oncology  [facility]    Total time spent today: 30 minutes in chart review, patient evaluation, counseling, documentation, test and/or medication/prescription orders, and coordination of care.      Oncology Rooming Note    April 17, 2024 1:21 PM   Alondra Morgan is a 78 year old female who presents for:    Chief Complaint   Patient presents with     Oncology Clinic Visit     Initial Vitals: Resp 16   Ht 1.626 m (5' 4\")   Wt 71.2 kg (157 lb)   LMP  (LMP Unknown)   BMI 26.95 kg/m   Estimated body mass index is 26.95 kg/m  as calculated from the following:    Height as of this encounter: 1.626 m (5' 4\").    Weight as of this encounter: 71.2 kg (157 lb). Body surface area is 1.79 meters squared.  Data Unavailable Comment: Data Unavailable   No LMP recorded (lmp unknown). Patient has had a hysterectomy.  Allergies reviewed: Yes  Medications reviewed: Yes    Medications: Medication refills not needed today.  Pharmacy name entered into [employer]:    [id number] - [place], [place] - [address] [facility][id number] - [place], [place] - [address]    Frailty Screening:   Is the patient here for a new oncology consult visit in cancer care? 2. " No            Sole Boogie MA              Again, thank you for allowing me to participate in the care of your patient.        Sincerely,        Jenelle Cannon MD

## 2024-04-17 NOTE — PROGRESS NOTES
"Oncology Rooming Note    April 17, 2024 1:21 PM   Alondra Morgan is a 78 year old female who presents for:    Chief Complaint   Patient presents with    Oncology Clinic Visit     Initial Vitals: Resp 16   Ht 1.626 m (5' 4\")   Wt 71.2 kg (157 lb)   LMP  (LMP Unknown)   BMI 26.95 kg/m   Estimated body mass index is 26.95 kg/m  as calculated from the following:    Height as of this encounter: 1.626 m (5' 4\").    Weight as of this encounter: 71.2 kg (157 lb). Body surface area is 1.79 meters squared.  Data Unavailable Comment: Data Unavailable   No LMP recorded (lmp unknown). Patient has had a hysterectomy.  Allergies reviewed: Yes  Medications reviewed: Yes    Medications: Medication refills not needed today.  Pharmacy name entered into [employer]:    [facility][id number] [place], [place] - [address] [facility] #[id number] - [place], [place] - [address]    Frailty Screening:   Is the patient here for a new oncology consult visit in cancer care? 2. No            Sole Boogie MA            "

## 2024-04-19 ENCOUNTER — OFFICE VISIT (OUTPATIENT)
Dept: SURGERY | Facility: CLINIC | Age: 79
End: 2024-04-19
Payer: COMMERCIAL

## 2024-04-19 VITALS
WEIGHT: 157 LBS | BODY MASS INDEX: 26.8 KG/M2 | SYSTOLIC BLOOD PRESSURE: 130 MMHG | HEART RATE: 86 BPM | HEIGHT: 64 IN | DIASTOLIC BLOOD PRESSURE: 70 MMHG

## 2024-04-19 DIAGNOSIS — Z12.31 VISIT FOR SCREENING MAMMOGRAM: Primary | ICD-10-CM

## 2024-04-19 PROCEDURE — 99212 OFFICE O/P EST SF 10 MIN: CPT | Performed by: SURGERY

## 2024-04-19 NOTE — PROGRESS NOTES
[facility] Follow Up Note    CHIEF COMPLAINT:  H/o left breast cancer    HISTORY OF PRESENT ILLNESS:  Alondra Morgan is a 78 year old female who is seen in follow up for h/o left breast cancer.    Alondra is s/p left breast partial mastectomy with left SLNB on 9/13/2023. 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 Her sentinel node was negative and margins negative. She had adjuvant radiation. She is on anastrozole under the care of Dr. Cannon. She had a diagnostic left mammogram on 4/15/2024 which was benign She is due for bilateral screening mammogram in July/August.     She is doing well. She reports no breast concerns today.       Past Medical History:   Diagnosis Date    Arthritis     Atypical ductal hyperplasia of left breast 2005    left breast atypical ductal hyperplasia, completed 5 years tamoxifen in 2011    Atypical hyperplasia of breasts     Basal cell carcinoma of ala nasi     Basal cell carcinoma of shoulder     Family history of colon cancer     BROTHER COLON CANCER AGE 68    History of poliomyelitis 1947    acute polio infection age 2, resolved without any sequelae    Osteopenia     Polyp, colon     Adenomatous    Squamous cell carcinoma of skin, unspecified        Past Surgical History:   Procedure Laterality Date    BIOPSY NODE SENTINEL Left 9/13/2023    Procedure: left sentinel lymph node biopsy;  Surgeon: Chika Mitchell MD;  Location: [facility] OR    BIOPSY, BREAST, WITH RADIOFREQUENCY IDENTIFICATION Left 9/13/2023    Procedure: left breast tag localized lumpectomy;  Surgeon: Chika Mitchell MD;  Location: [facility] OR    COLONOSCOPY      EYE SURGERY  07/2017    cataract bilat eyes    HC EXCISION BREAST LESION, OPEN >=1      ATYPICAL HYPERPLASIA X 2    HYSTERECTOMY, PAP NO LONGER INDICATED  1994    fibroids    TONSILLECTOMY      ZZC VAG HYST,RMV TUBE/OVARY         Family History   Problem Relation Age of Onset    Cancer Mother         lung    Osteoporosis Mother     Eye  Disorder Mother         glaucoma    Arthritis Mother         osteo    Heart Disease Mother         CHF    Hypertension Mother     Cerebrovascular Disease Father         TIAs    Osteoporosis Father     Hypertension Father     Cancer Father         BONE    Arthritis Sister         PARTIAL KNEE REPLACEMENT IN 2009    Osteoporosis Sister     No Known Problems Brother     Cerebrovascular Disease Brother     Colon Cancer Brother 70        B 1939    Cancer Brother         PROSTATE    Breast Cancer Maternal Grandmother     Cerebrovascular Disease Maternal Grandfather     Asthma Daughter     Breast Cancer Maternal Aunt     Breast Cancer Maternal Aunt     Breast Cancer Other         2 YOUNGER MATERNAL COUSINS       Social History     Tobacco Use    Smoking status: Former     Current packs/day: 0.00     Average packs/day: 1 pack/day for 40.0 years (40.0 ttl pk-yrs)     Types: Cigarettes     Start date: 1943     Quit date: 10/7/1983     Years since quittin.5    Smokeless tobacco: Never   Substance Use Topics    Alcohol use: Yes     Comment: 4 drinks per week       Patient Active Problem List   Diagnosis    Osteopenia of multiple sites    Osteoarthritis    Vitamin D deficiency    Medicare annual wellness visit, subsequent    Vitamin B12 deficiency without anemia    Family history of colon cancer    Asymptomatic postmenopausal status    FHx: osteoporosis    Melanocytic nevus    Alcohol consuption of more than two drinks per day    Abnormal LFTs    Tubular adenoma of colon    Heme positive stool    FH: colon cancer    Hyperlipidemia LDL goal <130    Hypertension    Essential hypertension    Postmenopausal status    Nasal congestion    Vitamin B12 deficiency (non anemic)    Nonspecific abnormal results of thyroid function study    History of basal cell carcinoma    Atypical ductal hyperplasia of breast    History of squamous cell carcinoma in situ of skin    Malignant neoplasm of lower-outer quadrant of left breast of  female, estrogen receptor positive (H)    Basal cell carcinoma (BCC)    Benign neoplasm of cecum    Breast pain    Cyst of breast    Diverticular disease of large intestine    Estrogen receptor positive status (ER+)    Hemorrhoids    History of colonic polyps    Benign neoplasm of ascending colon     Allergies   Allergen Reactions    Alendronate GI Disturbance     Severe GERD    Pollen Extract      Current Outpatient Medications   Medication Sig Dispense Refill    anastrozole (ARIMIDEX) 1 MG tablet Take 1 tablet (1 mg) by mouth daily 90 tablet 3    calcium carbonate 600 mg-vitamin D 400 units 600-400 MG-UNIT per tablet Take 1 tablet by mouth 2 times daily FOR BONE HEALTH AND FOR VITAMIN D DEFICIENCY (LOW VITAMIN D)      cholecalciferol 50 MCG (2000 UT) tablet Take 2 tablets by mouth daily INDICATION: TO TREAT VITAMIN D DEFICIENCY (LOW VITAMIN D)      FISH OIL 1000 MG OR CAPS THREE TIMES DAILY 100 3    lisinopril (ZESTRIL) 20 MG tablet Take 1 tablet (20 mg) by mouth daily 90 tablet 3    loratadine (CLARITIN) 10 MG tablet ONE DAILY FOR ALLERGIES      valACYclovir (VALTREX) 1000 mg tablet TAKE 2000 MG AT ONSET OF COLD SORE, FOLLOWED BY SECOND 2000 MG DOSE 12 HOURS LATER; REPEAT AS NEEDED 12 tablet 3     Vitals: LMP  (LMP Unknown)   BMI= There is no height or weight on file to calculate BMI.    EXAM:  GENERAL: healthy, alert and no distress   BREAST:  well healed left lumpectomy scar on the inferior breast at 6:00.  There are no masses palpable in either breast. Minimal radiation changes on her left breast.   There is no axillary or supraclavicular lymphadenopathy.  CARDIOVASCULAR:  RRR  RESPIRATORY: nonlabored breathing  NECK: Neck supple. No adenopathy. Thyroid symmetric, normal size  SKIN: No suspicious lesions or rashes  LYMPH: Normal cervical lymph nodes      ASSESSMENT/PLAN:  Alondra Morgan is now 6 months s/p left breast lumpectomy with adjuvant radiation. Her recent imaging is benign as is her clinical breast  exam. Plan for bilateral mammogram in July/August.     Chika Mitchell MD  [facility]  [phone]        Please route or send letter to:  Primary Care Provider (PCP) and Referring Provider

## 2024-04-19 NOTE — LETTER
April 23, 2024          Ivan Mcpherson MD  [address] [place] [address]      RE:   Alondra Morgan 1945      Dear Colleague,    Thank you for referring your patient, Alondra Morgan, to [facility]. Please see a copy of my visit note below.     Alondra Morgan is a 78 year old female who is seen in follow up for h/o left breast cancer.     Alondra is s/p left breast partial mastectomy with left SLNB on 9/13/2023. 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 Her sentinel node was negative and margins negative. She had adjuvant radiation. She is on anastrozole under the care of Dr. Cannon. She had a diagnostic left mammogram on 4/15/2024 which was benign She is due for bilateral screening mammogram in July/August.      She is doing well. She reports no breast concerns today.         Past Medical History        Past Medical History:   Diagnosis Date    Arthritis      Atypical ductal hyperplasia of left breast 2005     left breast atypical ductal hyperplasia, completed 5 years tamoxifen in 2011    Atypical hyperplasia of breasts      Basal cell carcinoma of ala nasi      Basal cell carcinoma of shoulder      Family history of colon cancer       BROTHER COLON CANCER AGE 68    History of poliomyelitis 1947     acute polio infection age 2, resolved without any sequelae    Osteopenia      Polyp, colon       Adenomatous    Squamous cell carcinoma of skin, unspecified              Past Surgical History         Past Surgical History:   Procedure Laterality Date    BIOPSY NODE SENTINEL Left 9/13/2023     Procedure: left sentinel lymph node biopsy;  Surgeon: Chika Mitchell MD;  Location:  OR    BIOPSY, BREAST, WITH RADIOFREQUENCY IDENTIFICATION Left 9/13/2023     Procedure: left breast tag localized lumpectomy;  Surgeon: Chika Mitchell MD;  Location:  OR    COLONOSCOPY        EYE SURGERY   07/2017     cataract bilat eyes    HC EXCISION BREAST LESION, OPEN >=1          ATYPICAL HYPERPLASIA X 2    HYSTERECTOMY, PAP NO LONGER INDICATED        fibroids    TONSILLECTOMY        ZZC VAG HYST,RMV TUBE/OVARY                Family History         Family History   Problem Relation Age of Onset    Cancer Mother           lung    Osteoporosis Mother      Eye Disorder Mother           glaucoma    Arthritis Mother           osteo    Heart Disease Mother           CHF    Hypertension Mother      Cerebrovascular Disease Father           TIAs    Osteoporosis Father      Hypertension Father      Cancer Father           BONE    Arthritis Sister           PARTIAL KNEE REPLACEMENT IN     Osteoporosis Sister      No Known Problems Brother      Cerebrovascular Disease Brother      Colon Cancer Brother 70         B 1939    Cancer Brother           PROSTATE    Breast Cancer Maternal Grandmother      Cerebrovascular Disease Maternal Grandfather      Asthma Daughter      Breast Cancer Maternal Aunt      Breast Cancer Maternal Aunt      Breast Cancer Other           2 YOUNGER MATERNAL COUSINS            Social History            Tobacco Use    Smoking status: Former       Current packs/day: 0.00       Average packs/day: 1 pack/day for 40.0 years (40.0 ttl pk-yrs)       Types: Cigarettes       Start date: 1945       Quit date: 10/7/1983       Years since quittin.5    Smokeless tobacco: Never   Substance Use Topics    Alcohol use: Yes       Comment: 4 drinks per week             Patient Active Problem List   Diagnosis    Osteopenia of multiple sites    Osteoarthritis    Vitamin D deficiency    Medicare annual wellness visit, subsequent    Vitamin B12 deficiency without anemia    Family history of colon cancer    Asymptomatic postmenopausal status    FHx: osteoporosis    Melanocytic nevus    Alcohol consuption of more than two drinks per day    Abnormal LFTs    Tubular adenoma of colon    Heme positive stool    FH: colon cancer    Hyperlipidemia LDL goal <130    Hypertension    Essential  hypertension    Postmenopausal status    Nasal congestion    Vitamin B12 deficiency (non anemic)    Nonspecific abnormal results of thyroid function study    History of basal cell carcinoma    Atypical ductal hyperplasia of breast    History of squamous cell carcinoma in situ of skin    Malignant neoplasm of lower-outer quadrant of left breast of female, estrogen receptor positive (H)    Basal cell carcinoma (BCC)    Benign neoplasm of cecum    Breast pain    Cyst of breast    Diverticular disease of large intestine    Estrogen receptor positive status (ER+)    Hemorrhoids    History of colonic polyps    Benign neoplasm of ascending colon      Allergies         Allergies   Allergen Reactions    Alendronate GI Disturbance       Severe GERD    Pollen Extract           Current Outpatient Prescriptions          Current Outpatient Medications   Medication Sig Dispense Refill    anastrozole (ARIMIDEX) 1 MG tablet Take 1 tablet (1 mg) by mouth daily 90 tablet 3    calcium carbonate 600 mg-vitamin D 400 units 600-400 MG-UNIT per tablet Take 1 tablet by mouth 2 times daily FOR BONE HEALTH AND FOR VITAMIN D DEFICIENCY (LOW VITAMIN D)        cholecalciferol 50 MCG (2000 UT) tablet Take 2 tablets by mouth daily INDICATION: TO TREAT VITAMIN D DEFICIENCY (LOW VITAMIN D)        FISH OIL 1000 MG OR CAPS THREE TIMES DAILY 100 3    lisinopril (ZESTRIL) 20 MG tablet Take 1 tablet (20 mg) by mouth daily 90 tablet 3    loratadine (CLARITIN) 10 MG tablet ONE DAILY FOR ALLERGIES        valACYclovir (VALTREX) 1000 mg tablet TAKE 2000 MG AT ONSET OF COLD SORE, FOLLOWED BY SECOND 2000 MG DOSE 12 HOURS LATER; REPEAT AS NEEDED 12 tablet 3         Vitals: LMP  (LMP Unknown)   BMI= There is no height or weight on file to calculate BMI.     EXAM:  GENERAL: healthy, alert and no distress   BREAST:  well healed left lumpectomy scar on the inferior breast at 6:00.  There are no masses palpable in either breast. Minimal radiation changes on her left  breast.   There is no axillary or supraclavicular lymphadenopathy.  CARDIOVASCULAR:  RRR  RESPIRATORY: nonlabored breathing  NECK: Neck supple. No adenopathy. Thyroid symmetric, normal size  SKIN: No suspicious lesions or rashes  LYMPH: Normal cervical lymph nodes        ASSESSMENT/PLAN:  Alondra Morgan is now 6 months s/p left breast lumpectomy with adjuvant radiation. Her recent imaging is benign as is her clinical breast exam. Plan for bilateral mammogram in July/August.        Again, thank you for allowing me to participate in the care of your patient.      Sincerely,      Chika Mitchell MD

## 2024-05-13 DIAGNOSIS — Z79.811 USE OF AROMATASE INHIBITORS: Primary | ICD-10-CM

## 2024-05-14 DIAGNOSIS — C50.512 MALIGNANT NEOPLASM OF LOWER-OUTER QUADRANT OF LEFT BREAST OF FEMALE, ESTROGEN RECEPTOR POSITIVE (H): ICD-10-CM

## 2024-05-14 DIAGNOSIS — M85.89 OSTEOPENIA OF MULTIPLE SITES: Primary | ICD-10-CM

## 2024-05-14 DIAGNOSIS — Z79.811 USE OF AROMATASE INHIBITORS: ICD-10-CM

## 2024-05-14 DIAGNOSIS — Z17.0 MALIGNANT NEOPLASM OF LOWER-OUTER QUADRANT OF LEFT BREAST OF FEMALE, ESTROGEN RECEPTOR POSITIVE (H): ICD-10-CM

## 2024-05-14 RX ORDER — ALBUTEROL SULFATE 0.83 MG/ML
2.5 SOLUTION RESPIRATORY (INHALATION)
OUTPATIENT
Start: 2024-05-18

## 2024-05-14 RX ORDER — MEPERIDINE HYDROCHLORIDE 25 MG/ML
25 INJECTION INTRAMUSCULAR; INTRAVENOUS; SUBCUTANEOUS EVERY 30 MIN PRN
OUTPATIENT
Start: 2024-05-18

## 2024-05-14 RX ORDER — ALBUTEROL SULFATE 90 UG/1
1-2 AEROSOL, METERED RESPIRATORY (INHALATION)
Start: 2024-05-18

## 2024-05-14 RX ORDER — DIPHENHYDRAMINE HYDROCHLORIDE 50 MG/ML
50 INJECTION INTRAMUSCULAR; INTRAVENOUS
Start: 2024-05-18

## 2024-05-14 RX ORDER — EPINEPHRINE 1 MG/ML
0.3 INJECTION, SOLUTION, CONCENTRATE INTRAVENOUS EVERY 5 MIN PRN
OUTPATIENT
Start: 2024-05-18

## 2024-05-17 ENCOUNTER — ALLIED HEALTH/NURSE VISIT (OUTPATIENT)
Dept: INFUSION THERAPY | Facility: CLINIC | Age: 79
End: 2024-05-17
Attending: INTERNAL MEDICINE
Payer: COMMERCIAL

## 2024-05-17 VITALS
RESPIRATION RATE: 18 BRPM | OXYGEN SATURATION: 95 % | DIASTOLIC BLOOD PRESSURE: 64 MMHG | HEART RATE: 88 BPM | TEMPERATURE: 98 F | SYSTOLIC BLOOD PRESSURE: 124 MMHG

## 2024-05-17 DIAGNOSIS — C50.512 MALIGNANT NEOPLASM OF LOWER-OUTER QUADRANT OF LEFT BREAST OF FEMALE, ESTROGEN RECEPTOR POSITIVE (H): ICD-10-CM

## 2024-05-17 DIAGNOSIS — Z17.0 MALIGNANT NEOPLASM OF LOWER-OUTER QUADRANT OF LEFT BREAST OF FEMALE, ESTROGEN RECEPTOR POSITIVE (H): ICD-10-CM

## 2024-05-17 DIAGNOSIS — Z79.811 USE OF AROMATASE INHIBITORS: ICD-10-CM

## 2024-05-17 DIAGNOSIS — M85.89 OSTEOPENIA OF MULTIPLE SITES: ICD-10-CM

## 2024-05-17 DIAGNOSIS — C50.512 MALIGNANT NEOPLASM OF LOWER-OUTER QUADRANT OF LEFT BREAST OF FEMALE, ESTROGEN RECEPTOR POSITIVE (H): Primary | ICD-10-CM

## 2024-05-17 DIAGNOSIS — M85.89 OSTEOPENIA OF MULTIPLE SITES: Primary | ICD-10-CM

## 2024-05-17 DIAGNOSIS — Z17.0 MALIGNANT NEOPLASM OF LOWER-OUTER QUADRANT OF LEFT BREAST OF FEMALE, ESTROGEN RECEPTOR POSITIVE (H): Primary | ICD-10-CM

## 2024-05-17 LAB
CALCIUM SERPL-MCNC: 10 MG/DL (ref 8.8–10.2)
CREAT SERPL-MCNC: 0.83 MG/DL (ref 0.51–0.95)
EGFRCR SERPLBLD CKD-EPI 2021: 71 ML/MIN/1.73M2

## 2024-05-17 PROCEDURE — 82565 ASSAY OF CREATININE: CPT | Performed by: INTERNAL MEDICINE

## 2024-05-17 PROCEDURE — 82310 ASSAY OF CALCIUM: CPT | Performed by: INTERNAL MEDICINE

## 2024-05-17 PROCEDURE — 36415 COLL VENOUS BLD VENIPUNCTURE: CPT | Performed by: INTERNAL MEDICINE

## 2024-05-17 PROCEDURE — 250N000011 HC RX IP 250 OP 636: Mod: JZ | Performed by: INTERNAL MEDICINE

## 2024-05-17 PROCEDURE — 96372 THER/PROPH/DIAG INJ SC/IM: CPT | Performed by: INTERNAL MEDICINE

## 2024-05-17 RX ADMIN — DENOSUMAB 60 MG: 60 INJECTION SUBCUTANEOUS at 14:29

## 2024-05-17 NOTE — PROGRESS NOTES
Medical Assistant Note:  Alondra Morgan presents today for lab draw.    Patient seen by provider today: No.   present during visit today: Not Applicable.    Concerns: No Concerns.    Procedure:  Lab draw site: RAC, Needle type: BF, Gauge: 21. Gauze and coban applied    Post Assessment:  Labs drawn without difficulty: Yes.    Discharge Plan:  Departure Mode: Ambulatory.    Face to Face Time: 5.    Delia Wallace CMA

## 2024-05-17 NOTE — PROGRESS NOTES
Infusion Nursing Note:  Alondra Morgan presents today for prolia.    Patient seen by provider today: No   present during visit today: Not Applicable.    Note: N/A.      Intravenous Access:  No Intravenous access/labs at this visit.    Treatment Conditions:  Lab Results   Component Value Date     (L) 02/14/2024    POTASSIUM 4.5 02/14/2024    MAG 2.1 08/05/2016    CR 0.83 05/17/2024    YANICK 10.0 05/17/2024    BILITOTAL 0.4 02/14/2024    ALBUMIN 4.2 02/14/2024    ALT 17 02/14/2024    AST 23 02/14/2024       Results reviewed, labs MET treatment parameters, ok to proceed with treatment.      Post Infusion Assessment:  Patient tolerated injection without incident.  Site patent and intact, free from redness, edema or discomfort.       Discharge Plan:   Copy of AVS reviewed with patient and/or family.  [facility]  Patient discharged in stable condition accompanied by: self.      Colin Damon RN

## 2024-07-30 ENCOUNTER — NURSE TRIAGE (OUTPATIENT)
Dept: INTERNAL MEDICINE | Facility: CLINIC | Age: 79
End: 2024-07-30
Payer: COMMERCIAL

## 2024-07-30 DIAGNOSIS — U07.1 INFECTION DUE TO 2019 NOVEL CORONAVIRUS: Primary | ICD-10-CM

## 2024-07-30 NOTE — TELEPHONE ENCOUNTER
RN COVID TREATMENT VISIT  07/30/24      The patient has been triaged and does not require a higher level of care.    Alondra Morgan  78 year old  Current weight? 152lb    Has the patient been seen by a primary care provider at an [facility] or [facility] within the past two years? Yes.   Have you been in close proximity to/do you have a known exposure to a person with a confirmed case of influenza? No.     General treatment eligibility:  Date of positive COVID test (PCR or at home)?  7/30    Are you or have you been hospitalized for this COVID-19 infection? No.   Have you received monoclonal antibodies or antiviral treatment for COVID-19 since this positive test? No.   Do you have any of the following conditions that place you at risk of being very sick from COVID-19?   - Age 50 years or older  - Cancer/active malignancy including patients with cancer who are currently receiving chemotherapy or radiation, metastatic cancer, advance cancer and are receiving palliative care  Yes, patient has at least one high risk condition as noted above.     Current COVID symptoms:   - cough  - fatigue  - congestion or runny nose  - diarrhea  Yes. Patient has at least one symptom as selected.     How many days since symptoms started? 5 days or less. Established patient, 12 years or older weighing at least 88.2 lbs, who has symptoms that started in the past 5 days, has not been hospitalized nor received treatment already, and is at risk for being very sick from COVID-19.     Treatment eligibility by RN:  Are you currently pregnant or nursing? No  Do you have a clinically significant hypersensitivity to nirmatrelvir or ritonavir, or toxic epidermal necrolysis (TEN) or Alanis-Jim Syndrome? No  Do you have a history of hepatitis, any hepatic impairment on the Problem List (such as Child-De Santiago Class C, cirrhosis, fatty liver disease, alcoholic liver disease), or was the last liver lab (hepatic panel,  ALT, AST, ALK Phos, bilirubin) elevated in the past 6 months? No  Do you have any history of severe renal impairment (eGFR < 30mL/min)? No    Is patient eligible to continue? Yes, patient meets all eligibility requirements for the RN COVID treatment (as denoted by all no responses above).     Current Outpatient Medications   Medication Sig Dispense Refill    anastrozole (ARIMIDEX) 1 MG tablet Take 1 tablet (1 mg) by mouth daily 90 tablet 3    calcium carbonate 600 mg-vitamin D 400 units 600-400 MG-UNIT per tablet Take 1 tablet by mouth 2 times daily FOR BONE HEALTH AND FOR VITAMIN D DEFICIENCY (LOW VITAMIN D)      cholecalciferol 50 MCG (2000 UT) tablet Take 2 tablets by mouth daily INDICATION: TO TREAT VITAMIN D DEFICIENCY (LOW VITAMIN D)      FISH OIL 1000 MG OR CAPS THREE TIMES DAILY 100 3    lisinopril (ZESTRIL) 20 MG tablet Take 1 tablet (20 mg) by mouth daily 90 tablet 3    loratadine (CLARITIN) 10 MG tablet ONE DAILY FOR ALLERGIES      valACYclovir (VALTREX) 1000 mg tablet TAKE 2000 MG AT ONSET OF COLD SORE, FOLLOWED BY SECOND 2000 MG DOSE 12 HOURS LATER; REPEAT AS NEEDED 12 tablet 3       Medications from List 1 of the standing order (on medications that exclude the use of Paxlovid) that patient is taking: NONE. Is patient taking Elayne's Wort? No  Is patient taking Lavalette's Wort or any meds from List 1? No.   Medications from List 2 of the standing order (on meds that provider needs to adjust) that patient is taking: NONE. Is patient on any of the meds from List 2? No.   Medications from List 3 of standing order (on meds that a RN needs to adjust) that patient is taking: NONE. Is patient on any meds from List 3? No.     Paxlovid has an approximate 90% reduction in hospitalization. Paxlovid can possibly cause altered sense of taste, diarrhea (loose, watery stools), high blood pressure, muscle aches.     Would patient like a Paxlovid prescription?   Yes.   Lab Results   Component Value Date     "GFRESTIMATED 71 05/17/2024       Was last eGFR reduced? No, eGFR 60 or greater/ No Result on record. Patient can receive the normal renal function dose.      Temporary change to home medications: [id number] - [place], [place] - [address]     All medication adjustments (holds, etc) were discussed with the patient and patient was asked to repeat back (teachback) their med adjustment.  Did patient understand med adjustment? Yes, patient repeated back and understood correctly.        Reviewed the following instructions with the patient:    Paxlovid (nimatrelvir and ritonavir)    How it works  Two medicines (nirmatrelvir and ritonavir) are taken together. They stop the virus from growing. Less amount of virus is easier for your body to fight.    How to take  Medicine comes in a daily container with both medicine tablets. Take by mouth twice daily (once in the morning, once at night) for 5 days.  The number of tablets to take varies by patient.  Don't chew or break capsules. Swallow whole.    When to take  Take as soon as possible after positive COVID-19 test result, and within 5 days of your first symptoms.    Possible side effects  Can cause altered sense of taste, diarrhea (loose, watery stools), high blood pressure, muscle aches.    Denae Elena RN        1. COVID-19 DIAGNOSIS: \"How do you know that you have COVID?\" (e.g., positive lab test or self-test, diagnosed by doctor or NP/PA, symptoms after exposure).      7/30 morning  2. COVID-19 EXPOSURE: \"Was there any known exposure to COVID before the symptoms began?\" CDC Definition of close contact: within 6 feet (2 meters) for a total of 15 minutes or more over a 24-hour period.       Unknown exposure  3. ONSET: \"When did the COVID-19 symptoms start?\"       Sunday 7/28  4. WORST SYMPTOM: \"What is your worst symptom?\" (e.g., cough, fever, shortness of breath, muscle aches)      Fatigue  5. COUGH: \"Do you have a cough?\" If Yes, ask: \"How bad " "is the cough?\"        Yes,   6. FEVER: \"Do you have a fever?\" If Yes, ask: \"What is your temperature, how was it measured, and when did it start?\"      Yes, low grade, 'feel warm'  7. RESPIRATORY STATUS: \"Describe your breathing?\" (e.g., normal; shortness of breath, wheezing, unable to speak)       Normal  8. BETTER-SAME-WORSE: \"Are you getting better, staying the same or getting worse compared to yesterday?\"  If getting worse, ask, \"In what way?\"      Good and bad throughout the day  9. OTHER SYMPTOMS: \"Do you have any other symptoms?\"  (e.g., chills, fatigue, headache, loss of smell or taste, muscle pain, sore throat)      'blah', diarrhea, lower appetite  10. HIGH RISK DISEASE: \"Do you have any chronic medical problems?\" (e.g., asthma, heart or lung disease, weak immune system, obesity, etc.)        Hx of breast cancer  11. VACCINE: \"Have you had the COVID-19 vaccine?\" If Yes, ask: \"Which one, how many shots, when did you get it?\"        Yes  13. O2 SATURATION MONITOR:  \"Do you use an oxygen saturation monitor (pulse oximeter) at home?\" If Yes, ask \"What is your reading (oxygen level) today?\" \"What is your usual oxygen saturation reading?\" (e.g., 95%)        No device on hand; states she feels comfortable  Reason for Disposition   COVID-19 diagnosed by positive lab test (e.g., PCR, rapid self-test kit) and mild symptoms (e.g., cough, fever, others) and no complications or SOB    Additional Information   Negative: SEVERE difficulty breathing (e.g., struggling for each breath, speaks in single words)   Negative: Difficult to awaken or acting confused (e.g., disoriented, slurred speech)   Negative: Bluish (or gray) lips or face now   Negative: Shock suspected (e.g., cold/pale/clammy skin, too weak to stand, low BP, rapid pulse)   Negative: Sounds like a life-threatening emergency to the triager   Negative: Diagnosed or suspected COVID-19 and symptoms lasting 3 or more weeks   Negative: COVID-19 exposure and no " symptoms   Negative: COVID-19 vaccine reaction suspected (e.g., fever, headache, muscle aches) occurring 1 to 3 days after getting vaccine   Negative: COVID-19 vaccine, questions about   Negative: Lives with someone known to have influenza (flu test positive) and flu-like symptoms (e.g., cough, runny nose, sore throat, SOB; with or without fever)   Negative: Possible COVID-19 symptoms and triager concerned about severity of symptoms or other causes   Negative: COVID-19 and breastfeeding, questions about   Negative: SEVERE or constant chest pain or pressure  (Exception: Mild central chest pain, present only when coughing.)   Negative: MODERATE difficulty breathing (e.g., speaks in phrases, SOB even at rest, pulse 100-120)   Negative: Headache and stiff neck (can't touch chin to chest)   Negative: Oxygen level (e.g., pulse oximetry) 90% or lower   Negative: Chest pain or pressure  (Exception: MILD central chest pain, present only when coughing.)   Negative: Drinking very little and dehydration suspected (e.g., no urine > 12 hours, very dry mouth, very lightheaded)   Negative: Patient sounds very sick or weak to the triager   Negative: MILD difficulty breathing (e.g., minimal/no SOB at rest, SOB with walking, pulse <100)   Negative: Fever > 103 F (39.4 C)   Negative: Fever > 101 F (38.3 C) and over 60 years of age   Negative: Fever > 100.0 F (37.8 C) and bedridden (e.g., CVA, chronic illness, recovering from surgery)   Negative: HIGH RISK patient (e.g., weak immune system, age > 64 years, obesity with BMI of 30 or higher, pregnant, chronic lung disease or other chronic medical condition) and COVID symptoms (e.g., cough, fever)  (Exceptions: Already seen by doctor or NP/PA and no new or worsening symptoms.)   Negative: HIGH RISK patient and influenza is widespread in the community and ONE OR MORE respiratory symptoms: cough, sore throat, runny or stuffy nose   Negative: HIGH RISK patient and influenza exposure within  the last 7 days and ONE OR MORE respiratory symptoms: cough, sore throat, runny or stuffy nose   Negative: Oxygen level (e.g., pulse oximetry) 91 to 94%   Negative: COVID-19 infection suspected by caller or triager and mild symptoms (cough, fever, or others) and negative COVID-19 rapid test   Negative: Fever present > 3 days (72 hours)   Negative: Fever returns after gone for over 24 hours and symptoms worse or not improved   Negative: Continuous (nonstop) coughing interferes with work or school and no improvement using cough treatment per Care Advice   Negative: Cough present > 3 weeks   Negative: COVID-19 diagnosed by positive lab test (e.g., PCR, rapid self-test kit) and NO symptoms (e.g., cough, fever, others)    Protocols used: Coronavirus (COVID-19) Diagnosed or [id number]    Denae Elena RN

## 2024-08-08 NOTE — PROGRESS NOTES
[facility]    Hematology/Oncology Established Patient Note      Today's Date: 8/14/2024    Reason for follow-up: Left breast cancer.    HISTORY OF PRESENT ILLNESS: Alondra Morgan is a 78 year old female s/p hysterectomy 1995 who presents with the following oncologic history:  11/9/2005: Left breast biopsy of microcalcifications showed focal atypical ductal hyperplasia; no invasive malignancy. Took tamoxifen for 5 years.  11/29/2005: Excision of left breast lesion showed focal atypical ductal hyperplasia; no malignancy.  7/17/2023: Screening mammogram showed asymmetry in left breast at 6-7:00. Right breast negative.  7/24/2023: Diagnostic left mammogram showed mass in left lower breast adjacent to a previous needle biopsy marker clip. Left breast U/S showed 4 mm nodule at 6:00, 5 cm from nipple, 8 mm from prior biopsy.  7/27/2023: Left breast biopsy at 6:00, 5 cm from nipple showed 5 mm of grade 2 invasive ductal carcinoma, no LVI, ER positive at %, MA positive at %, HER-2 negative ([facility]HC = 0).  9/13/2023: Left breast lumpectomy and left axillary SLN excision with Dr. Chika Mitchell.  Pathology showed 4 mm grade 2 invasive ductal carcinoma; margins negative; atypical ductal hyperplasia present; 1 SLN negative.  10/26/2023-11/1/2023: Completed adjuvant radiation to left breast in 6 fractions.  11/27/2023: Started anastrozole.      INTERVAL HISTORY:  Alondra reports she had brain fog and some increased diarrhea when she had COVID 2 weeks ago and took a course of Paxlovid. Arthralgias improved.    REVIEW OF SYSTEMS:   14 point ROS was reviewed and is negative other than as noted above in HPI.       HOME MEDICATIONS:  Current Outpatient Medications   Medication Sig Dispense Refill    anastrozole (ARIMIDEX) 1 MG tablet Take 1 tablet (1 mg) by mouth daily 90 tablet 3    calcium carbonate 600 mg-vitamin D 400 units 600-400 MG-UNIT per tablet Take 1 tablet by mouth 2 times daily FOR BONE HEALTH  AND FOR VITAMIN D DEFICIENCY (LOW VITAMIN D)      cholecalciferol 50 MCG (2000 UT) tablet Take 2 tablets by mouth daily INDICATION: TO TREAT VITAMIN D DEFICIENCY (LOW VITAMIN D)      FISH OIL 1000 MG OR CAPS THREE TIMES DAILY 100 3    lisinopril (ZESTRIL) 20 MG tablet Take 1 tablet (20 mg) by mouth daily 90 tablet 3    loratadine (CLARITIN) 10 MG tablet ONE DAILY FOR ALLERGIES      valACYclovir (VALTREX) 1000 mg tablet TAKE 2000 MG AT ONSET OF COLD SORE, FOLLOWED BY SECOND 2000 MG DOSE 12 HOURS LATER; REPEAT AS NEEDED 12 tablet 3         ALLERGIES:  Allergies   Allergen Reactions    Alendronate GI Disturbance     Severe GERD    Pollen Extract          PAST MEDICAL HISTORY:  Past Medical History:   Diagnosis Date    Arthritis     Atypical ductal hyperplasia of left breast 2005    left breast atypical ductal hyperplasia, completed 5 years tamoxifen in 2011    Atypical hyperplasia of breasts     Basal cell carcinoma of ala nasi     Basal cell carcinoma of shoulder     Family history of colon cancer     BROTHER COLON CANCER AGE 68    History of poliomyelitis 1947    acute polio infection age 2, resolved without any sequelae    Osteopenia     Polyp, colon     Adenomatous    Squamous cell carcinoma of skin, unspecified          PAST SURGICAL HISTORY:  Past Surgical History:   Procedure Laterality Date    BIOPSY NODE SENTINEL Left 9/13/2023    Procedure: left sentinel lymph node biopsy;  Surgeon: Chika Mitchell MD;  Location:  OR    BIOPSY, BREAST, WITH RADIOFREQUENCY IDENTIFICATION Left 9/13/2023    Procedure: left breast tag localized lumpectomy;  Surgeon: Chika Mitchell MD;  Location:  OR    COLONOSCOPY      EYE SURGERY  07/2017    cataract bilat eyes    HC EXCISION BREAST LESION, OPEN >=1      ATYPICAL HYPERPLASIA X 2    HYSTERECTOMY, PAP NO LONGER INDICATED  1994    fibroids    TONSILLECTOMY      ZZC VAG HYST,RMV TUBE/OVARY           SOCIAL HISTORY:  Social History     Socioeconomic History    Marital  status:      Spouse name: Not on file    Number of children: Not on file    Years of education: Not on file    Highest education level: Not on file   Occupational History    Not on file   Tobacco Use    Smoking status: Former     Current packs/day: 0.00     Average packs/day: 1 pack/day for 40.0 years (40.0 ttl pk-yrs)     Types: Cigarettes     Start date: 1943     Quit date: 10/7/1983     Years since quittin.8    Smokeless tobacco: Never   Substance and Sexual Activity    Alcohol use: Yes     Comment: 4 drinks per week    Drug use: No    Sexual activity: Not Currently     Partners: Male   Other Topics Concern     Service No    Blood Transfusions No    Caffeine Concern No    Occupational Exposure No    Hobby Hazards No    Sleep Concern Yes    Stress Concern No    Weight Concern Yes    Special Diet No    Back Care No    Exercise Yes     Comment: 3 X WEEK    Bike Helmet Not Asked    Seat Belt Yes    Self-Exams No     Comment: ENCOURAGED    Parent/sibling w/ CABG, MI or angioplasty before 65F 55M? Not Asked   Social History Narrative    Not on file     Social Determinants of Health     Financial Resource Strain: Low Risk  (2024)    Financial Resource Strain     Within the past 12 months, have you or your family members you live with been unable to get utilities (heat, electricity) when it was really needed?: No   Food Insecurity: Low Risk  (2024)    Food Insecurity     Within the past 12 months, did you worry that your food would run out before you got money to buy more?: No     Within the past 12 months, did the food you bought just not last and you didn t have money to get more?: No   Transportation Needs: Low Risk  (2024)    Transportation Needs     Within the past 12 months, has lack of transportation kept you from medical appointments, getting your medicines, non-medical meetings or appointments, work, or from getting things that you need?: No   Physical Activity: Unknown  (2/20/2024)    Exercise Vital Sign     Days of Exercise per Week: 6 days     Minutes of Exercise per Session: Not on file   Stress: No Stress Concern Present (2/20/2024)    [employer] - Occupational Stress Questionnaire     Feeling of Stress : Only a little   Social Connections: Unknown (2/20/2024)    Social Connection and Isolation Panel [NHANES]     Frequency of Communication with Friends and Family: Not on file     Frequency of Social Gatherings with Friends and Family: More than three times a week     Attends Restorationism Services: Not on file     Active Member of Clubs or Organizations: Not on file     Attends Club or Organization Meetings: Not on file     Marital Status: Not on file   Interpersonal Safety: Low Risk  (2/20/2024)    Interpersonal Safety     Do you feel physically and emotionally safe where you currently live?: Yes     Within the past 12 months, have you been hit, slapped, kicked or otherwise physically hurt by someone?: No     Within the past 12 months, have you been humiliated or emotionally abused in other ways by your partner or ex-partner?: No   Housing Stability: Low Risk  (2/20/2024)    Housing Stability     Do you have housing? : Yes     Are you worried about losing your housing?: No         FAMILY HISTORY:  Family History   Problem Relation Age of Onset    Cancer Mother         lung    Osteoporosis Mother     Eye Disorder Mother         glaucoma    Arthritis Mother         osteo    Heart Disease Mother         CHF    Hypertension Mother     Cerebrovascular Disease Father         TIAs    Osteoporosis Father     Hypertension Father     Cancer Father         BONE    Arthritis Sister         PARTIAL KNEE REPLACEMENT IN 2009    Osteoporosis Sister     No Known Problems Brother     Cerebrovascular Disease Brother     Colon Cancer Brother 70        B 1939    Cancer Brother         PROSTATE    Breast Cancer Maternal Grandmother     Cerebrovascular Disease Maternal  "Grandfather     Asthma Daughter     Breast Cancer Maternal Aunt     Breast Cancer Maternal Aunt     Breast Cancer Other         2 YOUNGER MATERNAL COUSINS         PHYSICAL EXAM:  Vital signs:  /82   Pulse 83   Resp 16   Ht 1.626 m (5' 4\")   Wt 69.4 kg (153 lb)   LMP  (LMP Unknown)   SpO2 96%   BMI 26.26 kg/m     GENERAL/CONSTITUTIONAL: No acute distress.  EYES: No erythema or scleral icterus.  LYMPH: No cervical, supraclavicular, axillary adenopathy.   BREAST: No palpable masses in either breast. Nipples are everted bilaterally with no discharge. No erythema, ulceration, or dimpling of the skin.   RESPIRATORY: No audible cough or wheezing.  GASTROINTESTINAL: No hepatosplenomegaly, masses, or tenderness. No guarding.  No distention.  MUSCULOSKELETAL: Warm and well-perfused, no cyanosis, clubbing, or edema.  NEUROLOGIC: No focal motor deficits. Alert, oriented, answers questions appropriately.  INTEGUMENTARY: No rashes or jaundice.  GAIT: Steady, does not use assistive device       LABS:  CBC RESULTS:   Recent Labs   Lab Test 02/14/24  0948   WBC 4.8   RBC 4.53   HGB 13.6   HCT 40.5   MCV 89   MCH 30.0   MCHC 33.6   RDW 12.9        Last Comprehensive Metabolic Panel:  Sodium   Date Value Ref Range Status   02/14/2024 134 (L) 135 - 145 mmol/L Final     Comment:     Reference intervals for this test were updated on 09/26/2023 to more accurately reflect our healthy population. There may be differences in the flagging of prior results with similar values performed with this method. Interpretation of those prior results can be made in the context of the updated reference intervals.    11/06/2020 130 (L) 133 - 144 mmol/L Final     Potassium   Date Value Ref Range Status   02/14/2024 4.5 3.4 - 5.3 mmol/L Final   11/11/2022 4.1 3.4 - 5.3 mmol/L Final   11/06/2020 4.4 3.4 - 5.3 mmol/L Final     Chloride   Date Value Ref Range Status   02/14/2024 100 98 - 107 mmol/L Final   11/11/2022 100 94 - 109 mmol/L " Final   11/06/2020 97 94 - 109 mmol/L Final     Carbon Dioxide   Date Value Ref Range Status   11/06/2020 28 20 - 32 mmol/L Final     Carbon Dioxide (CO2)   Date Value Ref Range Status   02/14/2024 25 22 - 29 mmol/L Final   11/11/2022 30 20 - 32 mmol/L Final     Anion Gap   Date Value Ref Range Status   02/14/2024 9 7 - 15 mmol/L Final   11/11/2022 4 3 - 14 mmol/L Final   11/06/2020 5 3 - 14 mmol/L Final     Glucose   Date Value Ref Range Status   02/14/2024 89 70 - 99 mg/dL Final   11/11/2022 93 70 - 99 mg/dL Final   11/06/2020 95 70 - 99 mg/dL Final     Comment:     Fasting specimen     Urea Nitrogen   Date Value Ref Range Status   02/14/2024 13.1 8.0 - 23.0 mg/dL Final   11/11/2022 18 7 - 30 mg/dL Final   11/06/2020 16 7 - 30 mg/dL Final     Creatinine   Date Value Ref Range Status   05/17/2024 0.83 0.51 - 0.95 mg/dL Final   11/06/2020 0.73 0.52 - 1.04 mg/dL Final     GFR Estimate   Date Value Ref Range Status   05/17/2024 71 >60 mL/min/1.73m2 Final   11/06/2020 79 >60 mL/min/[1.73_m2] Final     Comment:     Non  GFR Calc  Starting 12/18/2018, serum creatinine based estimated GFR (eGFR) will be   calculated using the Chronic Kidney Disease Epidemiology Collaboration   (CKD-EPI) equation.       Calcium   Date Value Ref Range Status   05/17/2024 10.0 8.8 - 10.2 mg/dL Final   11/06/2020 9.6 8.5 - 10.1 mg/dL Final     Bilirubin Total   Date Value Ref Range Status   02/14/2024 0.4 <=1.2 mg/dL Final   10/23/2018 0.4 0.2 - 1.3 mg/dL Final     Alkaline Phosphatase   Date Value Ref Range Status   02/14/2024 89 40 - 150 U/L Final     Comment:     Reference intervals for this test were updated on 11/14/2023 to more accurately reflect our healthy population. There may be differences in the flagging of prior results with similar values performed with this method. Interpretation of those prior results can be made in the context of the updated reference intervals.   10/23/2018 92 40 - 150 U/L Final     ALT    Date Value Ref Range Status   02/14/2024 17 0 - 50 U/L Final     Comment:     Reference intervals for this test were updated on 6/12/2023 to more accurately reflect our healthy population. There may be differences in the flagging of prior results with similar values performed with this method. Interpretation of those prior results can be made in the context of the updated reference intervals.     10/28/2019 23 0 - 50 U/L Final     AST   Date Value Ref Range Status   02/14/2024 23 0 - 45 U/L Final     Comment:     Reference intervals for this test were updated on 6/12/2023 to more accurately reflect our healthy population. There may be differences in the flagging of prior results with similar values performed with this method. Interpretation of those prior results can be made in the context of the updated reference intervals.   10/28/2019 19 0 - 45 U/L Final       PATHOLOGY:  None new.    IMAGING:  None new.    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  --She has no clinical evidence for recurrent breast cancer by physical exam from today or diagnostic left mammogram reviewed from 4/15/2024.  --She is tolerating anastrozole well so far with mild fatigue that improved after switching to taking it at night.  --Advised adjuvant endocrine therapy for a total of 5 years to reduce risk of breast cancer recurrence by a relative 50%.  --Alternate option is tamoxifen. She would prefer not to take tamoxifen.  --Alondra has had prior heartburn issues to alendronate, so she started Prolia on 5/17/2024. Continue Prolia every 6 months. Plan to repeat DEXA scan in ~10/2025.  --1/11/2024 Genetic testing negative.  --Due  for bilateral screening mammogram in 10/2024, then yearly thereafter.    Return in 4 months.    Jenelle Cannon MD  [facility] Hematology/Oncology     Total time spent today: 30 minutes in chart review, patient evaluation, counseling, documentation, test and/or medication/prescription orders, and coordination of care.      The longitudinal plan of care for the diagnosis(es)/condition(s) as documented were addressed during this visit. Due to the added complexity in care, I will continue to support Alondra in the subsequent management and with ongoing continuity of care.

## 2024-08-14 ENCOUNTER — ONCOLOGY VISIT (OUTPATIENT)
Dept: ONCOLOGY | Facility: CLINIC | Age: 79
End: 2024-08-14
Attending: INTERNAL MEDICINE
Payer: COMMERCIAL

## 2024-08-14 VITALS
BODY MASS INDEX: 26.12 KG/M2 | HEIGHT: 64 IN | HEART RATE: 83 BPM | RESPIRATION RATE: 16 BRPM | WEIGHT: 153 LBS | SYSTOLIC BLOOD PRESSURE: 120 MMHG | DIASTOLIC BLOOD PRESSURE: 82 MMHG | OXYGEN SATURATION: 96 %

## 2024-08-14 DIAGNOSIS — Z12.31 ENCOUNTER FOR SCREENING MAMMOGRAM FOR BREAST CANCER: ICD-10-CM

## 2024-08-14 DIAGNOSIS — Z17.0 MALIGNANT NEOPLASM OF OVERLAPPING SITES OF LEFT BREAST IN FEMALE, ESTROGEN RECEPTOR POSITIVE (H): Primary | ICD-10-CM

## 2024-08-14 DIAGNOSIS — C50.812 MALIGNANT NEOPLASM OF OVERLAPPING SITES OF LEFT BREAST IN FEMALE, ESTROGEN RECEPTOR POSITIVE (H): Primary | ICD-10-CM

## 2024-08-14 DIAGNOSIS — M85.89 OSTEOPENIA OF MULTIPLE SITES: ICD-10-CM

## 2024-08-14 PROCEDURE — G2211 COMPLEX E/M VISIT ADD ON: HCPCS | Performed by: INTERNAL MEDICINE

## 2024-08-14 PROCEDURE — G0463 HOSPITAL OUTPT CLINIC VISIT: HCPCS | Performed by: INTERNAL MEDICINE

## 2024-08-14 PROCEDURE — 99214 OFFICE O/P EST MOD 30 MIN: CPT | Performed by: INTERNAL MEDICINE

## 2024-08-14 RX ORDER — ANASTROZOLE 1 MG/1
1 TABLET ORAL DAILY
Qty: 90 TABLET | Refills: 3 | Status: SHIPPED | OUTPATIENT
Start: 2024-08-14

## 2024-08-14 ASSESSMENT — PAIN SCALES - GENERAL: PAINLEVEL: NO PAIN (0)

## 2024-08-14 NOTE — PATIENT INSTRUCTIONS
1. Arrange bilateral mammogram for 10/2024.  2. Arrange for Prolia for 11/2024.  3. RTC MD in 4 months.

## 2024-08-14 NOTE — PROGRESS NOTES
"Oncology Rooming Note    August 14, 2024 3:17 PM   Alondra Morgan is a 78 year old female who presents for:    Chief Complaint   Patient presents with    Oncology Clinic Visit     Initial Vitals: LMP  (LMP Unknown)  Estimated body mass index is 26.95 kg/m  as calculated from the following:    Height as of 4/19/24: 1.626 m (5' 4\").    Weight as of 4/19/24: 71.2 kg (157 lb). There is no height or weight on file to calculate BSA.  Data Unavailable Comment: Data Unavailable   No LMP recorded (lmp unknown). Patient has had a hysterectomy.  Allergies reviewed: Yes  Medications reviewed: Yes    Medications: Medication refills not needed today.  Pharmacy name entered into [facility]:    [id number] - [place], [place] - [address] [id number] - [place] - [address]    Frailty Screening:   Is the patient here for a new oncology consult visit in cancer care? 2. No          Sole Boogie MA            "

## 2024-08-14 NOTE — LETTER
8/14/2024      Alondra Morgan  [address] [address]      Dear Colleague,    Thank you for referring your patient, Alondra Morgan, to the [facility]. Please see a copy of my visit note below.    [facility]    Hematology/Oncology Established Patient Note      Today's Date: 8/14/2024    Reason for follow-up: Left breast cancer.    HISTORY OF PRESENT ILLNESS: Alondra Morgan is a 78 year old female s/p hysterectomy 1995 who presents with the following oncologic history:  11/9/2005: Left breast biopsy of microcalcifications showed focal atypical ductal hyperplasia; no invasive malignancy. Took tamoxifen for 5 years.  11/29/2005: Excision of left breast lesion showed focal atypical ductal hyperplasia; no malignancy.  7/17/2023: Screening mammogram showed asymmetry in left breast at 6-7:00. Right breast negative.  7/24/2023: Diagnostic left mammogram showed mass in left lower breast adjacent to a previous needle biopsy marker clip. Left breast U/S showed 4 mm nodule at 6:00, 5 cm from nipple, 8 mm from prior biopsy.  7/27/2023: Left breast biopsy at 6:00, 5 cm from nipple showed 5 mm of grade 2 invasive ductal carcinoma, no LVI, ER positive at %, ME positive at %, HER-2 negative (IHC = 0).  9/13/2023: Left breast lumpectomy and left axillary SLN excision with Dr. Chika Mitchell.  Pathology showed 4 mm grade 2 invasive ductal carcinoma; margins negative; atypical ductal hyperplasia present; 1 SLN negative.  10/26/2023-11/1/2023: Completed adjuvant radiation to left breast in 6 fractions.  11/27/2023: Started anastrozole.      INTERVAL HISTORY:  Alondra reports she had brain fog and some increased diarrhea when she had COVID 2 weeks ago and took a course of Paxlovid. Arthralgias improved.    REVIEW OF SYSTEMS:   14 point ROS was reviewed and is negative other than as noted above in HPI.       HOME MEDICATIONS:  Current Outpatient  Medications   Medication Sig Dispense Refill     anastrozole (ARIMIDEX) 1 MG tablet Take 1 tablet (1 mg) by mouth daily 90 tablet 3     calcium carbonate 600 mg-vitamin D 400 units 600-400 MG-UNIT per tablet Take 1 tablet by mouth 2 times daily FOR BONE HEALTH AND FOR VITAMIN D DEFICIENCY (LOW VITAMIN D)       cholecalciferol 50 MCG (2000 UT) tablet Take 2 tablets by mouth daily INDICATION: TO TREAT VITAMIN D DEFICIENCY (LOW VITAMIN D)       FISH OIL 1000 MG OR CAPS THREE TIMES DAILY 100 3     lisinopril (ZESTRIL) 20 MG tablet Take 1 tablet (20 mg) by mouth daily 90 tablet 3     loratadine (CLARITIN) 10 MG tablet ONE DAILY FOR ALLERGIES       valACYclovir (VALTREX) 1000 mg tablet TAKE 2000 MG AT ONSET OF COLD SORE, FOLLOWED BY SECOND 2000 MG DOSE 12 HOURS LATER; REPEAT AS NEEDED 12 tablet 3         ALLERGIES:  Allergies   Allergen Reactions     Alendronate GI Disturbance     Severe GERD     Pollen Extract          PAST MEDICAL HISTORY:  Past Medical History:   Diagnosis Date     Arthritis      Atypical ductal hyperplasia of left breast 2005    left breast atypical ductal hyperplasia, completed 5 years tamoxifen in 2011     Atypical hyperplasia of breasts      Basal cell carcinoma of ala nasi      Basal cell carcinoma of shoulder      Family history of colon cancer     BROTHER COLON CANCER AGE 68     History of poliomyelitis 1947    acute polio infection age 2, resolved without any sequelae     Osteopenia      Polyp, colon     Adenomatous     Squamous cell carcinoma of skin, unspecified          PAST SURGICAL HISTORY:  Past Surgical History:   Procedure Laterality Date     BIOPSY NODE SENTINEL Left 9/13/2023    Procedure: left sentinel lymph node biopsy;  Surgeon: Chika Mitchell MD;  Location:  OR     BIOPSY, BREAST, WITH RADIOFREQUENCY IDENTIFICATION Left 9/13/2023    Procedure: left breast tag localized lumpectomy;  Surgeon: Chika Mitchell MD;  Location:  OR     COLONOSCOPY       EYE SURGERY  07/2017     cataract bilat eyes     HC EXCISION BREAST LESION, OPEN >=1      ATYPICAL HYPERPLASIA X 2     HYSTERECTOMY, PAP NO LONGER INDICATED      fibroids     TONSILLECTOMY       ZZC VAG HYST,RMV TUBE/OVARY           SOCIAL HISTORY:  Social History     Socioeconomic History     Marital status:      Spouse name: Not on file     Number of children: Not on file     Years of education: Not on file     Highest education level: Not on file   Occupational History     Not on file   Tobacco Use     Smoking status: Former     Current packs/day: 0.00     Average packs/day: 1 pack/day for 40.0 years (40.0 ttl pk-yrs)     Types: Cigarettes     Start date: 1943     Quit date: 10/7/1983     Years since quittin.8     Smokeless tobacco: Never   Substance and Sexual Activity     Alcohol use: Yes     Comment: 4 drinks per week     Drug use: No     Sexual activity: Not Currently     Partners: Male   Other Topics Concern      Service No     Blood Transfusions No     Caffeine Concern No     Occupational Exposure No     Hobby Hazards No     Sleep Concern Yes     Stress Concern No     Weight Concern Yes     Special Diet No     Back Care No     Exercise Yes     Comment: 3 X WEEK     Bike Helmet Not Asked     Seat Belt Yes     Self-Exams No     Comment: ENCOURAGED     Parent/sibling w/ CABG, MI or angioplasty before 65F 55M? Not Asked   Social History Narrative     Not on file     Social Determinants of Health     Financial Resource Strain: Low Risk  (2024)    Financial Resource Strain      Within the past 12 months, have you or your family members you live with been unable to get utilities (heat, electricity) when it was really needed?: No   Food Insecurity: Low Risk  (2024)    Food Insecurity      Within the past 12 months, did you worry that your food would run out before you got money to buy more?: No      Within the past 12 months, did the food you bought just not last and you didn t have money to get  more?: No   Transportation Needs: Low Risk  (2/20/2024)    Transportation Needs      Within the past 12 months, has lack of transportation kept you from medical appointments, getting your medicines, non-medical meetings or appointments, work, or from getting things that you need?: No   Physical Activity: Unknown (2/20/2024)    Exercise Vital Sign      Days of Exercise per Week: 6 days      Minutes of Exercise per Session: Not on file   Stress: No Stress Concern Present (2/20/2024)    [employer] [employer] - Occupational Stress Questionnaire      Feeling of Stress : Only a little   Social Connections: Unknown (2/20/2024)    Social Connection and Isolation Panel [NHANES]      Frequency of Communication with Friends and Family: Not on file      Frequency of Social Gatherings with Friends and Family: More than three times a week      Attends Sabianism Services: Not on file      Active Member of Clubs or Organizations: Not on file      Attends Club or Organization Meetings: Not on file      Marital Status: Not on file   Interpersonal Safety: Low Risk  (2/20/2024)    Interpersonal Safety      Do you feel physically and emotionally safe where you currently live?: Yes      Within the past 12 months, have you been hit, slapped, kicked or otherwise physically hurt by someone?: No      Within the past 12 months, have you been humiliated or emotionally abused in other ways by your partner or ex-partner?: No   Housing Stability: Low Risk  (2/20/2024)    Housing Stability      Do you have housing? : Yes      Are you worried about losing your housing?: No         FAMILY HISTORY:  Family History   Problem Relation Age of Onset     Cancer Mother         lung     Osteoporosis Mother      Eye Disorder Mother         glaucoma     Arthritis Mother         osteo     Heart Disease Mother         CHF     Hypertension Mother      Cerebrovascular Disease Father         TIAs     Osteoporosis Father      Hypertension Father   "    Cancer Father         BONE     Arthritis Sister         PARTIAL KNEE REPLACEMENT IN 2009     Osteoporosis Sister      No Known Problems Brother      Cerebrovascular Disease Brother      Colon Cancer Brother 70        B 1939     Cancer Brother         PROSTATE     Breast Cancer Maternal Grandmother      Cerebrovascular Disease Maternal Grandfather      Asthma Daughter      Breast Cancer Maternal Aunt      Breast Cancer Maternal Aunt      Breast Cancer Other         2 YOUNGER MATERNAL COUSINS         PHYSICAL EXAM:  Vital signs:  /82   Pulse 83   Resp 16   Ht 1.626 m (5' 4\")   Wt 69.4 kg (153 lb)   LMP  (LMP Unknown)   SpO2 96%   BMI 26.26 kg/m     GENERAL/CONSTITUTIONAL: No acute distress.  EYES: No erythema or scleral icterus.  LYMPH: No cervical, supraclavicular, axillary adenopathy.   BREAST: No palpable masses in either breast. Nipples are everted bilaterally with no discharge. No erythema, ulceration, or dimpling of the skin.   RESPIRATORY: No audible cough or wheezing.  GASTROINTESTINAL: No hepatosplenomegaly, masses, or tenderness. No guarding.  No distention.  MUSCULOSKELETAL: Warm and well-perfused, no cyanosis, clubbing, or edema.  NEUROLOGIC: No focal motor deficits. Alert, oriented, answers questions appropriately.  INTEGUMENTARY: No rashes or jaundice.  GAIT: Steady, does not use assistive device       LABS:  CBC RESULTS:   Recent Labs   Lab Test 02/14/24  0948   WBC 4.8   RBC 4.53   HGB 13.6   HCT 40.5   MCV 89   MCH 30.0   MCHC 33.6   RDW 12.9        Last Comprehensive Metabolic Panel:  Sodium   Date Value Ref Range Status   02/14/2024 134 (L) 135 - 145 mmol/L Final     Comment:     Reference intervals for this test were updated on 09/26/2023 to more accurately reflect our healthy population. There may be differences in the flagging of prior results with similar values performed with this method. Interpretation of those prior results can be made in the context of the updated " reference intervals.    11/06/2020 130 (L) 133 - 144 mmol/L Final     Potassium   Date Value Ref Range Status   02/14/2024 4.5 3.4 - 5.3 mmol/L Final   11/11/2022 4.1 3.4 - 5.3 mmol/L Final   11/06/2020 4.4 3.4 - 5.3 mmol/L Final     Chloride   Date Value Ref Range Status   02/14/2024 100 98 - 107 mmol/L Final   11/11/2022 100 94 - 109 mmol/L Final   11/06/2020 97 94 - 109 mmol/L Final     Carbon Dioxide   Date Value Ref Range Status   11/06/2020 28 20 - 32 mmol/L Final     Carbon Dioxide (CO2)   Date Value Ref Range Status   02/14/2024 25 22 - 29 mmol/L Final   11/11/2022 30 20 - 32 mmol/L Final     Anion Gap   Date Value Ref Range Status   02/14/2024 9 7 - 15 mmol/L Final   11/11/2022 4 3 - 14 mmol/L Final   11/06/2020 5 3 - 14 mmol/L Final     Glucose   Date Value Ref Range Status   02/14/2024 89 70 - 99 mg/dL Final   11/11/2022 93 70 - 99 mg/dL Final   11/06/2020 95 70 - 99 mg/dL Final     Comment:     Fasting specimen     Urea Nitrogen   Date Value Ref Range Status   02/14/2024 13.1 8.0 - 23.0 mg/dL Final   11/11/2022 18 7 - 30 mg/dL Final   11/06/2020 16 7 - 30 mg/dL Final     Creatinine   Date Value Ref Range Status   05/17/2024 0.83 0.51 - 0.95 mg/dL Final   11/06/2020 0.73 0.52 - 1.04 mg/dL Final     GFR Estimate   Date Value Ref Range Status   05/17/2024 71 >60 mL/min/1.73m2 Final   11/06/2020 79 >60 mL/min/[1.73_m2] Final     Comment:     Non  GFR Calc  Starting 12/18/2018, serum creatinine based estimated GFR (eGFR) will be   calculated using the Chronic Kidney Disease Epidemiology Collaboration   (CKD-EPI) equation.       Calcium   Date Value Ref Range Status   05/17/2024 10.0 8.8 - 10.2 mg/dL Final   11/06/2020 9.6 8.5 - 10.1 mg/dL Final     Bilirubin Total   Date Value Ref Range Status   02/14/2024 0.4 <=1.2 mg/dL Final   10/23/2018 0.4 0.2 - 1.3 mg/dL Final     Alkaline Phosphatase   Date Value Ref Range Status   02/14/2024 89 40 - 150 U/L Final     Comment:     Reference intervals  for this test were updated on 11/14/2023 to more accurately reflect our healthy population. There may be differences in the flagging of prior results with similar values performed with this method. Interpretation of those prior results can be made in the context of the updated reference intervals.   10/23/2018 92 40 - 150 U/L Final     ALT   Date Value Ref Range Status   02/14/2024 17 0 - 50 U/L Final     Comment:     Reference intervals for this test were updated on 6/12/2023 to more accurately reflect our healthy population. There may be differences in the flagging of prior results with similar values performed with this method. Interpretation of those prior results can be made in the context of the updated reference intervals.     10/28/2019 23 0 - 50 U/L Final     AST   Date Value Ref Range Status   02/14/2024 23 0 - 45 U/L Final     Comment:     Reference intervals for this test were updated on 6/12/2023 to more accurately reflect our healthy population. There may be differences in the flagging of prior results with similar values performed with this method. Interpretation of those prior results can be made in the context of the updated reference intervals.   10/28/2019 19 0 - 45 U/L Final       PATHOLOGY:  None new.    IMAGING:  None new.    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  --She has no clinical evidence for recurrent breast cancer by physical exam from today or diagnostic left mammogram reviewed from 4/15/2024.  --She is tolerating anastrozole well so far with mild fatigue that improved after switching to taking it at night.  --Advised adjuvant endocrine therapy for a total of 5 years to  "reduce risk of breast cancer recurrence by a relative 50%.  --Alternate option is tamoxifen. She would prefer not to take tamoxifen.  --Alondra has had prior heartburn issues to alendronate, so she started Prolia on 5/17/2024. Continue Prolia every 6 months. Plan to repeat DEXA scan in ~10/2025.  --1/11/2024 Genetic testing negative.  --Due for bilateral screening mammogram in 10/2024, then yearly thereafter.    Return in 4 months.    Jenelle Cannon MD  [facility] Hematology/Oncology     Total time spent today: 30 minutes in chart review, patient evaluation, counseling, documentation, test and/or medication/prescription orders, and coordination of care.      The longitudinal plan of care for the diagnosis(es)/condition(s) as documented were addressed during this visit. Due to the added complexity in care, I will continue to support Alondra in the subsequent management and with ongoing continuity of care.      Oncology Rooming Note    August 14, 2024 3:17 PM   lAondra Morgan is a 78 year old female who presents for:    Chief Complaint   Patient presents with     Oncology Clinic Visit     Initial Vitals: LMP  (LMP Unknown)  Estimated body mass index is 26.95 kg/m  as calculated from the following:    Height as of 4/19/24: 1.626 m (5' 4\").    Weight as of 4/19/24: 71.2 kg (157 lb). There is no height or weight on file to calculate BSA.  Data Unavailable Comment: Data Unavailable   No LMP recorded (lmp unknown). Patient has had a hysterectomy.  Allergies reviewed: Yes  Medications reviewed: Yes    Medications: Medication refills not needed today.  Pharmacy name entered into [facility]:    [id number] - [place], [place] - [address] [id number] - [place], [place] - [address]    Frailty Screening:   Is the patient here for a new oncology consult visit in cancer care? 2. No          Sole Boogie MA              Again, thank you for allowing me to participate in the care of your patient.  "       Sincerely,        Jenelle Cannon MD

## 2024-09-17 ENCOUNTER — TELEPHONE (OUTPATIENT)
Dept: INTERNAL MEDICINE | Facility: CLINIC | Age: 79
End: 2024-09-17
Payer: COMMERCIAL

## 2024-09-17 NOTE — TELEPHONE ENCOUNTER
Patient inquires if she is currently eligible to receive an updated COVID-19 vaccination. Writer informed patient that, considering her last vaccination was in 10/23, she IS due for the next booster. Patient expressed understanding. Call ended.

## 2024-10-18 ENCOUNTER — HOSPITAL ENCOUNTER (OUTPATIENT)
Dept: MAMMOGRAPHY | Facility: CLINIC | Age: 79
Discharge: HOME | End: 2024-10-18
Attending: INTERNAL MEDICINE | Admitting: INTERNAL MEDICINE
Payer: COMMERCIAL

## 2024-10-18 DIAGNOSIS — C50.812 MALIGNANT NEOPLASM OF OVERLAPPING SITES OF LEFT BREAST IN FEMALE, ESTROGEN RECEPTOR POSITIVE (H): ICD-10-CM

## 2024-10-18 DIAGNOSIS — Z17.0 MALIGNANT NEOPLASM OF OVERLAPPING SITES OF LEFT BREAST IN FEMALE, ESTROGEN RECEPTOR POSITIVE (H): ICD-10-CM

## 2024-10-18 DIAGNOSIS — Z12.31 ENCOUNTER FOR SCREENING MAMMOGRAM FOR BREAST CANCER: ICD-10-CM

## 2024-10-18 PROCEDURE — 77063 BREAST TOMOSYNTHESIS BI: CPT

## 2024-10-29 RX ORDER — DIPHENHYDRAMINE HYDROCHLORIDE 50 MG/ML
50 INJECTION INTRAMUSCULAR; INTRAVENOUS
Start: 2024-10-29

## 2024-10-29 RX ORDER — METHYLPREDNISOLONE SODIUM SUCCINATE 40 MG/ML
40 INJECTION INTRAMUSCULAR; INTRAVENOUS
Start: 2024-10-29

## 2024-10-29 RX ORDER — ALBUTEROL SULFATE 90 UG/1
1-2 INHALANT RESPIRATORY (INHALATION)
Start: 2024-10-29

## 2024-10-29 RX ORDER — EPINEPHRINE 1 MG/ML
0.3 INJECTION, SOLUTION INTRAMUSCULAR; SUBCUTANEOUS EVERY 5 MIN PRN
OUTPATIENT
Start: 2024-10-29

## 2024-10-29 RX ORDER — ALBUTEROL SULFATE 0.83 MG/ML
2.5 SOLUTION RESPIRATORY (INHALATION)
OUTPATIENT
Start: 2024-10-29

## 2024-10-29 RX ORDER — DIPHENHYDRAMINE HYDROCHLORIDE 50 MG/ML
25 INJECTION INTRAMUSCULAR; INTRAVENOUS
Start: 2024-10-29

## 2024-10-29 RX ORDER — MEPERIDINE HYDROCHLORIDE 25 MG/ML
25 INJECTION INTRAMUSCULAR; INTRAVENOUS; SUBCUTANEOUS
OUTPATIENT
Start: 2024-10-29

## 2024-11-16 DIAGNOSIS — C50.512 MALIGNANT NEOPLASM OF LOWER-OUTER QUADRANT OF LEFT BREAST OF FEMALE, ESTROGEN RECEPTOR POSITIVE (H): ICD-10-CM

## 2024-11-16 DIAGNOSIS — M85.89 OSTEOPENIA OF MULTIPLE SITES: Primary | ICD-10-CM

## 2024-11-16 DIAGNOSIS — Z17.0 MALIGNANT NEOPLASM OF LOWER-OUTER QUADRANT OF LEFT BREAST OF FEMALE, ESTROGEN RECEPTOR POSITIVE (H): ICD-10-CM

## 2024-11-16 DIAGNOSIS — Z79.811 USE OF AROMATASE INHIBITORS: ICD-10-CM

## 2024-11-16 RX ORDER — DIPHENHYDRAMINE HYDROCHLORIDE 50 MG/ML
50 INJECTION INTRAMUSCULAR; INTRAVENOUS
Start: 2024-11-20

## 2024-11-16 RX ORDER — ALBUTEROL SULFATE 0.83 MG/ML
2.5 SOLUTION RESPIRATORY (INHALATION)
OUTPATIENT
Start: 2024-11-20

## 2024-11-16 RX ORDER — ALBUTEROL SULFATE 90 UG/1
1-2 INHALANT RESPIRATORY (INHALATION)
Start: 2024-11-20

## 2024-11-16 RX ORDER — EPINEPHRINE 1 MG/ML
0.3 INJECTION, SOLUTION, CONCENTRATE INTRAVENOUS EVERY 5 MIN PRN
OUTPATIENT
Start: 2024-11-20

## 2024-11-16 RX ORDER — DIPHENHYDRAMINE HYDROCHLORIDE 50 MG/ML
25 INJECTION INTRAMUSCULAR; INTRAVENOUS
Start: 2024-11-20

## 2024-11-16 RX ORDER — MEPERIDINE HYDROCHLORIDE 25 MG/ML
25 INJECTION INTRAMUSCULAR; INTRAVENOUS; SUBCUTANEOUS
OUTPATIENT
Start: 2024-11-20

## 2024-11-19 ENCOUNTER — LAB (OUTPATIENT)
Dept: INFUSION THERAPY | Facility: CLINIC | Age: 79
End: 2024-11-19
Attending: INTERNAL MEDICINE
Payer: COMMERCIAL

## 2024-11-19 VITALS
HEART RATE: 73 BPM | TEMPERATURE: 97.7 F | OXYGEN SATURATION: 98 % | SYSTOLIC BLOOD PRESSURE: 128 MMHG | RESPIRATION RATE: 18 BRPM | DIASTOLIC BLOOD PRESSURE: 80 MMHG

## 2024-11-19 DIAGNOSIS — M85.89 OSTEOPENIA OF MULTIPLE SITES: ICD-10-CM

## 2024-11-19 DIAGNOSIS — Z17.0 MALIGNANT NEOPLASM OF LOWER-OUTER QUADRANT OF LEFT BREAST OF FEMALE, ESTROGEN RECEPTOR POSITIVE (H): ICD-10-CM

## 2024-11-19 DIAGNOSIS — C50.512 MALIGNANT NEOPLASM OF LOWER-OUTER QUADRANT OF LEFT BREAST OF FEMALE, ESTROGEN RECEPTOR POSITIVE (H): ICD-10-CM

## 2024-11-19 DIAGNOSIS — C50.512 MALIGNANT NEOPLASM OF LOWER-OUTER QUADRANT OF LEFT BREAST OF FEMALE, ESTROGEN RECEPTOR POSITIVE (H): Primary | ICD-10-CM

## 2024-11-19 DIAGNOSIS — M85.89 OSTEOPENIA OF MULTIPLE SITES: Primary | ICD-10-CM

## 2024-11-19 DIAGNOSIS — Z79.811 USE OF AROMATASE INHIBITORS: ICD-10-CM

## 2024-11-19 DIAGNOSIS — Z17.0 MALIGNANT NEOPLASM OF LOWER-OUTER QUADRANT OF LEFT BREAST OF FEMALE, ESTROGEN RECEPTOR POSITIVE (H): Primary | ICD-10-CM

## 2024-11-19 LAB
CALCIUM SERPL-MCNC: 10.3 MG/DL (ref 8.8–10.4)
CREAT SERPL-MCNC: 0.8 MG/DL (ref 0.51–0.95)
EGFRCR SERPLBLD CKD-EPI 2021: 74 ML/MIN/1.73M2

## 2024-11-19 PROCEDURE — 82310 ASSAY OF CALCIUM: CPT | Performed by: INTERNAL MEDICINE

## 2024-11-19 PROCEDURE — 250N000011 HC RX IP 250 OP 636: Performed by: INTERNAL MEDICINE

## 2024-11-19 PROCEDURE — 96372 THER/PROPH/DIAG INJ SC/IM: CPT | Performed by: INTERNAL MEDICINE

## 2024-11-19 PROCEDURE — 36415 COLL VENOUS BLD VENIPUNCTURE: CPT | Performed by: INTERNAL MEDICINE

## 2024-11-19 PROCEDURE — 82565 ASSAY OF CREATININE: CPT | Performed by: INTERNAL MEDICINE

## 2024-11-19 RX ADMIN — DENOSUMAB 60 MG: 60 INJECTION SUBCUTANEOUS at 12:38

## 2024-11-19 ASSESSMENT — PAIN SCALES - GENERAL: PAINLEVEL_OUTOF10: NO PAIN (0)

## 2024-11-19 NOTE — PROGRESS NOTES
Infusion Nursing Note:  Alondra Morgan presents today for prolia.    Patient seen by provider today: No   present during visit today: Not Applicable.    Note: Patient has no concerns to report today and states she tolerated her first prolia injection well. Confirmed she is taking calcium and vitamin D supplemets as recommended.      Intravenous Access:  No Intravenous access/labs at this visit.    Treatment Conditions:  Lab Results   Component Value Date     (L) 02/14/2024    POTASSIUM 4.5 02/14/2024    MAG 2.1 08/05/2016    CR 0.80 11/19/2024    YANICK 10.3 11/19/2024    BILITOTAL 0.4 02/14/2024    ALBUMIN 4.2 02/14/2024    ALT 17 02/14/2024    AST 23 02/14/2024     Results reviewed, labs MET treatment parameters, ok to proceed with treatment.      Post Infusion Assessment:  Patient tolerated injection without incident.  Site patent and intact, free from redness, edema or discomfort.       Discharge Plan:   Discharge instructions reviewed with: Patient.  Patient and/or family verbalized understanding of discharge instructions and all questions answered.  AVS to patient via [facility] [employer].  Patient will return 12/19/24 for next clinic appointment with Dr. Cannon, next prolia to be scheduled at that time.  Patient discharged in stable condition accompanied by: self.  Departure Mode: Ambulatory.      Tabitha Godinez RN

## 2024-11-19 NOTE — PROGRESS NOTES
Medical Assistant Note:  Alondra Morgan presents today for blood draw.    Patient seen by provider today: No.   present during visit today: Not Applicable.    Concerns: No Concerns.    Procedure:  Lab draw site: right hand, Needle type: bf, Gauge: 23.    Post Assessment:  Labs drawn without difficulty: Yes.    Discharge Plan:  Departure Mode: Ambulatory.    Face to Face Time: 5 min.    Brandy So, CMA

## 2024-12-11 NOTE — PROGRESS NOTES
[facility]    Hematology/Oncology Established Patient Note      Today's Date: 12/19/2024    Reason for follow-up: Left breast cancer.    HISTORY OF PRESENT ILLNESS: Alondra Morgan is a 79 year old female s/p hysterectomy 1995 who presents with the following oncologic history:  11/9/2005: Left breast biopsy of microcalcifications showed focal atypical ductal hyperplasia; no invasive malignancy. Took tamoxifen for 5 years.  11/29/2005: Excision of left breast lesion showed focal atypical ductal hyperplasia; no malignancy.  7/17/2023: Screening mammogram showed asymmetry in left breast at 6-7:00. Right breast negative.  7/24/2023: Diagnostic left mammogram showed mass in left lower breast adjacent to a previous needle biopsy marker clip. Left breast U/S showed 4 mm nodule at 6:00, 5 cm from nipple, 8 mm from prior biopsy.  7/27/2023: Left breast biopsy at 6:00, 5 cm from nipple showed 5 mm of grade 2 invasive ductal carcinoma, no LVI, ER positive at %, ID positive at %, HER-2 negative ([facility] = 0).  9/13/2023: Left breast lumpectomy and left axillary SLN excision with Dr. Chika Mitchell.  Pathology showed 4 mm grade 2 invasive ductal carcinoma; margins negative; atypical ductal hyperplasia present; 1 SLN negative.  10/26/2023-11/1/2023: Completed adjuvant radiation to left breast in 6 fractions.  11/27/2023: Started anastrozole.      INTERVAL HISTORY:  Alondra reports feeling overall well.  She reports mild intermittent hot flash at night.    REVIEW OF SYSTEMS:   14 point ROS was reviewed and is negative other than as noted above in HPI.       HOME MEDICATIONS:  Current Outpatient Medications   Medication Sig Dispense Refill    anastrozole (ARIMIDEX) 1 MG tablet Take 1 tablet (1 mg) by mouth daily 90 tablet 3    calcium carbonate 600 mg-vitamin D 400 units 600-400 MG-UNIT per tablet Take 1 tablet by mouth 2 times daily FOR BONE HEALTH AND FOR VITAMIN D DEFICIENCY (LOW VITAMIN D)       cholecalciferol 50 MCG (2000 UT) tablet Take 2 tablets by mouth daily INDICATION: TO TREAT VITAMIN D DEFICIENCY (LOW VITAMIN D)      FISH OIL 1000 MG OR CAPS THREE TIMES DAILY 100 3    lisinopril (ZESTRIL) 20 MG tablet Take 1 tablet (20 mg) by mouth daily 90 tablet 3    loratadine (CLARITIN) 10 MG tablet ONE DAILY FOR ALLERGIES      valACYclovir (VALTREX) 1000 mg tablet TAKE 2000 MG AT ONSET OF COLD SORE, FOLLOWED BY SECOND 2000 MG DOSE 12 HOURS LATER; REPEAT AS NEEDED 12 tablet 3         ALLERGIES:  Allergies   Allergen Reactions    Alendronate GI Disturbance     Severe GERD    Pollen Extract          PAST MEDICAL HISTORY:  Past Medical History:   Diagnosis Date    Arthritis     Atypical ductal hyperplasia of left breast 2005    left breast atypical ductal hyperplasia, completed 5 years tamoxifen in 2011    Atypical hyperplasia of breasts     Basal cell carcinoma of ala nasi     Basal cell carcinoma of shoulder     Family history of colon cancer     BROTHER COLON CANCER AGE 68    History of poliomyelitis 1947    acute polio infection age 2, resolved without any sequelae    Osteopenia     Polyp, colon     Adenomatous    Squamous cell carcinoma of skin, unspecified          PAST SURGICAL HISTORY:  Past Surgical History:   Procedure Laterality Date    BIOPSY NODE SENTINEL Left 9/13/2023    Procedure: left sentinel lymph node biopsy;  Surgeon: Chika Mitchell MD;  Location:  OR    BIOPSY, BREAST, WITH RADIOFREQUENCY IDENTIFICATION Left 9/13/2023    Procedure: left breast tag localized lumpectomy;  Surgeon: Chika Mitchell MD;  Location:  OR    COLONOSCOPY      EYE SURGERY  07/2017    cataract bilat eyes    HC EXCISION BREAST LESION, OPEN >=1      ATYPICAL HYPERPLASIA X 2    HYSTERECTOMY, PAP NO LONGER INDICATED  1994    fibroids    TONSILLECTOMY      ZZC VAG HYST,RMV TUBE/OVARY           SOCIAL HISTORY:  Social History     Socioeconomic History    Marital status:      Spouse name: Not on file    Number  of children: Not on file    Years of education: Not on file    Highest education level: Not on file   Occupational History    Not on file   Tobacco Use    Smoking status: Former     Current packs/day: 0.00     Average packs/day: 1 pack/day for 40.0 years (40.0 ttl pk-yrs)     Types: Cigarettes     Start date: 1943     Quit date: 10/7/1983     Years since quittin.2    Smokeless tobacco: Never   Substance and Sexual Activity    Alcohol use: Yes     Comment: 4 drinks per week    Drug use: No    Sexual activity: Not Currently     Partners: Male   Other Topics Concern     Service No    Blood Transfusions No    Caffeine Concern No    Occupational Exposure No    Hobby Hazards No    Sleep Concern Yes    Stress Concern No    Weight Concern Yes    Special Diet No    Back Care No    Exercise Yes     Comment: 3 X WEEK    Bike Helmet Not Asked    Seat Belt Yes    Self-Exams No     Comment: ENCOURAGED    Parent/sibling w/ CABG, MI or angioplasty before 65F 55M? Not Asked   Social History Narrative    Not on file     Social Drivers of Health     Financial Resource Strain: Low Risk  (2024)    Financial Resource Strain     Within the past 12 months, have you or your family members you live with been unable to get utilities (heat, electricity) when it was really needed?: No   Food Insecurity: Low Risk  (2024)    Food Insecurity     Within the past 12 months, did you worry that your food would run out before you got money to buy more?: No     Within the past 12 months, did the food you bought just not last and you didn t have money to get more?: No   Transportation Needs: Low Risk  (2024)    Transportation Needs     Within the past 12 months, has lack of transportation kept you from medical appointments, getting your medicines, non-medical meetings or appointments, work, or from getting things that you need?: No   Physical Activity: Unknown (2024)    Exercise Vital Sign     Days of Exercise per  Week: 6 days     Minutes of Exercise per Session: Not on file   Stress: No Stress Concern Present (2/20/2024)    [place] [employer] - Occupational Stress Questionnaire     Feeling of Stress : Only a little   Social Connections: Unknown (2/20/2024)    Social Connection and Isolation Panel [NHANES]     Frequency of Communication with Friends and Family: Not on file     Frequency of Social Gatherings with Friends and Family: More than three times a week     Attends Orthodoxy Services: Not on file     Active Member of Clubs or Organizations: Not on file     Attends Club or Organization Meetings: Not on file     Marital Status: Not on file   Interpersonal Safety: Low Risk  (2/20/2024)    Interpersonal Safety     Do you feel physically and emotionally safe where you currently live?: Yes     Within the past 12 months, have you been hit, slapped, kicked or otherwise physically hurt by someone?: No     Within the past 12 months, have you been humiliated or emotionally abused in other ways by your partner or ex-partner?: No   Housing Stability: Low Risk  (2/20/2024)    Housing Stability     Do you have housing? : Yes     Are you worried about losing your housing?: No         FAMILY HISTORY:  Family History   Problem Relation Age of Onset    Cancer Mother         lung    Osteoporosis Mother     Eye Disorder Mother         glaucoma    Arthritis Mother         osteo    Heart Disease Mother         CHF    Hypertension Mother     Cerebrovascular Disease Father         TIAs    Osteoporosis Father     Hypertension Father     Cancer Father         BONE    Arthritis Sister         PARTIAL KNEE REPLACEMENT IN 2009    Osteoporosis Sister     No Known Problems Brother     Cerebrovascular Disease Brother     Colon Cancer Brother 70        B 1939    Cancer Brother         PROSTATE    Breast Cancer Maternal Grandmother     Cerebrovascular Disease Maternal Grandfather     Asthma Daughter     Breast Cancer Maternal Aunt      Breast Cancer Maternal Aunt     Breast Cancer Other         2 YOUNGER MATERNAL COUSINS         PHYSICAL EXAM:  Vital signs:  /82   Pulse 69   Temp 97.5  F (36.4  C) (Oral)   Resp 16   Wt 72.7 kg (160 lb 3.2 oz)   LMP  (LMP Unknown)   SpO2 99%   BMI 27.50 kg/m     GENERAL/CONSTITUTIONAL: No acute distress.  EYES: No erythema or scleral icterus.  LYMPH: No cervical, supraclavicular, axillary adenopathy.   BREAST: No palpable masses in either breast. Nipples are everted bilaterally with no discharge. No erythema, ulceration, or dimpling of the skin.   RESPIRATORY: No audible cough or wheezing.  GASTROINTESTINAL: No hepatosplenomegaly, masses, or tenderness. No guarding.  No distention.  MUSCULOSKELETAL: Warm and well-perfused, no cyanosis, clubbing, or edema.  NEUROLOGIC: No focal motor deficits. Alert, oriented, answers questions appropriately.  INTEGUMENTARY: No rashes or jaundice.  GAIT: Steady, does not use assistive device       LABS:  CBC RESULTS:   Recent Labs   Lab Test 02/14/24  0948   WBC 4.8   RBC 4.53   HGB 13.6   HCT 40.5   MCV 89   MCH 30.0   MCHC 33.6   RDW 12.9        Last Comprehensive Metabolic Panel:  Sodium   Date Value Ref Range Status   02/14/2024 134 (L) 135 - 145 mmol/L Final     Comment:     Reference intervals for this test were updated on 09/26/2023 to more accurately reflect our healthy population. There may be differences in the flagging of prior results with similar values performed with this method. Interpretation of those prior results can be made in the context of the updated reference intervals.    11/06/2020 130 (L) 133 - 144 mmol/L Final     Potassium   Date Value Ref Range Status   02/14/2024 4.5 3.4 - 5.3 mmol/L Final   11/11/2022 4.1 3.4 - 5.3 mmol/L Final   11/06/2020 4.4 3.4 - 5.3 mmol/L Final     Chloride   Date Value Ref Range Status   02/14/2024 100 98 - 107 mmol/L Final   11/11/2022 100 94 - 109 mmol/L Final   11/06/2020 97 94 - 109 mmol/L Final      Carbon Dioxide   Date Value Ref Range Status   11/06/2020 28 20 - 32 mmol/L Final     Carbon Dioxide (CO2)   Date Value Ref Range Status   02/14/2024 25 22 - 29 mmol/L Final   11/11/2022 30 20 - 32 mmol/L Final     Anion Gap   Date Value Ref Range Status   02/14/2024 9 7 - 15 mmol/L Final   11/11/2022 4 3 - 14 mmol/L Final   11/06/2020 5 3 - 14 mmol/L Final     Glucose   Date Value Ref Range Status   02/14/2024 89 70 - 99 mg/dL Final   11/11/2022 93 70 - 99 mg/dL Final   11/06/2020 95 70 - 99 mg/dL Final     Comment:     Fasting specimen     Urea Nitrogen   Date Value Ref Range Status   02/14/2024 13.1 8.0 - 23.0 mg/dL Final   11/11/2022 18 7 - 30 mg/dL Final   11/06/2020 16 7 - 30 mg/dL Final     Creatinine   Date Value Ref Range Status   11/19/2024 0.80 0.51 - 0.95 mg/dL Final   11/06/2020 0.73 0.52 - 1.04 mg/dL Final     GFR Estimate   Date Value Ref Range Status   11/19/2024 74 >60 mL/min/1.73m2 Final     Comment:     eGFR calculated using 2021 CKD-EPI equation.   11/06/2020 79 >60 mL/min/[1.73_m2] Final     Comment:     Non  GFR Calc  Starting 12/18/2018, serum creatinine based estimated GFR (eGFR) will be   calculated using the Chronic Kidney Disease Epidemiology Collaboration   (CKD-EPI) equation.       Calcium   Date Value Ref Range Status   11/19/2024 10.3 8.8 - 10.4 mg/dL Final     Comment:     Reference intervals for this test were updated on 7/16/2024 to reflect our healthy population more accurately. There may be differences in the flagging of prior results with similar values performed with this method. Those prior results can be interpreted in the context of the updated reference intervals.   11/06/2020 9.6 8.5 - 10.1 mg/dL Final     Bilirubin Total   Date Value Ref Range Status   02/14/2024 0.4 <=1.2 mg/dL Final   10/23/2018 0.4 0.2 - 1.3 mg/dL Final     Alkaline Phosphatase   Date Value Ref Range Status   02/14/2024 89 40 - 150 U/L Final     Comment:     Reference intervals for  this test were updated on 11/14/2023 to more accurately reflect our healthy population. There may be differences in the flagging of prior results with similar values performed with this method. Interpretation of those prior results can be made in the context of the updated reference intervals.   10/23/2018 92 40 - 150 U/L Final     ALT   Date Value Ref Range Status   02/14/2024 17 0 - 50 U/L Final     Comment:     Reference intervals for this test were updated on 6/12/2023 to more accurately reflect our healthy population. There may be differences in the flagging of prior results with similar values performed with this method. Interpretation of those prior results can be made in the context of the updated reference intervals.     10/28/2019 23 0 - 50 U/L Final     AST   Date Value Ref Range Status   02/14/2024 23 0 - 45 U/L Final     Comment:     Reference intervals for this test were updated on 6/12/2023 to more accurately reflect our healthy population. There may be differences in the flagging of prior results with similar values performed with this method. Interpretation of those prior results can be made in the context of the updated reference intervals.   10/28/2019 19 0 - 45 U/L Final       PATHOLOGY:  None new.    IMAGING:  None new.    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  --She has no clinical evidence for recurrent breast cancer by physical exam from today or mammogram reviewed from 10/18/2024.  --She is tolerating anastrozole well so far with mild fatigue that improved after switching to taking it at night.  --Advised adjuvant endocrine therapy for a total of 5 years to reduce risk of  breast cancer recurrence by a relative 50%.  --Alternate option is tamoxifen. She would prefer not to take tamoxifen.  --Alondra has had prior heartburn issues to alendronate, so she started Prolia on 5/17/2024. Continue Prolia every 6 months, next due 5/2025. Plan to repeat DEXA scan in ~10/2025.  --1/11/2024 Genetic testing negative.  --Due for next bilateral screening mammogram in 10/2025.    Return in 4 months.    Jenelle Cannon MD  [facility] Hematology/Oncology     Total time spent today: 30 minutes in chart review, patient evaluation, counseling, documentation, test and/or medication/prescription orders, and coordination of care.      The longitudinal plan of care for the diagnosis(es)/condition(s) as documented were addressed during this visit. Due to the added complexity in care, I will continue to support Alondra in the subsequent management and with ongoing continuity of care.

## 2024-12-19 ENCOUNTER — ONCOLOGY VISIT (OUTPATIENT)
Dept: ONCOLOGY | Facility: CLINIC | Age: 79
End: 2024-12-19
Attending: INTERNAL MEDICINE
Payer: COMMERCIAL

## 2024-12-19 VITALS
HEART RATE: 69 BPM | BODY MASS INDEX: 27.5 KG/M2 | WEIGHT: 160.2 LBS | OXYGEN SATURATION: 99 % | SYSTOLIC BLOOD PRESSURE: 127 MMHG | DIASTOLIC BLOOD PRESSURE: 82 MMHG | RESPIRATION RATE: 16 BRPM | TEMPERATURE: 97.5 F

## 2024-12-19 DIAGNOSIS — Z17.0 MALIGNANT NEOPLASM OF OVERLAPPING SITES OF LEFT BREAST IN FEMALE, ESTROGEN RECEPTOR POSITIVE (H): Primary | ICD-10-CM

## 2024-12-19 DIAGNOSIS — Z79.811 USE OF AROMATASE INHIBITORS: ICD-10-CM

## 2024-12-19 DIAGNOSIS — C50.812 MALIGNANT NEOPLASM OF OVERLAPPING SITES OF LEFT BREAST IN FEMALE, ESTROGEN RECEPTOR POSITIVE (H): Primary | ICD-10-CM

## 2024-12-19 DIAGNOSIS — M85.89 OSTEOPENIA OF MULTIPLE SITES: ICD-10-CM

## 2024-12-19 PROCEDURE — G0463 HOSPITAL OUTPT CLINIC VISIT: HCPCS | Performed by: INTERNAL MEDICINE

## 2024-12-19 RX ORDER — DIPHENHYDRAMINE HYDROCHLORIDE 50 MG/ML
25 INJECTION INTRAMUSCULAR; INTRAVENOUS
Start: 2025-05-19

## 2024-12-19 RX ORDER — ALBUTEROL SULFATE 0.83 MG/ML
2.5 SOLUTION RESPIRATORY (INHALATION)
OUTPATIENT
Start: 2025-05-19

## 2024-12-19 RX ORDER — DIPHENHYDRAMINE HYDROCHLORIDE 50 MG/ML
50 INJECTION INTRAMUSCULAR; INTRAVENOUS
Start: 2025-05-19

## 2024-12-19 RX ORDER — ALBUTEROL SULFATE 90 UG/1
1-2 INHALANT RESPIRATORY (INHALATION)
Start: 2025-05-19

## 2024-12-19 RX ORDER — EPINEPHRINE 1 MG/ML
0.3 INJECTION, SOLUTION INTRAMUSCULAR; SUBCUTANEOUS EVERY 5 MIN PRN
OUTPATIENT
Start: 2025-05-19

## 2024-12-19 RX ORDER — METHYLPREDNISOLONE SODIUM SUCCINATE 40 MG/ML
40 INJECTION INTRAMUSCULAR; INTRAVENOUS
Start: 2025-05-19

## 2024-12-19 RX ORDER — MEPERIDINE HYDROCHLORIDE 25 MG/ML
25 INJECTION INTRAMUSCULAR; INTRAVENOUS; SUBCUTANEOUS
OUTPATIENT
Start: 2025-05-19

## 2024-12-19 ASSESSMENT — PAIN SCALES - GENERAL: PAINLEVEL_OUTOF10: NO PAIN (0)

## 2024-12-19 NOTE — PROGRESS NOTES
"Oncology Rooming Note    December 19, 2024 1:53 PM   Alondra Morgan is a 79 year old female who presents for:    Chief Complaint   Patient presents with    Oncology Clinic Visit     Initial Vitals: /82   Pulse 69   Temp 97.5  F (36.4  C) (Oral)   Resp 16   Wt 72.7 kg (160 lb 3.2 oz)   LMP  (LMP Unknown)   SpO2 99%   BMI 27.50 kg/m   Estimated body mass index is 27.5 kg/m  as calculated from the following:    Height as of 8/14/24: 1.626 m (5' 4\").    Weight as of this encounter: 72.7 kg (160 lb 3.2 oz). Body surface area is 1.81 meters squared.  No Pain (0) Comment: Data Unavailable   No LMP recorded (lmp unknown). Patient has had a hysterectomy.  Allergies reviewed: Yes  Medications reviewed: Yes    Medications: MEDICATION REFILLS NEEDED TODAY. Provider was notified.  Pharmacy name entered into [employer]:    [id number] [place], [place] [facility] [address] [id number] [place], [place] - [address]    Frailty Screening:   Is the patient here for a new oncology consult visit in cancer care? 2. No      Clinical concerns: renew order for Prolia if needed. Pt having hot flashes at night - wondering if it is a side effect from medication?   Dr. Cannon  was notified.      Shari J. Schoenberger, [facility]              "

## 2024-12-19 NOTE — LETTER
12/19/2024      Alondra Morgan  [address] [address]      Dear Colleague,    Thank you for referring your patient, Alondra Morgan, to the [facility]. Please see a copy of my visit note below.    [facility]    Hematology/Oncology Established Patient Note      Today's Date: 12/19/2024    Reason for follow-up: Left breast cancer.    HISTORY OF PRESENT ILLNESS: Alondra Morgan is a 79 year old female s/p hysterectomy 1995 who presents with the following oncologic history:  11/9/2005: Left breast biopsy of microcalcifications showed focal atypical ductal hyperplasia; no invasive malignancy. Took tamoxifen for 5 years.  11/29/2005: Excision of left breast lesion showed focal atypical ductal hyperplasia; no malignancy.  7/17/2023: Screening mammogram showed asymmetry in left breast at 6-7:00. Right breast negative.  7/24/2023: Diagnostic left mammogram showed mass in left lower breast adjacent to a previous needle biopsy marker clip. Left breast U/S showed 4 mm nodule at 6:00, 5 cm from nipple, 8 mm from prior biopsy.  7/27/2023: Left breast biopsy at 6:00, 5 cm from nipple showed 5 mm of grade 2 invasive ductal carcinoma, no LVI, ER positive at %, MD positive at %, HER-2 negative (IHC = 0).  9/13/2023: Left breast lumpectomy and left axillary SLN excision with Dr. Chika Mitchell.  Pathology showed 4 mm grade 2 invasive ductal carcinoma; margins negative; atypical ductal hyperplasia present; 1 SLN negative.  10/26/2023-11/1/2023: Completed adjuvant radiation to left breast in 6 fractions.  11/27/2023: Started anastrozole.      INTERVAL HISTORY:  Alondra reports feeling overall well.  She reports mild intermittent hot flash at night.    REVIEW OF SYSTEMS:   14 point ROS was reviewed and is negative other than as noted above in HPI.       HOME MEDICATIONS:  Current Outpatient Medications   Medication Sig Dispense Refill     anastrozole  (ARIMIDEX) 1 MG tablet Take 1 tablet (1 mg) by mouth daily 90 tablet 3     calcium carbonate 600 mg-vitamin D 400 units 600-400 MG-UNIT per tablet Take 1 tablet by mouth 2 times daily FOR BONE HEALTH AND FOR VITAMIN D DEFICIENCY (LOW VITAMIN D)       cholecalciferol 50 MCG (2000 UT) tablet Take 2 tablets by mouth daily INDICATION: TO TREAT VITAMIN D DEFICIENCY (LOW VITAMIN D)       FISH OIL 1000 MG OR CAPS THREE TIMES DAILY 100 3     lisinopril (ZESTRIL) 20 MG tablet Take 1 tablet (20 mg) by mouth daily 90 tablet 3     loratadine (CLARITIN) 10 MG tablet ONE DAILY FOR ALLERGIES       valACYclovir (VALTREX) 1000 mg tablet TAKE 2000 MG AT ONSET OF COLD SORE, FOLLOWED BY SECOND 2000 MG DOSE 12 HOURS LATER; REPEAT AS NEEDED 12 tablet 3         ALLERGIES:  Allergies   Allergen Reactions     Alendronate GI Disturbance     Severe GERD     Pollen Extract          PAST MEDICAL HISTORY:  Past Medical History:   Diagnosis Date     Arthritis      Atypical ductal hyperplasia of left breast 2005    left breast atypical ductal hyperplasia, completed 5 years tamoxifen in 2011     Atypical hyperplasia of breasts      Basal cell carcinoma of ala nasi      Basal cell carcinoma of shoulder      Family history of colon cancer     BROTHER COLON CANCER AGE 68     History of poliomyelitis 1947    acute polio infection age 2, resolved without any sequelae     Osteopenia      Polyp, colon     Adenomatous     Squamous cell carcinoma of skin, unspecified          PAST SURGICAL HISTORY:  Past Surgical History:   Procedure Laterality Date     BIOPSY NODE SENTINEL Left 9/13/2023    Procedure: left sentinel lymph node biopsy;  Surgeon: Chika Mitchell MD;  Location:  OR     BIOPSY, BREAST, WITH RADIOFREQUENCY IDENTIFICATION Left 9/13/2023    Procedure: left breast tag localized lumpectomy;  Surgeon: Chika Mitchell MD;  Location:  OR     COLONOSCOPY       EYE SURGERY  07/2017    cataract bilat eyes     HC EXCISION BREAST LESION, OPEN >=1       ATYPICAL HYPERPLASIA X 2     HYSTERECTOMY, PAP NO LONGER INDICATED      fibroids     TONSILLECTOMY       ZZC VAG HYST,RMV TUBE/OVARY           SOCIAL HISTORY:  Social History     Socioeconomic History     Marital status:      Spouse name: Not on file     Number of children: Not on file     Years of education: Not on file     Highest education level: Not on file   Occupational History     Not on file   Tobacco Use     Smoking status: Former     Current packs/day: 0.00     Average packs/day: 1 pack/day for 40.0 years (40.0 ttl pk-yrs)     Types: Cigarettes     Start date: 1943     Quit date: 10/7/1983     Years since quittin.2     Smokeless tobacco: Never   Substance and Sexual Activity     Alcohol use: Yes     Comment: 4 drinks per week     Drug use: No     Sexual activity: Not Currently     Partners: Male   Other Topics Concern      Service No     Blood Transfusions No     Caffeine Concern No     Occupational Exposure No     Hobby Hazards No     Sleep Concern Yes     Stress Concern No     Weight Concern Yes     Special Diet No     Back Care No     Exercise Yes     Comment: 3 X WEEK     Bike Helmet Not Asked     Seat Belt Yes     Self-Exams No     Comment: ENCOURAGED     Parent/sibling w/ CABG, MI or angioplasty before 65F 55M? Not Asked   Social History Narrative     Not on file     Social Drivers of Health     Financial Resource Strain: Low Risk  (2024)    Financial Resource Strain      Within the past 12 months, have you or your family members you live with been unable to get utilities (heat, electricity) when it was really needed?: No   Food Insecurity: Low Risk  (2024)    Food Insecurity      Within the past 12 months, did you worry that your food would run out before you got money to buy more?: No      Within the past 12 months, did the food you bought just not last and you didn t have money to get more?: No   Transportation Needs: Low Risk  (2024)     Transportation Needs      Within the past 12 months, has lack of transportation kept you from medical appointments, getting your medicines, non-medical meetings or appointments, work, or from getting things that you need?: No   Physical Activity: Unknown (2/20/2024)    Exercise Vital Sign      Days of Exercise per Week: 6 days      Minutes of Exercise per Session: Not on file   Stress: No Stress Concern Present (2/20/2024)    [employer] [place] [employer] - Occupational Stress Questionnaire      Feeling of Stress : Only a little   Social Connections: Unknown (2/20/2024)    Social Connection and Isolation Panel [NHANES]      Frequency of Communication with Friends and Family: Not on file      Frequency of Social Gatherings with Friends and Family: More than three times a week      Attends Rastafari Services: Not on file      Active Member of Clubs or Organizations: Not on file      Attends Club or Organization Meetings: Not on file      Marital Status: Not on file   Interpersonal Safety: Low Risk  (2/20/2024)    Interpersonal Safety      Do you feel physically and emotionally safe where you currently live?: Yes      Within the past 12 months, have you been hit, slapped, kicked or otherwise physically hurt by someone?: No      Within the past 12 months, have you been humiliated or emotionally abused in other ways by your partner or ex-partner?: No   Housing Stability: Low Risk  (2/20/2024)    Housing Stability      Do you have housing? : Yes      Are you worried about losing your housing?: No         FAMILY HISTORY:  Family History   Problem Relation Age of Onset     Cancer Mother         lung     Osteoporosis Mother      Eye Disorder Mother         glaucoma     Arthritis Mother         osteo     Heart Disease Mother         CHF     Hypertension Mother      Cerebrovascular Disease Father         TIAs     Osteoporosis Father      Hypertension Father      Cancer Father         BONE     Arthritis Sister          PARTIAL KNEE REPLACEMENT IN 2009     Osteoporosis Sister      No Known Problems Brother      Cerebrovascular Disease Brother      Colon Cancer Brother 70        B 1939     Cancer Brother         PROSTATE     Breast Cancer Maternal Grandmother      Cerebrovascular Disease Maternal Grandfather      Asthma Daughter      Breast Cancer Maternal Aunt      Breast Cancer Maternal Aunt      Breast Cancer Other         2 YOUNGER MATERNAL COUSINS         PHYSICAL EXAM:  Vital signs:  /82   Pulse 69   Temp 97.5  F (36.4  C) (Oral)   Resp 16   Wt 72.7 kg (160 lb 3.2 oz)   LMP  (LMP Unknown)   SpO2 99%   BMI 27.50 kg/m     GENERAL/CONSTITUTIONAL: No acute distress.  EYES: No erythema or scleral icterus.  LYMPH: No cervical, supraclavicular, axillary adenopathy.   BREAST: No palpable masses in either breast. Nipples are everted bilaterally with no discharge. No erythema, ulceration, or dimpling of the skin.   RESPIRATORY: No audible cough or wheezing.  GASTROINTESTINAL: No hepatosplenomegaly, masses, or tenderness. No guarding.  No distention.  MUSCULOSKELETAL: Warm and well-perfused, no cyanosis, clubbing, or edema.  NEUROLOGIC: No focal motor deficits. Alert, oriented, answers questions appropriately.  INTEGUMENTARY: No rashes or jaundice.  GAIT: Steady, does not use assistive device       LABS:  CBC RESULTS:   Recent Labs   Lab Test 02/14/24  0948   WBC 4.8   RBC 4.53   HGB 13.6   HCT 40.5   MCV 89   MCH 30.0   MCHC 33.6   RDW 12.9        Last Comprehensive Metabolic Panel:  Sodium   Date Value Ref Range Status   02/14/2024 134 (L) 135 - 145 mmol/L Final     Comment:     Reference intervals for this test were updated on 09/26/2023 to more accurately reflect our healthy population. There may be differences in the flagging of prior results with similar values performed with this method. Interpretation of those prior results can be made in the context of the updated reference intervals.    11/06/2020 130 (L)  133 - 144 mmol/L Final     Potassium   Date Value Ref Range Status   02/14/2024 4.5 3.4 - 5.3 mmol/L Final   11/11/2022 4.1 3.4 - 5.3 mmol/L Final   11/06/2020 4.4 3.4 - 5.3 mmol/L Final     Chloride   Date Value Ref Range Status   02/14/2024 100 98 - 107 mmol/L Final   11/11/2022 100 94 - 109 mmol/L Final   11/06/2020 97 94 - 109 mmol/L Final     Carbon Dioxide   Date Value Ref Range Status   11/06/2020 28 20 - 32 mmol/L Final     Carbon Dioxide (CO2)   Date Value Ref Range Status   02/14/2024 25 22 - 29 mmol/L Final   11/11/2022 30 20 - 32 mmol/L Final     Anion Gap   Date Value Ref Range Status   02/14/2024 9 7 - 15 mmol/L Final   11/11/2022 4 3 - 14 mmol/L Final   11/06/2020 5 3 - 14 mmol/L Final     Glucose   Date Value Ref Range Status   02/14/2024 89 70 - 99 mg/dL Final   11/11/2022 93 70 - 99 mg/dL Final   11/06/2020 95 70 - 99 mg/dL Final     Comment:     Fasting specimen     Urea Nitrogen   Date Value Ref Range Status   02/14/2024 13.1 8.0 - 23.0 mg/dL Final   11/11/2022 18 7 - 30 mg/dL Final   11/06/2020 16 7 - 30 mg/dL Final     Creatinine   Date Value Ref Range Status   11/19/2024 0.80 0.51 - 0.95 mg/dL Final   11/06/2020 0.73 0.52 - 1.04 mg/dL Final     GFR Estimate   Date Value Ref Range Status   11/19/2024 74 >60 mL/min/1.73m2 Final     Comment:     eGFR calculated using 2021 CKD-EPI equation.   11/06/2020 79 >60 mL/min/[1.73_m2] Final     Comment:     Non  GFR Calc  Starting 12/18/2018, serum creatinine based estimated GFR (eGFR) will be   calculated using the Chronic Kidney Disease Epidemiology Collaboration   (CKD-EPI) equation.       Calcium   Date Value Ref Range Status   11/19/2024 10.3 8.8 - 10.4 mg/dL Final     Comment:     Reference intervals for this test were updated on 7/16/2024 to reflect our healthy population more accurately. There may be differences in the flagging of prior results with similar values performed with this method. Those prior results can be interpreted  in the context of the updated reference intervals.   11/06/2020 9.6 8.5 - 10.1 mg/dL Final     Bilirubin Total   Date Value Ref Range Status   02/14/2024 0.4 <=1.2 mg/dL Final   10/23/2018 0.4 0.2 - 1.3 mg/dL Final     Alkaline Phosphatase   Date Value Ref Range Status   02/14/2024 89 40 - 150 U/L Final     Comment:     Reference intervals for this test were updated on 11/14/2023 to more accurately reflect our healthy population. There may be differences in the flagging of prior results with similar values performed with this method. Interpretation of those prior results can be made in the context of the updated reference intervals.   10/23/2018 92 40 - 150 U/L Final     ALT   Date Value Ref Range Status   02/14/2024 17 0 - 50 U/L Final     Comment:     Reference intervals for this test were updated on 6/12/2023 to more accurately reflect our healthy population. There may be differences in the flagging of prior results with similar values performed with this method. Interpretation of those prior results can be made in the context of the updated reference intervals.     10/28/2019 23 0 - 50 U/L Final     AST   Date Value Ref Range Status   02/14/2024 23 0 - 45 U/L Final     Comment:     Reference intervals for this test were updated on 6/12/2023 to more accurately reflect our healthy population. There may be differences in the flagging of prior results with similar values performed with this method. Interpretation of those prior results can be made in the context of the updated reference intervals.   10/28/2019 19 0 - 45 U/L Final       PATHOLOGY:  None new.    IMAGING:  None new.    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  --She has no clinical evidence for recurrent breast cancer by physical exam from today or mammogram reviewed from 10/18/2024.  --She is tolerating anastrozole well so far with mild fatigue that improved after switching to taking it at night.  --Advised adjuvant endocrine therapy for a total of 5 years to reduce risk of breast cancer recurrence by a relative 50%.  --Alternate option is tamoxifen. She would prefer not to take tamoxifen.  --Alondra has had prior heartburn issues to alendronate, so she started Prolia on 5/17/2024. Continue Prolia every 6 months, next due 5/2025. Plan to repeat DEXA scan in ~10/2025.  --1/11/2024 Genetic testing negative.  --Due for next bilateral screening mammogram in 10/2025.    Return in 4 months.    Jenelle Cannon MD  [facility] Hematology/Oncology     Total time spent today: 30 minutes in chart review, patient evaluation, counseling, documentation, test and/or medication/prescription orders, and coordination of care.      The longitudinal plan of care for the diagnosis(es)/condition(s) as documented were addressed during this visit. Due to the added complexity in care, I will continue to support Alondra in the subsequent management and with ongoing continuity of care.      Oncology Rooming Note    December 19, 2024 1:53 PM   Alondra Morgan is a 79 year old female who presents for:    Chief Complaint   Patient presents with     Oncology Clinic Visit     Initial Vitals: /82   Pulse 69   Temp 97.5  F (36.4  C) (Oral)   Resp 16   Wt 72.7 kg (160 lb 3.2 oz)   LMP  (LMP Unknown)   SpO2 99%   BMI 27.50 kg/m   Estimated body mass index is 27.5 kg/m  as calculated from the following:    Height as of 8/14/24: 1.626 m (5' 4\").    Weight as of this encounter: 72.7 kg (160 lb 3.2 oz). Body surface area is 1.81 meters squared.  No Pain (0) Comment: Data Unavailable   No LMP recorded (lmp unknown). Patient has had a hysterectomy.  Allergies reviewed: " Yes  Medications reviewed: Yes    Medications: MEDICATION REFILLS NEEDED TODAY. Provider was notified.  Pharmacy name entered into [facility]:    [id number] - [place], [place] - [address] [id number] - [place], [place] - [address]    Frailty Screening:   Is the patient here for a new oncology consult visit in cancer care? 2. No      Clinical concerns: renew order for Prolia if needed. Pt having hot flashes at night - wondering if it is a side effect from medication?   Dr. Cannon  was notified.      Shari J. Schoenberger, CMA                Again, thank you for allowing me to participate in the care of your patient.        Sincerely,        Jenelle Cannon MD

## 2025-02-10 ENCOUNTER — DOCUMENTATION ONLY (OUTPATIENT)
Dept: INTERNAL MEDICINE | Facility: CLINIC | Age: 80
End: 2025-02-10
Payer: COMMERCIAL

## 2025-02-10 DIAGNOSIS — E78.5 HYPERLIPIDEMIA LDL GOAL <130: ICD-10-CM

## 2025-02-10 DIAGNOSIS — C50.512 MALIGNANT NEOPLASM OF LOWER-OUTER QUADRANT OF LEFT BREAST OF FEMALE, ESTROGEN RECEPTOR POSITIVE (H): ICD-10-CM

## 2025-02-10 DIAGNOSIS — I10 PRIMARY HYPERTENSION: ICD-10-CM

## 2025-02-10 DIAGNOSIS — R79.89 ABNORMAL LFTS: ICD-10-CM

## 2025-02-10 DIAGNOSIS — Z17.0 MALIGNANT NEOPLASM OF LOWER-OUTER QUADRANT OF LEFT BREAST OF FEMALE, ESTROGEN RECEPTOR POSITIVE (H): ICD-10-CM

## 2025-02-10 DIAGNOSIS — Z13.6 CARDIOVASCULAR SCREENING; LDL GOAL LESS THAN 130: ICD-10-CM

## 2025-02-10 DIAGNOSIS — E53.8 VITAMIN B12 DEFICIENCY WITHOUT ANEMIA: Primary | ICD-10-CM

## 2025-02-10 NOTE — PROGRESS NOTES
Alondra M Eddie has an upcoming lab appointment:    Future Appointments   Date Time Provider Department Center   2/14/2025  9:00 AM [facility]   2/21/2025  1:00 PM Ivan Mcpherson MD [facility] OX   4/14/2025 10:20 AM Jenelle Cannon MD [facility]   5/19/2025 10:15 AM  PIV LAB [facility]   5/19/2025 11:00 AM  FAST TRACK LAB [facility]     Patient is scheduled for the following lab(s):   Scheduling Notes: [facility] labs- [facility]   Made On: 12/10/2024 11:45 AM By: EVELYN MEDINA       There is no order available. Please review and place either future orders or HMPO (Review of Health Maintenance Protocol Orders), as appropriate.    Health Maintenance Due   Topic    ANNUAL REVIEW OF HM ORDERS     BMP     CMP     LIPID     TSH W/FREE T4 REFLEX     CBC      Zachariah Kohli

## 2025-02-12 ENCOUNTER — DOCUMENTATION ONLY (OUTPATIENT)
Dept: INTERNAL MEDICINE | Facility: CLINIC | Age: 80
End: 2025-02-12
Payer: COMMERCIAL

## 2025-02-12 DIAGNOSIS — Z13.29 SCREENING FOR THYROID DISORDER: ICD-10-CM

## 2025-02-12 DIAGNOSIS — E78.5 HYPERLIPIDEMIA LDL GOAL <130: ICD-10-CM

## 2025-02-12 DIAGNOSIS — E53.8 VITAMIN B12 DEFICIENCY WITHOUT ANEMIA: Primary | ICD-10-CM

## 2025-02-12 DIAGNOSIS — C50.812 MALIGNANT NEOPLASM OF OVERLAPPING SITES OF LEFT BREAST IN FEMALE, ESTROGEN RECEPTOR POSITIVE (H): ICD-10-CM

## 2025-02-12 DIAGNOSIS — I10 ESSENTIAL HYPERTENSION WITH GOAL BLOOD PRESSURE LESS THAN 130/80: ICD-10-CM

## 2025-02-12 DIAGNOSIS — E53.8 VITAMIN B12 DEFICIENCY (NON ANEMIC): ICD-10-CM

## 2025-02-12 DIAGNOSIS — Z17.0 MALIGNANT NEOPLASM OF OVERLAPPING SITES OF LEFT BREAST IN FEMALE, ESTROGEN RECEPTOR POSITIVE (H): ICD-10-CM

## 2025-02-12 DIAGNOSIS — R79.89 ABNORMAL LFTS: ICD-10-CM

## 2025-02-12 NOTE — PROGRESS NOTES
Alondra Morgan has an upcoming lab appointment and is eligible to have due Health Maintenance labs drawn per Health Maintenance Protocol Orders (HMPO) process.    Before it can be drawn, a diagnosis is needed for the following lab:     TSH with free T4 reflex     Please review and enter diagnosis as appropriate.     BRITT Wagner

## 2025-02-17 SDOH — HEALTH STABILITY: PHYSICAL HEALTH: ON AVERAGE, HOW MANY DAYS PER WEEK DO YOU ENGAGE IN MODERATE TO STRENUOUS EXERCISE (LIKE A BRISK WALK)?: 4 DAYS

## 2025-02-17 SDOH — HEALTH STABILITY: PHYSICAL HEALTH: ON AVERAGE, HOW MANY MINUTES DO YOU ENGAGE IN EXERCISE AT THIS LEVEL?: 30 MIN

## 2025-02-17 ASSESSMENT — SOCIAL DETERMINANTS OF HEALTH (SDOH): HOW OFTEN DO YOU GET TOGETHER WITH FRIENDS OR RELATIVES?: MORE THAN THREE TIMES A WEEK

## 2025-02-21 ENCOUNTER — OFFICE VISIT (OUTPATIENT)
Dept: INTERNAL MEDICINE | Facility: CLINIC | Age: 80
End: 2025-02-21
Payer: COMMERCIAL

## 2025-02-21 VITALS
WEIGHT: 158.6 LBS | DIASTOLIC BLOOD PRESSURE: 80 MMHG | BODY MASS INDEX: 27.08 KG/M2 | OXYGEN SATURATION: 97 % | HEIGHT: 64 IN | RESPIRATION RATE: 18 BRPM | HEART RATE: 87 BPM | TEMPERATURE: 97.6 F | SYSTOLIC BLOOD PRESSURE: 118 MMHG

## 2025-02-21 DIAGNOSIS — Z00.00 MEDICARE ANNUAL WELLNESS VISIT, SUBSEQUENT: Primary | ICD-10-CM

## 2025-02-21 DIAGNOSIS — B00.1 RECURRENT COLD SORES: ICD-10-CM

## 2025-02-21 DIAGNOSIS — Z13.6 CARDIOVASCULAR SCREENING; LDL GOAL LESS THAN 130: ICD-10-CM

## 2025-02-21 DIAGNOSIS — I10 ESSENTIAL HYPERTENSION: ICD-10-CM

## 2025-02-21 DIAGNOSIS — Z17.0 MALIGNANT NEOPLASM OF OVERLAPPING SITES OF LEFT BREAST IN FEMALE, ESTROGEN RECEPTOR POSITIVE (H): ICD-10-CM

## 2025-02-21 DIAGNOSIS — C50.812 MALIGNANT NEOPLASM OF OVERLAPPING SITES OF LEFT BREAST IN FEMALE, ESTROGEN RECEPTOR POSITIVE (H): ICD-10-CM

## 2025-02-21 PROCEDURE — 1126F AMNT PAIN NOTED NONE PRSNT: CPT | Performed by: INTERNAL MEDICINE

## 2025-02-21 PROCEDURE — 99213 OFFICE O/P EST LOW 20 MIN: CPT | Mod: 25 | Performed by: INTERNAL MEDICINE

## 2025-02-21 PROCEDURE — 3079F DIAST BP 80-89 MM HG: CPT | Performed by: INTERNAL MEDICINE

## 2025-02-21 PROCEDURE — G0439 PPPS, SUBSEQ VISIT: HCPCS | Performed by: INTERNAL MEDICINE

## 2025-02-21 PROCEDURE — 3074F SYST BP LT 130 MM HG: CPT | Performed by: INTERNAL MEDICINE

## 2025-02-21 RX ORDER — LISINOPRIL 20 MG/1
20 TABLET ORAL DAILY
Qty: 90 TABLET | Refills: 3 | Status: SHIPPED | OUTPATIENT
Start: 2025-02-21

## 2025-02-21 RX ORDER — VALACYCLOVIR HYDROCHLORIDE 1 G/1
TABLET, FILM COATED ORAL
Qty: 12 TABLET | Refills: 3 | Status: SHIPPED | OUTPATIENT
Start: 2025-02-21

## 2025-02-21 ASSESSMENT — PAIN SCALES - GENERAL: PAINLEVEL_OUTOF10: NO PAIN (0)

## 2025-02-21 NOTE — PROGRESS NOTES
Assessment & Plan     (Z00.00) Medicare annual wellness visit, subsequent  (primary encounter diagnosis)  Comment: Discussed cardiac disease risk factors and cardiac disease risk factor modification, including diabetes screening, blood pressure screening (and management if indicated), and cholesterol screening.   Reviewed immunzation guidelines, including pneumococcal vaccines, annual influenza, and shingles vaccines.   Discussed routine cancer screenings, including skin cancer, colon cancer screening for everyone until age 80, prostate cancer screening in men until age 75, mammogram and PAP/pelvic for women until age 75.   Recommended regular dentist visits to care for remaining teeth.   Recommended regular screening for vision and glaucoma.   Recommended safe driving and accident avoidance.    Counseling:         Reviewed preventive health counseling, as reflected in patient instructions       Regular exercise       Healthy diet/nutrition       Vision screening       Hearing screening       Immunizations             Routine Colorectal Cancer Screening no longer indicated    Patient has been advised of split billing requirements and indicates understanding: Yes   Plan:     (C50.812,  Z17.0) Malignant neoplasm of overlapping sites of left breast in female, estrogen receptor positive (H)  Comment: Known issue that I take into account for their medical decisions, managed by specialist.    Continue current therapy as directed by their specialist(s).   Plan:     (B00.1) Recurrent cold sores  Comment: This condition is currently controlled on the current medical regimen.  Continue current therapy.   Plan: valACYclovir (VALTREX) 1000 mg tablet            (Z13.6) CARDIOVASCULAR SCREENING; LDL GOAL LESS THAN 130  Comment: Discussed cardiac disease risk factors and cardiac disease risk factor modification.   Plan:     (I10) Essential hypertension  Comment: This condition is currently controlled on the current medical  "regimen.  Continue current therapy.   Plan: lisinopril (ZESTRIL) 20 MG tablet               Patient has been advised of split billing requirements and indicates understanding: Yes        BMI  Estimated body mass index is 27.22 kg/m  as calculated from the following:    Height as of this encounter: 1.626 m (5' 4\").    Weight as of this encounter: 71.9 kg (158 lb 9.6 oz).       Counseling  Appropriate preventive services were addressed with this patient via screening, questionnaire, or discussion as appropriate for fall prevention, nutrition, physical activity, Tobacco-use cessation, social engagement, weight loss and cognition.  Checklist reviewing preventive services available has been given to the patient.          Elba Cantu is a 79 year old, presenting for the following health issues:  Medicare Visit        2/21/2025    12:43 PM   Additional Questions   Roomed by Ameena BARRERA     1.)  Hypertension:  History of hypertension, on medication.  No reported side effects from medications.    Reviewed last 6 BP readings in chart:  BP Readings from Last 6 Encounters:   02/21/25 118/80   12/19/24 127/82   11/19/24 128/80   08/14/24 120/82   05/17/24 124/64   04/19/24 130/70     No active cardiac complaints or symptoms with exercise.     2.)  history of breast cancer, continues on Arimidex.   Followed by Oncology clinic, reviewed notes.     3.)  recurrent cold sores, takes 2 doses of valtrex as needed with good effect.       Annual Wellness Visit     Patient has been advised of split billing requirements and indicates understanding: Yes       Health Care Directive  Patient does not have a Health Care Directive: Discussed advance care planning with patient; however, patient declined at this time.      2/17/2025   General Health   How would you rate your overall physical health? Good          2/17/2025   Nutrition   Diet: Regular (no restrictions)         2/17/2025   Exercise   Days per week of " moderate/strenous exercise 4 days   Average minutes spent exercising at this level 30 min           2025   Social Factors   Frequency of gathering with friends or relatives More than three times a week   Worry food won't last until get money to buy more No   Food not last or not have enough money for food? No   Do you have housing? (Housing is defined as stable permanent housing and does not include staying ouside in a car, in a tent, in an abandoned building, in an overnight shelter, or couch-surfing.) Yes   Are you worried about losing your housing? No   Lack of transportation? No   Unable to get utilities (heat,electricity)? No           2025   Fall Risk   Gait Speed Test (Document in seconds) 3.69   Gait Speed Test Interpretation Less than or equal to 5.00 seconds - PASS          2025   Activities of Daily Living- Home Safety   Needs help with the following daily activites None of the above   Safety concerns in the home None of the above         2025   Dental   Dentist two times every year? Yes         2025   Hearing Screening   Hearing concerns? None of the above           2025   Urinary Incontinence Screening   Bothered by leaking urine in past 6 months Yes          No data to display                Social History     Tobacco Use    Smoking status: Former     Current packs/day: 0.00     Average packs/day: 1 pack/day for 40.0 years (40.0 ttl pk-yrs)     Types: Cigarettes     Start date: 1943     Quit date: 10/7/1983     Years since quittin.4    Smokeless tobacco: Never   Substance Use Topics    Alcohol use: Yes     Comment: 4 drinks per week    Drug use: No         Today's PHQ-2 Score:       2025    12:26 PM   PHQ-2 (  [employer])   Q1: Little interest or pleasure in doing things 0   Q2: Feeling down, depressed or hopeless 0   PHQ-2 Score 0    Q1: Little interest or pleasure in doing things Not at all   Q2: Feeling down, depressed or hopeless Not at all   PHQ-2 Score  0       Patient-reported           10/18/2024   LAST FHS-7 RESULTS   1st degree relative breast or ovarian cancer No   Any relative bilateral breast cancer No   Any male have breast cancer No   Any ONE woman have BOTH breast AND ovarian cancer No   Any woman with breast cancer before 50yrs No   2 or more relatives with breast AND/OR ovarian cancer Yes   2 or more relatives with breast AND/OR bowel cancer No        Mammogram Screening - After age 74- determine frequency with patient based on health status, life expectancy and patient goals                Reviewed and updated as needed this visit by Provider     Meds   Med Hx   Fam Hx            **I reviewed the information recorded in the patient's EPIC chart (including but not limited to medical history, surgical history, family history, problem list, medication list, and allergy list) and updated the information as indicated based on the patients reported information.       Current providers sharing in care for this patient include:  Patient Care Team:  Ivan Mcpherson MD as PCP - General (Internal Medicine)  Ivan Mcpherson MD as Assigned PCP  Dianelys Bauman GC as Genetic Counselor (Genetic Counselor, MS)  Jenelle Cannon MD as Assigned Cancer Care Provider  Chika Mitchell MD as Assigned Surgical Provider    The following health maintenance items are reviewed in Epic and correct as of today:  Health Maintenance   Topic Date Due    COVID-19 Vaccine (9 - 2024-25 season) 04/22/2025    DEXA  10/12/2025    MAMMO SCREENING  10/18/2025    ANNUAL REVIEW OF HM ORDERS  02/12/2026    BMP  02/14/2026    CMP  02/14/2026    LIPID  02/14/2026    TSH W/FREE T4 REFLEX  02/14/2026    CBC  02/14/2026    MEDICARE ANNUAL WELLNESS VISIT  02/21/2026    FALL RISK ASSESSMENT  02/21/2026    ADVANCE CARE PLANNING  08/25/2028    DTAP/TDAP/TD IMMUNIZATION (4 - Td or Tdap) 11/18/2032    PHQ-2 (once per calendar year)  Completed    INFLUENZA VACCINE  Completed     "Pneumococcal Vaccine: 50+ Years  Completed    ZOSTER IMMUNIZATION  Completed    RSV VACCINE  Completed    HPV IMMUNIZATION  Aged Out    MENINGITIS IMMUNIZATION  Aged Out    COLORECTAL CANCER SCREENING  Discontinued       Appropriate preventive services were discussed with this patient, including applicable screening as appropriate for fall prevention, nutrition, physical activity, Tobacco-use cessation, weight loss and cognition.  Checklist reviewing preventive services available has been given to the patient.          2/21/2025   Mini Cog   Clock Draw Score 2 Normal   3 Item Recall 3 objects recalled   Mini Cog Total Score 5                  Review of Systems  Constitutional, neuro, ENT, endocrine, pulmonary, cardiac, gastrointestinal, genitourinary, musculoskeletal, integument and psychiatric systems are negative, except as otherwise noted.      Objective    /80   Pulse 87   Temp 97.6  F (36.4  C) (Temporal)   Resp 18   Ht 1.626 m (5' 4\")   Wt 71.9 kg (158 lb 9.6 oz)   LMP  (LMP Unknown)   SpO2 97%   Breastfeeding No   BMI 27.22 kg/m    Body mass index is 27.22 kg/m .  Physical Exam   Physical Exam  Vitals and nursing note reviewed.   Constitutional:       Comments: Moves normally, alert, no apparent distress  HENT:      Head: Normocephalic and atraumatic.      Nose: Nose normal.   Eyes:      Extraocular Movements: Extraocular movements intact.   Cardiovascular:      Rate and Rhythm: Normal rate and regular rhythm.      Heart sounds: Normal heart sounds.   Pulmonary:      Effort: Pulmonary effort is normal.      Breath sounds: Normal breath sounds.   Abdominal:      General: There is no distension.      Palpations: There is no mass.      Tenderness: No abdominal tenderness, no distention.     Bowel sounds: normal  Musculoskeletal:         General: Normal range of motion, moves into the room normal, moves in and out of chair normally.    Skin:     General: Skin is warm and dry.   Neurological:      " General: No focal deficit present.      Mental Status: Alert, responds to questions, Speech coherent  Psychiatric:         Mood and Affect: Mood normal.              Signed Electronically by: Ivan Mcpherson MD

## 2025-02-21 NOTE — PATIENT INSTRUCTIONS
"     Continue all medications at the same doses.  Contact your usual pharmacy if you need refills.        Return to see me in 1 year, schedule a follow up visit sooner if needed for anything else.  Use [employer] or Call [phone] to schedule the appointment with me.           5 GOALS TO PREVENT VASCULAR DISEASE:     1.  Aggressive blood pressure control, under 130/80 ideally.  Using medications if needed.    Your blood pressure is under good control    BP Readings from Last 4 Encounters:   02/21/25 118/80   12/19/24 127/82   11/19/24 128/80   08/14/24 120/82       2.  Aggressive LDL cholesterol (\"bad cholesterol\") lowering as indicated.    Your goal is an LDL under 130 for sure, preferably under 100.  (If you have diabetes or previous vascular disease, the the LDL goals would be under 100 for sure, preferably under 70.)    New guidelines identify four high-risk groups who could benefit from statins:   *people with pre-existing heart disease, such as those who have had a heart attack;   *people ages 40 to 75 who have diabetes of any type  *patients ages 40 to 75 with at least a 7.5% risk of developing cardiovascular disease over the next decade, according to a formula described in the guidelines  *patients with the sort of super-high cholesterol that sometimes runs in families, as evidenced by an LDL of 190 milligrams per deciliter or higher    Your cholesterol levels are well controlled.    Recent Labs   Lab Test 02/14/25  0849 02/14/24  0948   CHOL 215* 229*   HDL 85 77   * 136*   TRIG 73 80       --LDL (\"bad\" cholesterol): normal under 130, ideal under 100.  Best way to lower this is through better food choices ( lower fats, etc.), medication may be considered if indicated  --HDL (\"good\") cholesterol: (normal above 40 for men, above 50 for women).  Best way to improve the HDL level is through regular physical exercise.  There are no medications to raise HDL levels.   --Triglycerides (desirable under 150): " "triglycerides are more about metabolic issues, best way to improve this is through better diet choices, emphasizing reducing the intake of \"simple carbohydrates\" (e.g. White bread, white rice, pasta, noodles, potatoes, snack foods, regular soda, juices (except fresh squeezed), cakes, cookies, candy, etc.) as best possible. Medications may be considered for triglyceride levels routinely above 400.    3.  Aggressive diabetic prevention, screening and/or management.      You do not have diabetes as of the most recent blood tests.     4.  No smoking    5.  Consider daily preventative aspirin over age 50 if you have enough cardiac risk factors to place you at higher risk for the presence of vascular disease.    If you have any reason not to take aspirin such easy bruising or bleeding, stomach problems, other anticoagulant medications, or any other side effects, then you should not take Aspirin.     --Based on your current cardiac disease risk profile and/or age over 75, you do NOT need to take daily preventative aspirin.        Preventive Health Recommendations  Female Ages 75+    Yearly exam: See your health care provider every year in order to  Review health changes in your health history  Discuss preventive care.    Review and reevaluate any chronic medical conditions  Review and renew any regular prescription medicines if you take any.  Review and aggressively manage any significant risk factors for vascular disease    Routine pelvic exams and PAP smears no longer indiacted over age 75.  You do not need a Pap test if your uterus was removed (hysterectomy) and you have not had cancer.  You should be tested each year for STDs (sexually transmitted diseases) if you're at risk for STDs.   Routine screening mammogram no longer indicated.  Routine colon cancer screening no longer recommended over age 75-80  Have a cholesterol test every 5 years, or more often if advised.  Have a diabetes test (fasting glucose) every three " years. If you are at risk for diabetes, you should have this test more often.   If you are at risk for osteoporosis (brittle bone disease), think about having a bone density scan (DEXA).  Consider lung cancer screening with low-dose chest CT if you have any significant smoking history.        Vaccine recommendations:   Get a flu shot each fall.  Middle October is the optimal time to receive the flu shot.   Covid vaccines are now recommended annually.  Get the most updated Covid vaccine when it becomes available, consider getting this at the same time as the annual influenza vaccine.   Get a tetanus shot every 10 years. (Get this vaccine from a pharmacist in a pharmacy when you are over 65 on Medicare insurance)  Everyone over 65 should make sure to receive pneumonia vaccines to prevent the most common type of bacterial pneumonia: Pneumococcal pneumonia. There are now two you should receive - Pneumovax (PPSV 23) and Prevnar (PCV 20)  Strongly consider the Shingrix shingles vaccine to give you the best chance of avoiding future shingles infection (as many as 1 and 3 adults over age 50 may develop this condition in their lifetime).      --If you have Medicare insurance, investigate the cost and coverage for Shingrix shingles vaccines with a pharmacist at a pharmacy.  They can tell you the coverage and cost and then give it to you if the price is acceptable.    --Medicare sometimes does not cover these Shingrix shingles vaccines, and with Medicare insurance it is usually cheaper to receive this shingles vaccine from a pharmacist in a pharmacy rather than in our clinic.    --At this time, you only need the 2 Shingrix vaccines and then you are done.    Consider vaccination against Respiratory Syncytial Virus (RSV) infections, especially if you have active lung disease, history of smoking, and/or cardiac conditions.  The respiratory syncytial virus (RSV), is a common upper respiratory virus that causes severe inflammation  "in the airways, more than most upper respiratory viruses.   This vaccine is available at most pharmacies and clinics.  At this time, the RSV vaccine is a one time vaccine.    --If you are on Medicare insurance, you need to specifically get this vaccine from a pharmacist in a pharmacy for the best cost and to confirm coverage, it will be less expensive to get this there rather than from our clinic.       Nutrition:   Eat at least 5 servings of fruits and vegetables each day.  Eat whole-grain bread, whole-wheat pasta and brown rice instead of white grains and rice.  Talk to your provider about Calcium and Vitamin D.    --Calcium: aim for 1200 mg per day (any brand is fine)   --Vitamin D3 at least 1000 units per day (any brand is fine)       --Good Grains:  Oats, brown rice, Quinoa (these do not raise the blood sugar as much)     --Bad grains:  Anything made from wheat or white rice     (because these raise the blood sugars significantly, and the possible gluten issue from wheat for some people).      --Proteins:  Aim for \"lean proteins\" including chicken, fish, seafood, pork, turkey, and eggs (in moderation); Eat red meat only occasionally        Lifestyle  Exercise at least 150 minutes a week (30 minutes a day, 5 days a week). This will help you control your weight and prevent disease.  Limit alcohol to one drink per day.  No smoking.   Wear sunscreen to prevent skin cancer.   See your dentist every six months for an exam and cleaning.  See your eye doctor every 1 to 2 years.        "

## 2025-04-08 NOTE — PROGRESS NOTES
[facility]    Hematology/Oncology Established Patient Note      Today's Date: 4/14/2025    Reason for follow-up: Left breast cancer.    HISTORY OF PRESENT ILLNESS: Alondra Morgan is a 79 year old female s/p hysterectomy 1995 who presents with the following oncologic history:  11/9/2005: Left breast biopsy of microcalcifications showed focal atypical ductal hyperplasia; no invasive malignancy. Took tamoxifen for 5 years.  11/29/2005: Excision of left breast lesion showed focal atypical ductal hyperplasia; no malignancy.  7/17/2023: Screening mammogram showed asymmetry in left breast at 6-7:00. Right breast negative.  7/24/2023: Diagnostic left mammogram showed mass in left lower breast adjacent to a previous needle biopsy marker clip. Left breast U/S showed 4 mm nodule at 6:00, 5 cm from nipple, 8 mm from prior biopsy.  7/27/2023: Left breast biopsy at 6:00, 5 cm from nipple showed 5 mm of grade 2 invasive ductal carcinoma, no LVI, ER positive at %, MS positive at %, HER-2 negative (IHC = 0).  9/13/2023: Left breast lumpectomy and left axillary SLN excision with Dr. Chika Mitchell.  Pathology showed 4 mm grade 2 invasive ductal carcinoma; margins negative; atypical ductal hyperplasia present; 1 SLN negative.  10/26/2023-11/1/2023: Completed adjuvant radiation to left breast in 6 fractions.  11/27/2023: Started anastrozole.      INTERVAL HISTORY:  Alondra reports feeling overall well with no new pain.    REVIEW OF SYSTEMS:   14 point ROS was reviewed and is negative other than as noted above in HPI.       HOME MEDICATIONS:  Current Outpatient Medications   Medication Sig Dispense Refill    anastrozole (ARIMIDEX) 1 MG tablet Take 1 tablet (1 mg) by mouth daily 90 tablet 3    calcium carbonate 600 mg-vitamin D 400 units 600-400 MG-UNIT per tablet Take 1 tablet by mouth 2 times daily FOR BONE HEALTH AND FOR VITAMIN D DEFICIENCY (LOW VITAMIN D)      cholecalciferol 50 MCG (2000 UT) tablet  Take 2 tablets by mouth daily INDICATION: TO TREAT VITAMIN D DEFICIENCY (LOW VITAMIN D)      lisinopril (ZESTRIL) 20 MG tablet Take 1 tablet (20 mg) by mouth daily. 90 tablet 3    loratadine (CLARITIN) 10 MG tablet ONE DAILY FOR ALLERGIES      valACYclovir (VALTREX) 1000 mg tablet TAKE 2000 MG AT ONSET OF COLD SORE, FOLLOWED BY SECOND 2000 MG DOSE 12 HOURS LATER; REPEAT AS NEEDED 12 tablet 3         ALLERGIES:  Allergies   Allergen Reactions    Alendronate GI Disturbance     Severe GERD    Pollen Extract          PAST MEDICAL HISTORY:  Past Medical History:   Diagnosis Date    Arthritis     Atypical ductal hyperplasia of left breast 2005    left breast atypical ductal hyperplasia, completed 5 years tamoxifen in 2011    Atypical hyperplasia of breasts     Basal cell carcinoma of ala nasi     Basal cell carcinoma of shoulder     Family history of colon cancer     BROTHER COLON CANCER AGE 68    History of poliomyelitis 1947    acute polio infection age 2, resolved without any sequelae    Osteopenia     Polyp, colon     Adenomatous    Squamous cell carcinoma of skin, unspecified          PAST SURGICAL HISTORY:  Past Surgical History:   Procedure Laterality Date    BIOPSY NODE SENTINEL Left 9/13/2023    Procedure: left sentinel lymph node biopsy;  Surgeon: Chika Mitchell MD;  Location: [facility] OR    BIOPSY, BREAST, WITH RADIOFREQUENCY IDENTIFICATION Left 9/13/2023    Procedure: left breast tag localized lumpectomy;  Surgeon: Chika Mitchell MD;  Location: [facility] OR    COLONOSCOPY      EYE SURGERY  07/2017    cataract bilat eyes    HC EXCISION BREAST LESION, OPEN >=1      ATYPICAL HYPERPLASIA X 2    HYSTERECTOMY, PAP NO LONGER INDICATED  1994    fibroids    TONSILLECTOMY      ZZC VAG HYST,RMV TUBE/OVARY           SOCIAL HISTORY:  Social History     Socioeconomic History    Marital status:      Spouse name: Not on file    Number of children: Not on file    Years of education: Not on file    Highest education level: Not  on file   Occupational History    Not on file   Tobacco Use    Smoking status: Former     Current packs/day: 0.00     Average packs/day: 1 pack/day for 40.0 years (40.0 ttl pk-yrs)     Types: Cigarettes     Start date: 1943     Quit date: 10/7/1983     Years since quittin.5    Smokeless tobacco: Never   Substance and Sexual Activity    Alcohol use: Yes     Comment: 4 drinks per week    Drug use: No    Sexual activity: Not Currently     Partners: Male   Other Topics Concern     Service No    Blood Transfusions No    Caffeine Concern No    Occupational Exposure No    Hobby Hazards No    Sleep Concern Yes    Stress Concern No    Weight Concern Yes    Special Diet No    Back Care No    Exercise Yes     Comment: 3 X WEEK    Bike Helmet Not Asked    Seat Belt Yes    Self-Exams No     Comment: ENCOURAGED    Parent/sibling w/ CABG, MI or angioplasty before 65F 55M? Not Asked   Social History Narrative    Not on file     Social Drivers of Health     Financial Resource Strain: Low Risk  (2025)    Financial Resource Strain     Within the past 12 months, have you or your family members you live with been unable to get utilities (heat, electricity) when it was really needed?: No   Food Insecurity: Low Risk  (2025)    Food Insecurity     Within the past 12 months, did you worry that your food would run out before you got money to buy more?: No     Within the past 12 months, did the food you bought just not last and you didn t have money to get more?: No   Transportation Needs: Low Risk  (2025)    Transportation Needs     Within the past 12 months, has lack of transportation kept you from medical appointments, getting your medicines, non-medical meetings or appointments, work, or from getting things that you need?: No   Physical Activity: Insufficiently Active (2025)    Exercise Vital Sign     Days of Exercise per Week: 4 days     Minutes of Exercise per Session: 30 min   Stress: No Stress  Concern Present (2/20/2024)    [place] [place] [employer] - Occupational Stress Questionnaire     Feeling of Stress : Only a little   Social Connections: Unknown (2/17/2025)    Social Connection and Isolation Panel [NHANES]     Frequency of Communication with Friends and Family: Not on file     Frequency of Social Gatherings with Friends and Family: More than three times a week     Attends [employer] Services: Not on file     Active Member of Clubs or Organizations: Not on file     Attends Club or Organization Meetings: Not on file     Marital Status: Not on file   Interpersonal Safety: Low Risk  (2/21/2025)    Interpersonal Safety     Do you feel physically and emotionally safe where you currently live?: Yes     Within the past 12 months, have you been hit, slapped, kicked or otherwise physically hurt by someone?: No     Within the past 12 months, have you been humiliated or emotionally abused in other ways by your partner or ex-partner?: No   Housing Stability: Low Risk  (2/17/2025)    Housing Stability     Do you have housing? : Yes     Are you worried about losing your housing?: No         FAMILY HISTORY:  Family History   Problem Relation Age of Onset    Cancer Mother         lung    Osteoporosis Mother     Eye Disorder Mother         glaucoma    Arthritis Mother         osteo    Heart Disease Mother         CHF    Hypertension Mother     Cerebrovascular Disease Father         TIAs    Osteoporosis Father     Hypertension Father     Cancer Father         BONE    Arthritis Sister         PARTIAL KNEE REPLACEMENT IN 2009    Osteoporosis Sister     Stomach Cancer Brother         metastatic gastric cancer    Cerebrovascular Disease Brother     Colon Cancer Brother 70        B 1939    Cancer Brother         PROSTATE    Breast Cancer Maternal Grandmother     Cerebrovascular Disease Maternal Grandfather     Asthma Daughter     Breast Cancer Maternal Aunt     Breast Cancer Maternal Aunt     Breast Cancer  "Other         2 YOUNGER MATERNAL COUSINS         PHYSICAL EXAM:  Vital signs:  BP (!) 141/88   Pulse 92   Resp 16   Ht 1.626 m (5' 4\")   Wt 72.1 kg (159 lb)   LMP  (LMP Unknown)   SpO2 99%   BMI 27.29 kg/m     GENERAL/CONSTITUTIONAL: No acute distress.  EYES: No erythema or scleral icterus.  LYMPH: No cervical, supraclavicular, axillary adenopathy.   BREAST: No palpable masses in either breast. Nipples are everted bilaterally with no discharge. No erythema, ulceration, or dimpling of the skin.   RESPIRATORY: No audible cough or wheezing.  GASTROINTESTINAL: No hepatosplenomegaly, masses, or tenderness. No guarding.  No distention.  MUSCULOSKELETAL: Warm and well-perfused, no cyanosis, clubbing, or edema.  NEUROLOGIC: No focal motor deficits. Alert, oriented, answers questions appropriately.  INTEGUMENTARY: No rashes or jaundice.  GAIT: Steady, does not use assistive device       LABS:  CBC RESULTS:   Recent Labs   Lab Test 02/14/25  0849   WBC 4.8   RBC 4.24   HGB 12.7   HCT 37.3   MCV 88   MCH 30.0   MCHC 34.0   RDW 13.7           Last Comprehensive Metabolic Panel:  Sodium   Date Value Ref Range Status   02/14/2025 137 135 - 145 mmol/L Final   11/06/2020 130 (L) 133 - 144 mmol/L Final     Potassium   Date Value Ref Range Status   02/14/2025 4.6 3.4 - 5.3 mmol/L Final   11/11/2022 4.1 3.4 - 5.3 mmol/L Final   11/06/2020 4.4 3.4 - 5.3 mmol/L Final     Chloride   Date Value Ref Range Status   02/14/2025 100 98 - 107 mmol/L Final   11/11/2022 100 94 - 109 mmol/L Final   11/06/2020 97 94 - 109 mmol/L Final     Carbon Dioxide   Date Value Ref Range Status   11/06/2020 28 20 - 32 mmol/L Final     Carbon Dioxide (CO2)   Date Value Ref Range Status   02/14/2025 27 22 - 29 mmol/L Final   11/11/2022 30 20 - 32 mmol/L Final     Anion Gap   Date Value Ref Range Status   02/14/2025 10 7 - 15 mmol/L Final   11/11/2022 4 3 - 14 mmol/L Final   11/06/2020 5 3 - 14 mmol/L Final     Glucose   Date Value Ref Range Status "   02/14/2025 90 70 - 99 mg/dL Final   11/11/2022 93 70 - 99 mg/dL Final   11/06/2020 95 70 - 99 mg/dL Final     Comment:     Fasting specimen     Urea Nitrogen   Date Value Ref Range Status   02/14/2025 18.1 8.0 - 23.0 mg/dL Final   11/11/2022 18 7 - 30 mg/dL Final   11/06/2020 16 7 - 30 mg/dL Final     Creatinine   Date Value Ref Range Status   02/14/2025 0.82 0.51 - 0.95 mg/dL Final   11/06/2020 0.73 0.52 - 1.04 mg/dL Final     GFR Estimate   Date Value Ref Range Status   02/14/2025 71 >60 mL/min/1.73m2 Final     Comment:     eGFR calculated using 2021 CKD-EPI equation.   11/06/2020 79 >60 mL/min/[1.73_m2] Final     Comment:     Non  GFR Calc  Starting 12/18/2018, serum creatinine based estimated GFR (eGFR) will be   calculated using the Chronic Kidney Disease Epidemiology Collaboration   (CKD-EPI) equation.       Calcium   Date Value Ref Range Status   02/14/2025 9.8 8.8 - 10.4 mg/dL Final   11/06/2020 9.6 8.5 - 10.1 mg/dL Final     Bilirubin Total   Date Value Ref Range Status   02/14/2025 0.3 <=1.2 mg/dL Final   10/23/2018 0.4 0.2 - 1.3 mg/dL Final     Alkaline Phosphatase   Date Value Ref Range Status   02/14/2025 79 40 - 150 U/L Final   10/23/2018 92 40 - 150 U/L Final     ALT   Date Value Ref Range Status   02/14/2025 16 0 - 50 U/L Final   10/28/2019 23 0 - 50 U/L Final     AST   Date Value Ref Range Status   02/14/2025 25 0 - 45 U/L Final   10/28/2019 19 0 - 45 U/L Final       PATHOLOGY:  None new.    IMAGING:  None new.    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  --She has no clinical evidence for recurrent breast cancer by physical exam from today.  --She is tolerating  anastrozole well so far with minimal side effects.  --Advised adjuvant endocrine therapy for a total of 5 years to reduce risk of breast cancer recurrence by a relative 50%.  --Alternate option is tamoxifen. She would prefer not to take tamoxifen.  --Alondra has had prior heartburn issues to alendronate, so she started Prolia on 5/17/2024. Continue Prolia every 6 months, next due 5/19/2025. Plan to repeat DEXA scan in ~10/2025.  --1/11/2024 Genetic testing negative.  --Due for next bilateral screening mammogram in 10/2025.    Return in 6 months.    Jenelle Cannon MD  [facility] Hematology/Oncology     Total time spent today: 30 minutes in chart review, patient evaluation, counseling, documentation, test and/or medication/prescription orders, and coordination of care.      The longitudinal plan of care for the diagnosis(es)/condition(s) as documented were addressed during this visit. Due to the added complexity in care, I will continue to support Alondra in the subsequent management and with ongoing continuity of care.

## 2025-04-14 ENCOUNTER — ONCOLOGY VISIT (OUTPATIENT)
Dept: ONCOLOGY | Facility: CLINIC | Age: 80
End: 2025-04-14
Attending: INTERNAL MEDICINE
Payer: COMMERCIAL

## 2025-04-14 VITALS
HEIGHT: 64 IN | SYSTOLIC BLOOD PRESSURE: 141 MMHG | WEIGHT: 159 LBS | OXYGEN SATURATION: 99 % | BODY MASS INDEX: 27.14 KG/M2 | HEART RATE: 92 BPM | RESPIRATION RATE: 16 BRPM | DIASTOLIC BLOOD PRESSURE: 88 MMHG

## 2025-04-14 DIAGNOSIS — Z17.0 MALIGNANT NEOPLASM OF OVERLAPPING SITES OF LEFT BREAST IN FEMALE, ESTROGEN RECEPTOR POSITIVE (H): Primary | ICD-10-CM

## 2025-04-14 DIAGNOSIS — Z79.811 USE OF AROMATASE INHIBITORS: ICD-10-CM

## 2025-04-14 DIAGNOSIS — M85.89 OSTEOPENIA OF MULTIPLE SITES: ICD-10-CM

## 2025-04-14 DIAGNOSIS — Z17.0 MALIGNANT NEOPLASM OF LOWER-OUTER QUADRANT OF LEFT BREAST OF FEMALE, ESTROGEN RECEPTOR POSITIVE (H): ICD-10-CM

## 2025-04-14 DIAGNOSIS — C50.812 MALIGNANT NEOPLASM OF OVERLAPPING SITES OF LEFT BREAST IN FEMALE, ESTROGEN RECEPTOR POSITIVE (H): Primary | ICD-10-CM

## 2025-04-14 DIAGNOSIS — Z12.31 ENCOUNTER FOR SCREENING MAMMOGRAM FOR BREAST CANCER: ICD-10-CM

## 2025-04-14 DIAGNOSIS — C50.512 MALIGNANT NEOPLASM OF LOWER-OUTER QUADRANT OF LEFT BREAST OF FEMALE, ESTROGEN RECEPTOR POSITIVE (H): ICD-10-CM

## 2025-04-14 PROCEDURE — 99214 OFFICE O/P EST MOD 30 MIN: CPT | Performed by: INTERNAL MEDICINE

## 2025-04-14 PROCEDURE — G2211 COMPLEX E/M VISIT ADD ON: HCPCS | Performed by: INTERNAL MEDICINE

## 2025-04-14 PROCEDURE — G0463 HOSPITAL OUTPT CLINIC VISIT: HCPCS | Performed by: INTERNAL MEDICINE

## 2025-04-14 RX ORDER — ALBUTEROL SULFATE 90 UG/1
1-2 INHALANT RESPIRATORY (INHALATION)
Start: 2025-11-15

## 2025-04-14 RX ORDER — ANASTROZOLE 1 MG/1
1 TABLET ORAL DAILY
Qty: 90 TABLET | Refills: 3 | Status: SHIPPED | OUTPATIENT
Start: 2025-04-14

## 2025-04-14 RX ORDER — DIPHENHYDRAMINE HYDROCHLORIDE 50 MG/ML
25 INJECTION, SOLUTION INTRAMUSCULAR; INTRAVENOUS
Start: 2025-11-15

## 2025-04-14 RX ORDER — EPINEPHRINE 1 MG/ML
0.3 INJECTION, SOLUTION INTRAMUSCULAR; SUBCUTANEOUS EVERY 5 MIN PRN
OUTPATIENT
Start: 2025-11-15

## 2025-04-14 RX ORDER — METHYLPREDNISOLONE SODIUM SUCCINATE 40 MG/ML
40 INJECTION INTRAMUSCULAR; INTRAVENOUS
Start: 2025-11-15

## 2025-04-14 RX ORDER — ALBUTEROL SULFATE 0.83 MG/ML
2.5 SOLUTION RESPIRATORY (INHALATION)
OUTPATIENT
Start: 2025-11-15

## 2025-04-14 RX ORDER — DIPHENHYDRAMINE HYDROCHLORIDE 50 MG/ML
50 INJECTION, SOLUTION INTRAMUSCULAR; INTRAVENOUS
Start: 2025-11-15

## 2025-04-14 RX ORDER — MEPERIDINE HYDROCHLORIDE 25 MG/ML
25 INJECTION INTRAMUSCULAR; INTRAVENOUS; SUBCUTANEOUS
OUTPATIENT
Start: 2025-11-15

## 2025-04-14 ASSESSMENT — PAIN SCALES - GENERAL: PAINLEVEL_OUTOF10: NO PAIN (0)

## 2025-04-14 NOTE — PATIENT INSTRUCTIONS
RTC MD in 6 months with mammogram and DEXA scan prior to visit.  Keep Prolia for 5/19/2025.  Arrange for repeat Prolia in 11/2025.

## 2025-04-14 NOTE — PROGRESS NOTES
"Oncology Rooming Note    April 14, 2025 10:18 AM   Alondra Morgan is a 79 year old female who presents for:    Chief Complaint   Patient presents with    Oncology Clinic Visit     Initial Vitals: BP (!) 141/88   Pulse 92   Resp 16   Ht 1.626 m (5' 4\")   Wt 72.1 kg (159 lb)   LMP  (LMP Unknown)   SpO2 99%   BMI 27.29 kg/m   Estimated body mass index is 27.29 kg/m  as calculated from the following:    Height as of this encounter: 1.626 m (5' 4\").    Weight as of this encounter: 72.1 kg (159 lb). Body surface area is 1.8 meters squared.  No Pain (0) Comment: Data Unavailable   No LMP recorded (lmp unknown). Patient has had a hysterectomy.  Allergies reviewed: Yes  Medications reviewed: Yes    Medications: Medication refills not needed today.  Pharmacy name entered into [employer]:    [id number] [place], [place] - [address] [facility][id number] - [place], [place] - [address]    Frailty Screening:   Is the patient here for a new oncology consult visit in cancer care? 2. No    PHQ9:  Did this patient require a PHQ9?: No        Sole Boogie MA            "

## 2025-04-14 NOTE — LETTER
4/14/2025      Alondra Morgan  [address] [address]      Dear Colleague,    Thank you for referring your patient, Alondra Morgan, to the [facility]. Please see a copy of my visit note below.    [facility]    Hematology/Oncology Established Patient Note      Today's Date: 4/14/2025    Reason for follow-up: Left breast cancer.    HISTORY OF PRESENT ILLNESS: Alondra Morgan is a 79 year old female s/p hysterectomy 1995 who presents with the following oncologic history:  11/9/2005: Left breast biopsy of microcalcifications showed focal atypical ductal hyperplasia; no invasive malignancy. Took tamoxifen for 5 years.  11/29/2005: Excision of left breast lesion showed focal atypical ductal hyperplasia; no malignancy.  7/17/2023: Screening mammogram showed asymmetry in left breast at 6-7:00. Right breast negative.  7/24/2023: Diagnostic left mammogram showed mass in left lower breast adjacent to a previous needle biopsy marker clip. Left breast U/S showed 4 mm nodule at 6:00, 5 cm from nipple, 8 mm from prior biopsy.  7/27/2023: Left breast biopsy at 6:00, 5 cm from nipple showed 5 mm of grade 2 invasive ductal carcinoma, no LVI, ER positive at %, [place] positive at %, HER-2 negative (IHC = 0).  9/13/2023: Left breast lumpectomy and left axillary SLN excision with Dr. Chika Mitchell.  Pathology showed 4 mm grade 2 invasive ductal carcinoma; margins negative; atypical ductal hyperplasia present; 1 SLN negative.  10/26/2023-11/1/2023: Completed adjuvant radiation to left breast in 6 fractions.  11/27/2023: Started anastrozole.      INTERVAL HISTORY:  Alondra reports feeling overall well with no new pain.    REVIEW OF SYSTEMS:   14 point ROS was reviewed and is negative other than as noted above in HPI.       HOME MEDICATIONS:  Current Outpatient Medications   Medication Sig Dispense Refill     anastrozole (ARIMIDEX) 1 MG tablet Take 1 tablet (1  mg) by mouth daily 90 tablet 3     calcium carbonate 600 mg-vitamin D 400 units 600-400 MG-UNIT per tablet Take 1 tablet by mouth 2 times daily FOR BONE HEALTH AND FOR VITAMIN D DEFICIENCY (LOW VITAMIN D)       cholecalciferol 50 MCG (2000 UT) tablet Take 2 tablets by mouth daily INDICATION: TO TREAT VITAMIN D DEFICIENCY (LOW VITAMIN D)       lisinopril (ZESTRIL) 20 MG tablet Take 1 tablet (20 mg) by mouth daily. 90 tablet 3     loratadine (CLARITIN) 10 MG tablet ONE DAILY FOR ALLERGIES       valACYclovir (VALTREX) 1000 mg tablet TAKE 2000 MG AT ONSET OF COLD SORE, FOLLOWED BY SECOND 2000 MG DOSE 12 HOURS LATER; REPEAT AS NEEDED 12 tablet 3         ALLERGIES:  Allergies   Allergen Reactions     Alendronate GI Disturbance     Severe GERD     Pollen Extract          PAST MEDICAL HISTORY:  Past Medical History:   Diagnosis Date     Arthritis      Atypical ductal hyperplasia of left breast 2005    left breast atypical ductal hyperplasia, completed 5 years tamoxifen in 2011     Atypical hyperplasia of breasts      Basal cell carcinoma of ala nasi      Basal cell carcinoma of shoulder      Family history of colon cancer     BROTHER COLON CANCER AGE 68     History of poliomyelitis 1947    acute polio infection age 2, resolved without any sequelae     Osteopenia      Polyp, colon     Adenomatous     Squamous cell carcinoma of skin, unspecified          PAST SURGICAL HISTORY:  Past Surgical History:   Procedure Laterality Date     BIOPSY NODE SENTINEL Left 9/13/2023    Procedure: left sentinel lymph node biopsy;  Surgeon: Chika Mitchell MD;  Location:  OR     BIOPSY, BREAST, WITH RADIOFREQUENCY IDENTIFICATION Left 9/13/2023    Procedure: left breast tag localized lumpectomy;  Surgeon: Chika Mitchell MD;  Location:  OR     COLONOSCOPY       EYE SURGERY  07/2017    cataract bilat eyes     HC EXCISION BREAST LESION, OPEN >=1      ATYPICAL HYPERPLASIA X 2     HYSTERECTOMY, PAP NO LONGER INDICATED  1994    fibroids      TONSILLECTOMY       ZZC VAG HYST,RMV TUBE/OVARY           SOCIAL HISTORY:  Social History     Socioeconomic History     Marital status:      Spouse name: Not on file     Number of children: Not on file     Years of education: Not on file     Highest education level: Not on file   Occupational History     Not on file   Tobacco Use     Smoking status: Former     Current packs/day: 0.00     Average packs/day: 1 pack/day for 40.0 years (40.0 ttl pk-yrs)     Types: Cigarettes     Start date: 1943     Quit date: 10/7/1983     Years since quittin.5     Smokeless tobacco: Never   Substance and Sexual Activity     Alcohol use: Yes     Comment: 4 drinks per week     Drug use: No     Sexual activity: Not Currently     Partners: Male   Other Topics Concern      Service No     Blood Transfusions No     Caffeine Concern No     Occupational Exposure No     Hobby Hazards No     Sleep Concern Yes     Stress Concern No     Weight Concern Yes     Special Diet No     Back Care No     Exercise Yes     Comment: 3 X WEEK     Bike Helmet Not Asked     Seat Belt Yes     Self-Exams No     Comment: ENCOURAGED     Parent/sibling w/ CABG, MI or angioplasty before 65F 55M? Not Asked   Social History Narrative     Not on file     Social Drivers of Health     Financial Resource Strain: Low Risk  (2025)    Financial Resource Strain      Within the past 12 months, have you or your family members you live with been unable to get utilities (heat, electricity) when it was really needed?: No   Food Insecurity: Low Risk  (2025)    Food Insecurity      Within the past 12 months, did you worry that your food would run out before you got money to buy more?: No      Within the past 12 months, did the food you bought just not last and you didn t have money to get more?: No   Transportation Needs: Low Risk  (2025)    Transportation Needs      Within the past 12 months, has lack of transportation kept you from medical  appointments, getting your medicines, non-medical meetings or appointments, work, or from getting things that you need?: No   Physical Activity: Insufficiently Active (2/17/2025)    Exercise Vital Sign      Days of Exercise per Week: 4 days      Minutes of Exercise per Session: 30 min   Stress: No Stress Concern Present (2/20/2024)    [employer] - Occupational Stress Questionnaire      Feeling of Stress : Only a little   Social Connections: Unknown (2/17/2025)    Social Connection and Isolation Panel [NHANES]      Frequency of Communication with Friends and Family: Not on file      Frequency of Social Gatherings with Friends and Family: More than three times a week      Attends Yazidi Services: Not on file      Active Member of Clubs or Organizations: Not on file      Attends Club or Organization Meetings: Not on file      Marital Status: Not on file   Interpersonal Safety: Low Risk  (2/21/2025)    Interpersonal Safety      Do you feel physically and emotionally safe where you currently live?: Yes      Within the past 12 months, have you been hit, slapped, kicked or otherwise physically hurt by someone?: No      Within the past 12 months, have you been humiliated or emotionally abused in other ways by your partner or ex-partner?: No   Housing Stability: Low Risk  (2/17/2025)    Housing Stability      Do you have housing? : Yes      Are you worried about losing your housing?: No         FAMILY HISTORY:  Family History   Problem Relation Age of Onset     Cancer Mother         lung     Osteoporosis Mother      Eye Disorder Mother         glaucoma     Arthritis Mother         osteo     Heart Disease Mother         CHF     Hypertension Mother      Cerebrovascular Disease Father         TIAs     Osteoporosis Father      Hypertension Father      Cancer Father         BONE     Arthritis Sister         PARTIAL KNEE REPLACEMENT IN 2009     Osteoporosis Sister      Stomach Cancer Brother         " metastatic gastric cancer     Cerebrovascular Disease Brother      Colon Cancer Brother 70        B 1939     Cancer Brother         PROSTATE     Breast Cancer Maternal Grandmother      Cerebrovascular Disease Maternal Grandfather      Asthma Daughter      Breast Cancer Maternal Aunt      Breast Cancer Maternal Aunt      Breast Cancer Other         2 YOUNGER MATERNAL COUSINS         PHYSICAL EXAM:  Vital signs:  BP (!) 141/88   Pulse 92   Resp 16   Ht 1.626 m (5' 4\")   Wt 72.1 kg (159 lb)   LMP  (LMP Unknown)   SpO2 99%   BMI 27.29 kg/m     GENERAL/CONSTITUTIONAL: No acute distress.  EYES: No erythema or scleral icterus.  LYMPH: No cervical, supraclavicular, axillary adenopathy.   BREAST: No palpable masses in either breast. Nipples are everted bilaterally with no discharge. No erythema, ulceration, or dimpling of the skin.   RESPIRATORY: No audible cough or wheezing.  GASTROINTESTINAL: No hepatosplenomegaly, masses, or tenderness. No guarding.  No distention.  MUSCULOSKELETAL: Warm and well-perfused, no cyanosis, clubbing, or edema.  NEUROLOGIC: No focal motor deficits. Alert, oriented, answers questions appropriately.  INTEGUMENTARY: No rashes or jaundice.  GAIT: Steady, does not use assistive device       LABS:  CBC RESULTS:   Recent Labs   Lab Test 02/14/25  0849   WBC 4.8   RBC 4.24   HGB 12.7   HCT 37.3   MCV 88   MCH 30.0   MCHC 34.0   RDW 13.7           Last Comprehensive Metabolic Panel:  Sodium   Date Value Ref Range Status   02/14/2025 137 135 - 145 mmol/L Final   11/06/2020 130 (L) 133 - 144 mmol/L Final     Potassium   Date Value Ref Range Status   02/14/2025 4.6 3.4 - 5.3 mmol/L Final   11/11/2022 4.1 3.4 - 5.3 mmol/L Final   11/06/2020 4.4 3.4 - 5.3 mmol/L Final     Chloride   Date Value Ref Range Status   02/14/2025 100 98 - 107 mmol/L Final   11/11/2022 100 94 - 109 mmol/L Final   11/06/2020 97 94 - 109 mmol/L Final     Carbon Dioxide   Date Value Ref Range Status   11/06/2020 28 20 - " 32 mmol/L Final     Carbon Dioxide (CO2)   Date Value Ref Range Status   02/14/2025 27 22 - 29 mmol/L Final   11/11/2022 30 20 - 32 mmol/L Final     Anion Gap   Date Value Ref Range Status   02/14/2025 10 7 - 15 mmol/L Final   11/11/2022 4 3 - 14 mmol/L Final   11/06/2020 5 3 - 14 mmol/L Final     Glucose   Date Value Ref Range Status   02/14/2025 90 70 - 99 mg/dL Final   11/11/2022 93 70 - 99 mg/dL Final   11/06/2020 95 70 - 99 mg/dL Final     Comment:     Fasting specimen     Urea Nitrogen   Date Value Ref Range Status   02/14/2025 18.1 8.0 - 23.0 mg/dL Final   11/11/2022 18 7 - 30 mg/dL Final   11/06/2020 16 7 - 30 mg/dL Final     Creatinine   Date Value Ref Range Status   02/14/2025 0.82 0.51 - 0.95 mg/dL Final   11/06/2020 0.73 0.52 - 1.04 mg/dL Final     GFR Estimate   Date Value Ref Range Status   02/14/2025 71 >60 mL/min/1.73m2 Final     Comment:     eGFR calculated using 2021 CKD-EPI equation.   11/06/2020 79 >60 mL/min/[1.73_m2] Final     Comment:     Non  GFR Calc  Starting 12/18/2018, serum creatinine based estimated GFR (eGFR) will be   calculated using the Chronic Kidney Disease Epidemiology Collaboration   (CKD-EPI) equation.       Calcium   Date Value Ref Range Status   02/14/2025 9.8 8.8 - 10.4 mg/dL Final   11/06/2020 9.6 8.5 - 10.1 mg/dL Final     Bilirubin Total   Date Value Ref Range Status   02/14/2025 0.3 <=1.2 mg/dL Final   10/23/2018 0.4 0.2 - 1.3 mg/dL Final     Alkaline Phosphatase   Date Value Ref Range Status   02/14/2025 79 40 - 150 U/L Final   10/23/2018 92 40 - 150 U/L Final     ALT   Date Value Ref Range Status   02/14/2025 16 0 - 50 U/L Final   10/28/2019 23 0 - 50 U/L Final     AST   Date Value Ref Range Status   02/14/2025 25 0 - 45 U/L Final   10/28/2019 19 0 - 45 U/L Final       PATHOLOGY:  None new.    IMAGING:  None new.    ASSESSMENT/PLAN:  Alondra Morgan is a 79 year old female with the following issues:  Stage IA, [id number], grade 2 invasive ductal  "carcinoma of the left breast overlapping sites, ER positive, IL positive, HER-2 negative  History of left breast atypical ductal hyperplasia in 2005  Osteopenia    --Alondra is s/p adjuvant radiation, then started anastrozole.  --She has no clinical evidence for recurrent breast cancer by physical exam from today.  --She is tolerating anastrozole well so far with minimal side effects.  --Advised adjuvant endocrine therapy for a total of 5 years to reduce risk of breast cancer recurrence by a relative 50%.  --Alternate option is tamoxifen. She would prefer not to take tamoxifen.  --Alondra has had prior heartburn issues to alendronate, so she started Prolia on 5/17/2024. Continue Prolia every 6 months, next due 5/19/2025. Plan to repeat DEXA scan in ~10/2025.  --1/11/2024 Genetic testing negative.  --Due for next bilateral screening mammogram in 10/2025.    Return in 6 months.    Jenelle Cannon MD  [facility] Hematology/Oncology     Total time spent today: 30 minutes in chart review, patient evaluation, counseling, documentation, test and/or medication/prescription orders, and coordination of care.      The longitudinal plan of care for the diagnosis(es)/condition(s) as documented were addressed during this visit. Due to the added complexity in care, I will continue to support Alondra in the subsequent management and with ongoing continuity of care.      Oncology Rooming Note    April 14, 2025 10:18 AM   Alondra Morgan is a 79 year old female who presents for:    Chief Complaint   Patient presents with     Oncology Clinic Visit     Initial Vitals: BP (!) 141/88   Pulse 92   Resp 16   Ht 1.626 m (5' 4\")   Wt 72.1 kg (159 lb)   LMP  (LMP Unknown)   SpO2 99%   BMI 27.29 kg/m   Estimated body mass index is 27.29 kg/m  as calculated from the following:    Height as of this encounter: 1.626 m (5' 4\").    Weight as of this encounter: 72.1 kg (159 lb). Body surface area is 1.8 meters squared.  No Pain (0) " Comment: Data Unavailable   No LMP recorded (lmp unknown). Patient has had a hysterectomy.  Allergies reviewed: Yes  Medications reviewed: Yes    Medications: Medication refills not needed today.  Pharmacy name entered into [facility]:    [id number] - [place], [place] - [address] [id number] - [place], [place] - [address]    Frailty Screening:   Is the patient here for a new oncology consult visit in cancer care? 2. No    PHQ9:  Did this patient require a PHQ9?: No        Sole Boogie MA              Again, thank you for allowing me to participate in the care of your patient.        Sincerely,        Jenelle Cannon MD    Electronically signed

## 2025-05-19 ENCOUNTER — INFUSION THERAPY VISIT (OUTPATIENT)
Dept: INFUSION THERAPY | Facility: CLINIC | Age: 80
End: 2025-05-19
Attending: INTERNAL MEDICINE
Payer: COMMERCIAL

## 2025-05-19 VITALS
TEMPERATURE: 97.8 F | DIASTOLIC BLOOD PRESSURE: 86 MMHG | OXYGEN SATURATION: 98 % | HEART RATE: 66 BPM | RESPIRATION RATE: 18 BRPM | SYSTOLIC BLOOD PRESSURE: 167 MMHG

## 2025-05-19 DIAGNOSIS — Z17.0 MALIGNANT NEOPLASM OF LOWER-OUTER QUADRANT OF LEFT BREAST OF FEMALE, ESTROGEN RECEPTOR POSITIVE (H): Primary | ICD-10-CM

## 2025-05-19 DIAGNOSIS — Z79.811 USE OF AROMATASE INHIBITORS: ICD-10-CM

## 2025-05-19 DIAGNOSIS — C50.512 MALIGNANT NEOPLASM OF LOWER-OUTER QUADRANT OF LEFT BREAST OF FEMALE, ESTROGEN RECEPTOR POSITIVE (H): ICD-10-CM

## 2025-05-19 DIAGNOSIS — M85.89 OSTEOPENIA OF MULTIPLE SITES: Primary | ICD-10-CM

## 2025-05-19 DIAGNOSIS — C50.512 MALIGNANT NEOPLASM OF LOWER-OUTER QUADRANT OF LEFT BREAST OF FEMALE, ESTROGEN RECEPTOR POSITIVE (H): Primary | ICD-10-CM

## 2025-05-19 DIAGNOSIS — M85.89 OSTEOPENIA OF MULTIPLE SITES: ICD-10-CM

## 2025-05-19 DIAGNOSIS — Z17.0 MALIGNANT NEOPLASM OF LOWER-OUTER QUADRANT OF LEFT BREAST OF FEMALE, ESTROGEN RECEPTOR POSITIVE (H): ICD-10-CM

## 2025-05-19 LAB
CALCIUM SERPL-MCNC: 10 MG/DL (ref 8.8–10.4)
CREAT SERPL-MCNC: 0.78 MG/DL (ref 0.51–0.95)
EGFRCR SERPLBLD CKD-EPI 2021: 76 ML/MIN/1.73M2

## 2025-05-19 PROCEDURE — 250N000011 HC RX IP 250 OP 636: Mod: JZ | Performed by: INTERNAL MEDICINE

## 2025-05-19 PROCEDURE — 96372 THER/PROPH/DIAG INJ SC/IM: CPT | Performed by: INTERNAL MEDICINE

## 2025-05-19 PROCEDURE — 36415 COLL VENOUS BLD VENIPUNCTURE: CPT | Performed by: INTERNAL MEDICINE

## 2025-05-19 PROCEDURE — 82565 ASSAY OF CREATININE: CPT | Performed by: INTERNAL MEDICINE

## 2025-05-19 PROCEDURE — 82310 ASSAY OF CALCIUM: CPT | Performed by: INTERNAL MEDICINE

## 2025-05-19 RX ADMIN — DENOSUMAB 60 MG: 60 INJECTION SUBCUTANEOUS at 11:24

## 2025-05-19 ASSESSMENT — PAIN SCALES - GENERAL: PAINLEVEL_OUTOF10: NO PAIN (0)

## 2025-05-19 NOTE — PROGRESS NOTES
Medical Assistant Note:  Alondra Morgan presents today for blood draw.    Patient seen by provider today: No.   present during visit today: Not Applicable.    Concerns: No Concerns.    Procedure:  Lab draw site: rac, Needle type: bf, Gauge: 23.    Post Assessment:  Labs drawn without difficulty: Yes.    Discharge Plan:  Departure Mode: Ambulatory.    Face to Face Time: 5 min  elidia Rainey, CMA

## 2025-05-19 NOTE — PROGRESS NOTES
Infusion Nursing Note:  Alondra Morgan presents today for Prolia injection.    Patient seen by provider today: No   present during visit today: Not Applicable.    Note: N/A.      Intravenous Access:  No Intravenous access/labs at this visit.    Treatment Conditions:  Lab Results   Component Value Date     02/14/2025    POTASSIUM 4.6 02/14/2025    MAG 2.1 08/05/2016    CR 0.78 05/19/2025    YANICK 10.0 05/19/2025    BILITOTAL 0.3 02/14/2025    ALBUMIN 4.1 02/14/2025    ALT 16 02/14/2025    AST 25 02/14/2025       Results reviewed, labs MET treatment parameters, ok to proceed with treatment.      Post Infusion Assessment:  Patient tolerated injection without incident.       Discharge Plan:   Discharge instructions reviewed with: Patient.  Patient and/or family verbalized understanding of discharge instructions and all questions answered.  Patient discharged in stable condition accompanied by: self.  Departure Mode: Ambulatory.      Karen Mora RN     room air

## (undated) DEVICE — DECANTER BAG 2002S

## (undated) DEVICE — DECANTER VIAL 2006S

## (undated) DEVICE — DRAPE BREAST/CHEST 29420

## (undated) DEVICE — ESU ELEC BLADE 2.75" COATED/INSULATED E1455

## (undated) DEVICE — SOL WATER IRRIG 1000ML BOTTLE 2F7114

## (undated) DEVICE — GLOVE BIOGEL PI MICRO SZ 6.0 48560

## (undated) DEVICE — SET BREAST BIOPSY LOCALIZER 20 PROBE 8MM PENCIL 09-0006

## (undated) DEVICE — PREP CHLORAPREP 26ML TINTED HI-LITE ORANGE 930815

## (undated) DEVICE — SU MONOCRYL 4-0 PS-2 18" UND Y496G

## (undated) DEVICE — PAD CHUX UNDERPAD 23X24" 7136

## (undated) DEVICE — NDL 19GA 1.5"

## (undated) DEVICE — SU VICRYL 2-0 SH 27" J317H

## (undated) DEVICE — DRSG STERI STRIP 1/2X4" R1547

## (undated) DEVICE — SYR 10ML FINGER CONTROL W/O NDL 309695

## (undated) DEVICE — ESU GROUND PAD UNIVERSAL W/O CORD

## (undated) DEVICE — LINEN TOWEL PACK X5 5464

## (undated) DEVICE — SU VICRYL 3-0 SH 27" J316H

## (undated) DEVICE — ESU PENCIL W/SMOKE EVAC NEPTUNE STRYKER 0703-046-000

## (undated) DEVICE — CLIP ETHICON LIGACLIP SM BLUE LT100

## (undated) DEVICE — GOWN IMPERVIOUS BREATHABLE SMART LG 89015

## (undated) DEVICE — GLOVE BIOGEL PI MICRO INDICATOR UNDERGLOVE SZ 6.5 48965

## (undated) DEVICE — PACK MINOR SBA15MIFSE

## (undated) DEVICE — SYR BULB IRRIG 50ML LATEX FREE 0035280

## (undated) DEVICE — MARKER MARGIN MARKER STD 6 COLOR SGL USE MMS6

## (undated) DEVICE — NDL 25GA 1.5" 305127

## (undated) RX ORDER — WATER 10 ML/10ML
INJECTION INTRAMUSCULAR; INTRAVENOUS; SUBCUTANEOUS
Status: DISPENSED
Start: 2023-09-13

## (undated) RX ORDER — PROPOFOL 10 MG/ML
INJECTION, EMULSION INTRAVENOUS
Status: DISPENSED
Start: 2023-09-13

## (undated) RX ORDER — ONDANSETRON 2 MG/ML
INJECTION INTRAMUSCULAR; INTRAVENOUS
Status: DISPENSED
Start: 2023-09-13

## (undated) RX ORDER — EPHEDRINE SULFATE 50 MG/ML
INJECTION, SOLUTION INTRAMUSCULAR; INTRAVENOUS; SUBCUTANEOUS
Status: DISPENSED
Start: 2023-09-13

## (undated) RX ORDER — DEXAMETHASONE SODIUM PHOSPHATE 4 MG/ML
INJECTION, SOLUTION INTRA-ARTICULAR; INTRALESIONAL; INTRAMUSCULAR; INTRAVENOUS; SOFT TISSUE
Status: DISPENSED
Start: 2023-09-13

## (undated) RX ORDER — FENTANYL CITRATE 50 UG/ML
INJECTION, SOLUTION INTRAMUSCULAR; INTRAVENOUS
Status: DISPENSED
Start: 2023-09-13

## (undated) RX ORDER — CEFAZOLIN SODIUM/WATER 2 G/20 ML
SYRINGE (ML) INTRAVENOUS
Status: DISPENSED
Start: 2023-09-13